# Patient Record
Sex: MALE | Race: WHITE | NOT HISPANIC OR LATINO | Employment: FULL TIME | URBAN - METROPOLITAN AREA
[De-identification: names, ages, dates, MRNs, and addresses within clinical notes are randomized per-mention and may not be internally consistent; named-entity substitution may affect disease eponyms.]

---

## 2022-01-01 ENCOUNTER — APPOINTMENT (OUTPATIENT)
Dept: CARDIOLOGY | Facility: CLINIC | Age: 62
DRG: 834 | End: 2022-01-01
Attending: PHYSICIAN ASSISTANT
Payer: COMMERCIAL

## 2022-01-01 ENCOUNTER — APPOINTMENT (OUTPATIENT)
Dept: GENERAL RADIOLOGY | Facility: CLINIC | Age: 62
DRG: 834 | End: 2022-01-01
Attending: PHYSICIAN ASSISTANT
Payer: COMMERCIAL

## 2022-01-01 ENCOUNTER — LAB (OUTPATIENT)
Dept: LAB | Facility: CLINIC | Age: 62
DRG: 834 | End: 2022-01-01
Attending: INTERNAL MEDICINE
Payer: COMMERCIAL

## 2022-01-01 ENCOUNTER — DOCUMENTATION ONLY (OUTPATIENT)
Dept: MEDSURG UNIT | Facility: CLINIC | Age: 62
End: 2022-01-01

## 2022-01-01 ENCOUNTER — APPOINTMENT (OUTPATIENT)
Dept: GENERAL RADIOLOGY | Facility: CLINIC | Age: 62
DRG: 834 | End: 2022-01-01
Attending: INTERNAL MEDICINE
Payer: COMMERCIAL

## 2022-01-01 ENCOUNTER — APPOINTMENT (OUTPATIENT)
Dept: MRI IMAGING | Facility: CLINIC | Age: 62
DRG: 834 | End: 2022-01-01
Attending: PHYSICIAN ASSISTANT
Payer: COMMERCIAL

## 2022-01-01 ENCOUNTER — TELEPHONE (OUTPATIENT)
Dept: TRANSPLANT | Facility: CLINIC | Age: 62
End: 2022-01-01

## 2022-01-01 ENCOUNTER — HOSPITAL ENCOUNTER (INPATIENT)
Facility: CLINIC | Age: 62
LOS: 20 days | Discharge: HOME | DRG: 834 | End: 2022-12-29
Attending: INTERNAL MEDICINE | Admitting: PEDIATRICS
Payer: COMMERCIAL

## 2022-01-01 ENCOUNTER — TELEPHONE (OUTPATIENT)
Dept: ONCOLOGY | Facility: CLINIC | Age: 62
End: 2022-01-01

## 2022-01-01 ENCOUNTER — PATIENT OUTREACH (OUTPATIENT)
Dept: ONCOLOGY | Facility: CLINIC | Age: 62
End: 2022-01-01

## 2022-01-01 ENCOUNTER — APPOINTMENT (OUTPATIENT)
Dept: CT IMAGING | Facility: CLINIC | Age: 62
DRG: 834 | End: 2022-01-01
Attending: PHYSICIAN ASSISTANT
Payer: COMMERCIAL

## 2022-01-01 VITALS
SYSTOLIC BLOOD PRESSURE: 103 MMHG | OXYGEN SATURATION: 97 % | TEMPERATURE: 98.8 F | RESPIRATION RATE: 18 BRPM | HEIGHT: 73 IN | HEART RATE: 83 BPM | BODY MASS INDEX: 41.75 KG/M2 | WEIGHT: 315 LBS | DIASTOLIC BLOOD PRESSURE: 61 MMHG

## 2022-01-01 DIAGNOSIS — C95.90 LEUKEMIA NOT HAVING ACHIEVED REMISSION, UNSPECIFIED LEUKEMIA TYPE (H): ICD-10-CM

## 2022-01-01 DIAGNOSIS — C91.90 LYMPHOID LEUKEMIA NOT HAVING ACHIEVED REMISSION, UNSPECIFIED LYMPHOID LEUKEMIA TYPE (H): ICD-10-CM

## 2022-01-01 DIAGNOSIS — C92.50: ICD-10-CM

## 2022-01-01 DIAGNOSIS — C92.00 ACUTE MYELOID LEUKEMIA NOT HAVING ACHIEVED REMISSION (H): ICD-10-CM

## 2022-01-01 DIAGNOSIS — C95.00 ACUTE LEUKEMIA (H): Primary | ICD-10-CM

## 2022-01-01 DIAGNOSIS — C92.00 ACUTE MYELOID LEUKEMIA NOT HAVING ACHIEVED REMISSION (H): Primary | ICD-10-CM

## 2022-01-01 LAB
A*: NORMAL
A*LOCUS SEROLOGIC EQUIVALENT: 2
A*LOCUS: NORMAL
A*SEROLOGIC EQUIVALENT: 3
ABO/RH(D): NORMAL
ABTEST METHOD: NORMAL
ADDITIONAL COMMENTS: NORMAL
ALBUMIN SERPL BCG-MCNC: 3.2 G/DL (ref 3.5–5.2)
ALBUMIN SERPL BCG-MCNC: 3.3 G/DL (ref 3.5–5.2)
ALBUMIN SERPL BCG-MCNC: 3.3 G/DL (ref 3.5–5.2)
ALBUMIN SERPL BCG-MCNC: 3.4 G/DL (ref 3.5–5.2)
ALBUMIN SERPL BCG-MCNC: 3.4 G/DL (ref 3.5–5.2)
ALBUMIN SERPL BCG-MCNC: 3.5 G/DL (ref 3.5–5.2)
ALBUMIN SERPL BCG-MCNC: 3.6 G/DL (ref 3.5–5.2)
ALBUMIN SERPL BCG-MCNC: 3.7 G/DL (ref 3.5–5.2)
ALBUMIN SERPL BCG-MCNC: 3.8 G/DL (ref 3.5–5.2)
ALBUMIN SERPL BCG-MCNC: 3.9 G/DL (ref 3.5–5.2)
ALBUMIN SERPL BCG-MCNC: 3.9 G/DL (ref 3.5–5.2)
ALP SERPL-CCNC: 100 U/L (ref 40–129)
ALP SERPL-CCNC: 62 U/L (ref 40–129)
ALP SERPL-CCNC: 64 U/L (ref 40–129)
ALP SERPL-CCNC: 67 U/L (ref 40–129)
ALP SERPL-CCNC: 68 U/L (ref 40–129)
ALP SERPL-CCNC: 69 U/L (ref 40–129)
ALP SERPL-CCNC: 71 U/L (ref 40–129)
ALP SERPL-CCNC: 74 U/L (ref 40–129)
ALP SERPL-CCNC: 75 U/L (ref 40–129)
ALP SERPL-CCNC: 76 U/L (ref 40–129)
ALP SERPL-CCNC: 77 U/L (ref 40–129)
ALP SERPL-CCNC: 78 U/L (ref 40–129)
ALP SERPL-CCNC: 82 U/L (ref 40–129)
ALP SERPL-CCNC: 83 U/L (ref 40–129)
ALP SERPL-CCNC: 86 U/L (ref 40–129)
ALP SERPL-CCNC: 87 U/L (ref 40–129)
ALP SERPL-CCNC: 88 U/L (ref 40–129)
ALP SERPL-CCNC: 90 U/L (ref 40–129)
ALP SERPL-CCNC: 96 U/L (ref 40–129)
ALP SERPL-CCNC: 96 U/L (ref 40–129)
ALT SERPL W P-5'-P-CCNC: 13 U/L (ref 10–50)
ALT SERPL W P-5'-P-CCNC: 15 U/L (ref 10–50)
ALT SERPL W P-5'-P-CCNC: 15 U/L (ref 10–50)
ALT SERPL W P-5'-P-CCNC: 16 U/L (ref 10–50)
ALT SERPL W P-5'-P-CCNC: 16 U/L (ref 10–50)
ALT SERPL W P-5'-P-CCNC: 17 U/L (ref 10–50)
ALT SERPL W P-5'-P-CCNC: 17 U/L (ref 10–50)
ALT SERPL W P-5'-P-CCNC: 19 U/L (ref 10–50)
ALT SERPL W P-5'-P-CCNC: 20 U/L (ref 10–50)
ALT SERPL W P-5'-P-CCNC: 21 U/L (ref 10–50)
ALT SERPL W P-5'-P-CCNC: 22 U/L (ref 10–50)
ALT SERPL W P-5'-P-CCNC: 24 U/L (ref 10–50)
ALT SERPL W P-5'-P-CCNC: 26 U/L (ref 10–50)
ALT SERPL W P-5'-P-CCNC: 30 U/L (ref 10–50)
ALT SERPL W P-5'-P-CCNC: 37 U/L (ref 10–50)
ALT SERPL W P-5'-P-CCNC: 39 U/L (ref 10–50)
ALT SERPL W P-5'-P-CCNC: 39 U/L (ref 10–50)
ALT SERPL W P-5'-P-CCNC: 5 U/L (ref 10–50)
ALT SERPL W P-5'-P-CCNC: <5 U/L (ref 10–50)
ALT SERPL W P-5'-P-CCNC: <5 U/L (ref 10–50)
ANION GAP SERPL CALCULATED.3IONS-SCNC: 10 MMOL/L (ref 7–15)
ANION GAP SERPL CALCULATED.3IONS-SCNC: 11 MMOL/L (ref 7–15)
ANION GAP SERPL CALCULATED.3IONS-SCNC: 12 MMOL/L (ref 7–15)
ANION GAP SERPL CALCULATED.3IONS-SCNC: 13 MMOL/L (ref 7–15)
ANION GAP SERPL CALCULATED.3IONS-SCNC: 14 MMOL/L (ref 7–15)
ANION GAP SERPL CALCULATED.3IONS-SCNC: 15 MMOL/L (ref 7–15)
ANION GAP SERPL CALCULATED.3IONS-SCNC: 16 MMOL/L (ref 7–15)
ANION GAP SERPL CALCULATED.3IONS-SCNC: 16 MMOL/L (ref 7–15)
ANTIBODY SCREEN: NEGATIVE
APPEARANCE CSF: CLEAR
APTT PPP: 25 SECONDS (ref 22–38)
APTT PPP: 26 SECONDS (ref 22–38)
APTT PPP: 26 SECONDS (ref 22–38)
APTT PPP: 27 SECONDS (ref 22–38)
APTT PPP: 27 SECONDS (ref 22–38)
APTT PPP: 28 SECONDS (ref 22–38)
APTT PPP: 30 SECONDS (ref 22–38)
APTT PPP: 30 SECONDS (ref 22–38)
APTT PPP: 31 SECONDS (ref 22–38)
APTT PPP: 32 SECONDS (ref 22–38)
APTT PPP: 33 SECONDS (ref 22–38)
APTT PPP: 34 SECONDS (ref 22–38)
APTT PPP: 37 SECONDS (ref 22–38)
AST SERPL W P-5'-P-CCNC: 21 U/L (ref 10–50)
AST SERPL W P-5'-P-CCNC: 23 U/L (ref 10–50)
AST SERPL W P-5'-P-CCNC: 23 U/L (ref 10–50)
AST SERPL W P-5'-P-CCNC: 25 U/L (ref 10–50)
AST SERPL W P-5'-P-CCNC: 27 U/L (ref 10–50)
AST SERPL W P-5'-P-CCNC: 29 U/L (ref 10–50)
AST SERPL W P-5'-P-CCNC: 30 U/L (ref 10–50)
AST SERPL W P-5'-P-CCNC: 34 U/L (ref 10–50)
AST SERPL W P-5'-P-CCNC: 39 U/L (ref 10–50)
AST SERPL W P-5'-P-CCNC: 45 U/L (ref 10–50)
AST SERPL W P-5'-P-CCNC: 49 U/L (ref 10–50)
AST SERPL W P-5'-P-CCNC: 52 U/L (ref 10–50)
AST SERPL W P-5'-P-CCNC: 53 U/L (ref 10–50)
AST SERPL W P-5'-P-CCNC: 55 U/L (ref 10–50)
AST SERPL W P-5'-P-CCNC: 59 U/L (ref 10–50)
AST SERPL W P-5'-P-CCNC: 64 U/L (ref 10–50)
AST SERPL W P-5'-P-CCNC: 64 U/L (ref 10–50)
AST SERPL W P-5'-P-CCNC: 65 U/L (ref 10–50)
AST SERPL W P-5'-P-CCNC: 68 U/L (ref 10–50)
AST SERPL W P-5'-P-CCNC: 72 U/L (ref 10–50)
ATRIAL RATE - MUSE: 60 BPM
ATRIAL RATE - MUSE: 75 BPM
ATRIAL RATE - MUSE: 77 BPM
ATRIAL RATE - MUSE: 81 BPM
B*: NORMAL
B*LOCUS SEROLOGIC EQUIVALENT: 35
B*LOCUS: NORMAL
B*SEROLOGIC EQUIVALENT: 50
BACTERIA BLD CULT: NO GROWTH
BACTERIA BLD CULT: NO GROWTH
BASE EXCESS BLDV CALC-SCNC: 1.2 MMOL/L (ref -7.7–1.9)
BASOPHILS # BLD MANUAL: 0 10E3/UL (ref 0–0.2)
BASOPHILS # BLD MANUAL: 0.2 10E3/UL (ref 0–0.2)
BASOPHILS # BLD MANUAL: 0.2 10E3/UL (ref 0–0.2)
BASOPHILS NFR BLD MANUAL: 0 %
BASOPHILS NFR BLD MANUAL: 1 %
BILIRUB SERPL-MCNC: 0.5 MG/DL
BILIRUB SERPL-MCNC: 0.6 MG/DL
BILIRUB SERPL-MCNC: 0.7 MG/DL
BUN SERPL-MCNC: 25.1 MG/DL (ref 8–23)
BUN SERPL-MCNC: 25.7 MG/DL (ref 8–23)
BUN SERPL-MCNC: 26.2 MG/DL (ref 8–23)
BUN SERPL-MCNC: 26.4 MG/DL (ref 8–23)
BUN SERPL-MCNC: 26.7 MG/DL (ref 8–23)
BUN SERPL-MCNC: 26.9 MG/DL (ref 8–23)
BUN SERPL-MCNC: 27.1 MG/DL (ref 8–23)
BUN SERPL-MCNC: 27.2 MG/DL (ref 8–23)
BUN SERPL-MCNC: 27.2 MG/DL (ref 8–23)
BUN SERPL-MCNC: 28.1 MG/DL (ref 8–23)
BUN SERPL-MCNC: 28.2 MG/DL (ref 8–23)
BUN SERPL-MCNC: 29 MG/DL (ref 8–23)
BUN SERPL-MCNC: 29.3 MG/DL (ref 8–23)
BUN SERPL-MCNC: 29.6 MG/DL (ref 8–23)
BUN SERPL-MCNC: 29.6 MG/DL (ref 8–23)
BUN SERPL-MCNC: 30 MG/DL (ref 8–23)
BUN SERPL-MCNC: 30.6 MG/DL (ref 8–23)
BUN SERPL-MCNC: 31.1 MG/DL (ref 8–23)
BUN SERPL-MCNC: 32 MG/DL (ref 8–23)
BUN SERPL-MCNC: 32.9 MG/DL (ref 8–23)
BUN SERPL-MCNC: 33.2 MG/DL (ref 8–23)
BUN SERPL-MCNC: 33.3 MG/DL (ref 8–23)
BUN SERPL-MCNC: 33.4 MG/DL (ref 8–23)
BUN SERPL-MCNC: 34.1 MG/DL (ref 8–23)
BUN SERPL-MCNC: 34.9 MG/DL (ref 8–23)
BUN SERPL-MCNC: 35.4 MG/DL (ref 8–23)
BUN SERPL-MCNC: 36.5 MG/DL (ref 8–23)
BUN SERPL-MCNC: 38.7 MG/DL (ref 8–23)
BUN SERPL-MCNC: 39 MG/DL (ref 8–23)
BW-1: NORMAL
C*: NORMAL
C*LOCUS SEROLOGIC EQUIVALENT: 4
C*LOCUS: NORMAL
C*SEROLOGIC EQUIVALENT: 12
CALCIUM SERPL-MCNC: 10.1 MG/DL (ref 8.8–10.2)
CALCIUM SERPL-MCNC: 10.1 MG/DL (ref 8.8–10.2)
CALCIUM SERPL-MCNC: 10.2 MG/DL (ref 8.8–10.2)
CALCIUM SERPL-MCNC: 8.9 MG/DL (ref 8.8–10.2)
CALCIUM SERPL-MCNC: 9 MG/DL (ref 8.8–10.2)
CALCIUM SERPL-MCNC: 9 MG/DL (ref 8.8–10.2)
CALCIUM SERPL-MCNC: 9.1 MG/DL (ref 8.8–10.2)
CALCIUM SERPL-MCNC: 9.2 MG/DL (ref 8.8–10.2)
CALCIUM SERPL-MCNC: 9.3 MG/DL (ref 8.8–10.2)
CALCIUM SERPL-MCNC: 9.4 MG/DL (ref 8.8–10.2)
CALCIUM SERPL-MCNC: 9.4 MG/DL (ref 8.8–10.2)
CALCIUM SERPL-MCNC: 9.5 MG/DL (ref 8.8–10.2)
CALCIUM SERPL-MCNC: 9.6 MG/DL (ref 8.8–10.2)
CALCIUM SERPL-MCNC: 9.6 MG/DL (ref 8.8–10.2)
CALCIUM SERPL-MCNC: 9.8 MG/DL (ref 8.8–10.2)
CALCIUM SERPL-MCNC: 9.9 MG/DL (ref 8.8–10.2)
CHLORIDE SERPL-SCNC: 100 MMOL/L (ref 98–107)
CHLORIDE SERPL-SCNC: 100 MMOL/L (ref 98–107)
CHLORIDE SERPL-SCNC: 102 MMOL/L (ref 98–107)
CHLORIDE SERPL-SCNC: 103 MMOL/L (ref 98–107)
CHLORIDE SERPL-SCNC: 90 MMOL/L (ref 98–107)
CHLORIDE SERPL-SCNC: 92 MMOL/L (ref 98–107)
CHLORIDE SERPL-SCNC: 93 MMOL/L (ref 98–107)
CHLORIDE SERPL-SCNC: 94 MMOL/L (ref 98–107)
CHLORIDE SERPL-SCNC: 95 MMOL/L (ref 98–107)
CHLORIDE SERPL-SCNC: 96 MMOL/L (ref 98–107)
CHLORIDE SERPL-SCNC: 97 MMOL/L (ref 98–107)
CHLORIDE SERPL-SCNC: 97 MMOL/L (ref 98–107)
CHLORIDE SERPL-SCNC: 98 MMOL/L (ref 98–107)
CMV IGG SERPL IA-ACNC: <0.2 U/ML
CMV IGG SERPL IA-ACNC: NORMAL
CMV IGM SERPL IA-ACNC: <8 AU/ML
CMV IGM SERPL IA-ACNC: NEGATIVE
COLOR CSF: COLORLESS
CORTIS SERPL-MCNC: 0.7 UG/DL
CREAT SERPL-MCNC: 0.8 MG/DL (ref 0.67–1.17)
CREAT SERPL-MCNC: 0.88 MG/DL (ref 0.67–1.17)
CREAT SERPL-MCNC: 0.9 MG/DL (ref 0.67–1.17)
CREAT SERPL-MCNC: 0.9 MG/DL (ref 0.67–1.17)
CREAT SERPL-MCNC: 0.93 MG/DL (ref 0.67–1.17)
CREAT SERPL-MCNC: 0.95 MG/DL (ref 0.67–1.17)
CREAT SERPL-MCNC: 0.96 MG/DL (ref 0.67–1.17)
CREAT SERPL-MCNC: 0.97 MG/DL (ref 0.67–1.17)
CREAT SERPL-MCNC: 0.98 MG/DL (ref 0.67–1.17)
CREAT SERPL-MCNC: 1 MG/DL (ref 0.67–1.17)
CREAT SERPL-MCNC: 1 MG/DL (ref 0.67–1.17)
CREAT SERPL-MCNC: 1.01 MG/DL (ref 0.67–1.17)
CREAT SERPL-MCNC: 1.01 MG/DL (ref 0.67–1.17)
CREAT SERPL-MCNC: 1.02 MG/DL (ref 0.67–1.17)
CREAT SERPL-MCNC: 1.04 MG/DL (ref 0.67–1.17)
CREAT SERPL-MCNC: 1.04 MG/DL (ref 0.67–1.17)
CREAT SERPL-MCNC: 1.08 MG/DL (ref 0.67–1.17)
CREAT SERPL-MCNC: 1.11 MG/DL (ref 0.67–1.17)
CREAT SERPL-MCNC: 1.15 MG/DL (ref 0.67–1.17)
CREAT SERPL-MCNC: 1.16 MG/DL (ref 0.67–1.17)
CREAT SERPL-MCNC: 1.16 MG/DL (ref 0.67–1.17)
CREAT SERPL-MCNC: 1.22 MG/DL (ref 0.67–1.17)
CREAT SERPL-MCNC: 1.27 MG/DL (ref 0.67–1.17)
CREAT SERPL-MCNC: 1.53 MG/DL (ref 0.67–1.17)
CRP SERPL-MCNC: 18.4 MG/L
CULTURE HARVEST COMPLETE DATE: NORMAL
DACRYOCYTES BLD QL SMEAR: SLIGHT
DAT POLY: NEGATIVE
DEPRECATED HCO3 PLAS-SCNC: 20 MMOL/L (ref 22–29)
DEPRECATED HCO3 PLAS-SCNC: 21 MMOL/L (ref 22–29)
DEPRECATED HCO3 PLAS-SCNC: 22 MMOL/L (ref 22–29)
DEPRECATED HCO3 PLAS-SCNC: 22 MMOL/L (ref 22–29)
DEPRECATED HCO3 PLAS-SCNC: 23 MMOL/L (ref 22–29)
DEPRECATED HCO3 PLAS-SCNC: 24 MMOL/L (ref 22–29)
DEPRECATED HCO3 PLAS-SCNC: 25 MMOL/L (ref 22–29)
DEPRECATED HCO3 PLAS-SCNC: 26 MMOL/L (ref 22–29)
DIASTOLIC BLOOD PRESSURE - MUSE: NORMAL MMHG
DPA1*: NORMAL
DPB1*: NORMAL
DPB1*LOCUS NMDP: NORMAL
DPB1*LOCUS: NORMAL
DPB1*NMDP: NORMAL
DQA1*: NORMAL
DQA1*LOCUS: NORMAL
DQB1*: NORMAL
DQB1*LOCUS SEROLOGIC EQUIVALENT: 2
DQB1*LOCUS: NORMAL
DQB1*SEROLOGIC EQUIVALENT: 5
DRB1*: NORMAL
DRB1*LOCUS SEROLOGIC EQUIVALENT: 7
DRB1*LOCUS: NORMAL
DRB1*SEROLOGIC EQUIVALENT: 14
DRB3*LOCUS SEROLOGIC EQUIVALENT: 52
DRB3*LOCUS: NORMAL
DRB4*: NORMAL
DRB4*SEROLOGIC EQUIVALENT: 53
DRSSO TEST METHOD: NORMAL
EBV VCA IGG SER IA-ACNC: 71.2 U/ML
EBV VCA IGG SER IA-ACNC: POSITIVE
EBV VCA IGM SER IA-ACNC: <10 U/ML
EBV VCA IGM SER IA-ACNC: NORMAL
EOSINOPHIL # BLD MANUAL: 0 10E3/UL (ref 0–0.7)
EOSINOPHIL NFR BLD MANUAL: 0 %
ERYTHROCYTE [DISTWIDTH] IN BLOOD BY AUTOMATED COUNT: 18.6 % (ref 10–15)
ERYTHROCYTE [DISTWIDTH] IN BLOOD BY AUTOMATED COUNT: 18.7 % (ref 10–15)
ERYTHROCYTE [DISTWIDTH] IN BLOOD BY AUTOMATED COUNT: 18.8 % (ref 10–15)
ERYTHROCYTE [DISTWIDTH] IN BLOOD BY AUTOMATED COUNT: 18.9 % (ref 10–15)
ERYTHROCYTE [DISTWIDTH] IN BLOOD BY AUTOMATED COUNT: 18.9 % (ref 10–15)
ERYTHROCYTE [DISTWIDTH] IN BLOOD BY AUTOMATED COUNT: 19 % (ref 10–15)
ERYTHROCYTE [DISTWIDTH] IN BLOOD BY AUTOMATED COUNT: 19.1 % (ref 10–15)
ERYTHROCYTE [DISTWIDTH] IN BLOOD BY AUTOMATED COUNT: 19.2 % (ref 10–15)
ERYTHROCYTE [DISTWIDTH] IN BLOOD BY AUTOMATED COUNT: 19.3 % (ref 10–15)
ERYTHROCYTE [DISTWIDTH] IN BLOOD BY AUTOMATED COUNT: 19.4 % (ref 10–15)
ERYTHROCYTE [DISTWIDTH] IN BLOOD BY AUTOMATED COUNT: 19.4 % (ref 10–15)
ERYTHROCYTE [DISTWIDTH] IN BLOOD BY AUTOMATED COUNT: 19.5 % (ref 10–15)
FIBRINOGEN PPP-MCNC: 243 MG/DL (ref 170–490)
FIBRINOGEN PPP-MCNC: 246 MG/DL (ref 170–490)
FIBRINOGEN PPP-MCNC: 248 MG/DL (ref 170–490)
FIBRINOGEN PPP-MCNC: 253 MG/DL (ref 170–490)
FIBRINOGEN PPP-MCNC: 256 MG/DL (ref 170–490)
FIBRINOGEN PPP-MCNC: 267 MG/DL (ref 170–490)
FIBRINOGEN PPP-MCNC: 272 MG/DL (ref 170–490)
FIBRINOGEN PPP-MCNC: 282 MG/DL (ref 170–490)
FIBRINOGEN PPP-MCNC: 282 MG/DL (ref 170–490)
FIBRINOGEN PPP-MCNC: 294 MG/DL (ref 170–490)
FIBRINOGEN PPP-MCNC: 315 MG/DL (ref 170–490)
FIBRINOGEN PPP-MCNC: 318 MG/DL (ref 170–490)
FIBRINOGEN PPP-MCNC: 323 MG/DL (ref 170–490)
FIBRINOGEN PPP-MCNC: 336 MG/DL (ref 170–490)
FIBRINOGEN PPP-MCNC: 336 MG/DL (ref 170–490)
FIBRINOGEN PPP-MCNC: 359 MG/DL (ref 170–490)
FIBRINOGEN PPP-MCNC: 395 MG/DL (ref 170–490)
FIBRINOGEN PPP-MCNC: 460 MG/DL (ref 170–490)
FIBRINOGEN PPP-MCNC: 474 MG/DL (ref 170–490)
FIBRINOGEN PPP-MCNC: 490 MG/DL (ref 170–490)
FIBRINOGEN PPP-MCNC: 498 MG/DL (ref 170–490)
GFR SERPL CREATININE-BSD FRML MDRD: 52 ML/MIN/1.73M2
GFR SERPL CREATININE-BSD FRML MDRD: 65 ML/MIN/1.73M2
GFR SERPL CREATININE-BSD FRML MDRD: 68 ML/MIN/1.73M2
GFR SERPL CREATININE-BSD FRML MDRD: 72 ML/MIN/1.73M2
GFR SERPL CREATININE-BSD FRML MDRD: 72 ML/MIN/1.73M2
GFR SERPL CREATININE-BSD FRML MDRD: 73 ML/MIN/1.73M2
GFR SERPL CREATININE-BSD FRML MDRD: 76 ML/MIN/1.73M2
GFR SERPL CREATININE-BSD FRML MDRD: 79 ML/MIN/1.73M2
GFR SERPL CREATININE-BSD FRML MDRD: 82 ML/MIN/1.73M2
GFR SERPL CREATININE-BSD FRML MDRD: 82 ML/MIN/1.73M2
GFR SERPL CREATININE-BSD FRML MDRD: 84 ML/MIN/1.73M2
GFR SERPL CREATININE-BSD FRML MDRD: 85 ML/MIN/1.73M2
GFR SERPL CREATININE-BSD FRML MDRD: 85 ML/MIN/1.73M2
GFR SERPL CREATININE-BSD FRML MDRD: 86 ML/MIN/1.73M2
GFR SERPL CREATININE-BSD FRML MDRD: 86 ML/MIN/1.73M2
GFR SERPL CREATININE-BSD FRML MDRD: 88 ML/MIN/1.73M2
GFR SERPL CREATININE-BSD FRML MDRD: 89 ML/MIN/1.73M2
GFR SERPL CREATININE-BSD FRML MDRD: 90 ML/MIN/1.73M2
GFR SERPL CREATININE-BSD FRML MDRD: >90 ML/MIN/1.73M2
GGT SERPL-CCNC: 77 U/L (ref 8–61)
GLUCOSE BLDC GLUCOMTR-MCNC: 103 MG/DL (ref 70–99)
GLUCOSE BLDC GLUCOMTR-MCNC: 104 MG/DL (ref 70–99)
GLUCOSE BLDC GLUCOMTR-MCNC: 105 MG/DL (ref 70–99)
GLUCOSE BLDC GLUCOMTR-MCNC: 106 MG/DL (ref 70–99)
GLUCOSE BLDC GLUCOMTR-MCNC: 107 MG/DL (ref 70–99)
GLUCOSE BLDC GLUCOMTR-MCNC: 108 MG/DL (ref 70–99)
GLUCOSE BLDC GLUCOMTR-MCNC: 109 MG/DL (ref 70–99)
GLUCOSE BLDC GLUCOMTR-MCNC: 115 MG/DL (ref 70–99)
GLUCOSE BLDC GLUCOMTR-MCNC: 115 MG/DL (ref 70–99)
GLUCOSE BLDC GLUCOMTR-MCNC: 116 MG/DL (ref 70–99)
GLUCOSE BLDC GLUCOMTR-MCNC: 117 MG/DL (ref 70–99)
GLUCOSE BLDC GLUCOMTR-MCNC: 121 MG/DL (ref 70–99)
GLUCOSE BLDC GLUCOMTR-MCNC: 122 MG/DL (ref 70–99)
GLUCOSE BLDC GLUCOMTR-MCNC: 124 MG/DL (ref 70–99)
GLUCOSE BLDC GLUCOMTR-MCNC: 128 MG/DL (ref 70–99)
GLUCOSE BLDC GLUCOMTR-MCNC: 131 MG/DL (ref 70–99)
GLUCOSE BLDC GLUCOMTR-MCNC: 133 MG/DL (ref 70–99)
GLUCOSE BLDC GLUCOMTR-MCNC: 137 MG/DL (ref 70–99)
GLUCOSE BLDC GLUCOMTR-MCNC: 139 MG/DL (ref 70–99)
GLUCOSE BLDC GLUCOMTR-MCNC: 140 MG/DL (ref 70–99)
GLUCOSE BLDC GLUCOMTR-MCNC: 140 MG/DL (ref 70–99)
GLUCOSE BLDC GLUCOMTR-MCNC: 141 MG/DL (ref 70–99)
GLUCOSE BLDC GLUCOMTR-MCNC: 145 MG/DL (ref 70–99)
GLUCOSE BLDC GLUCOMTR-MCNC: 146 MG/DL (ref 70–99)
GLUCOSE BLDC GLUCOMTR-MCNC: 148 MG/DL (ref 70–99)
GLUCOSE BLDC GLUCOMTR-MCNC: 152 MG/DL (ref 70–99)
GLUCOSE BLDC GLUCOMTR-MCNC: 153 MG/DL (ref 70–99)
GLUCOSE BLDC GLUCOMTR-MCNC: 154 MG/DL (ref 70–99)
GLUCOSE BLDC GLUCOMTR-MCNC: 159 MG/DL (ref 70–99)
GLUCOSE BLDC GLUCOMTR-MCNC: 159 MG/DL (ref 70–99)
GLUCOSE BLDC GLUCOMTR-MCNC: 164 MG/DL (ref 70–99)
GLUCOSE BLDC GLUCOMTR-MCNC: 166 MG/DL (ref 70–99)
GLUCOSE BLDC GLUCOMTR-MCNC: 167 MG/DL (ref 70–99)
GLUCOSE BLDC GLUCOMTR-MCNC: 167 MG/DL (ref 70–99)
GLUCOSE BLDC GLUCOMTR-MCNC: 168 MG/DL (ref 70–99)
GLUCOSE BLDC GLUCOMTR-MCNC: 168 MG/DL (ref 70–99)
GLUCOSE BLDC GLUCOMTR-MCNC: 169 MG/DL (ref 70–99)
GLUCOSE BLDC GLUCOMTR-MCNC: 169 MG/DL (ref 70–99)
GLUCOSE BLDC GLUCOMTR-MCNC: 172 MG/DL (ref 70–99)
GLUCOSE BLDC GLUCOMTR-MCNC: 173 MG/DL (ref 70–99)
GLUCOSE BLDC GLUCOMTR-MCNC: 173 MG/DL (ref 70–99)
GLUCOSE BLDC GLUCOMTR-MCNC: 174 MG/DL (ref 70–99)
GLUCOSE BLDC GLUCOMTR-MCNC: 174 MG/DL (ref 70–99)
GLUCOSE BLDC GLUCOMTR-MCNC: 175 MG/DL (ref 70–99)
GLUCOSE BLDC GLUCOMTR-MCNC: 176 MG/DL (ref 70–99)
GLUCOSE BLDC GLUCOMTR-MCNC: 180 MG/DL (ref 70–99)
GLUCOSE BLDC GLUCOMTR-MCNC: 181 MG/DL (ref 70–99)
GLUCOSE BLDC GLUCOMTR-MCNC: 181 MG/DL (ref 70–99)
GLUCOSE BLDC GLUCOMTR-MCNC: 186 MG/DL (ref 70–99)
GLUCOSE BLDC GLUCOMTR-MCNC: 187 MG/DL (ref 70–99)
GLUCOSE BLDC GLUCOMTR-MCNC: 188 MG/DL (ref 70–99)
GLUCOSE BLDC GLUCOMTR-MCNC: 189 MG/DL (ref 70–99)
GLUCOSE BLDC GLUCOMTR-MCNC: 190 MG/DL (ref 70–99)
GLUCOSE BLDC GLUCOMTR-MCNC: 193 MG/DL (ref 70–99)
GLUCOSE BLDC GLUCOMTR-MCNC: 193 MG/DL (ref 70–99)
GLUCOSE BLDC GLUCOMTR-MCNC: 194 MG/DL (ref 70–99)
GLUCOSE BLDC GLUCOMTR-MCNC: 195 MG/DL (ref 70–99)
GLUCOSE BLDC GLUCOMTR-MCNC: 196 MG/DL (ref 70–99)
GLUCOSE BLDC GLUCOMTR-MCNC: 201 MG/DL (ref 70–99)
GLUCOSE BLDC GLUCOMTR-MCNC: 203 MG/DL (ref 70–99)
GLUCOSE BLDC GLUCOMTR-MCNC: 207 MG/DL (ref 70–99)
GLUCOSE BLDC GLUCOMTR-MCNC: 210 MG/DL (ref 70–99)
GLUCOSE BLDC GLUCOMTR-MCNC: 214 MG/DL (ref 70–99)
GLUCOSE BLDC GLUCOMTR-MCNC: 214 MG/DL (ref 70–99)
GLUCOSE BLDC GLUCOMTR-MCNC: 217 MG/DL (ref 70–99)
GLUCOSE BLDC GLUCOMTR-MCNC: 217 MG/DL (ref 70–99)
GLUCOSE BLDC GLUCOMTR-MCNC: 219 MG/DL (ref 70–99)
GLUCOSE BLDC GLUCOMTR-MCNC: 222 MG/DL (ref 70–99)
GLUCOSE BLDC GLUCOMTR-MCNC: 224 MG/DL (ref 70–99)
GLUCOSE BLDC GLUCOMTR-MCNC: 225 MG/DL (ref 70–99)
GLUCOSE BLDC GLUCOMTR-MCNC: 227 MG/DL (ref 70–99)
GLUCOSE BLDC GLUCOMTR-MCNC: 234 MG/DL (ref 70–99)
GLUCOSE BLDC GLUCOMTR-MCNC: 236 MG/DL (ref 70–99)
GLUCOSE BLDC GLUCOMTR-MCNC: 237 MG/DL (ref 70–99)
GLUCOSE BLDC GLUCOMTR-MCNC: 241 MG/DL (ref 70–99)
GLUCOSE BLDC GLUCOMTR-MCNC: 265 MG/DL (ref 70–99)
GLUCOSE BLDC GLUCOMTR-MCNC: 268 MG/DL (ref 70–99)
GLUCOSE BLDC GLUCOMTR-MCNC: 278 MG/DL (ref 70–99)
GLUCOSE BLDC GLUCOMTR-MCNC: 82 MG/DL (ref 70–99)
GLUCOSE BLDC GLUCOMTR-MCNC: 94 MG/DL (ref 70–99)
GLUCOSE BLDC GLUCOMTR-MCNC: 95 MG/DL (ref 70–99)
GLUCOSE BLDC GLUCOMTR-MCNC: 96 MG/DL (ref 70–99)
GLUCOSE BLDC GLUCOMTR-MCNC: 99 MG/DL (ref 70–99)
GLUCOSE CSF-MCNC: 85 MG/DL (ref 40–70)
GLUCOSE SERPL-MCNC: 104 MG/DL (ref 70–99)
GLUCOSE SERPL-MCNC: 107 MG/DL (ref 70–99)
GLUCOSE SERPL-MCNC: 108 MG/DL (ref 70–99)
GLUCOSE SERPL-MCNC: 110 MG/DL (ref 70–99)
GLUCOSE SERPL-MCNC: 114 MG/DL (ref 70–99)
GLUCOSE SERPL-MCNC: 115 MG/DL (ref 70–99)
GLUCOSE SERPL-MCNC: 116 MG/DL (ref 70–99)
GLUCOSE SERPL-MCNC: 117 MG/DL (ref 70–99)
GLUCOSE SERPL-MCNC: 120 MG/DL (ref 70–99)
GLUCOSE SERPL-MCNC: 121 MG/DL (ref 70–99)
GLUCOSE SERPL-MCNC: 122 MG/DL (ref 70–99)
GLUCOSE SERPL-MCNC: 123 MG/DL (ref 70–99)
GLUCOSE SERPL-MCNC: 134 MG/DL (ref 70–99)
GLUCOSE SERPL-MCNC: 138 MG/DL (ref 70–99)
GLUCOSE SERPL-MCNC: 141 MG/DL (ref 70–99)
GLUCOSE SERPL-MCNC: 142 MG/DL (ref 70–99)
GLUCOSE SERPL-MCNC: 146 MG/DL (ref 70–99)
GLUCOSE SERPL-MCNC: 162 MG/DL (ref 70–99)
GLUCOSE SERPL-MCNC: 166 MG/DL (ref 70–99)
GLUCOSE SERPL-MCNC: 169 MG/DL (ref 70–99)
GLUCOSE SERPL-MCNC: 191 MG/DL (ref 70–99)
GLUCOSE SERPL-MCNC: 203 MG/DL (ref 70–99)
GLUCOSE SERPL-MCNC: 214 MG/DL (ref 70–99)
GLUCOSE SERPL-MCNC: 234 MG/DL (ref 70–99)
GLUCOSE SERPL-MCNC: 252 MG/DL (ref 70–99)
GLUCOSE SERPL-MCNC: 276 MG/DL (ref 70–99)
GLUCOSE SERPL-MCNC: 85 MG/DL (ref 70–99)
GROUP A STREP BY PCR: NOT DETECTED
HAPTOGLOB SERPL-MCNC: 145 MG/DL (ref 32–197)
HBV CORE AB SERPL QL IA: NONREACTIVE
HBV SURFACE AB SERPL IA-ACNC: 0.45 M[IU]/ML
HBV SURFACE AB SERPL IA-ACNC: NONREACTIVE M[IU]/ML
HBV SURFACE AG SERPL QL IA: NONREACTIVE
HCO3 BLDV-SCNC: 27 MMOL/L (ref 21–28)
HCT VFR BLD AUTO: 23.3 % (ref 40–53)
HCT VFR BLD AUTO: 24.6 % (ref 40–53)
HCT VFR BLD AUTO: 25.8 % (ref 40–53)
HCT VFR BLD AUTO: 25.9 % (ref 40–53)
HCT VFR BLD AUTO: 26 % (ref 40–53)
HCT VFR BLD AUTO: 26.2 % (ref 40–53)
HCT VFR BLD AUTO: 26.3 % (ref 40–53)
HCT VFR BLD AUTO: 26.3 % (ref 40–53)
HCT VFR BLD AUTO: 26.4 % (ref 40–53)
HCT VFR BLD AUTO: 26.8 % (ref 40–53)
HCT VFR BLD AUTO: 27.1 % (ref 40–53)
HCT VFR BLD AUTO: 27.1 % (ref 40–53)
HCT VFR BLD AUTO: 27.2 % (ref 40–53)
HCT VFR BLD AUTO: 27.3 % (ref 40–53)
HCT VFR BLD AUTO: 27.8 % (ref 40–53)
HCT VFR BLD AUTO: 27.9 % (ref 40–53)
HCT VFR BLD AUTO: 28.5 % (ref 40–53)
HCT VFR BLD AUTO: 28.6 % (ref 40–53)
HCT VFR BLD AUTO: 28.7 % (ref 40–53)
HCT VFR BLD AUTO: 29 % (ref 40–53)
HCT VFR BLD AUTO: 30 % (ref 40–53)
HCV AB SERPL QL IA: NONREACTIVE
HGB BLD-MCNC: 7.4 G/DL (ref 13.3–17.7)
HGB BLD-MCNC: 7.6 G/DL (ref 13.3–17.7)
HGB BLD-MCNC: 7.8 G/DL (ref 13.3–17.7)
HGB BLD-MCNC: 8 G/DL (ref 13.3–17.7)
HGB BLD-MCNC: 8.1 G/DL (ref 13.3–17.7)
HGB BLD-MCNC: 8.1 G/DL (ref 13.3–17.7)
HGB BLD-MCNC: 8.2 G/DL (ref 13.3–17.7)
HGB BLD-MCNC: 8.2 G/DL (ref 13.3–17.7)
HGB BLD-MCNC: 8.3 G/DL (ref 13.3–17.7)
HGB BLD-MCNC: 8.3 G/DL (ref 13.3–17.7)
HGB BLD-MCNC: 8.4 G/DL (ref 13.3–17.7)
HGB BLD-MCNC: 8.5 G/DL (ref 13.3–17.7)
HGB BLD-MCNC: 8.5 G/DL (ref 13.3–17.7)
HGB BLD-MCNC: 8.6 G/DL (ref 13.3–17.7)
HGB BLD-MCNC: 8.7 G/DL (ref 13.3–17.7)
HGB BLD-MCNC: 8.8 G/DL (ref 13.3–17.7)
HGB BLD-MCNC: 8.8 G/DL (ref 13.3–17.7)
HGB BLD-MCNC: 9 G/DL (ref 13.3–17.7)
HGB BLD-MCNC: 9.1 G/DL (ref 13.3–17.7)
HIV 1+2 AB+HIV1 P24 AG SERPL QL IA: NONREACTIVE
HOLD SPECIMEN: NORMAL
HSV1 DNA SPEC QL NAA+PROBE: NEGATIVE
HSV1 DNA SPEC QL NAA+PROBE: NOT DETECTED
HSV2 DNA SPEC QL NAA+PROBE: NEGATIVE
HSV2 DNA SPEC QL NAA+PROBE: NOT DETECTED
INR PPP: 1.05 (ref 0.85–1.15)
INR PPP: 1.06 (ref 0.85–1.15)
INR PPP: 1.08 (ref 0.85–1.15)
INR PPP: 1.08 (ref 0.85–1.15)
INR PPP: 1.11 (ref 0.85–1.15)
INR PPP: 1.12 (ref 0.85–1.15)
INR PPP: 1.12 (ref 0.85–1.15)
INR PPP: 1.14 (ref 0.85–1.15)
INR PPP: 1.14 (ref 0.85–1.15)
INR PPP: 1.15 (ref 0.85–1.15)
INR PPP: 1.16 (ref 0.85–1.15)
INR PPP: 1.17 (ref 0.85–1.15)
INR PPP: 1.17 (ref 0.85–1.15)
INR PPP: 1.2 (ref 0.85–1.15)
INR PPP: 1.21 (ref 0.85–1.15)
INR PPP: 1.22 (ref 0.85–1.15)
INR PPP: 1.61 (ref 0.85–1.15)
INR PPP: 1.62 (ref 0.85–1.15)
INR PPP: 1.73 (ref 0.85–1.15)
INTERPRETATION ECG - MUSE: NORMAL
INTERPRETATION: NORMAL
ISCN: NORMAL
LAB DIRECTOR COMMENTS: NORMAL
LAB DIRECTOR DISCLAIMER: NORMAL
LAB DIRECTOR INTERPRETATION: NORMAL
LAB DIRECTOR METHODOLOGY: NORMAL
LAB DIRECTOR RESULTS: NORMAL
LACTATE SERPL-SCNC: 1.8 MMOL/L (ref 0.7–2)
LACTATE SERPL-SCNC: 2.2 MMOL/L (ref 0.7–2)
LDH SERPL L TO P-CCNC: 1384 U/L (ref 0–250)
LDH SERPL L TO P-CCNC: 1522 U/L (ref 0–250)
LDH SERPL L TO P-CCNC: 1584 U/L (ref 0–250)
LDH SERPL L TO P-CCNC: 1779 U/L (ref 0–250)
LDH SERPL L TO P-CCNC: 1797 U/L (ref 0–250)
LDH SERPL L TO P-CCNC: 1825 U/L (ref 0–250)
LDH SERPL L TO P-CCNC: 1857 U/L (ref 0–250)
LDH SERPL L TO P-CCNC: 1893 U/L (ref 0–250)
LDH SERPL L TO P-CCNC: 1904 U/L (ref 0–250)
LDH SERPL L TO P-CCNC: 1929 U/L (ref 0–250)
LDH SERPL L TO P-CCNC: 1963 U/L (ref 0–250)
LDH SERPL L TO P-CCNC: 1991 U/L (ref 0–250)
LDH SERPL L TO P-CCNC: 2475 U/L (ref 0–250)
LDH SERPL L TO P-CCNC: 409 U/L (ref 0–250)
LDH SERPL L TO P-CCNC: 463 U/L (ref 0–250)
LDH SERPL L TO P-CCNC: 501 U/L (ref 0–250)
LDH SERPL L TO P-CCNC: 549 U/L (ref 0–250)
LDH SERPL L TO P-CCNC: 690 U/L (ref 0–250)
LDH SERPL L TO P-CCNC: 765 U/L (ref 0–250)
LDH SERPL L TO P-CCNC: 974 U/L (ref 0–250)
LIPASE SERPL-CCNC: 36 U/L (ref 13–60)
LIPASE SERPL-CCNC: 37 U/L (ref 13–60)
LIPASE SERPL-CCNC: 70 U/L (ref 13–60)
LVEF ECHO: NORMAL
LYMPHOCYTES # BLD MANUAL: 0.5 10E3/UL (ref 0.8–5.3)
LYMPHOCYTES # BLD MANUAL: 0.6 10E3/UL (ref 0.8–5.3)
LYMPHOCYTES # BLD MANUAL: 0.7 10E3/UL (ref 0.8–5.3)
LYMPHOCYTES # BLD MANUAL: 0.7 10E3/UL (ref 0.8–5.3)
LYMPHOCYTES # BLD MANUAL: 0.8 10E3/UL (ref 0.8–5.3)
LYMPHOCYTES # BLD MANUAL: 0.9 10E3/UL (ref 0.8–5.3)
LYMPHOCYTES # BLD MANUAL: 1 10E3/UL (ref 0.8–5.3)
LYMPHOCYTES # BLD MANUAL: 1.1 10E3/UL (ref 0.8–5.3)
LYMPHOCYTES # BLD MANUAL: 1.3 10E3/UL (ref 0.8–5.3)
LYMPHOCYTES # BLD MANUAL: 1.4 10E3/UL (ref 0.8–5.3)
LYMPHOCYTES # BLD MANUAL: 1.9 10E3/UL (ref 0.8–5.3)
LYMPHOCYTES # BLD MANUAL: 2.1 10E3/UL (ref 0.8–5.3)
LYMPHOCYTES # BLD MANUAL: 2.1 10E3/UL (ref 0.8–5.3)
LYMPHOCYTES # BLD MANUAL: 2.3 10E3/UL (ref 0.8–5.3)
LYMPHOCYTES # BLD MANUAL: 2.5 10E3/UL (ref 0.8–5.3)
LYMPHOCYTES # BLD MANUAL: 2.8 10E3/UL (ref 0.8–5.3)
LYMPHOCYTES # BLD MANUAL: 2.8 10E3/UL (ref 0.8–5.3)
LYMPHOCYTES # BLD MANUAL: 3.6 10E3/UL (ref 0.8–5.3)
LYMPHOCYTES NFR BLD MANUAL: 11 %
LYMPHOCYTES NFR BLD MANUAL: 12 %
LYMPHOCYTES NFR BLD MANUAL: 12 %
LYMPHOCYTES NFR BLD MANUAL: 13 %
LYMPHOCYTES NFR BLD MANUAL: 14 %
LYMPHOCYTES NFR BLD MANUAL: 14 %
LYMPHOCYTES NFR BLD MANUAL: 15 %
LYMPHOCYTES NFR BLD MANUAL: 16 %
LYMPHOCYTES NFR BLD MANUAL: 16 %
LYMPHOCYTES NFR BLD MANUAL: 17 %
LYMPHOCYTES NFR BLD MANUAL: 18 %
LYMPHOCYTES NFR BLD MANUAL: 18 %
LYMPHOCYTES NFR BLD MANUAL: 21 %
LYMPHOCYTES NFR BLD MANUAL: 23 %
LYMPHOCYTES NFR BLD MANUAL: 24 %
LYMPHOCYTES NFR BLD MANUAL: 5 %
LYMPHOCYTES NFR BLD MANUAL: 6 %
LYMPHOCYTES NFR BLD MANUAL: 6 %
LYMPHOCYTES NFR BLD MANUAL: 9 %
LYMPHOCYTES NFR BLD MANUAL: 9 %
MAGNESIUM SERPL-MCNC: 2 MG/DL (ref 1.7–2.3)
MAGNESIUM SERPL-MCNC: 2.1 MG/DL (ref 1.7–2.3)
MAGNESIUM SERPL-MCNC: 2.1 MG/DL (ref 1.7–2.3)
MAGNESIUM SERPL-MCNC: 2.2 MG/DL (ref 1.7–2.3)
MAGNESIUM SERPL-MCNC: 2.3 MG/DL (ref 1.7–2.3)
MAGNESIUM SERPL-MCNC: 2.4 MG/DL (ref 1.7–2.3)
MAGNESIUM SERPL-MCNC: 2.4 MG/DL (ref 1.7–2.3)
MAGNESIUM SERPL-MCNC: 2.5 MG/DL (ref 1.7–2.3)
MAGNESIUM SERPL-MCNC: 2.6 MG/DL (ref 1.7–2.3)
MAGNESIUM SERPL-MCNC: 2.7 MG/DL (ref 1.7–2.3)
MCH RBC QN AUTO: 29.4 PG (ref 26.5–33)
MCH RBC QN AUTO: 29.4 PG (ref 26.5–33)
MCH RBC QN AUTO: 29.5 PG (ref 26.5–33)
MCH RBC QN AUTO: 29.5 PG (ref 26.5–33)
MCH RBC QN AUTO: 29.6 PG (ref 26.5–33)
MCH RBC QN AUTO: 29.8 PG (ref 26.5–33)
MCH RBC QN AUTO: 29.9 PG (ref 26.5–33)
MCH RBC QN AUTO: 30 PG (ref 26.5–33)
MCH RBC QN AUTO: 30 PG (ref 26.5–33)
MCH RBC QN AUTO: 30.1 PG (ref 26.5–33)
MCH RBC QN AUTO: 30.1 PG (ref 26.5–33)
MCH RBC QN AUTO: 30.2 PG (ref 26.5–33)
MCH RBC QN AUTO: 30.3 PG (ref 26.5–33)
MCH RBC QN AUTO: 30.3 PG (ref 26.5–33)
MCH RBC QN AUTO: 30.4 PG (ref 26.5–33)
MCHC RBC AUTO-ENTMCNC: 29.7 G/DL (ref 31.5–36.5)
MCHC RBC AUTO-ENTMCNC: 29.7 G/DL (ref 31.5–36.5)
MCHC RBC AUTO-ENTMCNC: 29.9 G/DL (ref 31.5–36.5)
MCHC RBC AUTO-ENTMCNC: 30.1 G/DL (ref 31.5–36.5)
MCHC RBC AUTO-ENTMCNC: 30.3 G/DL (ref 31.5–36.5)
MCHC RBC AUTO-ENTMCNC: 30.4 G/DL (ref 31.5–36.5)
MCHC RBC AUTO-ENTMCNC: 30.5 G/DL (ref 31.5–36.5)
MCHC RBC AUTO-ENTMCNC: 30.5 G/DL (ref 31.5–36.5)
MCHC RBC AUTO-ENTMCNC: 30.6 G/DL (ref 31.5–36.5)
MCHC RBC AUTO-ENTMCNC: 30.9 G/DL (ref 31.5–36.5)
MCHC RBC AUTO-ENTMCNC: 31 G/DL (ref 31.5–36.5)
MCHC RBC AUTO-ENTMCNC: 31 G/DL (ref 31.5–36.5)
MCHC RBC AUTO-ENTMCNC: 31.3 G/DL (ref 31.5–36.5)
MCHC RBC AUTO-ENTMCNC: 31.3 G/DL (ref 31.5–36.5)
MCHC RBC AUTO-ENTMCNC: 31.6 G/DL (ref 31.5–36.5)
MCHC RBC AUTO-ENTMCNC: 31.8 G/DL (ref 31.5–36.5)
MCHC RBC AUTO-ENTMCNC: 31.9 G/DL (ref 31.5–36.5)
MCV RBC AUTO: 100 FL (ref 78–100)
MCV RBC AUTO: 93 FL (ref 78–100)
MCV RBC AUTO: 95 FL (ref 78–100)
MCV RBC AUTO: 96 FL (ref 78–100)
MCV RBC AUTO: 97 FL (ref 78–100)
MCV RBC AUTO: 98 FL (ref 78–100)
MCV RBC AUTO: 99 FL (ref 78–100)
METAMYELOCYTES # BLD MANUAL: 0 10E3/UL
METAMYELOCYTES # BLD MANUAL: 0.1 10E3/UL
METAMYELOCYTES # BLD MANUAL: 0.2 10E3/UL
METAMYELOCYTES # BLD MANUAL: 0.3 10E3/UL
METAMYELOCYTES # BLD MANUAL: 0.4 10E3/UL
METAMYELOCYTES # BLD MANUAL: 0.4 10E3/UL
METAMYELOCYTES # BLD MANUAL: 0.5 10E3/UL
METAMYELOCYTES # BLD MANUAL: 0.5 10E3/UL
METAMYELOCYTES # BLD MANUAL: 0.6 10E3/UL
METAMYELOCYTES # BLD MANUAL: 0.7 10E3/UL
METAMYELOCYTES # BLD MANUAL: 1.1 10E3/UL
METAMYELOCYTES # BLD MANUAL: 1.2 10E3/UL
METAMYELOCYTES NFR BLD MANUAL: 1 %
METAMYELOCYTES NFR BLD MANUAL: 2 %
METAMYELOCYTES NFR BLD MANUAL: 3 %
METAMYELOCYTES NFR BLD MANUAL: 3 %
METAMYELOCYTES NFR BLD MANUAL: 4 %
METAMYELOCYTES NFR BLD MANUAL: 4 %
METAMYELOCYTES NFR BLD MANUAL: 5 %
METAMYELOCYTES NFR BLD MANUAL: 6 %
METAMYELOCYTES NFR BLD MANUAL: 7 %
METHODS: NORMAL
MONOCYTES # BLD MANUAL: 0 10E3/UL (ref 0–1.3)
MONOCYTES # BLD MANUAL: 0.1 10E3/UL (ref 0–1.3)
MONOCYTES # BLD MANUAL: 0.1 10E3/UL (ref 0–1.3)
MONOCYTES # BLD MANUAL: 0.2 10E3/UL (ref 0–1.3)
MONOCYTES # BLD MANUAL: 0.2 10E3/UL (ref 0–1.3)
MONOCYTES # BLD MANUAL: 0.3 10E3/UL (ref 0–1.3)
MONOCYTES # BLD MANUAL: 0.5 10E3/UL (ref 0–1.3)
MONOCYTES # BLD MANUAL: 0.9 10E3/UL (ref 0–1.3)
MONOCYTES # BLD MANUAL: 0.9 10E3/UL (ref 0–1.3)
MONOCYTES # BLD MANUAL: 1.3 10E3/UL (ref 0–1.3)
MONOCYTES # BLD MANUAL: 1.3 10E3/UL (ref 0–1.3)
MONOCYTES # BLD MANUAL: 1.7 10E3/UL (ref 0–1.3)
MONOCYTES # BLD MANUAL: 1.8 10E3/UL (ref 0–1.3)
MONOCYTES # BLD MANUAL: 1.9 10E3/UL (ref 0–1.3)
MONOCYTES # BLD MANUAL: 1.9 10E3/UL (ref 0–1.3)
MONOCYTES # BLD MANUAL: 2.2 10E3/UL (ref 0–1.3)
MONOCYTES # BLD MANUAL: 2.2 10E3/UL (ref 0–1.3)
MONOCYTES # BLD MANUAL: 2.3 10E3/UL (ref 0–1.3)
MONOCYTES # BLD MANUAL: 2.5 10E3/UL (ref 0–1.3)
MONOCYTES # BLD MANUAL: 2.6 10E3/UL (ref 0–1.3)
MONOCYTES NFR BLD MANUAL: 0 %
MONOCYTES NFR BLD MANUAL: 10 %
MONOCYTES NFR BLD MANUAL: 10 %
MONOCYTES NFR BLD MANUAL: 11 %
MONOCYTES NFR BLD MANUAL: 13 %
MONOCYTES NFR BLD MANUAL: 14 %
MONOCYTES NFR BLD MANUAL: 15 %
MONOCYTES NFR BLD MANUAL: 17 %
MONOCYTES NFR BLD MANUAL: 4 %
MONOCYTES NFR BLD MANUAL: 4 %
MONOCYTES NFR BLD MANUAL: 5 %
MONOCYTES NFR BLD MANUAL: 6 %
MONOCYTES NFR BLD MANUAL: 6 %
MONOCYTES NFR BLD MANUAL: 7 %
MONOCYTES NFR BLD MANUAL: 7 %
MYELOCYTES # BLD MANUAL: 0 10E3/UL
MYELOCYTES # BLD MANUAL: 0.1 10E3/UL
MYELOCYTES # BLD MANUAL: 0.2 10E3/UL
MYELOCYTES # BLD MANUAL: 0.3 10E3/UL
MYELOCYTES # BLD MANUAL: 0.3 10E3/UL
MYELOCYTES # BLD MANUAL: 0.4 10E3/UL
MYELOCYTES # BLD MANUAL: 0.4 10E3/UL
MYELOCYTES # BLD MANUAL: 0.5 10E3/UL
MYELOCYTES # BLD MANUAL: 0.5 10E3/UL
MYELOCYTES # BLD MANUAL: 0.6 10E3/UL
MYELOCYTES # BLD MANUAL: 0.7 10E3/UL
MYELOCYTES # BLD MANUAL: 0.7 10E3/UL
MYELOCYTES # BLD MANUAL: 0.9 10E3/UL
MYELOCYTES NFR BLD MANUAL: 1 %
MYELOCYTES NFR BLD MANUAL: 2 %
MYELOCYTES NFR BLD MANUAL: 3 %
MYELOCYTES NFR BLD MANUAL: 4 %
MYELOCYTES NFR BLD MANUAL: 5 %
MYELOCYTES NFR BLD MANUAL: 5 %
MYELOCYTES NFR BLD MANUAL: 6 %
NEUTROPHILS # BLD MANUAL: 1.5 10E3/UL (ref 1.6–8.3)
NEUTROPHILS # BLD MANUAL: 1.7 10E3/UL (ref 1.6–8.3)
NEUTROPHILS # BLD MANUAL: 10.1 10E3/UL (ref 1.6–8.3)
NEUTROPHILS # BLD MANUAL: 10.6 10E3/UL (ref 1.6–8.3)
NEUTROPHILS # BLD MANUAL: 10.7 10E3/UL (ref 1.6–8.3)
NEUTROPHILS # BLD MANUAL: 11.2 10E3/UL (ref 1.6–8.3)
NEUTROPHILS # BLD MANUAL: 11.4 10E3/UL (ref 1.6–8.3)
NEUTROPHILS # BLD MANUAL: 11.8 10E3/UL (ref 1.6–8.3)
NEUTROPHILS # BLD MANUAL: 12.2 10E3/UL (ref 1.6–8.3)
NEUTROPHILS # BLD MANUAL: 13 10E3/UL (ref 1.6–8.3)
NEUTROPHILS # BLD MANUAL: 2.1 10E3/UL (ref 1.6–8.3)
NEUTROPHILS # BLD MANUAL: 2.4 10E3/UL (ref 1.6–8.3)
NEUTROPHILS # BLD MANUAL: 3.8 10E3/UL (ref 1.6–8.3)
NEUTROPHILS # BLD MANUAL: 5.1 10E3/UL (ref 1.6–8.3)
NEUTROPHILS # BLD MANUAL: 7.1 10E3/UL (ref 1.6–8.3)
NEUTROPHILS # BLD MANUAL: 7.4 10E3/UL (ref 1.6–8.3)
NEUTROPHILS # BLD MANUAL: 8.8 10E3/UL (ref 1.6–8.3)
NEUTROPHILS # BLD MANUAL: 9.5 10E3/UL (ref 1.6–8.3)
NEUTROPHILS # BLD MANUAL: 9.7 10E3/UL (ref 1.6–8.3)
NEUTROPHILS # BLD MANUAL: 9.9 10E3/UL (ref 1.6–8.3)
NEUTROPHILS NFR BLD MANUAL: 57 %
NEUTROPHILS NFR BLD MANUAL: 57 %
NEUTROPHILS NFR BLD MANUAL: 60 %
NEUTROPHILS NFR BLD MANUAL: 60 %
NEUTROPHILS NFR BLD MANUAL: 64 %
NEUTROPHILS NFR BLD MANUAL: 65 %
NEUTROPHILS NFR BLD MANUAL: 66 %
NEUTROPHILS NFR BLD MANUAL: 69 %
NEUTROPHILS NFR BLD MANUAL: 71 %
NEUTROPHILS NFR BLD MANUAL: 73 %
NEUTROPHILS NFR BLD MANUAL: 74 %
NEUTROPHILS NFR BLD MANUAL: 75 %
NEUTROPHILS NFR BLD MANUAL: 75 %
NEUTROPHILS NFR BLD MANUAL: 76 %
NEUTROPHILS NFR BLD MANUAL: 78 %
NEUTROPHILS NFR BLD MANUAL: 79 %
NRBC # BLD AUTO: 0 10E3/UL
NRBC # BLD AUTO: 0.1 10E3/UL
NRBC # BLD AUTO: 0.2 10E3/UL
NRBC # BLD AUTO: 0.5 10E3/UL
NRBC # BLD AUTO: 0.7 10E3/UL
NRBC # BLD AUTO: 0.8 10E3/UL
NRBC # BLD AUTO: 1.6 10E3/UL
NRBC # BLD AUTO: 1.7 10E3/UL
NRBC # BLD AUTO: 2.3 10E3/UL
NRBC # BLD AUTO: 2.4 10E3/UL
NRBC # BLD AUTO: 2.7 10E3/UL
NRBC # BLD AUTO: 2.8 10E3/UL
NRBC # BLD AUTO: 2.8 10E3/UL
NRBC # BLD AUTO: 2.9 10E3/UL
NRBC # BLD AUTO: 3.2 10E3/UL
NRBC # BLD AUTO: 4.1 10E3/UL
NRBC # BLD AUTO: 4.5 10E3/UL
NRBC BLD MANUAL-RTO: 12 %
NRBC BLD MANUAL-RTO: 14 %
NRBC BLD MANUAL-RTO: 14 %
NRBC BLD MANUAL-RTO: 17 %
NRBC BLD MANUAL-RTO: 2 %
NRBC BLD MANUAL-RTO: 21 %
NRBC BLD MANUAL-RTO: 22 %
NRBC BLD MANUAL-RTO: 23 %
NRBC BLD MANUAL-RTO: 25 %
NRBC BLD MANUAL-RTO: 3 %
NRBC BLD MANUAL-RTO: 3 %
NRBC BLD MANUAL-RTO: 4 %
NRBC BLD MANUAL-RTO: 5 %
NRBC BLD MANUAL-RTO: 8 %
NRBC BLD MANUAL-RTO: 9 %
O2/TOTAL GAS SETTING VFR VENT: 2 %
OTHER CELLS # BLD MANUAL: 0.1 10E3/UL
OTHER CELLS # BLD MANUAL: 0.2 10E3/UL
OTHER CELLS # BLD MANUAL: 0.5 10E3/UL
OTHER CELLS # BLD MANUAL: 0.7 10E3/UL
OTHER CELLS # BLD MANUAL: 0.8 10E3/UL
OTHER CELLS # BLD MANUAL: 0.8 10E3/UL
OTHER CELLS # BLD MANUAL: 0.9 10E3/UL
OTHER CELLS # BLD MANUAL: 0.9 10E3/UL
OTHER CELLS # BLD MANUAL: 1.1 10E3/UL
OTHER CELLS # BLD MANUAL: 1.3 10E3/UL
OTHER CELLS # BLD MANUAL: 1.3 10E3/UL
OTHER CELLS # BLD MANUAL: 1.8 10E3/UL
OTHER CELLS NFR BLD MANUAL: 1 %
OTHER CELLS NFR BLD MANUAL: 10 %
OTHER CELLS NFR BLD MANUAL: 3 %
OTHER CELLS NFR BLD MANUAL: 4 %
OTHER CELLS NFR BLD MANUAL: 5 %
OTHER CELLS NFR BLD MANUAL: 6 %
OTHER CELLS NFR BLD MANUAL: 7 %
OTHER CELLS NFR BLD MANUAL: 8 %
OTHER CELLS NFR BLD MANUAL: 9 %
P AXIS - MUSE: 27 DEGREES
P AXIS - MUSE: 33 DEGREES
P AXIS - MUSE: 46 DEGREES
P AXIS - MUSE: 62 DEGREES
PATH REPORT.ADDENDUM SPEC: ABNORMAL
PATH REPORT.ADDENDUM SPEC: ABNORMAL
PATH REPORT.COMMENTS IMP SPEC: ABNORMAL
PATH REPORT.COMMENTS IMP SPEC: NORMAL
PATH REPORT.COMMENTS IMP SPEC: YES
PATH REPORT.FINAL DX SPEC: ABNORMAL
PATH REPORT.FINAL DX SPEC: NORMAL
PATH REPORT.GROSS SPEC: ABNORMAL
PATH REPORT.MICROSCOPIC SPEC OTHER STN: ABNORMAL
PATH REPORT.MICROSCOPIC SPEC OTHER STN: NORMAL
PATH REPORT.RELEVANT HX SPEC: ABNORMAL
PATH REPORT.RELEVANT HX SPEC: NORMAL
PATH REPORT.SITE OF ORIGIN SPEC: ABNORMAL
PATH REV: ABNORMAL
PATH REV: ABNORMAL
PCO2 BLDV: 47 MM HG (ref 40–50)
PH BLDV: 7.37 [PH] (ref 7.32–7.43)
PHOSPHATE SERPL-MCNC: 4.2 MG/DL (ref 2.5–4.5)
PHOSPHATE SERPL-MCNC: 4.2 MG/DL (ref 2.5–4.5)
PHOSPHATE SERPL-MCNC: 4.3 MG/DL (ref 2.5–4.5)
PHOSPHATE SERPL-MCNC: 4.4 MG/DL (ref 2.5–4.5)
PHOSPHATE SERPL-MCNC: 4.4 MG/DL (ref 2.5–4.5)
PHOSPHATE SERPL-MCNC: 4.5 MG/DL (ref 2.5–4.5)
PHOSPHATE SERPL-MCNC: 4.6 MG/DL (ref 2.5–4.5)
PHOSPHATE SERPL-MCNC: 4.7 MG/DL (ref 2.5–4.5)
PHOSPHATE SERPL-MCNC: 4.8 MG/DL (ref 2.5–4.5)
PHOSPHATE SERPL-MCNC: 4.9 MG/DL (ref 2.5–4.5)
PHOSPHATE SERPL-MCNC: 5 MG/DL (ref 2.5–4.5)
PHOSPHATE SERPL-MCNC: 5.1 MG/DL (ref 2.5–4.5)
PHOSPHATE SERPL-MCNC: 5.2 MG/DL (ref 2.5–4.5)
PHOSPHATE SERPL-MCNC: 5.3 MG/DL (ref 2.5–4.5)
PHOSPHATE SERPL-MCNC: 5.3 MG/DL (ref 2.5–4.5)
PHOSPHATE SERPL-MCNC: 5.4 MG/DL (ref 2.5–4.5)
PHOSPHATE SERPL-MCNC: 5.5 MG/DL (ref 2.5–4.5)
PHOSPHATE SERPL-MCNC: 5.6 MG/DL (ref 2.5–4.5)
PHOSPHATE SERPL-MCNC: 5.8 MG/DL (ref 2.5–4.5)
PHOSPHATE SERPL-MCNC: 6 MG/DL (ref 2.5–4.5)
PHOSPHATE SERPL-MCNC: 6.7 MG/DL (ref 2.5–4.5)
PHOSPHATE SERPL-MCNC: 7.1 MG/DL (ref 2.5–4.5)
PLAT MORPH BLD: ABNORMAL
PLATELET # BLD AUTO: 33 10E3/UL (ref 150–450)
PLATELET # BLD AUTO: 40 10E3/UL (ref 150–450)
PLATELET # BLD AUTO: 41 10E3/UL (ref 150–450)
PLATELET # BLD AUTO: 42 10E3/UL (ref 150–450)
PLATELET # BLD AUTO: 42 10E3/UL (ref 150–450)
PLATELET # BLD AUTO: 44 10E3/UL (ref 150–450)
PLATELET # BLD AUTO: 45 10E3/UL (ref 150–450)
PLATELET # BLD AUTO: 46 10E3/UL (ref 150–450)
PLATELET # BLD AUTO: 46 10E3/UL (ref 150–450)
PLATELET # BLD AUTO: 50 10E3/UL (ref 150–450)
PLATELET # BLD AUTO: 50 10E3/UL (ref 150–450)
PLATELET # BLD AUTO: 55 10E3/UL (ref 150–450)
PLATELET # BLD AUTO: 58 10E3/UL (ref 150–450)
PLATELET # BLD AUTO: 62 10E3/UL (ref 150–450)
PLATELET # BLD AUTO: 69 10E3/UL (ref 150–450)
PLATELET # BLD AUTO: 70 10E3/UL (ref 150–450)
PLATELET # BLD AUTO: 83 10E3/UL (ref 150–450)
PLATELET # BLD AUTO: 94 10E3/UL (ref 150–450)
PO2 BLDV: 32 MM HG (ref 25–47)
POLYCHROMASIA BLD QL SMEAR: ABNORMAL
POLYCHROMASIA BLD QL SMEAR: SLIGHT
POTASSIUM SERPL-SCNC: 4.2 MMOL/L (ref 3.4–5.3)
POTASSIUM SERPL-SCNC: 4.3 MMOL/L (ref 3.4–5.3)
POTASSIUM SERPL-SCNC: 4.4 MMOL/L (ref 3.4–5.3)
POTASSIUM SERPL-SCNC: 4.4 MMOL/L (ref 3.4–5.3)
POTASSIUM SERPL-SCNC: 4.5 MMOL/L (ref 3.4–5.3)
POTASSIUM SERPL-SCNC: 4.6 MMOL/L (ref 3.4–5.3)
POTASSIUM SERPL-SCNC: 4.6 MMOL/L (ref 3.4–5.3)
POTASSIUM SERPL-SCNC: 4.7 MMOL/L (ref 3.4–5.3)
POTASSIUM SERPL-SCNC: 4.8 MMOL/L (ref 3.4–5.3)
POTASSIUM SERPL-SCNC: 4.9 MMOL/L (ref 3.4–5.3)
POTASSIUM SERPL-SCNC: 5 MMOL/L (ref 3.4–5.3)
POTASSIUM SERPL-SCNC: 5.2 MMOL/L (ref 3.4–5.3)
POTASSIUM SERPL-SCNC: 5.3 MMOL/L (ref 3.4–5.3)
POTASSIUM SERPL-SCNC: 5.4 MMOL/L (ref 3.4–5.3)
POTASSIUM SERPL-SCNC: 5.8 MMOL/L (ref 3.4–5.3)
POTASSIUM SERPL-SCNC: 6.1 MMOL/L (ref 3.4–5.3)
PR INTERVAL - MUSE: 178 MS
PR INTERVAL - MUSE: 190 MS
PR INTERVAL - MUSE: 192 MS
PR INTERVAL - MUSE: 198 MS
PROCALCITONIN SERPL IA-MCNC: 0.62 NG/ML
PROMYELOCYTES # BLD MANUAL: 0 10E3/UL
PROMYELOCYTES # BLD MANUAL: 0.2 10E3/UL
PROMYELOCYTES # BLD MANUAL: 0.3 10E3/UL
PROMYELOCYTES # BLD MANUAL: 0.3 10E3/UL
PROMYELOCYTES NFR BLD MANUAL: 1 %
PROMYELOCYTES NFR BLD MANUAL: 2 %
PROT CSF-MCNC: 61.4 MG/DL (ref 15–45)
PROT SERPL-MCNC: 6.6 G/DL (ref 6.4–8.3)
PROT SERPL-MCNC: 6.7 G/DL (ref 6.4–8.3)
PROT SERPL-MCNC: 6.8 G/DL (ref 6.4–8.3)
PROT SERPL-MCNC: 6.9 G/DL (ref 6.4–8.3)
PROT SERPL-MCNC: 6.9 G/DL (ref 6.4–8.3)
PROT SERPL-MCNC: 7 G/DL (ref 6.4–8.3)
PROT SERPL-MCNC: 7.1 G/DL (ref 6.4–8.3)
PROT SERPL-MCNC: 7.2 G/DL (ref 6.4–8.3)
PROT SERPL-MCNC: 7.3 G/DL (ref 6.4–8.3)
PROT SERPL-MCNC: 7.3 G/DL (ref 6.4–8.3)
PROT SERPL-MCNC: 7.4 G/DL (ref 6.4–8.3)
PROT SERPL-MCNC: 7.7 G/DL (ref 6.4–8.3)
PROT SERPL-MCNC: 7.8 G/DL (ref 6.4–8.3)
QRS DURATION - MUSE: 80 MS
QRS DURATION - MUSE: 84 MS
QRS DURATION - MUSE: 92 MS
QRS DURATION - MUSE: 94 MS
QT - MUSE: 390 MS
QT - MUSE: 390 MS
QT - MUSE: 398 MS
QT - MUSE: 414 MS
QTC - MUSE: 414 MS
QTC - MUSE: 441 MS
QTC - MUSE: 444 MS
QTC - MUSE: 453 MS
R AXIS - MUSE: -9 DEGREES
R AXIS - MUSE: 14 DEGREES
R AXIS - MUSE: 37 DEGREES
R AXIS - MUSE: 8 DEGREES
RBC # BLD AUTO: 2.5 10E6/UL (ref 4.4–5.9)
RBC # BLD AUTO: 2.57 10E6/UL (ref 4.4–5.9)
RBC # BLD AUTO: 2.64 10E6/UL (ref 4.4–5.9)
RBC # BLD AUTO: 2.66 10E6/UL (ref 4.4–5.9)
RBC # BLD AUTO: 2.67 10E6/UL (ref 4.4–5.9)
RBC # BLD AUTO: 2.67 10E6/UL (ref 4.4–5.9)
RBC # BLD AUTO: 2.68 10E6/UL (ref 4.4–5.9)
RBC # BLD AUTO: 2.72 10E6/UL (ref 4.4–5.9)
RBC # BLD AUTO: 2.72 10E6/UL (ref 4.4–5.9)
RBC # BLD AUTO: 2.74 10E6/UL (ref 4.4–5.9)
RBC # BLD AUTO: 2.79 10E6/UL (ref 4.4–5.9)
RBC # BLD AUTO: 2.88 10E6/UL (ref 4.4–5.9)
RBC # BLD AUTO: 2.89 10E6/UL (ref 4.4–5.9)
RBC # BLD AUTO: 2.91 10E6/UL (ref 4.4–5.9)
RBC # BLD AUTO: 2.96 10E6/UL (ref 4.4–5.9)
RBC # BLD AUTO: 3.07 10E6/UL (ref 4.4–5.9)
RBC # CSF MANUAL: 3 /UL (ref 0–2)
RBC MORPH BLD: ABNORMAL
RETICS # AUTO: 0.13 10E6/UL (ref 0.03–0.1)
RETICS/RBC NFR AUTO: 4.9 % (ref 0.5–2)
SARS-COV-2 RNA RESP QL NAA+PROBE: NEGATIVE
SIGNIFICANT RESULTS: NORMAL
SIGNIFICANT RESULTS: NORMAL
SODIUM SERPL-SCNC: 127 MMOL/L (ref 136–145)
SODIUM SERPL-SCNC: 128 MMOL/L (ref 136–145)
SODIUM SERPL-SCNC: 128 MMOL/L (ref 136–145)
SODIUM SERPL-SCNC: 129 MMOL/L (ref 136–145)
SODIUM SERPL-SCNC: 129 MMOL/L (ref 136–145)
SODIUM SERPL-SCNC: 130 MMOL/L (ref 136–145)
SODIUM SERPL-SCNC: 131 MMOL/L (ref 136–145)
SODIUM SERPL-SCNC: 132 MMOL/L (ref 136–145)
SODIUM SERPL-SCNC: 133 MMOL/L (ref 136–145)
SODIUM SERPL-SCNC: 134 MMOL/L (ref 136–145)
SODIUM SERPL-SCNC: 136 MMOL/L (ref 136–145)
SODIUM SERPL-SCNC: 137 MMOL/L (ref 136–145)
SPECIMEN DESCRIPTION: NORMAL
SPECIMEN EXPIRATION DATE: NORMAL
SYSTOLIC BLOOD PRESSURE - MUSE: NORMAL MMHG
T AXIS - MUSE: 31 DEGREES
T AXIS - MUSE: 36 DEGREES
T AXIS - MUSE: 48 DEGREES
T AXIS - MUSE: 59 DEGREES
TEST DETAILS, MDL: NORMAL
TEST DETAILS, MDL: NORMAL
TROPONIN T SERPL HS-MCNC: 19 NG/L
TROPONIN T SERPL HS-MCNC: 33 NG/L
TROPONIN T SERPL HS-MCNC: 39 NG/L
TUBE # CSF: 4
URATE SERPL-MCNC: 3.5 MG/DL (ref 3.4–7)
URATE SERPL-MCNC: 3.6 MG/DL (ref 3.4–7)
URATE SERPL-MCNC: 4.3 MG/DL (ref 3.4–7)
URATE SERPL-MCNC: 4.5 MG/DL (ref 3.4–7)
URATE SERPL-MCNC: 4.7 MG/DL (ref 3.4–7)
URATE SERPL-MCNC: 5.4 MG/DL (ref 3.4–7)
URATE SERPL-MCNC: 5.6 MG/DL (ref 3.4–7)
URATE SERPL-MCNC: 5.8 MG/DL (ref 3.4–7)
URATE SERPL-MCNC: 6.2 MG/DL (ref 3.4–7)
URATE SERPL-MCNC: 6.7 MG/DL (ref 3.4–7)
URATE SERPL-MCNC: 6.8 MG/DL (ref 3.4–7)
URATE SERPL-MCNC: 6.9 MG/DL (ref 3.4–7)
URATE SERPL-MCNC: 6.9 MG/DL (ref 3.4–7)
URATE SERPL-MCNC: 7 MG/DL (ref 3.4–7)
URATE SERPL-MCNC: 7.3 MG/DL (ref 3.4–7)
URATE SERPL-MCNC: 7.4 MG/DL (ref 3.4–7)
URATE SERPL-MCNC: 7.4 MG/DL (ref 3.4–7)
URATE SERPL-MCNC: 7.7 MG/DL (ref 3.4–7)
URATE SERPL-MCNC: 7.7 MG/DL (ref 3.4–7)
URATE SERPL-MCNC: 7.8 MG/DL (ref 3.4–7)
URATE SERPL-MCNC: 7.8 MG/DL (ref 3.4–7)
URATE SERPL-MCNC: 8.1 MG/DL (ref 3.4–7)
URATE SERPL-MCNC: 8.2 MG/DL (ref 3.4–7)
URATE SERPL-MCNC: 8.3 MG/DL (ref 3.4–7)
URATE SERPL-MCNC: 8.6 MG/DL (ref 3.4–7)
URATE SERPL-MCNC: 9.1 MG/DL (ref 3.4–7)
URATE SERPL-MCNC: 9.2 MG/DL (ref 3.4–7)
URATE SERPL-MCNC: 9.6 MG/DL (ref 3.4–7)
VENTRICULAR RATE- MUSE: 60 BPM
VENTRICULAR RATE- MUSE: 75 BPM
VENTRICULAR RATE- MUSE: 77 BPM
VENTRICULAR RATE- MUSE: 81 BPM
VIT B12 SERPL-MCNC: 408 PG/ML (ref 232–1245)
WBC # BLD AUTO: 12.9 10E3/UL (ref 4–11)
WBC # BLD AUTO: 13.3 10E3/UL (ref 4–11)
WBC # BLD AUTO: 14.3 10E3/UL (ref 4–11)
WBC # BLD AUTO: 14.6 10E3/UL (ref 4–11)
WBC # BLD AUTO: 15.3 10E3/UL (ref 4–11)
WBC # BLD AUTO: 16.4 10E3/UL (ref 4–11)
WBC # BLD AUTO: 16.5 10E3/UL (ref 4–11)
WBC # BLD AUTO: 16.7 10E3/UL (ref 4–11)
WBC # BLD AUTO: 17.3 10E3/UL (ref 4–11)
WBC # BLD AUTO: 17.3 10E3/UL (ref 4–11)
WBC # BLD AUTO: 17.7 10E3/UL (ref 4–11)
WBC # BLD AUTO: 17.8 10E3/UL (ref 4–11)
WBC # BLD AUTO: 18.1 10E3/UL (ref 4–11)
WBC # BLD AUTO: 18.2 10E3/UL (ref 4–11)
WBC # BLD AUTO: 2.1 10E3/UL (ref 4–11)
WBC # BLD AUTO: 2.3 10E3/UL (ref 4–11)
WBC # BLD AUTO: 2.8 10E3/UL (ref 4–11)
WBC # BLD AUTO: 3.2 10E3/UL (ref 4–11)
WBC # BLD AUTO: 5.1 10E3/UL (ref 4–11)
WBC # BLD AUTO: 6.5 10E3/UL (ref 4–11)
WBC # BLD AUTO: 9 10E3/UL (ref 4–11)
WBC # CSF MANUAL: 0 /UL (ref 0–5)

## 2022-01-01 PROCEDURE — 250N000013 HC RX MED GY IP 250 OP 250 PS 637: Performed by: HOSPITALIST

## 2022-01-01 PROCEDURE — 81270 JAK2 GENE: CPT | Performed by: PHYSICIAN ASSISTANT

## 2022-01-01 PROCEDURE — 250N000013 HC RX MED GY IP 250 OP 250 PS 637: Performed by: PEDIATRICS

## 2022-01-01 PROCEDURE — 85610 PROTHROMBIN TIME: CPT | Performed by: PHYSICIAN ASSISTANT

## 2022-01-01 PROCEDURE — 85384 FIBRINOGEN ACTIVITY: CPT | Performed by: PHYSICIAN ASSISTANT

## 2022-01-01 PROCEDURE — 120N000002 HC R&B MED SURG/OB UMMC

## 2022-01-01 PROCEDURE — 250N000011 HC RX IP 250 OP 636: Performed by: PHYSICIAN ASSISTANT

## 2022-01-01 PROCEDURE — 250N000013 HC RX MED GY IP 250 OP 250 PS 637: Performed by: PHYSICIAN ASSISTANT

## 2022-01-01 PROCEDURE — 250N000013 HC RX MED GY IP 250 OP 250 PS 637: Performed by: INTERNAL MEDICINE

## 2022-01-01 PROCEDURE — 84132 ASSAY OF SERUM POTASSIUM: CPT | Performed by: PHYSICIAN ASSISTANT

## 2022-01-01 PROCEDURE — 93010 ELECTROCARDIOGRAM REPORT: CPT | Performed by: INTERNAL MEDICINE

## 2022-01-01 PROCEDURE — 85007 BL SMEAR W/DIFF WBC COUNT: CPT | Performed by: PEDIATRICS

## 2022-01-01 PROCEDURE — 81450 HL NEO GSAP 5-50DNA/DNA&RNA: CPT | Performed by: INTERNAL MEDICINE

## 2022-01-01 PROCEDURE — 250N000011 HC RX IP 250 OP 636: Performed by: HOSPITALIST

## 2022-01-01 PROCEDURE — 83615 LACTATE (LD) (LDH) ENZYME: CPT | Performed by: PHYSICIAN ASSISTANT

## 2022-01-01 PROCEDURE — 84100 ASSAY OF PHOSPHORUS: CPT | Performed by: PHYSICIAN ASSISTANT

## 2022-01-01 PROCEDURE — 258N000003 HC RX IP 258 OP 636: Performed by: PHYSICIAN ASSISTANT

## 2022-01-01 PROCEDURE — 86901 BLOOD TYPING SEROLOGIC RH(D): CPT | Performed by: PEDIATRICS

## 2022-01-01 PROCEDURE — 250N000012 HC RX MED GY IP 250 OP 636 PS 637: Performed by: PEDIATRICS

## 2022-01-01 PROCEDURE — 272N000451 HC KIT SHRLOCK 5FR POWER PICC DOUBLE LUMEN

## 2022-01-01 PROCEDURE — 88184 FLOWCYTOMETRY/ TC 1 MARKER: CPT | Performed by: STUDENT IN AN ORGANIZED HEALTH CARE EDUCATION/TRAINING PROGRAM

## 2022-01-01 PROCEDURE — 36592 COLLECT BLOOD FROM PICC: CPT | Performed by: PHYSICIAN ASSISTANT

## 2022-01-01 PROCEDURE — 250N000011 HC RX IP 250 OP 636: Performed by: INTERNAL MEDICINE

## 2022-01-01 PROCEDURE — 999N000156 HC STATISTIC RCP CONSULT EA 30 MIN

## 2022-01-01 PROCEDURE — 85730 THROMBOPLASTIN TIME PARTIAL: CPT | Performed by: PEDIATRICS

## 2022-01-01 PROCEDURE — 85730 THROMBOPLASTIN TIME PARTIAL: CPT | Performed by: PHYSICIAN ASSISTANT

## 2022-01-01 PROCEDURE — 86850 RBC ANTIBODY SCREEN: CPT | Performed by: PEDIATRICS

## 2022-01-01 PROCEDURE — 99232 SBSQ HOSP IP/OBS MODERATE 35: CPT | Performed by: INTERNAL MEDICINE

## 2022-01-01 PROCEDURE — 88185 FLOWCYTOMETRY/TC ADD-ON: CPT | Performed by: PEDIATRICS

## 2022-01-01 PROCEDURE — 86706 HEP B SURFACE ANTIBODY: CPT | Performed by: INTERNAL MEDICINE

## 2022-01-01 PROCEDURE — 3E04305 INTRODUCTION OF OTHER ANTINEOPLASTIC INTO CENTRAL VEIN, PERCUTANEOUS APPROACH: ICD-10-PCS | Performed by: INTERNAL MEDICINE

## 2022-01-01 PROCEDURE — 258N000003 HC RX IP 258 OP 636: Performed by: INTERNAL MEDICINE

## 2022-01-01 PROCEDURE — 93306 TTE W/DOPPLER COMPLETE: CPT | Mod: 26 | Performed by: STUDENT IN AN ORGANIZED HEALTH CARE EDUCATION/TRAINING PROGRAM

## 2022-01-01 PROCEDURE — G0452 MOLECULAR PATHOLOGY INTERPR: HCPCS | Mod: 26 | Performed by: PATHOLOGY

## 2022-01-01 PROCEDURE — 87529 HSV DNA AMP PROBE: CPT | Performed by: PHYSICIAN ASSISTANT

## 2022-01-01 PROCEDURE — 85027 COMPLETE CBC AUTOMATED: CPT | Performed by: PEDIATRICS

## 2022-01-01 PROCEDURE — 250N000009 HC RX 250: Performed by: PHYSICIAN ASSISTANT

## 2022-01-01 PROCEDURE — 83735 ASSAY OF MAGNESIUM: CPT | Performed by: PHYSICIAN ASSISTANT

## 2022-01-01 PROCEDURE — 81207 BCR/ABL1 GENE MINOR BP: CPT | Performed by: PHYSICIAN ASSISTANT

## 2022-01-01 PROCEDURE — 99233 SBSQ HOSP IP/OBS HIGH 50: CPT | Mod: FS | Performed by: PHYSICIAN ASSISTANT

## 2022-01-01 PROCEDURE — 84550 ASSAY OF BLOOD/URIC ACID: CPT | Performed by: PHYSICIAN ASSISTANT

## 2022-01-01 PROCEDURE — 94640 AIRWAY INHALATION TREATMENT: CPT | Mod: 76

## 2022-01-01 PROCEDURE — 250N000013 HC RX MED GY IP 250 OP 250 PS 637: Performed by: PODIATRIST

## 2022-01-01 PROCEDURE — 99232 SBSQ HOSP IP/OBS MODERATE 35: CPT | Mod: FS | Performed by: PHYSICIAN ASSISTANT

## 2022-01-01 PROCEDURE — 36415 COLL VENOUS BLD VENIPUNCTURE: CPT | Performed by: PHYSICIAN ASSISTANT

## 2022-01-01 PROCEDURE — 86645 CMV ANTIBODY IGM: CPT | Performed by: PHYSICIAN ASSISTANT

## 2022-01-01 PROCEDURE — 88341 IMHCHEM/IMCYTCHM EA ADD ANTB: CPT | Mod: 26 | Performed by: PATHOLOGY

## 2022-01-01 PROCEDURE — 83690 ASSAY OF LIPASE: CPT | Performed by: PHYSICIAN ASSISTANT

## 2022-01-01 PROCEDURE — 88368 INSITU HYBRIDIZATION MANUAL: CPT | Mod: 26 | Performed by: MEDICAL GENETICS

## 2022-01-01 PROCEDURE — 999N000208 ECHOCARDIOGRAM COMPLETE

## 2022-01-01 PROCEDURE — 999N000147 HC STATISTIC PT IP EVAL DEFER: Performed by: PHYSICAL THERAPIST

## 2022-01-01 PROCEDURE — 87651 STREP A DNA AMP PROBE: CPT | Performed by: PHYSICIAN ASSISTANT

## 2022-01-01 PROCEDURE — 88185 FLOWCYTOMETRY/TC ADD-ON: CPT | Performed by: PHYSICIAN ASSISTANT

## 2022-01-01 PROCEDURE — 999N000128 HC STATISTIC PERIPHERAL IV START W/O US GUIDANCE

## 2022-01-01 PROCEDURE — 80053 COMPREHEN METABOLIC PANEL: CPT | Performed by: PHYSICIAN ASSISTANT

## 2022-01-01 PROCEDURE — 84100 ASSAY OF PHOSPHORUS: CPT | Performed by: PEDIATRICS

## 2022-01-01 PROCEDURE — 250N000013 HC RX MED GY IP 250 OP 250 PS 637: Performed by: STUDENT IN AN ORGANIZED HEALTH CARE EDUCATION/TRAINING PROGRAM

## 2022-01-01 PROCEDURE — A9585 GADOBUTROL INJECTION: HCPCS | Performed by: INTERNAL MEDICINE

## 2022-01-01 PROCEDURE — 258N000003 HC RX IP 258 OP 636: Performed by: HOSPITALIST

## 2022-01-01 PROCEDURE — 999N000044 HC STATISTIC CVC DRESSING CHANGE

## 2022-01-01 PROCEDURE — 87389 HIV-1 AG W/HIV-1&-2 AB AG IA: CPT | Performed by: INTERNAL MEDICINE

## 2022-01-01 PROCEDURE — 83690 ASSAY OF LIPASE: CPT | Performed by: INTERNAL MEDICINE

## 2022-01-01 PROCEDURE — 99239 HOSP IP/OBS DSCHRG MGMT >30: CPT | Mod: FS | Performed by: PHYSICIAN ASSISTANT

## 2022-01-01 PROCEDURE — 250N000011 HC RX IP 250 OP 636: Performed by: PEDIATRICS

## 2022-01-01 PROCEDURE — 81206 BCR/ABL1 GENE MAJOR BP: CPT | Performed by: PHYSICIAN ASSISTANT

## 2022-01-01 PROCEDURE — 82945 GLUCOSE OTHER FLUID: CPT | Performed by: PHYSICIAN ASSISTANT

## 2022-01-01 PROCEDURE — 36592 COLLECT BLOOD FROM PICC: CPT | Performed by: INTERNAL MEDICINE

## 2022-01-01 PROCEDURE — 86665 EPSTEIN-BARR CAPSID VCA: CPT | Performed by: PHYSICIAN ASSISTANT

## 2022-01-01 PROCEDURE — 93005 ELECTROCARDIOGRAM TRACING: CPT

## 2022-01-01 PROCEDURE — 88319 ENZYME HISTOCHEMISTRY: CPT | Mod: 26 | Performed by: PATHOLOGY

## 2022-01-01 PROCEDURE — 82310 ASSAY OF CALCIUM: CPT | Performed by: PHYSICIAN ASSISTANT

## 2022-01-01 PROCEDURE — 80048 BASIC METABOLIC PNL TOTAL CA: CPT | Performed by: PEDIATRICS

## 2022-01-01 PROCEDURE — 999N000157 HC STATISTIC RCP TIME EA 10 MIN

## 2022-01-01 PROCEDURE — 85018 HEMOGLOBIN: CPT | Performed by: PEDIATRICS

## 2022-01-01 PROCEDURE — 74174 CTA ABD&PLVS W/CONTRAST: CPT | Mod: 26 | Performed by: RADIOLOGY

## 2022-01-01 PROCEDURE — 250N000009 HC RX 250

## 2022-01-01 PROCEDURE — 87340 HEPATITIS B SURFACE AG IA: CPT | Performed by: INTERNAL MEDICINE

## 2022-01-01 PROCEDURE — 94640 AIRWAY INHALATION TREATMENT: CPT

## 2022-01-01 PROCEDURE — 99233 SBSQ HOSP IP/OBS HIGH 50: CPT | Performed by: INTERNAL MEDICINE

## 2022-01-01 PROCEDURE — 71275 CT ANGIOGRAPHY CHEST: CPT | Mod: 26 | Performed by: RADIOLOGY

## 2022-01-01 PROCEDURE — 89050 BODY FLUID CELL COUNT: CPT | Performed by: PHYSICIAN ASSISTANT

## 2022-01-01 PROCEDURE — 84550 ASSAY OF BLOOD/URIC ACID: CPT | Performed by: INTERNAL MEDICINE

## 2022-01-01 PROCEDURE — 36569 INSJ PICC 5 YR+ W/O IMAGING: CPT

## 2022-01-01 PROCEDURE — 36592 COLLECT BLOOD FROM PICC: CPT | Performed by: STUDENT IN AN ORGANIZED HEALTH CARE EDUCATION/TRAINING PROGRAM

## 2022-01-01 PROCEDURE — 88369 M/PHMTRC ALYSISHQUANT/SEMIQ: CPT | Mod: 26 | Performed by: MEDICAL GENETICS

## 2022-01-01 PROCEDURE — 87040 BLOOD CULTURE FOR BACTERIA: CPT | Performed by: PHYSICIAN ASSISTANT

## 2022-01-01 PROCEDURE — 99357 PR PROLONGED SERV,INPATIENT,EA ADDL 30 MIN: CPT | Mod: FS | Performed by: INTERNAL MEDICINE

## 2022-01-01 PROCEDURE — 84484 ASSAY OF TROPONIN QUANT: CPT | Performed by: PHYSICIAN ASSISTANT

## 2022-01-01 PROCEDURE — 81450 HL NEO GSAP 5-50DNA/DNA&RNA: CPT | Performed by: PHYSICIAN ASSISTANT

## 2022-01-01 PROCEDURE — 258N000003 HC RX IP 258 OP 636: Performed by: STUDENT IN AN ORGANIZED HEALTH CARE EDUCATION/TRAINING PROGRAM

## 2022-01-01 PROCEDURE — 99356 PR PROLONGED SERV,INPATIENT,1ST HR: CPT | Mod: FS | Performed by: PHYSICIAN ASSISTANT

## 2022-01-01 PROCEDURE — 85384 FIBRINOGEN ACTIVITY: CPT | Performed by: PEDIATRICS

## 2022-01-01 PROCEDURE — 88275 CYTOGENETICS 100-300: CPT | Performed by: PHYSICIAN ASSISTANT

## 2022-01-01 PROCEDURE — 36415 COLL VENOUS BLD VENIPUNCTURE: CPT | Performed by: INTERNAL MEDICINE

## 2022-01-01 PROCEDURE — 36415 COLL VENOUS BLD VENIPUNCTURE: CPT | Performed by: PEDIATRICS

## 2022-01-01 PROCEDURE — 72157 MRI CHEST SPINE W/O & W/DYE: CPT | Mod: 26 | Performed by: STUDENT IN AN ORGANIZED HEALTH CARE EDUCATION/TRAINING PROGRAM

## 2022-01-01 PROCEDURE — 84132 ASSAY OF SERUM POTASSIUM: CPT | Performed by: STUDENT IN AN ORGANIZED HEALTH CARE EDUCATION/TRAINING PROGRAM

## 2022-01-01 PROCEDURE — 83010 ASSAY OF HAPTOGLOBIN QUANT: CPT | Performed by: PHYSICIAN ASSISTANT

## 2022-01-01 PROCEDURE — 81378 HLA I & II TYPING HR: CPT | Performed by: PHYSICIAN ASSISTANT

## 2022-01-01 PROCEDURE — 82803 BLOOD GASES ANY COMBINATION: CPT | Performed by: PHYSICIAN ASSISTANT

## 2022-01-01 PROCEDURE — 255N000002 HC RX 255 OP 636: Performed by: INTERNAL MEDICINE

## 2022-01-01 PROCEDURE — 81277 CYTOGENOMIC NEO MICRORA ALYS: CPT | Performed by: INTERNAL MEDICINE

## 2022-01-01 PROCEDURE — 99233 SBSQ HOSP IP/OBS HIGH 50: CPT | Mod: GC | Performed by: INTERNAL MEDICINE

## 2022-01-01 PROCEDURE — 71045 X-RAY EXAM CHEST 1 VIEW: CPT | Mod: 26 | Performed by: RADIOLOGY

## 2022-01-01 PROCEDURE — 86704 HEP B CORE ANTIBODY TOTAL: CPT | Performed by: INTERNAL MEDICINE

## 2022-01-01 PROCEDURE — 83605 ASSAY OF LACTIC ACID: CPT | Performed by: PHYSICIAN ASSISTANT

## 2022-01-01 PROCEDURE — 86880 COOMBS TEST DIRECT: CPT | Performed by: INTERNAL MEDICINE

## 2022-01-01 PROCEDURE — 85060 BLOOD SMEAR INTERPRETATION: CPT | Performed by: PATHOLOGY

## 2022-01-01 PROCEDURE — 84157 ASSAY OF PROTEIN OTHER: CPT | Performed by: PHYSICIAN ASSISTANT

## 2022-01-01 PROCEDURE — 88291 CYTO/MOLECULAR REPORT: CPT | Performed by: MEDICAL GENETICS

## 2022-01-01 PROCEDURE — 85610 PROTHROMBIN TIME: CPT | Performed by: PEDIATRICS

## 2022-01-01 PROCEDURE — 86140 C-REACTIVE PROTEIN: CPT | Performed by: PHYSICIAN ASSISTANT

## 2022-01-01 PROCEDURE — 84145 PROCALCITONIN (PCT): CPT | Performed by: PHYSICIAN ASSISTANT

## 2022-01-01 PROCEDURE — 88184 FLOWCYTOMETRY/ TC 1 MARKER: CPT | Performed by: PATHOLOGY

## 2022-01-01 PROCEDURE — 99356 PR PROLONGED SERV,INPATIENT,1ST HR: CPT | Mod: FS | Performed by: INTERNAL MEDICINE

## 2022-01-01 PROCEDURE — 999N000122 MR MHEALTH OVERREAD

## 2022-01-01 PROCEDURE — 86803 HEPATITIS C AB TEST: CPT | Performed by: INTERNAL MEDICINE

## 2022-01-01 PROCEDURE — 81219 CALR GENE COM VARIANTS: CPT | Performed by: PHYSICIAN ASSISTANT

## 2022-01-01 PROCEDURE — 86644 CMV ANTIBODY: CPT | Performed by: PHYSICIAN ASSISTANT

## 2022-01-01 PROCEDURE — 80053 COMPREHEN METABOLIC PANEL: CPT | Performed by: PEDIATRICS

## 2022-01-01 PROCEDURE — 99233 SBSQ HOSP IP/OBS HIGH 50: CPT | Mod: FS | Performed by: INTERNAL MEDICINE

## 2022-01-01 PROCEDURE — 88184 FLOWCYTOMETRY/ TC 1 MARKER: CPT | Performed by: PEDIATRICS

## 2022-01-01 PROCEDURE — 88319 ENZYME HISTOCHEMISTRY: CPT | Mod: TC | Performed by: INTERNAL MEDICINE

## 2022-01-01 PROCEDURE — 999N000122 CT MHEALTH OVERREAD

## 2022-01-01 PROCEDURE — 88264 CHROMOSOME ANALYSIS 20-25: CPT | Performed by: INTERNAL MEDICINE

## 2022-01-01 PROCEDURE — 999N000007 HC SITE CHECK

## 2022-01-01 PROCEDURE — 36592 COLLECT BLOOD FROM PICC: CPT | Performed by: PEDIATRICS

## 2022-01-01 PROCEDURE — 99222 1ST HOSP IP/OBS MODERATE 55: CPT | Mod: AI | Performed by: PEDIATRICS

## 2022-01-01 PROCEDURE — 258N000003 HC RX IP 258 OP 636: Performed by: PEDIATRICS

## 2022-01-01 PROCEDURE — 84484 ASSAY OF TROPONIN QUANT: CPT | Performed by: INTERNAL MEDICINE

## 2022-01-01 PROCEDURE — 88342 IMHCHEM/IMCYTCHM 1ST ANTB: CPT | Mod: 26 | Performed by: PATHOLOGY

## 2022-01-01 PROCEDURE — 82374 ASSAY BLOOD CARBON DIOXIDE: CPT | Performed by: PHYSICIAN ASSISTANT

## 2022-01-01 PROCEDURE — 88321 CONSLTJ&REPRT SLD PREP ELSWR: CPT | Performed by: PATHOLOGY

## 2022-01-01 PROCEDURE — 272N000473 HC KIT, VPS RHYTHM STYLET

## 2022-01-01 PROCEDURE — 82977 ASSAY OF GGT: CPT | Performed by: INTERNAL MEDICINE

## 2022-01-01 PROCEDURE — 74174 CTA ABD&PLVS W/CONTRAST: CPT

## 2022-01-01 PROCEDURE — 72157 MRI CHEST SPINE W/O & W/DYE: CPT

## 2022-01-01 PROCEDURE — 84550 ASSAY OF BLOOD/URIC ACID: CPT | Performed by: PEDIATRICS

## 2022-01-01 PROCEDURE — U0003 INFECTIOUS AGENT DETECTION BY NUCLEIC ACID (DNA OR RNA); SEVERE ACUTE RESPIRATORY SYNDROME CORONAVIRUS 2 (SARS-COV-2) (CORONAVIRUS DISEASE [COVID-19]), AMPLIFIED PROBE TECHNIQUE, MAKING USE OF HIGH THROUGHPUT TECHNOLOGIES AS DESCRIBED BY CMS-2020-01-R: HCPCS | Performed by: PEDIATRICS

## 2022-01-01 PROCEDURE — 83615 LACTATE (LD) (LDH) ENZYME: CPT | Performed by: PEDIATRICS

## 2022-01-01 PROCEDURE — 255N000002 HC RX 255 OP 636: Performed by: STUDENT IN AN ORGANIZED HEALTH CARE EDUCATION/TRAINING PROGRAM

## 2022-01-01 PROCEDURE — 83735 ASSAY OF MAGNESIUM: CPT | Performed by: PEDIATRICS

## 2022-01-01 PROCEDURE — G0452 MOLECULAR PATHOLOGY INTERPR: HCPCS | Mod: 26 | Performed by: STUDENT IN AN ORGANIZED HEALTH CARE EDUCATION/TRAINING PROGRAM

## 2022-01-01 PROCEDURE — 83690 ASSAY OF LIPASE: CPT | Performed by: HOSPITALIST

## 2022-01-01 PROCEDURE — 71045 X-RAY EXAM CHEST 1 VIEW: CPT

## 2022-01-01 PROCEDURE — 85045 AUTOMATED RETICULOCYTE COUNT: CPT | Performed by: PEDIATRICS

## 2022-01-01 PROCEDURE — 88188 FLOWCYTOMETRY/READ 9-15: CPT | Mod: GC | Performed by: STUDENT IN AN ORGANIZED HEALTH CARE EDUCATION/TRAINING PROGRAM

## 2022-01-01 PROCEDURE — 99223 1ST HOSP IP/OBS HIGH 75: CPT | Performed by: INTERNAL MEDICINE

## 2022-01-01 PROCEDURE — 88189 FLOWCYTOMETRY/READ 16 & >: CPT | Mod: GC | Performed by: PATHOLOGY

## 2022-01-01 PROCEDURE — 11719 TRIM NAIL(S) ANY NUMBER: CPT | Mod: Q8 | Performed by: PODIATRIST

## 2022-01-01 PROCEDURE — 85014 HEMATOCRIT: CPT | Performed by: PEDIATRICS

## 2022-01-01 PROCEDURE — 71275 CT ANGIOGRAPHY CHEST: CPT

## 2022-01-01 PROCEDURE — 85049 AUTOMATED PLATELET COUNT: CPT | Performed by: INTERNAL MEDICINE

## 2022-01-01 PROCEDURE — 85018 HEMOGLOBIN: CPT | Performed by: PHYSICIAN ASSISTANT

## 2022-01-01 PROCEDURE — 62270 DX LMBR SPI PNXR: CPT | Performed by: STUDENT IN AN ORGANIZED HEALTH CARE EDUCATION/TRAINING PROGRAM

## 2022-01-01 PROCEDURE — 009U3ZX DRAINAGE OF SPINAL CANAL, PERCUTANEOUS APPROACH, DIAGNOSTIC: ICD-10-PCS | Performed by: STUDENT IN AN ORGANIZED HEALTH CARE EDUCATION/TRAINING PROGRAM

## 2022-01-01 PROCEDURE — 999N000127 HC STATISTIC PERIPHERAL IV START W US GUIDANCE

## 2022-01-01 PROCEDURE — 82533 TOTAL CORTISOL: CPT | Performed by: PHYSICIAN ASSISTANT

## 2022-01-01 PROCEDURE — 250N000009 HC RX 250: Performed by: INTERNAL MEDICINE

## 2022-01-01 PROCEDURE — 88184 FLOWCYTOMETRY/ TC 1 MARKER: CPT | Performed by: PHYSICIAN ASSISTANT

## 2022-01-01 PROCEDURE — 93010 ELECTROCARDIOGRAM REPORT: CPT | Mod: 59 | Performed by: INTERNAL MEDICINE

## 2022-01-01 PROCEDURE — 82607 VITAMIN B-12: CPT | Performed by: INTERNAL MEDICINE

## 2022-01-01 RX ORDER — OXYCODONE HYDROCHLORIDE 5 MG/1
5-10 TABLET ORAL EVERY 4 HOURS PRN
Status: DISCONTINUED | OUTPATIENT
Start: 2022-01-01 | End: 2022-01-01

## 2022-01-01 RX ORDER — HEPARIN SODIUM,PORCINE 10 UNIT/ML
5-20 VIAL (ML) INTRAVENOUS EVERY 24 HOURS
Status: DISCONTINUED | OUTPATIENT
Start: 2022-01-01 | End: 2022-01-01 | Stop reason: HOSPADM

## 2022-01-01 RX ORDER — HEPARIN SODIUM,PORCINE 10 UNIT/ML
5 VIAL (ML) INTRAVENOUS
Status: CANCELLED | OUTPATIENT
Start: 2022-01-01

## 2022-01-01 RX ORDER — POLYETHYLENE GLYCOL 3350 17 G/17G
17 POWDER, FOR SOLUTION ORAL DAILY
Status: DISCONTINUED | OUTPATIENT
Start: 2022-01-01 | End: 2022-01-01 | Stop reason: HOSPADM

## 2022-01-01 RX ORDER — EPINEPHRINE 1 MG/ML
0.3 INJECTION, SOLUTION, CONCENTRATE INTRAVENOUS EVERY 5 MIN PRN
Status: CANCELLED | OUTPATIENT
Start: 2022-01-01

## 2022-01-01 RX ORDER — ENOXAPARIN SODIUM 100 MG/ML
0.5 INJECTION SUBCUTANEOUS EVERY 12 HOURS
Qty: 40 ML | Refills: 0 | Status: SHIPPED | OUTPATIENT
Start: 2022-01-01 | End: 2022-01-01

## 2022-01-01 RX ORDER — CALCIUM CARBONATE 500 MG/1
500 TABLET, CHEWABLE ORAL 2 TIMES DAILY PRN
Status: DISCONTINUED | OUTPATIENT
Start: 2022-01-01 | End: 2022-01-01 | Stop reason: HOSPADM

## 2022-01-01 RX ORDER — ALBUTEROL SULFATE 0.83 MG/ML
2.5 SOLUTION RESPIRATORY (INHALATION)
Status: CANCELLED | OUTPATIENT
Start: 2022-01-01

## 2022-01-01 RX ORDER — LORAZEPAM 2 MG/ML
0.5 INJECTION INTRAMUSCULAR EVERY 4 HOURS PRN
Status: CANCELLED | OUTPATIENT
Start: 2022-01-01

## 2022-01-01 RX ORDER — ALBUTEROL SULFATE 0.83 MG/ML
2.5 SOLUTION RESPIRATORY (INHALATION)
Status: DISCONTINUED | OUTPATIENT
Start: 2022-01-01 | End: 2022-01-01 | Stop reason: HOSPADM

## 2022-01-01 RX ORDER — LEVOFLOXACIN 250 MG/1
250 TABLET, FILM COATED ORAL DAILY
Status: DISCONTINUED | OUTPATIENT
Start: 2022-01-01 | End: 2022-01-01 | Stop reason: HOSPADM

## 2022-01-01 RX ORDER — LIDOCAINE 4 G/G
1-2 PATCH TOPICAL
Status: DISCONTINUED | OUTPATIENT
Start: 2022-01-01 | End: 2022-01-01

## 2022-01-01 RX ORDER — LIDOCAINE 40 MG/G
CREAM TOPICAL
Status: DISCONTINUED | OUTPATIENT
Start: 2022-01-01 | End: 2022-01-01 | Stop reason: HOSPADM

## 2022-01-01 RX ORDER — HEPARIN SODIUM (PORCINE) LOCK FLUSH IV SOLN 100 UNIT/ML 100 UNIT/ML
5 SOLUTION INTRAVENOUS
Status: CANCELLED | OUTPATIENT
Start: 2022-01-01

## 2022-01-01 RX ORDER — MEPERIDINE HYDROCHLORIDE 25 MG/ML
25 INJECTION INTRAMUSCULAR; INTRAVENOUS; SUBCUTANEOUS EVERY 30 MIN PRN
Status: CANCELLED | OUTPATIENT
Start: 2022-01-01

## 2022-01-01 RX ORDER — AMOXICILLIN 250 MG
2 CAPSULE ORAL 2 TIMES DAILY
Status: DISCONTINUED | OUTPATIENT
Start: 2022-01-01 | End: 2022-01-01 | Stop reason: HOSPADM

## 2022-01-01 RX ORDER — DEXAMETHASONE 4 MG/1
8 TABLET ORAL ONCE
Status: COMPLETED | OUTPATIENT
Start: 2022-01-01 | End: 2022-01-01

## 2022-01-01 RX ORDER — AMOXICILLIN 250 MG
1 CAPSULE ORAL 2 TIMES DAILY
Status: DISCONTINUED | OUTPATIENT
Start: 2022-01-01 | End: 2022-01-01 | Stop reason: HOSPADM

## 2022-01-01 RX ORDER — CALCIUM ACETATE 667 MG/1
2001 CAPSULE ORAL
Status: DISCONTINUED | OUTPATIENT
Start: 2022-01-01 | End: 2022-01-01 | Stop reason: HOSPADM

## 2022-01-01 RX ORDER — LIDOCAINE 40 MG/G
CREAM TOPICAL
Status: ACTIVE | OUTPATIENT
Start: 2022-01-01 | End: 2022-01-01

## 2022-01-01 RX ORDER — LORAZEPAM 2 MG/ML
.5-1 INJECTION INTRAMUSCULAR EVERY 6 HOURS PRN
Status: DISCONTINUED | OUTPATIENT
Start: 2022-01-01 | End: 2022-01-01 | Stop reason: HOSPADM

## 2022-01-01 RX ORDER — ALBUTEROL SULFATE 90 UG/1
1-2 AEROSOL, METERED RESPIRATORY (INHALATION)
Status: DISCONTINUED | OUTPATIENT
Start: 2022-01-01 | End: 2022-01-01 | Stop reason: HOSPADM

## 2022-01-01 RX ORDER — BUMETANIDE 1 MG/1
1 TABLET ORAL DAILY
Status: DISCONTINUED | OUTPATIENT
Start: 2022-01-01 | End: 2022-01-01 | Stop reason: HOSPADM

## 2022-01-01 RX ORDER — SODIUM CHLORIDE 9 MG/ML
INJECTION, SOLUTION INTRAVENOUS CONTINUOUS
Status: DISCONTINUED | OUTPATIENT
Start: 2022-01-01 | End: 2022-01-01

## 2022-01-01 RX ORDER — DIPHENHYDRAMINE HYDROCHLORIDE 50 MG/ML
50 INJECTION INTRAMUSCULAR; INTRAVENOUS
Status: DISCONTINUED | OUTPATIENT
Start: 2022-01-01 | End: 2022-01-01 | Stop reason: HOSPADM

## 2022-01-01 RX ORDER — VORICONAZOLE 200 MG/1
200 TABLET, FILM COATED ORAL 2 TIMES DAILY
Qty: 60 TABLET | Refills: 0 | Status: SHIPPED | OUTPATIENT
Start: 2022-01-01 | End: 2022-01-01

## 2022-01-01 RX ORDER — HYDROXYUREA 500 MG/1
1000 CAPSULE ORAL DAILY
Status: DISCONTINUED | OUTPATIENT
Start: 2022-01-01 | End: 2022-01-01

## 2022-01-01 RX ORDER — ENOXAPARIN SODIUM 100 MG/ML
0.5 INJECTION SUBCUTANEOUS EVERY 12 HOURS
Status: DISCONTINUED | OUTPATIENT
Start: 2022-01-01 | End: 2022-01-01 | Stop reason: HOSPADM

## 2022-01-01 RX ORDER — BUDESONIDE AND FORMOTEROL FUMARATE DIHYDRATE 160; 4.5 UG/1; UG/1
2 AEROSOL RESPIRATORY (INHALATION) 2 TIMES DAILY
COMMUNITY

## 2022-01-01 RX ORDER — CYCLOBENZAPRINE HCL 5 MG
10 TABLET ORAL 3 TIMES DAILY PRN
Status: DISCONTINUED | OUTPATIENT
Start: 2022-01-01 | End: 2022-01-01 | Stop reason: HOSPADM

## 2022-01-01 RX ORDER — ALLOPURINOL 300 MG/1
300 TABLET ORAL 2 TIMES DAILY
Qty: 60 TABLET | Refills: 0 | Status: SHIPPED | OUTPATIENT
Start: 2022-01-01

## 2022-01-01 RX ORDER — SODIUM CHLORIDE, SODIUM LACTATE, POTASSIUM CHLORIDE, CALCIUM CHLORIDE 600; 310; 30; 20 MG/100ML; MG/100ML; MG/100ML; MG/100ML
INJECTION, SOLUTION INTRAVENOUS CONTINUOUS
Status: DISCONTINUED | OUTPATIENT
Start: 2022-01-01 | End: 2022-01-01

## 2022-01-01 RX ORDER — METHYLPREDNISOLONE SODIUM SUCCINATE 125 MG/2ML
125 INJECTION, POWDER, LYOPHILIZED, FOR SOLUTION INTRAMUSCULAR; INTRAVENOUS
Status: CANCELLED
Start: 2022-01-01

## 2022-01-01 RX ORDER — SODIUM CHLORIDE 9 MG/ML
INJECTION, SOLUTION INTRAVENOUS CONTINUOUS
Status: ACTIVE | OUTPATIENT
Start: 2022-01-01 | End: 2022-01-01

## 2022-01-01 RX ORDER — ACETAMINOPHEN 325 MG/1
650 TABLET ORAL EVERY 4 HOURS PRN
Status: DISCONTINUED | OUTPATIENT
Start: 2022-01-01 | End: 2022-01-01 | Stop reason: HOSPADM

## 2022-01-01 RX ORDER — LIDOCAINE 4 G/G
1 PATCH TOPICAL
Status: DISCONTINUED | OUTPATIENT
Start: 2022-01-01 | End: 2022-01-01

## 2022-01-01 RX ORDER — POLYETHYLENE GLYCOL 3350 17 G/17G
17 POWDER, FOR SOLUTION ORAL DAILY PRN
Status: DISCONTINUED | OUTPATIENT
Start: 2022-01-01 | End: 2022-01-01

## 2022-01-01 RX ORDER — CALCIUM CARBONATE 500 MG/1
500 TABLET, CHEWABLE ORAL ONCE
Status: COMPLETED | OUTPATIENT
Start: 2022-01-01 | End: 2022-01-01

## 2022-01-01 RX ORDER — LISINOPRIL 20 MG/1
20 TABLET ORAL DAILY
Status: DISCONTINUED | OUTPATIENT
Start: 2022-01-01 | End: 2022-01-01

## 2022-01-01 RX ORDER — DEXAMETHASONE 4 MG/1
8 TABLET ORAL DAILY
Status: DISCONTINUED | OUTPATIENT
Start: 2022-01-01 | End: 2022-01-01

## 2022-01-01 RX ORDER — LISINOPRIL 5 MG/1
5 TABLET ORAL DAILY
Status: DISCONTINUED | OUTPATIENT
Start: 2022-01-01 | End: 2022-01-01

## 2022-01-01 RX ORDER — OXYCODONE HYDROCHLORIDE 5 MG/1
5 TABLET ORAL EVERY 6 HOURS PRN
Status: DISCONTINUED | OUTPATIENT
Start: 2022-01-01 | End: 2022-01-01

## 2022-01-01 RX ORDER — DEXTROSE MONOHYDRATE 25 G/50ML
25-50 INJECTION, SOLUTION INTRAVENOUS
Status: DISCONTINUED | OUTPATIENT
Start: 2022-01-01 | End: 2022-01-01 | Stop reason: HOSPADM

## 2022-01-01 RX ORDER — OXYCODONE HYDROCHLORIDE 5 MG/1
5 CAPSULE ORAL EVERY 6 HOURS PRN
Status: ON HOLD | COMMUNITY
End: 2022-01-01

## 2022-01-01 RX ORDER — OXYMETAZOLINE HYDROCHLORIDE 0.05 G/100ML
2 SPRAY NASAL 2 TIMES DAILY
Status: DISCONTINUED | OUTPATIENT
Start: 2022-01-01 | End: 2022-01-01

## 2022-01-01 RX ORDER — DEXAMETHASONE 2 MG/1
2 TABLET ORAL DAILY
Status: DISCONTINUED | OUTPATIENT
Start: 2022-01-01 | End: 2022-01-01

## 2022-01-01 RX ORDER — PANTOPRAZOLE SODIUM 40 MG/1
40 TABLET, DELAYED RELEASE ORAL
Qty: 30 TABLET | Refills: 0 | Status: SHIPPED | OUTPATIENT
Start: 2022-01-01

## 2022-01-01 RX ORDER — HYDROMORPHONE HYDROCHLORIDE 1 MG/ML
0.5 INJECTION, SOLUTION INTRAMUSCULAR; INTRAVENOUS; SUBCUTANEOUS
Status: DISCONTINUED | OUTPATIENT
Start: 2022-01-01 | End: 2022-01-01

## 2022-01-01 RX ORDER — CYCLOBENZAPRINE HCL 5 MG
10 TABLET ORAL EVERY 8 HOURS PRN
Status: DISCONTINUED | OUTPATIENT
Start: 2022-01-01 | End: 2022-01-01

## 2022-01-01 RX ORDER — ACYCLOVIR 200 MG/1
400 CAPSULE ORAL 2 TIMES DAILY
Status: DISCONTINUED | OUTPATIENT
Start: 2022-01-01 | End: 2022-01-01

## 2022-01-01 RX ORDER — DEXAMETHASONE 4 MG/1
4 TABLET ORAL ONCE
Status: COMPLETED | OUTPATIENT
Start: 2022-01-01 | End: 2022-01-01

## 2022-01-01 RX ORDER — METHOCARBAMOL 500 MG/1
500 TABLET, FILM COATED ORAL 4 TIMES DAILY
Status: DISCONTINUED | OUTPATIENT
Start: 2022-01-01 | End: 2022-01-01 | Stop reason: HOSPADM

## 2022-01-01 RX ORDER — ALBUTEROL SULFATE 90 UG/1
1-2 AEROSOL, METERED RESPIRATORY (INHALATION)
Status: CANCELLED
Start: 2022-01-01

## 2022-01-01 RX ORDER — LISINOPRIL 5 MG/1
5 TABLET ORAL DAILY
Status: ON HOLD | COMMUNITY
End: 2022-01-01

## 2022-01-01 RX ORDER — ONDANSETRON 8 MG/1
8 TABLET, FILM COATED ORAL EVERY 8 HOURS PRN
Status: DISCONTINUED | OUTPATIENT
Start: 2022-01-01 | End: 2022-01-01 | Stop reason: HOSPADM

## 2022-01-01 RX ORDER — CALCIUM ACETATE 667 MG/1
1334 CAPSULE ORAL
Status: DISCONTINUED | OUTPATIENT
Start: 2022-01-01 | End: 2022-01-01

## 2022-01-01 RX ORDER — ALBUTEROL SULFATE 5 MG/ML
2.5 SOLUTION RESPIRATORY (INHALATION)
Status: DISCONTINUED | OUTPATIENT
Start: 2022-01-01 | End: 2022-01-01 | Stop reason: HOSPADM

## 2022-01-01 RX ORDER — POLYETHYLENE GLYCOL 3350 17 G/17G
17 POWDER, FOR SOLUTION ORAL 2 TIMES DAILY PRN
Status: DISCONTINUED | OUTPATIENT
Start: 2022-01-01 | End: 2022-01-01

## 2022-01-01 RX ORDER — HYDROMORPHONE HYDROCHLORIDE 1 MG/ML
0.5 INJECTION, SOLUTION INTRAMUSCULAR; INTRAVENOUS; SUBCUTANEOUS ONCE
Status: COMPLETED | OUTPATIENT
Start: 2022-01-01 | End: 2022-01-01

## 2022-01-01 RX ORDER — BUMETANIDE 1 MG/1
1 TABLET ORAL DAILY
COMMUNITY

## 2022-01-01 RX ORDER — ATORVASTATIN CALCIUM 20 MG/1
20 TABLET, FILM COATED ORAL EVERY EVENING
Status: DISCONTINUED | OUTPATIENT
Start: 2022-01-01 | End: 2022-01-01

## 2022-01-01 RX ORDER — ENOXAPARIN SODIUM 100 MG/ML
0.5 INJECTION SUBCUTANEOUS EVERY 12 HOURS
Qty: 40 ML | Refills: 0 | Status: SHIPPED | OUTPATIENT
Start: 2022-01-01

## 2022-01-01 RX ORDER — AMMONIUM LACTATE 12 G/100G
CREAM TOPICAL DAILY
Status: DISCONTINUED | OUTPATIENT
Start: 2022-01-01 | End: 2022-01-01 | Stop reason: HOSPADM

## 2022-01-01 RX ORDER — DEXAMETHASONE 4 MG/1
4 TABLET ORAL DAILY
Status: DISCONTINUED | OUTPATIENT
Start: 2022-01-01 | End: 2022-01-01

## 2022-01-01 RX ORDER — METHYLPREDNISOLONE SODIUM SUCCINATE 125 MG/2ML
125 INJECTION, POWDER, LYOPHILIZED, FOR SOLUTION INTRAMUSCULAR; INTRAVENOUS
Status: DISCONTINUED | OUTPATIENT
Start: 2022-01-01 | End: 2022-01-01 | Stop reason: HOSPADM

## 2022-01-01 RX ORDER — DEXAMETHASONE 4 MG/1
4 TABLET ORAL
Status: ON HOLD | COMMUNITY
End: 2022-01-01

## 2022-01-01 RX ORDER — FLUTICASONE FUROATE AND VILANTEROL 200; 25 UG/1; UG/1
1 POWDER RESPIRATORY (INHALATION) DAILY
Status: DISCONTINUED | OUTPATIENT
Start: 2022-01-01 | End: 2022-01-01 | Stop reason: HOSPADM

## 2022-01-01 RX ORDER — ATORVASTATIN CALCIUM 20 MG/1
20 TABLET, FILM COATED ORAL DAILY
Status: ON HOLD | COMMUNITY
End: 2023-01-01

## 2022-01-01 RX ORDER — SODIUM CHLORIDE 9 MG/ML
INJECTION, SOLUTION INTRAVENOUS CONTINUOUS
Status: DISCONTINUED | OUTPATIENT
Start: 2022-01-01 | End: 2022-01-01 | Stop reason: CLARIF

## 2022-01-01 RX ORDER — LORAZEPAM 0.5 MG/1
.5-1 TABLET ORAL EVERY 6 HOURS PRN
Status: DISCONTINUED | OUTPATIENT
Start: 2022-01-01 | End: 2022-01-01 | Stop reason: HOSPADM

## 2022-01-01 RX ORDER — DIPHENHYDRAMINE HYDROCHLORIDE 50 MG/ML
50 INJECTION INTRAMUSCULAR; INTRAVENOUS
Status: CANCELLED
Start: 2022-01-01

## 2022-01-01 RX ORDER — PANTOPRAZOLE SODIUM 40 MG/1
40 TABLET, DELAYED RELEASE ORAL
Status: DISCONTINUED | OUTPATIENT
Start: 2022-01-01 | End: 2022-01-01 | Stop reason: HOSPADM

## 2022-01-01 RX ORDER — AZITHROMYCIN 250 MG/1
500 TABLET, FILM COATED ORAL DAILY
Status: COMPLETED | OUTPATIENT
Start: 2022-01-01 | End: 2022-01-01

## 2022-01-01 RX ORDER — NICOTINE POLACRILEX 4 MG
15-30 LOZENGE BUCCAL
Status: DISCONTINUED | OUTPATIENT
Start: 2022-01-01 | End: 2022-01-01 | Stop reason: HOSPADM

## 2022-01-01 RX ORDER — ALLOPURINOL 100 MG/1
200 TABLET ORAL DAILY
Status: ON HOLD | COMMUNITY
End: 2022-01-01

## 2022-01-01 RX ORDER — PROCHLORPERAZINE MALEATE 10 MG
10 TABLET ORAL EVERY 6 HOURS PRN
Status: DISCONTINUED | OUTPATIENT
Start: 2022-01-01 | End: 2022-01-01

## 2022-01-01 RX ORDER — DEXTROSE MONOHYDRATE 25 G/50ML
25-50 INJECTION, SOLUTION INTRAVENOUS
Status: DISCONTINUED | OUTPATIENT
Start: 2022-01-01 | End: 2022-01-01

## 2022-01-01 RX ORDER — LEVOFLOXACIN 250 MG/1
250 TABLET, FILM COATED ORAL DAILY
Qty: 30 TABLET | Refills: 0 | Status: SHIPPED | OUTPATIENT
Start: 2022-01-01

## 2022-01-01 RX ORDER — EPINEPHRINE 1 MG/ML
0.3 INJECTION, SOLUTION, CONCENTRATE INTRAVENOUS EVERY 5 MIN PRN
Status: DISCONTINUED | OUTPATIENT
Start: 2022-01-01 | End: 2022-01-01 | Stop reason: HOSPADM

## 2022-01-01 RX ORDER — BUMETANIDE 1 MG/1
1 TABLET ORAL ONCE
Status: COMPLETED | OUTPATIENT
Start: 2022-01-01 | End: 2022-01-01

## 2022-01-01 RX ORDER — POLYETHYLENE GLYCOL 3350 17 G/17G
17 POWDER, FOR SOLUTION ORAL DAILY PRN
Status: DISCONTINUED | OUTPATIENT
Start: 2022-01-01 | End: 2022-01-01 | Stop reason: HOSPADM

## 2022-01-01 RX ORDER — NALOXONE HYDROCHLORIDE 0.4 MG/ML
0.2 INJECTION, SOLUTION INTRAMUSCULAR; INTRAVENOUS; SUBCUTANEOUS
Status: DISCONTINUED | OUTPATIENT
Start: 2022-01-01 | End: 2022-01-01 | Stop reason: HOSPADM

## 2022-01-01 RX ORDER — OXYCODONE HYDROCHLORIDE 10 MG/1
10 TABLET ORAL
Status: DISCONTINUED | OUTPATIENT
Start: 2022-01-01 | End: 2022-01-01

## 2022-01-01 RX ORDER — GADOBUTROL 604.72 MG/ML
0.1 INJECTION INTRAVENOUS ONCE
Status: COMPLETED | OUTPATIENT
Start: 2022-01-01 | End: 2022-01-01

## 2022-01-01 RX ORDER — POSACONAZOLE 100 MG/1
300 TABLET, DELAYED RELEASE ORAL EVERY MORNING
Qty: 90 TABLET | Refills: 0 | Status: SHIPPED | OUTPATIENT
Start: 2022-01-01 | End: 2022-01-01

## 2022-01-01 RX ORDER — NALOXONE HYDROCHLORIDE 0.4 MG/ML
0.4 INJECTION, SOLUTION INTRAMUSCULAR; INTRAVENOUS; SUBCUTANEOUS
Status: DISCONTINUED | OUTPATIENT
Start: 2022-01-01 | End: 2022-01-01 | Stop reason: HOSPADM

## 2022-01-01 RX ORDER — ALBUTEROL SULFATE 0.83 MG/ML
SOLUTION RESPIRATORY (INHALATION)
Status: COMPLETED
Start: 2022-01-01 | End: 2022-01-01

## 2022-01-01 RX ORDER — HYDROMORPHONE HYDROCHLORIDE 1 MG/ML
0.5 INJECTION, SOLUTION INTRAMUSCULAR; INTRAVENOUS; SUBCUTANEOUS EVERY 4 HOURS PRN
Status: DISCONTINUED | OUTPATIENT
Start: 2022-01-01 | End: 2022-01-01

## 2022-01-01 RX ORDER — SEMAGLUTIDE 1.34 MG/ML
2 INJECTION, SOLUTION SUBCUTANEOUS
COMMUNITY

## 2022-01-01 RX ORDER — NICOTINE POLACRILEX 4 MG
15-30 LOZENGE BUCCAL
Status: DISCONTINUED | OUTPATIENT
Start: 2022-01-01 | End: 2022-01-01

## 2022-01-01 RX ORDER — OXYCODONE HYDROCHLORIDE 5 MG/1
5 TABLET ORAL
Status: DISCONTINUED | OUTPATIENT
Start: 2022-01-01 | End: 2022-01-01

## 2022-01-01 RX ORDER — ONDANSETRON 8 MG/1
8 TABLET, ORALLY DISINTEGRATING ORAL EVERY 8 HOURS PRN
Status: DISCONTINUED | OUTPATIENT
Start: 2022-01-01 | End: 2022-01-01 | Stop reason: HOSPADM

## 2022-01-01 RX ORDER — OXYCODONE HYDROCHLORIDE 10 MG/1
10-20 TABLET ORAL EVERY 4 HOURS PRN
Status: DISCONTINUED | OUTPATIENT
Start: 2022-01-01 | End: 2022-01-01

## 2022-01-01 RX ORDER — ACYCLOVIR 400 MG/1
400 TABLET ORAL 2 TIMES DAILY
Qty: 60 TABLET | Refills: 0 | Status: SHIPPED | OUTPATIENT
Start: 2022-01-01

## 2022-01-01 RX ORDER — POSACONAZOLE 100 MG/1
300 TABLET, DELAYED RELEASE ORAL EVERY MORNING
Status: DISCONTINUED | OUTPATIENT
Start: 2022-01-01 | End: 2022-01-01 | Stop reason: HOSPADM

## 2022-01-01 RX ORDER — MEPERIDINE HYDROCHLORIDE 25 MG/ML
25 INJECTION INTRAMUSCULAR; INTRAVENOUS; SUBCUTANEOUS EVERY 30 MIN PRN
Status: DISCONTINUED | OUTPATIENT
Start: 2022-01-01 | End: 2022-01-01 | Stop reason: HOSPADM

## 2022-01-01 RX ORDER — ENOXAPARIN SODIUM 100 MG/ML
40 INJECTION SUBCUTANEOUS ONCE
Status: COMPLETED | OUTPATIENT
Start: 2022-01-01 | End: 2022-01-01

## 2022-01-01 RX ORDER — HEPARIN SODIUM,PORCINE 10 UNIT/ML
5-20 VIAL (ML) INTRAVENOUS
Status: DISCONTINUED | OUTPATIENT
Start: 2022-01-01 | End: 2022-01-01 | Stop reason: HOSPADM

## 2022-01-01 RX ORDER — POSACONAZOLE 100 MG/1
300 TABLET, DELAYED RELEASE ORAL EVERY MORNING
Qty: 90 TABLET | Refills: 0 | Status: SHIPPED | OUTPATIENT
Start: 2022-01-01

## 2022-01-01 RX ORDER — OXYCODONE HYDROCHLORIDE 5 MG/1
5 TABLET ORAL EVERY 4 HOURS PRN
Qty: 20 TABLET | Refills: 0 | Status: SHIPPED | OUTPATIENT
Start: 2022-01-01 | End: 2023-01-01

## 2022-01-01 RX ORDER — ALLOPURINOL 300 MG/1
300 TABLET ORAL DAILY
Status: DISCONTINUED | OUTPATIENT
Start: 2022-01-01 | End: 2022-01-01

## 2022-01-01 RX ORDER — IOPAMIDOL 755 MG/ML
120 INJECTION, SOLUTION INTRAVASCULAR ONCE
Status: COMPLETED | OUTPATIENT
Start: 2022-01-01 | End: 2022-01-01

## 2022-01-01 RX ORDER — POLYETHYLENE GLYCOL 3350 17 G/17G
17 POWDER, FOR SOLUTION ORAL DAILY PRN
Qty: 225 G | Refills: 0 | Status: SHIPPED | OUTPATIENT
Start: 2022-01-01

## 2022-01-01 RX ORDER — METHOCARBAMOL 500 MG/1
500 TABLET, FILM COATED ORAL 4 TIMES DAILY PRN
Qty: 60 TABLET | Refills: 0 | Status: SHIPPED | OUTPATIENT
Start: 2022-01-01 | End: 2023-01-01

## 2022-01-01 RX ORDER — CALCIUM ACETATE 667 MG/1
2001 CAPSULE ORAL
Qty: 30 CAPSULE | Refills: 0 | Status: SHIPPED | OUTPATIENT
Start: 2022-01-01

## 2022-01-01 RX ORDER — IPRATROPIUM BROMIDE AND ALBUTEROL SULFATE 2.5; .5 MG/3ML; MG/3ML
3 SOLUTION RESPIRATORY (INHALATION) 2 TIMES DAILY PRN
Status: DISCONTINUED | OUTPATIENT
Start: 2022-01-01 | End: 2022-01-01 | Stop reason: HOSPADM

## 2022-01-01 RX ORDER — OXYCODONE HYDROCHLORIDE 5 MG/1
5-10 TABLET ORAL EVERY 6 HOURS PRN
Status: DISCONTINUED | OUTPATIENT
Start: 2022-01-01 | End: 2022-01-01

## 2022-01-01 RX ORDER — AMMONIUM LACTATE 12 G/100G
CREAM TOPICAL DAILY
Qty: 385 G | Refills: 0 | Status: SHIPPED | OUTPATIENT
Start: 2022-01-01

## 2022-01-01 RX ORDER — OXYCODONE HYDROCHLORIDE 5 MG/1
5 TABLET ORAL EVERY 4 HOURS PRN
Status: DISCONTINUED | OUTPATIENT
Start: 2022-01-01 | End: 2022-01-01 | Stop reason: HOSPADM

## 2022-01-01 RX ORDER — METOPROLOL SUCCINATE 25 MG/1
12.5 TABLET, EXTENDED RELEASE ORAL DAILY
Status: ON HOLD | COMMUNITY
End: 2022-01-01

## 2022-01-01 RX ORDER — ONDANSETRON 2 MG/ML
8 INJECTION INTRAMUSCULAR; INTRAVENOUS EVERY 8 HOURS PRN
Status: DISCONTINUED | OUTPATIENT
Start: 2022-01-01 | End: 2022-01-01 | Stop reason: HOSPADM

## 2022-01-01 RX ORDER — AMOXICILLIN 250 MG
1 CAPSULE ORAL 2 TIMES DAILY PRN
Qty: 30 TABLET | Refills: 0 | Status: SHIPPED | OUTPATIENT
Start: 2022-01-01

## 2022-01-01 RX ORDER — PROCHLORPERAZINE MALEATE 5 MG
5-10 TABLET ORAL EVERY 6 HOURS PRN
Status: DISCONTINUED | OUTPATIENT
Start: 2022-01-01 | End: 2022-01-01 | Stop reason: HOSPADM

## 2022-01-01 RX ORDER — ACETAMINOPHEN 500 MG
500-1000 TABLET ORAL EVERY 6 HOURS PRN
COMMUNITY

## 2022-01-01 RX ORDER — IPRATROPIUM BROMIDE AND ALBUTEROL SULFATE 2.5; .5 MG/3ML; MG/3ML
3 SOLUTION RESPIRATORY (INHALATION) 2 TIMES DAILY
Status: DISCONTINUED | OUTPATIENT
Start: 2022-01-01 | End: 2022-01-01

## 2022-01-01 RX ORDER — ALLOPURINOL 300 MG/1
300 TABLET ORAL 2 TIMES DAILY
Status: DISCONTINUED | OUTPATIENT
Start: 2022-01-01 | End: 2022-01-01 | Stop reason: HOSPADM

## 2022-01-01 RX ORDER — PROCHLORPERAZINE MALEATE 5 MG
5 TABLET ORAL EVERY 6 HOURS PRN
Status: DISCONTINUED | OUTPATIENT
Start: 2022-01-01 | End: 2022-01-01

## 2022-01-01 RX ORDER — ACYCLOVIR 400 MG/1
400 TABLET ORAL 2 TIMES DAILY
Status: DISCONTINUED | OUTPATIENT
Start: 2022-01-01 | End: 2022-01-01 | Stop reason: HOSPADM

## 2022-01-01 RX ADMIN — OXYCODONE HYDROCHLORIDE 7.5 MG: 5 TABLET ORAL at 16:12

## 2022-01-01 RX ADMIN — CALCIUM ACETATE 1334 MG: 667 CAPSULE ORAL at 12:08

## 2022-01-01 RX ADMIN — DICLOFENAC SODIUM 2 G: 10 GEL TOPICAL at 11:45

## 2022-01-01 RX ADMIN — PANTOPRAZOLE SODIUM 40 MG: 40 TABLET, DELAYED RELEASE ORAL at 08:26

## 2022-01-01 RX ADMIN — Medication: at 20:25

## 2022-01-01 RX ADMIN — CALCIUM ACETATE 1334 MG: 667 CAPSULE ORAL at 17:56

## 2022-01-01 RX ADMIN — HYDROMORPHONE HYDROCHLORIDE 0.5 MG: 1 INJECTION, SOLUTION INTRAMUSCULAR; INTRAVENOUS; SUBCUTANEOUS at 12:19

## 2022-01-01 RX ADMIN — LIDOCAINE PATCH 4% 2 PATCH: 40 PATCH TOPICAL at 19:58

## 2022-01-01 RX ADMIN — OXYCODONE HYDROCHLORIDE 7.5 MG: 5 TABLET ORAL at 02:14

## 2022-01-01 RX ADMIN — IPRATROPIUM BROMIDE AND ALBUTEROL SULFATE 3 ML: 2.5; .5 SOLUTION RESPIRATORY (INHALATION) at 20:01

## 2022-01-01 RX ADMIN — DEXAMETHASONE 2 MG: 2 TABLET ORAL at 09:06

## 2022-01-01 RX ADMIN — OXYCODONE HYDROCHLORIDE 10 MG: 5 TABLET ORAL at 21:39

## 2022-01-01 RX ADMIN — Medication: at 21:12

## 2022-01-01 RX ADMIN — CALCIUM ACETATE 2001 MG: 667 CAPSULE ORAL at 12:59

## 2022-01-01 RX ADMIN — METHOCARBAMOL 500 MG: 500 TABLET ORAL at 11:53

## 2022-01-01 RX ADMIN — MAGNESIUM HYDROXIDE 30 ML: 400 SUSPENSION ORAL at 13:31

## 2022-01-01 RX ADMIN — INSULIN ASPART 2 UNITS: 100 INJECTION, SOLUTION INTRAVENOUS; SUBCUTANEOUS at 13:48

## 2022-01-01 RX ADMIN — BUMETANIDE 1 MG: 1 TABLET ORAL at 07:53

## 2022-01-01 RX ADMIN — HYDROXYUREA 1000 MG: 500 CAPSULE ORAL at 09:02

## 2022-01-01 RX ADMIN — CALCIUM ACETATE 1334 MG: 667 CAPSULE ORAL at 13:35

## 2022-01-01 RX ADMIN — INSULIN ASPART 1 UNITS: 100 INJECTION, SOLUTION INTRAVENOUS; SUBCUTANEOUS at 13:55

## 2022-01-01 RX ADMIN — SODIUM CHLORIDE, PRESERVATIVE FREE 5 ML: 5 INJECTION INTRAVENOUS at 05:36

## 2022-01-01 RX ADMIN — DEXAMETHASONE 6 MG: 2 TABLET ORAL at 12:04

## 2022-01-01 RX ADMIN — ACYCLOVIR 400 MG: 400 TABLET ORAL at 21:13

## 2022-01-01 RX ADMIN — PANTOPRAZOLE SODIUM 40 MG: 40 TABLET, DELAYED RELEASE ORAL at 08:48

## 2022-01-01 RX ADMIN — POSACONAZOLE 300 MG: 100 TABLET, DELAYED RELEASE ORAL at 07:59

## 2022-01-01 RX ADMIN — AZITHROMYCIN MONOHYDRATE 500 MG: 250 TABLET ORAL at 14:04

## 2022-01-01 RX ADMIN — HYDROMORPHONE HYDROCHLORIDE 0.5 MG: 1 INJECTION, SOLUTION INTRAMUSCULAR; INTRAVENOUS; SUBCUTANEOUS at 15:07

## 2022-01-01 RX ADMIN — LEVOFLOXACIN 250 MG: 250 TABLET, FILM COATED ORAL at 11:53

## 2022-01-01 RX ADMIN — Medication: at 08:03

## 2022-01-01 RX ADMIN — ACYCLOVIR 400 MG: 400 TABLET ORAL at 08:36

## 2022-01-01 RX ADMIN — BUMETANIDE 1 MG: 1 TABLET ORAL at 08:00

## 2022-01-01 RX ADMIN — ALLOPURINOL 300 MG: 300 TABLET ORAL at 12:56

## 2022-01-01 RX ADMIN — AZITHROMYCIN MONOHYDRATE 500 MG: 250 TABLET ORAL at 08:39

## 2022-01-01 RX ADMIN — DICLOFENAC SODIUM 2 G: 10 GEL TOPICAL at 09:36

## 2022-01-01 RX ADMIN — HYDROXYUREA 1000 MG: 500 CAPSULE ORAL at 09:08

## 2022-01-01 RX ADMIN — INSULIN ASPART 2 UNITS: 100 INJECTION, SOLUTION INTRAVENOUS; SUBCUTANEOUS at 18:51

## 2022-01-01 RX ADMIN — MAGNESIUM HYDROXIDE 30 ML: 400 SUSPENSION ORAL at 16:19

## 2022-01-01 RX ADMIN — METHOCARBAMOL 500 MG: 500 TABLET ORAL at 11:42

## 2022-01-01 RX ADMIN — ALLOPURINOL 300 MG: 300 TABLET ORAL at 07:59

## 2022-01-01 RX ADMIN — ALLOPURINOL 300 MG: 300 TABLET ORAL at 09:04

## 2022-01-01 RX ADMIN — SODIUM CHLORIDE, POTASSIUM CHLORIDE, SODIUM LACTATE AND CALCIUM CHLORIDE 1000 ML: 600; 310; 30; 20 INJECTION, SOLUTION INTRAVENOUS at 10:02

## 2022-01-01 RX ADMIN — CALCIUM ACETATE 1334 MG: 667 CAPSULE ORAL at 17:14

## 2022-01-01 RX ADMIN — IPRATROPIUM BROMIDE AND ALBUTEROL SULFATE 3 ML: 2.5; .5 SOLUTION RESPIRATORY (INHALATION) at 08:43

## 2022-01-01 RX ADMIN — ENOXAPARIN SODIUM 90 MG: 100 INJECTION SUBCUTANEOUS at 20:09

## 2022-01-01 RX ADMIN — POLYETHYLENE GLYCOL 3350 17 G: 17 POWDER, FOR SOLUTION ORAL at 13:04

## 2022-01-01 RX ADMIN — CALCIUM CARBONATE (ANTACID) CHEW TAB 500 MG 500 MG: 500 CHEW TAB at 20:22

## 2022-01-01 RX ADMIN — VENETOCLAX 200 MG: 100 TABLET, FILM COATED ORAL at 17:15

## 2022-01-01 RX ADMIN — RIVAROXABAN 20 MG: 20 TABLET, FILM COATED ORAL at 16:55

## 2022-01-01 RX ADMIN — METHOCARBAMOL 500 MG: 500 TABLET ORAL at 09:35

## 2022-01-01 RX ADMIN — CALCIUM ACETATE 2001 MG: 667 CAPSULE ORAL at 08:19

## 2022-01-01 RX ADMIN — SENNOSIDES AND DOCUSATE SODIUM 2 TABLET: 8.6; 5 TABLET ORAL at 08:55

## 2022-01-01 RX ADMIN — IPRATROPIUM BROMIDE AND ALBUTEROL SULFATE 3 ML: 2.5; .5 SOLUTION RESPIRATORY (INHALATION) at 11:04

## 2022-01-01 RX ADMIN — IPRATROPIUM BROMIDE AND ALBUTEROL SULFATE 3 ML: 2.5; .5 SOLUTION RESPIRATORY (INHALATION) at 15:56

## 2022-01-01 RX ADMIN — ACETAMINOPHEN 650 MG: 325 TABLET, FILM COATED ORAL at 17:13

## 2022-01-01 RX ADMIN — METHOCARBAMOL 500 MG: 500 TABLET ORAL at 12:18

## 2022-01-01 RX ADMIN — DICLOFENAC SODIUM 2 G: 10 GEL TOPICAL at 21:51

## 2022-01-01 RX ADMIN — DICLOFENAC SODIUM 2 G: 10 GEL TOPICAL at 11:51

## 2022-01-01 RX ADMIN — ATORVASTATIN CALCIUM 20 MG: 20 TABLET, FILM COATED ORAL at 23:58

## 2022-01-01 RX ADMIN — SENNOSIDES AND DOCUSATE SODIUM 2 TABLET: 8.6; 5 TABLET ORAL at 08:05

## 2022-01-01 RX ADMIN — METHOCARBAMOL 500 MG: 500 TABLET ORAL at 12:55

## 2022-01-01 RX ADMIN — OXYCODONE HYDROCHLORIDE 10 MG: 5 TABLET ORAL at 19:03

## 2022-01-01 RX ADMIN — VENETOCLAX 100 MG: 100 TABLET, FILM COATED ORAL at 17:49

## 2022-01-01 RX ADMIN — BUMETANIDE 1 MG: 1 TABLET ORAL at 08:27

## 2022-01-01 RX ADMIN — OXYCODONE HYDROCHLORIDE 10 MG: 5 TABLET ORAL at 04:37

## 2022-01-01 RX ADMIN — METHOCARBAMOL 500 MG: 500 TABLET ORAL at 06:33

## 2022-01-01 RX ADMIN — DICLOFENAC SODIUM 2 G: 10 GEL TOPICAL at 11:55

## 2022-01-01 RX ADMIN — ALLOPURINOL 300 MG: 300 TABLET ORAL at 23:58

## 2022-01-01 RX ADMIN — FLUTICASONE FUROATE AND VILANTEROL TRIFENATATE 1 PUFF: 200; 25 POWDER RESPIRATORY (INHALATION) at 07:59

## 2022-01-01 RX ADMIN — CALCIUM ACETATE 1334 MG: 667 CAPSULE ORAL at 18:35

## 2022-01-01 RX ADMIN — DICLOFENAC SODIUM 2 G: 10 GEL TOPICAL at 19:10

## 2022-01-01 RX ADMIN — OXYCODONE HYDROCHLORIDE 10 MG: 10 TABLET ORAL at 08:48

## 2022-01-01 RX ADMIN — INSULIN ASPART 2 UNITS: 100 INJECTION, SOLUTION INTRAVENOUS; SUBCUTANEOUS at 17:56

## 2022-01-01 RX ADMIN — INSULIN ASPART 2 UNITS: 100 INJECTION, SOLUTION INTRAVENOUS; SUBCUTANEOUS at 18:37

## 2022-01-01 RX ADMIN — METHOCARBAMOL 500 MG: 500 TABLET ORAL at 19:08

## 2022-01-01 RX ADMIN — OXYCODONE HYDROCHLORIDE 10 MG: 10 TABLET ORAL at 22:35

## 2022-01-01 RX ADMIN — DICLOFENAC SODIUM 2 G: 10 GEL TOPICAL at 06:45

## 2022-01-01 RX ADMIN — METHOCARBAMOL 500 MG: 500 TABLET ORAL at 20:09

## 2022-01-01 RX ADMIN — ACYCLOVIR 400 MG: 400 TABLET ORAL at 07:59

## 2022-01-01 RX ADMIN — DECITABINE 60 MG: 50 INJECTION, POWDER, LYOPHILIZED, FOR SOLUTION INTRAVENOUS at 13:12

## 2022-01-01 RX ADMIN — PANTOPRAZOLE SODIUM 40 MG: 40 TABLET, DELAYED RELEASE ORAL at 08:55

## 2022-01-01 RX ADMIN — CALCIUM ACETATE 2001 MG: 667 CAPSULE ORAL at 18:46

## 2022-01-01 RX ADMIN — Medication: at 10:45

## 2022-01-01 RX ADMIN — OXYCODONE HYDROCHLORIDE 20 MG: 10 TABLET ORAL at 08:56

## 2022-01-01 RX ADMIN — DICLOFENAC SODIUM 2 G: 10 GEL TOPICAL at 09:10

## 2022-01-01 RX ADMIN — MAGNESIUM HYDROXIDE 30 ML: 400 SUSPENSION ORAL at 13:11

## 2022-01-01 RX ADMIN — IPRATROPIUM BROMIDE AND ALBUTEROL SULFATE 3 ML: 2.5; .5 SOLUTION RESPIRATORY (INHALATION) at 21:05

## 2022-01-01 RX ADMIN — BUMETANIDE 1 MG: 1 TABLET ORAL at 09:08

## 2022-01-01 RX ADMIN — IPRATROPIUM BROMIDE AND ALBUTEROL SULFATE 3 ML: 2.5; .5 SOLUTION RESPIRATORY (INHALATION) at 07:59

## 2022-01-01 RX ADMIN — DICLOFENAC SODIUM 2 G: 10 GEL TOPICAL at 05:50

## 2022-01-01 RX ADMIN — DICLOFENAC SODIUM 2 G: 10 GEL TOPICAL at 16:13

## 2022-01-01 RX ADMIN — ALLOPURINOL 300 MG: 300 TABLET ORAL at 09:35

## 2022-01-01 RX ADMIN — Medication: at 21:41

## 2022-01-01 RX ADMIN — DICLOFENAC SODIUM 2 G: 10 GEL TOPICAL at 21:48

## 2022-01-01 RX ADMIN — LEVOFLOXACIN 250 MG: 250 TABLET, FILM COATED ORAL at 12:56

## 2022-01-01 RX ADMIN — SODIUM CHLORIDE 500 ML: 9 INJECTION, SOLUTION INTRAVENOUS at 21:12

## 2022-01-01 RX ADMIN — CALCIUM ACETATE 1334 MG: 667 CAPSULE ORAL at 07:59

## 2022-01-01 RX ADMIN — OXYCODONE HYDROCHLORIDE 10 MG: 5 TABLET ORAL at 01:12

## 2022-01-01 RX ADMIN — METHOCARBAMOL 500 MG: 500 TABLET ORAL at 06:11

## 2022-01-01 RX ADMIN — POLYETHYLENE GLYCOL 3350 17 G: 17 POWDER, FOR SOLUTION ORAL at 08:20

## 2022-01-01 RX ADMIN — SODIUM CHLORIDE, PRESERVATIVE FREE 5 ML: 5 INJECTION INTRAVENOUS at 22:35

## 2022-01-01 RX ADMIN — OXYCODONE HYDROCHLORIDE 10 MG: 5 TABLET ORAL at 12:41

## 2022-01-01 RX ADMIN — IOPAMIDOL 120 ML: 755 INJECTION, SOLUTION INTRAVENOUS at 10:53

## 2022-01-01 RX ADMIN — CALCIUM ACETATE 1334 MG: 667 CAPSULE ORAL at 17:22

## 2022-01-01 RX ADMIN — SENNOSIDES AND DOCUSATE SODIUM 2 TABLET: 8.6; 5 TABLET ORAL at 09:04

## 2022-01-01 RX ADMIN — IPRATROPIUM BROMIDE AND ALBUTEROL SULFATE 3 ML: 2.5; .5 SOLUTION RESPIRATORY (INHALATION) at 20:10

## 2022-01-01 RX ADMIN — METHOCARBAMOL 500 MG: 500 TABLET ORAL at 21:47

## 2022-01-01 RX ADMIN — CEFEPIME HYDROCHLORIDE 2 G: 2 INJECTION, POWDER, FOR SOLUTION INTRAVENOUS at 22:10

## 2022-01-01 RX ADMIN — ACYCLOVIR 400 MG: 400 TABLET ORAL at 07:53

## 2022-01-01 RX ADMIN — SODIUM CHLORIDE, PRESERVATIVE FREE: 5 INJECTION INTRAVENOUS at 23:55

## 2022-01-01 RX ADMIN — ENOXAPARIN SODIUM 90 MG: 100 INJECTION SUBCUTANEOUS at 20:24

## 2022-01-01 RX ADMIN — METHOCARBAMOL 500 MG: 500 TABLET ORAL at 21:39

## 2022-01-01 RX ADMIN — ALLOPURINOL 300 MG: 300 TABLET ORAL at 20:24

## 2022-01-01 RX ADMIN — OXYMETAZOLINE HYDROCHLORIDE 2 SPRAY: 0.05 SPRAY NASAL at 20:46

## 2022-01-01 RX ADMIN — FLUTICASONE FUROATE AND VILANTEROL TRIFENATATE 1 PUFF: 200; 25 POWDER RESPIRATORY (INHALATION) at 08:31

## 2022-01-01 RX ADMIN — ALLOPURINOL 300 MG: 300 TABLET ORAL at 08:19

## 2022-01-01 RX ADMIN — INSULIN ASPART 1 UNITS: 100 INJECTION, SOLUTION INTRAVENOUS; SUBCUTANEOUS at 22:35

## 2022-01-01 RX ADMIN — IPRATROPIUM BROMIDE AND ALBUTEROL SULFATE 3 ML: 2.5; .5 SOLUTION RESPIRATORY (INHALATION) at 20:20

## 2022-01-01 RX ADMIN — INSULIN ASPART 1 UNITS: 100 INJECTION, SOLUTION INTRAVENOUS; SUBCUTANEOUS at 19:03

## 2022-01-01 RX ADMIN — CALCIUM ACETATE 2001 MG: 667 CAPSULE ORAL at 18:23

## 2022-01-01 RX ADMIN — METHOCARBAMOL 500 MG: 500 TABLET ORAL at 17:03

## 2022-01-01 RX ADMIN — ALLOPURINOL 300 MG: 300 TABLET ORAL at 08:27

## 2022-01-01 RX ADMIN — DECITABINE 60 MG: 50 INJECTION, POWDER, LYOPHILIZED, FOR SOLUTION INTRAVENOUS at 17:48

## 2022-01-01 RX ADMIN — OXYCODONE HYDROCHLORIDE 10 MG: 10 TABLET ORAL at 21:13

## 2022-01-01 RX ADMIN — SENNOSIDES AND DOCUSATE SODIUM 2 TABLET: 8.6; 5 TABLET ORAL at 20:06

## 2022-01-01 RX ADMIN — CALCIUM ACETATE 1334 MG: 667 CAPSULE ORAL at 12:18

## 2022-01-01 RX ADMIN — SODIUM CHLORIDE, PRESERVATIVE FREE 5 ML: 5 INJECTION INTRAVENOUS at 07:32

## 2022-01-01 RX ADMIN — LEVOFLOXACIN 250 MG: 250 TABLET, FILM COATED ORAL at 07:53

## 2022-01-01 RX ADMIN — IPRATROPIUM BROMIDE AND ALBUTEROL SULFATE 3 ML: 2.5; .5 SOLUTION RESPIRATORY (INHALATION) at 20:46

## 2022-01-01 RX ADMIN — PANTOPRAZOLE SODIUM 40 MG: 40 TABLET, DELAYED RELEASE ORAL at 06:36

## 2022-01-01 RX ADMIN — CALCIUM ACETATE 1334 MG: 667 CAPSULE ORAL at 17:33

## 2022-01-01 RX ADMIN — IPRATROPIUM BROMIDE AND ALBUTEROL SULFATE 3 ML: 2.5; .5 SOLUTION RESPIRATORY (INHALATION) at 08:08

## 2022-01-01 RX ADMIN — FLUTICASONE FUROATE AND VILANTEROL TRIFENATATE 1 PUFF: 200; 25 POWDER RESPIRATORY (INHALATION) at 08:35

## 2022-01-01 RX ADMIN — Medication 12.5 MG: at 08:17

## 2022-01-01 RX ADMIN — METHOCARBAMOL 500 MG: 500 TABLET ORAL at 16:41

## 2022-01-01 RX ADMIN — SENNOSIDES AND DOCUSATE SODIUM 2 TABLET: 8.6; 5 TABLET ORAL at 20:08

## 2022-01-01 RX ADMIN — SENNOSIDES AND DOCUSATE SODIUM 2 TABLET: 8.6; 5 TABLET ORAL at 20:09

## 2022-01-01 RX ADMIN — OXYCODONE HYDROCHLORIDE 10 MG: 10 TABLET ORAL at 08:07

## 2022-01-01 RX ADMIN — ALLOPURINOL 300 MG: 300 TABLET ORAL at 08:04

## 2022-01-01 RX ADMIN — ACYCLOVIR 400 MG: 400 TABLET ORAL at 21:48

## 2022-01-01 RX ADMIN — POLYETHYLENE GLYCOL 3350 17 G: 17 POWDER, FOR SOLUTION ORAL at 08:41

## 2022-01-01 RX ADMIN — DECITABINE 60 MG: 50 INJECTION, POWDER, LYOPHILIZED, FOR SOLUTION INTRAVENOUS at 16:00

## 2022-01-01 RX ADMIN — POLYETHYLENE GLYCOL 3350 17 G: 17 POWDER, FOR SOLUTION ORAL at 07:53

## 2022-01-01 RX ADMIN — METHOCARBAMOL 500 MG: 500 TABLET ORAL at 22:18

## 2022-01-01 RX ADMIN — PANTOPRAZOLE SODIUM 40 MG: 40 TABLET, DELAYED RELEASE ORAL at 09:54

## 2022-01-01 RX ADMIN — ACYCLOVIR 400 MG: 400 TABLET ORAL at 20:44

## 2022-01-01 RX ADMIN — OXYCODONE HYDROCHLORIDE 10 MG: 5 TABLET ORAL at 09:57

## 2022-01-01 RX ADMIN — FLUTICASONE FUROATE AND VILANTEROL TRIFENATATE 1 PUFF: 200; 25 POWDER RESPIRATORY (INHALATION) at 09:06

## 2022-01-01 RX ADMIN — HYDROMORPHONE HYDROCHLORIDE 0.5 MG: 1 INJECTION, SOLUTION INTRAMUSCULAR; INTRAVENOUS; SUBCUTANEOUS at 07:02

## 2022-01-01 RX ADMIN — DICLOFENAC SODIUM 2 G: 10 GEL TOPICAL at 16:45

## 2022-01-01 RX ADMIN — LISINOPRIL 5 MG: 5 TABLET ORAL at 09:08

## 2022-01-01 RX ADMIN — FLUTICASONE FUROATE AND VILANTEROL TRIFENATATE 1 PUFF: 200; 25 POWDER RESPIRATORY (INHALATION) at 11:59

## 2022-01-01 RX ADMIN — OXYCODONE HYDROCHLORIDE 10 MG: 5 TABLET ORAL at 06:01

## 2022-01-01 RX ADMIN — OXYCODONE HYDROCHLORIDE 10 MG: 5 TABLET ORAL at 18:50

## 2022-01-01 RX ADMIN — ENOXAPARIN SODIUM 90 MG: 100 INJECTION SUBCUTANEOUS at 10:10

## 2022-01-01 RX ADMIN — METHOCARBAMOL 500 MG: 500 TABLET ORAL at 15:50

## 2022-01-01 RX ADMIN — FLUTICASONE FUROATE AND VILANTEROL TRIFENATATE 1 PUFF: 200; 25 POWDER RESPIRATORY (INHALATION) at 08:52

## 2022-01-01 RX ADMIN — DICLOFENAC SODIUM 2 G: 10 GEL TOPICAL at 22:07

## 2022-01-01 RX ADMIN — METHOCARBAMOL 500 MG: 500 TABLET ORAL at 22:35

## 2022-01-01 RX ADMIN — Medication 2 G: at 13:08

## 2022-01-01 RX ADMIN — DICLOFENAC SODIUM 2 G: 10 GEL TOPICAL at 12:01

## 2022-01-01 RX ADMIN — PANTOPRAZOLE SODIUM 40 MG: 40 TABLET, DELAYED RELEASE ORAL at 08:19

## 2022-01-01 RX ADMIN — OXYCODONE HYDROCHLORIDE 10 MG: 5 TABLET ORAL at 21:44

## 2022-01-01 RX ADMIN — CALCIUM ACETATE 1334 MG: 667 CAPSULE ORAL at 08:03

## 2022-01-01 RX ADMIN — Medication: at 08:30

## 2022-01-01 RX ADMIN — OXYCODONE HYDROCHLORIDE 10 MG: 5 TABLET ORAL at 02:26

## 2022-01-01 RX ADMIN — OXYCODONE HYDROCHLORIDE 5 MG: 5 TABLET ORAL at 17:22

## 2022-01-01 RX ADMIN — METHOCARBAMOL 500 MG: 500 TABLET ORAL at 06:01

## 2022-01-01 RX ADMIN — CYCLOBENZAPRINE 10 MG: 5 TABLET, FILM COATED ORAL at 11:03

## 2022-01-01 RX ADMIN — DICLOFENAC SODIUM 2 G: 10 GEL TOPICAL at 06:09

## 2022-01-01 RX ADMIN — CALCIUM ACETATE 1334 MG: 667 CAPSULE ORAL at 08:02

## 2022-01-01 RX ADMIN — POLYETHYLENE GLYCOL 3350 17 G: 17 POWDER, FOR SOLUTION ORAL at 08:01

## 2022-01-01 RX ADMIN — VENETOCLAX 100 MG: 100 TABLET, FILM COATED ORAL at 18:23

## 2022-01-01 RX ADMIN — ALLOPURINOL 300 MG: 300 TABLET ORAL at 08:51

## 2022-01-01 RX ADMIN — SODIUM CHLORIDE, PRESERVATIVE FREE 5 ML: 5 INJECTION INTRAVENOUS at 08:58

## 2022-01-01 RX ADMIN — OXYCODONE HYDROCHLORIDE 10 MG: 5 TABLET ORAL at 17:59

## 2022-01-01 RX ADMIN — METHOCARBAMOL 500 MG: 500 TABLET ORAL at 12:57

## 2022-01-01 RX ADMIN — PANTOPRAZOLE SODIUM 40 MG: 40 TABLET, DELAYED RELEASE ORAL at 08:40

## 2022-01-01 RX ADMIN — OXYCODONE HYDROCHLORIDE 10 MG: 10 TABLET ORAL at 02:21

## 2022-01-01 RX ADMIN — SENNOSIDES AND DOCUSATE SODIUM 2 TABLET: 8.6; 5 TABLET ORAL at 19:08

## 2022-01-01 RX ADMIN — CALCIUM ACETATE 1334 MG: 667 CAPSULE ORAL at 11:42

## 2022-01-01 RX ADMIN — MAGNESIUM HYDROXIDE 30 ML: 400 SUSPENSION ORAL at 13:00

## 2022-01-01 RX ADMIN — LISINOPRIL 5 MG: 5 TABLET ORAL at 08:17

## 2022-01-01 RX ADMIN — CEFEPIME HYDROCHLORIDE 2 G: 2 INJECTION, POWDER, FOR SOLUTION INTRAVENOUS at 14:05

## 2022-01-01 RX ADMIN — BUMETANIDE 1 MG: 1 TABLET ORAL at 09:07

## 2022-01-01 RX ADMIN — METHOCARBAMOL 500 MG: 500 TABLET ORAL at 19:34

## 2022-01-01 RX ADMIN — FLUTICASONE FUROATE AND VILANTEROL TRIFENATATE 1 PUFF: 200; 25 POWDER RESPIRATORY (INHALATION) at 08:20

## 2022-01-01 RX ADMIN — SODIUM CHLORIDE 500 ML: 9 INJECTION, SOLUTION INTRAVENOUS at 08:33

## 2022-01-01 RX ADMIN — METHOCARBAMOL 500 MG: 500 TABLET ORAL at 12:24

## 2022-01-01 RX ADMIN — POLYETHYLENE GLYCOL 3350 17 G: 17 POWDER, FOR SOLUTION ORAL at 20:37

## 2022-01-01 RX ADMIN — SENNOSIDES AND DOCUSATE SODIUM 2 TABLET: 8.6; 5 TABLET ORAL at 20:41

## 2022-01-01 RX ADMIN — SENNOSIDES AND DOCUSATE SODIUM 2 TABLET: 8.6; 5 TABLET ORAL at 08:42

## 2022-01-01 RX ADMIN — METHOCARBAMOL 500 MG: 500 TABLET ORAL at 17:02

## 2022-01-01 RX ADMIN — INSULIN ASPART 2 UNITS: 100 INJECTION, SOLUTION INTRAVENOUS; SUBCUTANEOUS at 12:55

## 2022-01-01 RX ADMIN — OXYCODONE HYDROCHLORIDE 10 MG: 5 TABLET ORAL at 08:40

## 2022-01-01 RX ADMIN — METHOCARBAMOL 500 MG: 500 TABLET ORAL at 21:48

## 2022-01-01 RX ADMIN — VENETOCLAX 100 MG: 100 TABLET, FILM COATED ORAL at 20:41

## 2022-01-01 RX ADMIN — PANTOPRAZOLE SODIUM 40 MG: 40 TABLET, DELAYED RELEASE ORAL at 08:15

## 2022-01-01 RX ADMIN — FLUTICASONE FUROATE AND VILANTEROL TRIFENATATE 1 PUFF: 200; 25 POWDER RESPIRATORY (INHALATION) at 08:11

## 2022-01-01 RX ADMIN — CALCIUM ACETATE 1334 MG: 667 CAPSULE ORAL at 18:51

## 2022-01-01 RX ADMIN — CEFEPIME HYDROCHLORIDE 2 G: 2 INJECTION, POWDER, FOR SOLUTION INTRAVENOUS at 22:28

## 2022-01-01 RX ADMIN — ACYCLOVIR 400 MG: 400 TABLET ORAL at 20:24

## 2022-01-01 RX ADMIN — METHOCARBAMOL 500 MG: 500 TABLET ORAL at 12:20

## 2022-01-01 RX ADMIN — METHOCARBAMOL 500 MG: 500 TABLET ORAL at 16:19

## 2022-01-01 RX ADMIN — IPRATROPIUM BROMIDE AND ALBUTEROL SULFATE 3 ML: 2.5; .5 SOLUTION RESPIRATORY (INHALATION) at 07:46

## 2022-01-01 RX ADMIN — DICLOFENAC SODIUM 2 G: 10 GEL TOPICAL at 12:58

## 2022-01-01 RX ADMIN — DICLOFENAC SODIUM 2 G: 10 GEL TOPICAL at 12:18

## 2022-01-01 RX ADMIN — OXYCODONE HYDROCHLORIDE 10 MG: 5 TABLET ORAL at 13:35

## 2022-01-01 RX ADMIN — SENNOSIDES AND DOCUSATE SODIUM 2 TABLET: 8.6; 5 TABLET ORAL at 08:20

## 2022-01-01 RX ADMIN — IPRATROPIUM BROMIDE AND ALBUTEROL SULFATE 3 ML: 2.5; .5 SOLUTION RESPIRATORY (INHALATION) at 21:32

## 2022-01-01 RX ADMIN — Medication 10 ML: at 06:34

## 2022-01-01 RX ADMIN — PANTOPRAZOLE SODIUM 40 MG: 40 TABLET, DELAYED RELEASE ORAL at 07:53

## 2022-01-01 RX ADMIN — METHOCARBAMOL 500 MG: 500 TABLET ORAL at 16:45

## 2022-01-01 RX ADMIN — POLYETHYLENE GLYCOL 3350 17 G: 17 POWDER, FOR SOLUTION ORAL at 14:48

## 2022-01-01 RX ADMIN — OXYCODONE HYDROCHLORIDE 10 MG: 5 TABLET ORAL at 21:30

## 2022-01-01 RX ADMIN — MICAFUNGIN 50 MG: 10 INJECTION, POWDER, LYOPHILIZED, FOR SOLUTION INTRAVENOUS at 19:18

## 2022-01-01 RX ADMIN — Medication: at 08:37

## 2022-01-01 RX ADMIN — HYDROMORPHONE HYDROCHLORIDE 0.5 MG: 1 INJECTION, SOLUTION INTRAMUSCULAR; INTRAVENOUS; SUBCUTANEOUS at 06:05

## 2022-01-01 RX ADMIN — PANTOPRAZOLE SODIUM 40 MG: 40 TABLET, DELAYED RELEASE ORAL at 06:45

## 2022-01-01 RX ADMIN — ALLOPURINOL 300 MG: 300 TABLET ORAL at 08:48

## 2022-01-01 RX ADMIN — IPRATROPIUM BROMIDE AND ALBUTEROL SULFATE 3 ML: 2.5; .5 SOLUTION RESPIRATORY (INHALATION) at 10:04

## 2022-01-01 RX ADMIN — PANTOPRAZOLE SODIUM 40 MG: 40 TABLET, DELAYED RELEASE ORAL at 08:36

## 2022-01-01 RX ADMIN — BUMETANIDE 1 MG: 1 TABLET ORAL at 08:39

## 2022-01-01 RX ADMIN — METHOCARBAMOL 500 MG: 500 TABLET ORAL at 09:06

## 2022-01-01 RX ADMIN — Medication: at 09:10

## 2022-01-01 RX ADMIN — SODIUM CHLORIDE: 9 INJECTION, SOLUTION INTRAVENOUS at 08:29

## 2022-01-01 RX ADMIN — ACETAMINOPHEN 650 MG: 325 TABLET, FILM COATED ORAL at 02:46

## 2022-01-01 RX ADMIN — ENOXAPARIN SODIUM 90 MG: 100 INJECTION SUBCUTANEOUS at 08:23

## 2022-01-01 RX ADMIN — INSULIN ASPART 1 UNITS: 100 INJECTION, SOLUTION INTRAVENOUS; SUBCUTANEOUS at 22:19

## 2022-01-01 RX ADMIN — OXYMETAZOLINE HYDROCHLORIDE 2 SPRAY: 0.05 SPRAY NASAL at 21:24

## 2022-01-01 RX ADMIN — PANTOPRAZOLE SODIUM 40 MG: 40 TABLET, DELAYED RELEASE ORAL at 07:59

## 2022-01-01 RX ADMIN — ALLOPURINOL 300 MG: 300 TABLET ORAL at 20:43

## 2022-01-01 RX ADMIN — VENETOCLAX 400 MG: 100 TABLET, FILM COATED ORAL at 18:20

## 2022-01-01 RX ADMIN — POLYETHYLENE GLYCOL 3350 17 G: 17 POWDER, FOR SOLUTION ORAL at 08:28

## 2022-01-01 RX ADMIN — DEXAMETHASONE 4 MG: 4 TABLET ORAL at 08:01

## 2022-01-01 RX ADMIN — IPRATROPIUM BROMIDE AND ALBUTEROL SULFATE 3 ML: 2.5; .5 SOLUTION RESPIRATORY (INHALATION) at 08:17

## 2022-01-01 RX ADMIN — OXYCODONE HYDROCHLORIDE 7.5 MG: 5 TABLET ORAL at 22:17

## 2022-01-01 RX ADMIN — OXYCODONE HYDROCHLORIDE 10 MG: 5 TABLET ORAL at 04:05

## 2022-01-01 RX ADMIN — MAGNESIUM HYDROXIDE 30 ML: 400 SUSPENSION ORAL at 09:17

## 2022-01-01 RX ADMIN — FLUTICASONE FUROATE AND VILANTEROL TRIFENATATE 1 PUFF: 200; 25 POWDER RESPIRATORY (INHALATION) at 08:25

## 2022-01-01 RX ADMIN — POLYETHYLENE GLYCOL 3350 17 G: 17 POWDER, FOR SOLUTION ORAL at 20:32

## 2022-01-01 RX ADMIN — HYDROMORPHONE HYDROCHLORIDE 0.5 MG: 1 INJECTION, SOLUTION INTRAMUSCULAR; INTRAVENOUS; SUBCUTANEOUS at 16:58

## 2022-01-01 RX ADMIN — METHOCARBAMOL 500 MG: 500 TABLET ORAL at 06:53

## 2022-01-01 RX ADMIN — CALCIUM ACETATE 1334 MG: 667 CAPSULE ORAL at 17:49

## 2022-01-01 RX ADMIN — OXYCODONE HYDROCHLORIDE 5 MG: 5 TABLET ORAL at 11:40

## 2022-01-01 RX ADMIN — OXYCODONE HYDROCHLORIDE 10 MG: 5 TABLET ORAL at 17:21

## 2022-01-01 RX ADMIN — SENNOSIDES AND DOCUSATE SODIUM 2 TABLET: 8.6; 5 TABLET ORAL at 09:07

## 2022-01-01 RX ADMIN — METHOCARBAMOL 500 MG: 500 TABLET ORAL at 13:11

## 2022-01-01 RX ADMIN — SODIUM CHLORIDE, PRESERVATIVE FREE 10 ML/HR: 5 INJECTION INTRAVENOUS at 00:59

## 2022-01-01 RX ADMIN — OXYCODONE HYDROCHLORIDE 10 MG: 5 TABLET ORAL at 16:44

## 2022-01-01 RX ADMIN — INSULIN ASPART 1 UNITS: 100 INJECTION, SOLUTION INTRAVENOUS; SUBCUTANEOUS at 08:31

## 2022-01-01 RX ADMIN — POSACONAZOLE 300 MG: 100 TABLET, DELAYED RELEASE ORAL at 08:18

## 2022-01-01 RX ADMIN — SODIUM CHLORIDE, PRESERVATIVE FREE 5 ML: 5 INJECTION INTRAVENOUS at 16:05

## 2022-01-01 RX ADMIN — ACYCLOVIR 400 MG: 400 TABLET ORAL at 12:57

## 2022-01-01 RX ADMIN — DICLOFENAC SODIUM 2 G: 10 GEL TOPICAL at 18:50

## 2022-01-01 RX ADMIN — OXYCODONE HYDROCHLORIDE 10 MG: 5 TABLET ORAL at 16:34

## 2022-01-01 RX ADMIN — BUMETANIDE 1 MG: 1 TABLET ORAL at 08:48

## 2022-01-01 RX ADMIN — DEXAMETHASONE 4 MG: 4 TABLET ORAL at 09:17

## 2022-01-01 RX ADMIN — LEVOFLOXACIN 250 MG: 250 TABLET, FILM COATED ORAL at 21:47

## 2022-01-01 RX ADMIN — POSACONAZOLE 300 MG: 100 TABLET, DELAYED RELEASE ORAL at 08:27

## 2022-01-01 RX ADMIN — SODIUM CHLORIDE, PRESERVATIVE FREE 5 ML: 5 INJECTION INTRAVENOUS at 12:53

## 2022-01-01 RX ADMIN — CALCIUM ACETATE 2001 MG: 667 CAPSULE ORAL at 08:51

## 2022-01-01 RX ADMIN — IPRATROPIUM BROMIDE AND ALBUTEROL SULFATE 3 ML: 2.5; .5 SOLUTION RESPIRATORY (INHALATION) at 19:42

## 2022-01-01 RX ADMIN — OXYCODONE HYDROCHLORIDE 10 MG: 5 TABLET ORAL at 16:31

## 2022-01-01 RX ADMIN — LEVOFLOXACIN 250 MG: 250 TABLET, FILM COATED ORAL at 08:36

## 2022-01-01 RX ADMIN — Medication 12.5 MG: at 09:04

## 2022-01-01 RX ADMIN — ALLOPURINOL 300 MG: 300 TABLET ORAL at 09:08

## 2022-01-01 RX ADMIN — SODIUM CHLORIDE, PRESERVATIVE FREE 5 ML: 5 INJECTION INTRAVENOUS at 04:59

## 2022-01-01 RX ADMIN — IPRATROPIUM BROMIDE AND ALBUTEROL SULFATE 3 ML: 2.5; .5 SOLUTION RESPIRATORY (INHALATION) at 19:53

## 2022-01-01 RX ADMIN — INSULIN ASPART 3 UNITS: 100 INJECTION, SOLUTION INTRAVENOUS; SUBCUTANEOUS at 17:56

## 2022-01-01 RX ADMIN — ACYCLOVIR 400 MG: 400 TABLET ORAL at 20:42

## 2022-01-01 RX ADMIN — CALCIUM ACETATE 1334 MG: 667 CAPSULE ORAL at 08:15

## 2022-01-01 RX ADMIN — LEVOFLOXACIN 250 MG: 250 TABLET, FILM COATED ORAL at 08:48

## 2022-01-01 RX ADMIN — SODIUM CHLORIDE, PRESERVATIVE FREE 5 ML: 5 INJECTION INTRAVENOUS at 16:04

## 2022-01-01 RX ADMIN — SODIUM CHLORIDE, PRESERVATIVE FREE 5 ML: 5 INJECTION INTRAVENOUS at 15:58

## 2022-01-01 RX ADMIN — ALLOPURINOL 300 MG: 300 TABLET ORAL at 07:53

## 2022-01-01 RX ADMIN — Medication: at 13:01

## 2022-01-01 RX ADMIN — DEXAMETHASONE 8 MG: 4 TABLET ORAL at 08:36

## 2022-01-01 RX ADMIN — LEVOFLOXACIN 250 MG: 250 TABLET, FILM COATED ORAL at 21:31

## 2022-01-01 RX ADMIN — ACETAMINOPHEN 650 MG: 325 TABLET, FILM COATED ORAL at 15:58

## 2022-01-01 RX ADMIN — SODIUM CHLORIDE 1000 ML: 9 INJECTION, SOLUTION INTRAVENOUS at 18:14

## 2022-01-01 RX ADMIN — Medication: at 20:39

## 2022-01-01 RX ADMIN — SENNOSIDES AND DOCUSATE SODIUM 2 TABLET: 8.6; 5 TABLET ORAL at 08:28

## 2022-01-01 RX ADMIN — FLUTICASONE FUROATE AND VILANTEROL TRIFENATATE 1 PUFF: 200; 25 POWDER RESPIRATORY (INHALATION) at 08:48

## 2022-01-01 RX ADMIN — DICLOFENAC SODIUM 2 G: 10 GEL TOPICAL at 21:41

## 2022-01-01 RX ADMIN — SENNOSIDES AND DOCUSATE SODIUM 2 TABLET: 8.6; 5 TABLET ORAL at 08:29

## 2022-01-01 RX ADMIN — HYDROXYUREA 1000 MG: 500 CAPSULE ORAL at 08:49

## 2022-01-01 RX ADMIN — CALCIUM ACETATE 1334 MG: 667 CAPSULE ORAL at 11:25

## 2022-01-01 RX ADMIN — Medication: at 20:10

## 2022-01-01 RX ADMIN — SODIUM CHLORIDE: 9 INJECTION, SOLUTION INTRAVENOUS at 00:00

## 2022-01-01 RX ADMIN — OXYCODONE HYDROCHLORIDE 10 MG: 5 TABLET ORAL at 02:22

## 2022-01-01 RX ADMIN — SENNOSIDES AND DOCUSATE SODIUM 2 TABLET: 8.6; 5 TABLET ORAL at 20:10

## 2022-01-01 RX ADMIN — ACYCLOVIR 400 MG: 400 TABLET ORAL at 19:58

## 2022-01-01 RX ADMIN — BUMETANIDE 1 MG: 1 TABLET ORAL at 08:04

## 2022-01-01 RX ADMIN — ACYCLOVIR 400 MG: 400 TABLET ORAL at 08:13

## 2022-01-01 RX ADMIN — IPRATROPIUM BROMIDE AND ALBUTEROL SULFATE 3 ML: 2.5; .5 SOLUTION RESPIRATORY (INHALATION) at 09:54

## 2022-01-01 RX ADMIN — OXYCODONE HYDROCHLORIDE 10 MG: 5 TABLET ORAL at 03:08

## 2022-01-01 RX ADMIN — AZITHROMYCIN MONOHYDRATE 500 MG: 250 TABLET ORAL at 08:55

## 2022-01-01 RX ADMIN — Medication 12.5 MG: at 08:10

## 2022-01-01 RX ADMIN — SODIUM CHLORIDE 6 MG: 9 INJECTION, SOLUTION INTRAVENOUS at 21:33

## 2022-01-01 RX ADMIN — INSULIN ASPART 1 UNITS: 100 INJECTION, SOLUTION INTRAVENOUS; SUBCUTANEOUS at 18:14

## 2022-01-01 RX ADMIN — VENETOCLAX 100 MG: 100 TABLET, FILM COATED ORAL at 18:47

## 2022-01-01 RX ADMIN — SODIUM CHLORIDE, PRESERVATIVE FREE 5 ML: 5 INJECTION INTRAVENOUS at 06:21

## 2022-01-01 RX ADMIN — Medication 5 ML: at 19:08

## 2022-01-01 RX ADMIN — POLYETHYLENE GLYCOL 3350 17 G: 17 POWDER, FOR SOLUTION ORAL at 08:07

## 2022-01-01 RX ADMIN — METHOCARBAMOL 500 MG: 500 TABLET ORAL at 13:03

## 2022-01-01 RX ADMIN — SENNOSIDES AND DOCUSATE SODIUM 2 TABLET: 8.6; 5 TABLET ORAL at 08:40

## 2022-01-01 RX ADMIN — ACETAMINOPHEN 650 MG: 325 TABLET, FILM COATED ORAL at 08:16

## 2022-01-01 RX ADMIN — POLYETHYLENE GLYCOL 3350 17 G: 17 POWDER, FOR SOLUTION ORAL at 08:04

## 2022-01-01 RX ADMIN — SODIUM CHLORIDE, PRESERVATIVE FREE 5 ML: 5 INJECTION INTRAVENOUS at 14:11

## 2022-01-01 RX ADMIN — INSULIN ASPART 1 UNITS: 100 INJECTION, SOLUTION INTRAVENOUS; SUBCUTANEOUS at 21:53

## 2022-01-01 RX ADMIN — OXYCODONE HYDROCHLORIDE 10 MG: 5 TABLET ORAL at 06:43

## 2022-01-01 RX ADMIN — POLYETHYLENE GLYCOL 3350 17 G: 17 POWDER, FOR SOLUTION ORAL at 07:59

## 2022-01-01 RX ADMIN — HYDROMORPHONE HYDROCHLORIDE 0.5 MG: 1 INJECTION, SOLUTION INTRAMUSCULAR; INTRAVENOUS; SUBCUTANEOUS at 21:31

## 2022-01-01 RX ADMIN — SODIUM CHLORIDE, PRESERVATIVE FREE 5 ML: 5 INJECTION INTRAVENOUS at 06:07

## 2022-01-01 RX ADMIN — OXYCODONE HYDROCHLORIDE 10 MG: 5 TABLET ORAL at 14:04

## 2022-01-01 RX ADMIN — FLUTICASONE FUROATE AND VILANTEROL TRIFENATATE 1 PUFF: 200; 25 POWDER RESPIRATORY (INHALATION) at 08:39

## 2022-01-01 RX ADMIN — METHOCARBAMOL 500 MG: 500 TABLET ORAL at 21:14

## 2022-01-01 RX ADMIN — DECITABINE 60 MG: 50 INJECTION, POWDER, LYOPHILIZED, FOR SOLUTION INTRAVENOUS at 17:55

## 2022-01-01 RX ADMIN — DECITABINE 60 MG: 50 INJECTION, POWDER, LYOPHILIZED, FOR SOLUTION INTRAVENOUS at 11:58

## 2022-01-01 RX ADMIN — OXYCODONE HYDROCHLORIDE 10 MG: 5 TABLET ORAL at 13:47

## 2022-01-01 RX ADMIN — DICLOFENAC SODIUM 2 G: 10 GEL TOPICAL at 08:41

## 2022-01-01 RX ADMIN — DICLOFENAC SODIUM 2 G: 10 GEL TOPICAL at 06:20

## 2022-01-01 RX ADMIN — METHOCARBAMOL 500 MG: 500 TABLET ORAL at 17:13

## 2022-01-01 RX ADMIN — POSACONAZOLE 300 MG: 100 TABLET, DELAYED RELEASE ORAL at 08:05

## 2022-01-01 RX ADMIN — ACYCLOVIR 400 MG: 200 CAPSULE ORAL at 20:41

## 2022-01-01 RX ADMIN — ENOXAPARIN SODIUM 90 MG: 100 INJECTION SUBCUTANEOUS at 10:06

## 2022-01-01 RX ADMIN — ACETAMINOPHEN 650 MG: 325 TABLET, FILM COATED ORAL at 16:45

## 2022-01-01 RX ADMIN — LISINOPRIL 20 MG: 20 TABLET ORAL at 08:11

## 2022-01-01 RX ADMIN — OXYCODONE HYDROCHLORIDE 5 MG: 5 TABLET ORAL at 19:20

## 2022-01-01 RX ADMIN — SENNOSIDES AND DOCUSATE SODIUM 2 TABLET: 8.6; 5 TABLET ORAL at 21:14

## 2022-01-01 RX ADMIN — HYDROMORPHONE HYDROCHLORIDE 0.5 MG: 1 INJECTION, SOLUTION INTRAMUSCULAR; INTRAVENOUS; SUBCUTANEOUS at 10:21

## 2022-01-01 RX ADMIN — DEXAMETHASONE 8 MG: 4 TABLET ORAL at 09:48

## 2022-01-01 RX ADMIN — POLYETHYLENE GLYCOL 3350 17 G: 17 POWDER, FOR SOLUTION ORAL at 08:33

## 2022-01-01 RX ADMIN — DICLOFENAC SODIUM 2 G: 10 GEL TOPICAL at 16:11

## 2022-01-01 RX ADMIN — FLUTICASONE FUROATE AND VILANTEROL TRIFENATATE 1 PUFF: 200; 25 POWDER RESPIRATORY (INHALATION) at 08:18

## 2022-01-01 RX ADMIN — Medication: at 20:44

## 2022-01-01 RX ADMIN — METHOCARBAMOL 500 MG: 500 TABLET ORAL at 16:12

## 2022-01-01 RX ADMIN — HYDROXYUREA 1000 MG: 500 CAPSULE ORAL at 09:36

## 2022-01-01 RX ADMIN — HYDROXYUREA 1000 MG: 500 CAPSULE ORAL at 08:37

## 2022-01-01 RX ADMIN — METHOCARBAMOL 500 MG: 500 TABLET ORAL at 16:55

## 2022-01-01 RX ADMIN — OXYCODONE HYDROCHLORIDE 10 MG: 5 TABLET ORAL at 23:21

## 2022-01-01 RX ADMIN — ALLOPURINOL 300 MG: 300 TABLET ORAL at 09:07

## 2022-01-01 RX ADMIN — LEVOFLOXACIN 250 MG: 250 TABLET, FILM COATED ORAL at 08:15

## 2022-01-01 RX ADMIN — ACYCLOVIR 400 MG: 400 TABLET ORAL at 08:54

## 2022-01-01 RX ADMIN — LIDOCAINE PATCH 4% 2 PATCH: 40 PATCH TOPICAL at 20:41

## 2022-01-01 RX ADMIN — OXYCODONE HYDROCHLORIDE 10 MG: 5 TABLET ORAL at 22:27

## 2022-01-01 RX ADMIN — DICLOFENAC SODIUM 2 G: 10 GEL TOPICAL at 13:35

## 2022-01-01 RX ADMIN — CALCIUM ACETATE 1334 MG: 667 CAPSULE ORAL at 08:36

## 2022-01-01 RX ADMIN — FLUTICASONE FUROATE AND VILANTEROL TRIFENATATE 1 PUFF: 200; 25 POWDER RESPIRATORY (INHALATION) at 09:08

## 2022-01-01 RX ADMIN — Medication 5 ML: at 06:12

## 2022-01-01 RX ADMIN — METHOCARBAMOL 500 MG: 500 TABLET ORAL at 21:44

## 2022-01-01 RX ADMIN — SENNOSIDES AND DOCUSATE SODIUM 1 TABLET: 8.6; 5 TABLET ORAL at 20:42

## 2022-01-01 RX ADMIN — METHOCARBAMOL 500 MG: 500 TABLET ORAL at 14:37

## 2022-01-01 RX ADMIN — METHOCARBAMOL 500 MG: 500 TABLET ORAL at 12:08

## 2022-01-01 RX ADMIN — DEXAMETHASONE 8 MG: 4 TABLET ORAL at 12:54

## 2022-01-01 RX ADMIN — ALBUTEROL SULFATE 2.5 MG: 2.5 SOLUTION RESPIRATORY (INHALATION) at 10:28

## 2022-01-01 RX ADMIN — LEVOFLOXACIN 250 MG: 250 TABLET, FILM COATED ORAL at 20:43

## 2022-01-01 RX ADMIN — OXYCODONE HYDROCHLORIDE 7.5 MG: 5 TABLET ORAL at 16:18

## 2022-01-01 RX ADMIN — PANTOPRAZOLE SODIUM 40 MG: 40 TABLET, DELAYED RELEASE ORAL at 08:04

## 2022-01-01 RX ADMIN — MAGNESIUM HYDROXIDE 30 ML: 400 SUSPENSION ORAL at 13:37

## 2022-01-01 RX ADMIN — CALCIUM ACETATE 1334 MG: 667 CAPSULE ORAL at 18:20

## 2022-01-01 RX ADMIN — IPRATROPIUM BROMIDE AND ALBUTEROL SULFATE 3 ML: 2.5; .5 SOLUTION RESPIRATORY (INHALATION) at 20:37

## 2022-01-01 RX ADMIN — HYDROXYUREA 1000 MG: 500 CAPSULE ORAL at 08:20

## 2022-01-01 RX ADMIN — LEVOFLOXACIN 250 MG: 250 TABLET, FILM COATED ORAL at 08:01

## 2022-01-01 RX ADMIN — BUMETANIDE 1 MG: 1 TABLET ORAL at 09:04

## 2022-01-01 RX ADMIN — OXYCODONE HYDROCHLORIDE 10 MG: 5 TABLET ORAL at 13:03

## 2022-01-01 RX ADMIN — Medication 5 ML: at 11:35

## 2022-01-01 RX ADMIN — METHOCARBAMOL 500 MG: 500 TABLET ORAL at 06:38

## 2022-01-01 RX ADMIN — ENOXAPARIN SODIUM 90 MG: 100 INJECTION SUBCUTANEOUS at 21:41

## 2022-01-01 RX ADMIN — INSULIN ASPART 2 UNITS: 100 INJECTION, SOLUTION INTRAVENOUS; SUBCUTANEOUS at 22:44

## 2022-01-01 RX ADMIN — METHOCARBAMOL 500 MG: 500 TABLET ORAL at 06:37

## 2022-01-01 RX ADMIN — ENOXAPARIN SODIUM 90 MG: 100 INJECTION SUBCUTANEOUS at 22:17

## 2022-01-01 RX ADMIN — CALCIUM ACETATE 1334 MG: 667 CAPSULE ORAL at 12:25

## 2022-01-01 RX ADMIN — LISINOPRIL 5 MG: 5 TABLET ORAL at 08:39

## 2022-01-01 RX ADMIN — IPRATROPIUM BROMIDE AND ALBUTEROL SULFATE 3 ML: 2.5; .5 SOLUTION RESPIRATORY (INHALATION) at 08:12

## 2022-01-01 RX ADMIN — POLYETHYLENE GLYCOL 3350 17 G: 17 POWDER, FOR SOLUTION ORAL at 19:58

## 2022-01-01 RX ADMIN — DICLOFENAC SODIUM 2 G: 10 GEL TOPICAL at 16:00

## 2022-01-01 RX ADMIN — SODIUM CHLORIDE, PRESERVATIVE FREE 5 ML: 5 INJECTION INTRAVENOUS at 05:49

## 2022-01-01 RX ADMIN — SODIUM CHLORIDE, PRESERVATIVE FREE 5 ML: 5 INJECTION INTRAVENOUS at 06:14

## 2022-01-01 RX ADMIN — DEXAMETHASONE 10 MG: 2 TABLET ORAL at 12:56

## 2022-01-01 RX ADMIN — DICLOFENAC SODIUM 2 G: 10 GEL TOPICAL at 13:01

## 2022-01-01 RX ADMIN — Medication: at 10:46

## 2022-01-01 RX ADMIN — BUMETANIDE 1 MG: 1 TABLET ORAL at 08:36

## 2022-01-01 RX ADMIN — BUMETANIDE 1 MG: 1 TABLET ORAL at 08:51

## 2022-01-01 RX ADMIN — OXYMETAZOLINE HYDROCHLORIDE 2 SPRAY: 0.05 SPRAY NASAL at 08:47

## 2022-01-01 RX ADMIN — OXYCODONE HYDROCHLORIDE 20 MG: 10 TABLET ORAL at 12:56

## 2022-01-01 RX ADMIN — BUMETANIDE 1 MG: 1 TABLET ORAL at 12:39

## 2022-01-01 RX ADMIN — LIDOCAINE PATCH 4% 1 PATCH: 40 PATCH TOPICAL at 09:57

## 2022-01-01 RX ADMIN — METHOCARBAMOL 500 MG: 500 TABLET ORAL at 17:22

## 2022-01-01 RX ADMIN — SODIUM CHLORIDE, PRESERVATIVE FREE 5 ML: 5 INJECTION INTRAVENOUS at 15:28

## 2022-01-01 RX ADMIN — BUMETANIDE 1 MG: 1 TABLET ORAL at 08:17

## 2022-01-01 RX ADMIN — OXYCODONE HYDROCHLORIDE 10 MG: 5 TABLET ORAL at 23:26

## 2022-01-01 RX ADMIN — Medication: at 16:18

## 2022-01-01 RX ADMIN — METHOCARBAMOL 500 MG: 500 TABLET ORAL at 20:10

## 2022-01-01 RX ADMIN — ALLOPURINOL 300 MG: 300 TABLET ORAL at 08:36

## 2022-01-01 RX ADMIN — BUMETANIDE 1 MG: 1 TABLET ORAL at 12:54

## 2022-01-01 RX ADMIN — RIVAROXABAN 20 MG: 20 TABLET, FILM COATED ORAL at 17:58

## 2022-01-01 RX ADMIN — METHOCARBAMOL 500 MG: 500 TABLET ORAL at 09:08

## 2022-01-01 RX ADMIN — CEFEPIME HYDROCHLORIDE 2 G: 2 INJECTION, POWDER, FOR SOLUTION INTRAVENOUS at 06:38

## 2022-01-01 RX ADMIN — ACYCLOVIR 400 MG: 200 CAPSULE ORAL at 08:39

## 2022-01-01 RX ADMIN — METHOCARBAMOL 500 MG: 500 TABLET ORAL at 16:00

## 2022-01-01 RX ADMIN — CYCLOBENZAPRINE HYDROCHLORIDE 10 MG: 5 TABLET, FILM COATED ORAL at 10:55

## 2022-01-01 RX ADMIN — METHOCARBAMOL 500 MG: 500 TABLET ORAL at 11:50

## 2022-01-01 RX ADMIN — LEVOFLOXACIN 250 MG: 250 TABLET, FILM COATED ORAL at 07:59

## 2022-01-01 RX ADMIN — METHOCARBAMOL 500 MG: 500 TABLET ORAL at 19:03

## 2022-01-01 RX ADMIN — SODIUM CHLORIDE: 9 INJECTION, SOLUTION INTRAVENOUS at 00:52

## 2022-01-01 RX ADMIN — INSULIN ASPART 1 UNITS: 100 INJECTION, SOLUTION INTRAVENOUS; SUBCUTANEOUS at 18:19

## 2022-01-01 RX ADMIN — BUMETANIDE 1 MG: 1 TABLET ORAL at 07:59

## 2022-01-01 RX ADMIN — OXYCODONE HYDROCHLORIDE 10 MG: 5 TABLET ORAL at 22:28

## 2022-01-01 RX ADMIN — HYDROXYUREA 1000 MG: 500 CAPSULE ORAL at 10:55

## 2022-01-01 RX ADMIN — ENOXAPARIN SODIUM 90 MG: 100 INJECTION SUBCUTANEOUS at 22:35

## 2022-01-01 RX ADMIN — POLYETHYLENE GLYCOL 3350 17 G: 17 POWDER, FOR SOLUTION ORAL at 16:39

## 2022-01-01 RX ADMIN — HYDROMORPHONE HYDROCHLORIDE 0.5 MG: 1 INJECTION, SOLUTION INTRAMUSCULAR; INTRAVENOUS; SUBCUTANEOUS at 02:50

## 2022-01-01 RX ADMIN — IPRATROPIUM BROMIDE AND ALBUTEROL SULFATE 3 ML: 2.5; .5 SOLUTION RESPIRATORY (INHALATION) at 21:09

## 2022-01-01 RX ADMIN — RIVAROXABAN 20 MG: 20 TABLET, FILM COATED ORAL at 16:45

## 2022-01-01 RX ADMIN — METHOCARBAMOL 500 MG: 500 TABLET ORAL at 17:56

## 2022-01-01 RX ADMIN — METHOCARBAMOL 500 MG: 500 TABLET ORAL at 05:16

## 2022-01-01 RX ADMIN — ACYCLOVIR 400 MG: 400 TABLET ORAL at 08:01

## 2022-01-01 RX ADMIN — INSULIN ASPART 1 UNITS: 100 INJECTION, SOLUTION INTRAVENOUS; SUBCUTANEOUS at 22:04

## 2022-01-01 RX ADMIN — DEXAMETHASONE 4 MG: 4 TABLET ORAL at 07:59

## 2022-01-01 RX ADMIN — BUMETANIDE 1 MG: 1 TABLET ORAL at 09:34

## 2022-01-01 RX ADMIN — POSACONAZOLE 300 MG: 100 TABLET, DELAYED RELEASE ORAL at 07:53

## 2022-01-01 RX ADMIN — ACYCLOVIR 400 MG: 400 TABLET ORAL at 20:06

## 2022-01-01 RX ADMIN — ACYCLOVIR 400 MG: 400 TABLET ORAL at 08:27

## 2022-01-01 RX ADMIN — ENOXAPARIN SODIUM 90 MG: 100 INJECTION SUBCUTANEOUS at 09:52

## 2022-01-01 RX ADMIN — POSACONAZOLE 300 MG: 100 TABLET, DELAYED RELEASE ORAL at 08:51

## 2022-01-01 RX ADMIN — BUMETANIDE 1 MG: 1 TABLET ORAL at 08:40

## 2022-01-01 RX ADMIN — ACYCLOVIR 400 MG: 400 TABLET ORAL at 08:19

## 2022-01-01 RX ADMIN — METHOCARBAMOL 500 MG: 500 TABLET ORAL at 15:59

## 2022-01-01 RX ADMIN — POLYETHYLENE GLYCOL 3350 17 G: 17 POWDER, FOR SOLUTION ORAL at 09:00

## 2022-01-01 RX ADMIN — SENNOSIDES AND DOCUSATE SODIUM 2 TABLET: 8.6; 5 TABLET ORAL at 07:53

## 2022-01-01 RX ADMIN — DICLOFENAC SODIUM 2 G: 10 GEL TOPICAL at 22:42

## 2022-01-01 RX ADMIN — BUMETANIDE 1 MG: 1 TABLET ORAL at 08:19

## 2022-01-01 RX ADMIN — CALCIUM ACETATE 1334 MG: 667 CAPSULE ORAL at 07:53

## 2022-01-01 RX ADMIN — DICLOFENAC SODIUM 2 G: 10 GEL TOPICAL at 12:56

## 2022-01-01 RX ADMIN — INSULIN ASPART 1 UNITS: 100 INJECTION, SOLUTION INTRAVENOUS; SUBCUTANEOUS at 13:35

## 2022-01-01 RX ADMIN — METHOCARBAMOL 500 MG: 500 TABLET ORAL at 05:59

## 2022-01-01 RX ADMIN — DICLOFENAC SODIUM 2 G: 10 GEL TOPICAL at 20:09

## 2022-01-01 RX ADMIN — OXYCODONE HYDROCHLORIDE 5 MG: 5 TABLET ORAL at 12:52

## 2022-01-01 RX ADMIN — CALCIUM CARBONATE (ANTACID) CHEW TAB 500 MG 500 MG: 500 CHEW TAB at 19:28

## 2022-01-01 RX ADMIN — OXYCODONE HYDROCHLORIDE 7.5 MG: 5 TABLET ORAL at 16:06

## 2022-01-01 RX ADMIN — METHOCARBAMOL 500 MG: 500 TABLET ORAL at 06:45

## 2022-01-01 RX ADMIN — VENETOCLAX 400 MG: 100 TABLET, FILM COATED ORAL at 17:22

## 2022-01-01 RX ADMIN — BUMETANIDE 1 MG: 1 TABLET ORAL at 08:55

## 2022-01-01 RX ADMIN — ENOXAPARIN SODIUM 90 MG: 100 INJECTION SUBCUTANEOUS at 08:45

## 2022-01-01 RX ADMIN — METHOCARBAMOL 500 MG: 500 TABLET ORAL at 22:50

## 2022-01-01 RX ADMIN — ENOXAPARIN SODIUM 90 MG: 100 INJECTION SUBCUTANEOUS at 20:46

## 2022-01-01 RX ADMIN — INSULIN ASPART 1 UNITS: 100 INJECTION, SOLUTION INTRAVENOUS; SUBCUTANEOUS at 22:31

## 2022-01-01 RX ADMIN — METHOCARBAMOL 500 MG: 500 TABLET ORAL at 13:35

## 2022-01-01 RX ADMIN — OXYCODONE HYDROCHLORIDE 5 MG: 5 TABLET ORAL at 16:25

## 2022-01-01 RX ADMIN — SODIUM CHLORIDE, PRESERVATIVE FREE: 5 INJECTION INTRAVENOUS at 16:26

## 2022-01-01 RX ADMIN — SENNOSIDES AND DOCUSATE SODIUM 2 TABLET: 8.6; 5 TABLET ORAL at 19:58

## 2022-01-01 RX ADMIN — METHOCARBAMOL 500 MG: 500 TABLET ORAL at 08:40

## 2022-01-01 RX ADMIN — METHOCARBAMOL 500 MG: 500 TABLET ORAL at 21:41

## 2022-01-01 RX ADMIN — CALCIUM ACETATE 2001 MG: 667 CAPSULE ORAL at 18:19

## 2022-01-01 RX ADMIN — SENNOSIDES AND DOCUSATE SODIUM 1 TABLET: 8.6; 5 TABLET ORAL at 08:10

## 2022-01-01 RX ADMIN — SENNOSIDES AND DOCUSATE SODIUM 2 TABLET: 8.6; 5 TABLET ORAL at 08:01

## 2022-01-01 RX ADMIN — METHOCARBAMOL 500 MG: 500 TABLET ORAL at 21:20

## 2022-01-01 RX ADMIN — HYDROMORPHONE HYDROCHLORIDE 0.5 MG: 1 INJECTION, SOLUTION INTRAMUSCULAR; INTRAVENOUS; SUBCUTANEOUS at 02:15

## 2022-01-01 RX ADMIN — HYDROMORPHONE HYDROCHLORIDE 0.5 MG: 1 INJECTION, SOLUTION INTRAMUSCULAR; INTRAVENOUS; SUBCUTANEOUS at 04:55

## 2022-01-01 RX ADMIN — SENNOSIDES AND DOCUSATE SODIUM 1 TABLET: 8.6; 5 TABLET ORAL at 21:12

## 2022-01-01 RX ADMIN — METHOCARBAMOL 500 MG: 500 TABLET ORAL at 12:59

## 2022-01-01 RX ADMIN — CEFEPIME HYDROCHLORIDE 2 G: 2 INJECTION, POWDER, FOR SOLUTION INTRAVENOUS at 13:02

## 2022-01-01 RX ADMIN — PANTOPRAZOLE SODIUM 40 MG: 40 TABLET, DELAYED RELEASE ORAL at 08:51

## 2022-01-01 RX ADMIN — SENNOSIDES AND DOCUSATE SODIUM 2 TABLET: 8.6; 5 TABLET ORAL at 09:35

## 2022-01-01 RX ADMIN — DICLOFENAC SODIUM 2 G: 10 GEL TOPICAL at 22:50

## 2022-01-01 RX ADMIN — METHOCARBAMOL 500 MG: 500 TABLET ORAL at 16:13

## 2022-01-01 RX ADMIN — CALCIUM ACETATE 1334 MG: 667 CAPSULE ORAL at 11:51

## 2022-01-01 RX ADMIN — SODIUM CHLORIDE, PRESERVATIVE FREE 5 ML: 5 INJECTION INTRAVENOUS at 10:24

## 2022-01-01 RX ADMIN — OXYCODONE HYDROCHLORIDE 10 MG: 5 TABLET ORAL at 09:54

## 2022-01-01 RX ADMIN — INSULIN ASPART 2 UNITS: 100 INJECTION, SOLUTION INTRAVENOUS; SUBCUTANEOUS at 12:56

## 2022-01-01 RX ADMIN — FLUTICASONE FUROATE AND VILANTEROL TRIFENATATE 1 PUFF: 200; 25 POWDER RESPIRATORY (INHALATION) at 08:47

## 2022-01-01 RX ADMIN — IPRATROPIUM BROMIDE AND ALBUTEROL SULFATE 3 ML: 2.5; .5 SOLUTION RESPIRATORY (INHALATION) at 12:33

## 2022-01-01 RX ADMIN — CALCIUM ACETATE 2001 MG: 667 CAPSULE ORAL at 13:11

## 2022-01-01 RX ADMIN — SENNOSIDES AND DOCUSATE SODIUM 2 TABLET: 8.6; 5 TABLET ORAL at 08:17

## 2022-01-01 RX ADMIN — INSULIN ASPART 1 UNITS: 100 INJECTION, SOLUTION INTRAVENOUS; SUBCUTANEOUS at 17:17

## 2022-01-01 RX ADMIN — Medication 5 ML: at 06:11

## 2022-01-01 RX ADMIN — LIDOCAINE PATCH 4% 2 PATCH: 40 PATCH TOPICAL at 19:07

## 2022-01-01 RX ADMIN — GADOBUTROL 10 ML: 604.72 INJECTION INTRAVENOUS at 18:22

## 2022-01-01 RX ADMIN — OXYCODONE HYDROCHLORIDE 10 MG: 5 TABLET ORAL at 12:04

## 2022-01-01 RX ADMIN — OXYCODONE HYDROCHLORIDE 10 MG: 5 TABLET ORAL at 05:17

## 2022-01-01 RX ADMIN — INSULIN ASPART 2 UNITS: 100 INJECTION, SOLUTION INTRAVENOUS; SUBCUTANEOUS at 22:42

## 2022-01-01 RX ADMIN — IPRATROPIUM BROMIDE AND ALBUTEROL SULFATE 3 ML: 2.5; .5 SOLUTION RESPIRATORY (INHALATION) at 20:50

## 2022-01-01 RX ADMIN — LISINOPRIL 5 MG: 5 TABLET ORAL at 09:36

## 2022-01-01 RX ADMIN — SODIUM CHLORIDE, PRESERVATIVE FREE 5 ML: 5 INJECTION INTRAVENOUS at 17:43

## 2022-01-01 RX ADMIN — DICLOFENAC SODIUM 2 G: 10 GEL TOPICAL at 17:02

## 2022-01-01 RX ADMIN — SODIUM CHLORIDE, PRESERVATIVE FREE 5 ML: 5 INJECTION INTRAVENOUS at 21:54

## 2022-01-01 RX ADMIN — SENNOSIDES AND DOCUSATE SODIUM 2 TABLET: 8.6; 5 TABLET ORAL at 08:52

## 2022-01-01 RX ADMIN — DICLOFENAC SODIUM 2 G: 10 GEL TOPICAL at 22:15

## 2022-01-01 RX ADMIN — ACYCLOVIR 400 MG: 400 TABLET ORAL at 08:04

## 2022-01-01 RX ADMIN — METHOCARBAMOL 500 MG: 500 TABLET ORAL at 06:20

## 2022-01-01 RX ADMIN — OXYCODONE HYDROCHLORIDE 7.5 MG: 5 TABLET ORAL at 21:51

## 2022-01-01 RX ADMIN — SODIUM CHLORIDE, PRESERVATIVE FREE 10 ML: 5 INJECTION INTRAVENOUS at 06:15

## 2022-01-01 RX ADMIN — DEXAMETHASONE 10 MG: 2 TABLET ORAL at 11:23

## 2022-01-01 RX ADMIN — ALLOPURINOL 300 MG: 300 TABLET ORAL at 08:10

## 2022-01-01 RX ADMIN — OXYCODONE HYDROCHLORIDE 7.5 MG: 5 TABLET ORAL at 02:27

## 2022-01-01 RX ADMIN — METHOCARBAMOL 500 MG: 500 TABLET ORAL at 06:04

## 2022-01-01 RX ADMIN — DICLOFENAC SODIUM 2 G: 10 GEL TOPICAL at 06:06

## 2022-01-01 RX ADMIN — SODIUM CHLORIDE: 9 INJECTION, SOLUTION INTRAVENOUS at 08:07

## 2022-01-01 RX ADMIN — IPRATROPIUM BROMIDE AND ALBUTEROL SULFATE 3 ML: 2.5; .5 SOLUTION RESPIRATORY (INHALATION) at 21:11

## 2022-01-01 RX ADMIN — SENNOSIDES AND DOCUSATE SODIUM 2 TABLET: 8.6; 5 TABLET ORAL at 19:20

## 2022-01-01 RX ADMIN — INSULIN ASPART 2 UNITS: 100 INJECTION, SOLUTION INTRAVENOUS; SUBCUTANEOUS at 11:40

## 2022-01-01 RX ADMIN — METHOCARBAMOL 500 MG: 500 TABLET ORAL at 05:50

## 2022-01-01 RX ADMIN — DICLOFENAC SODIUM 2 G: 10 GEL TOPICAL at 08:45

## 2022-01-01 RX ADMIN — CEFEPIME HYDROCHLORIDE 2 G: 2 INJECTION, POWDER, FOR SOLUTION INTRAVENOUS at 06:12

## 2022-01-01 RX ADMIN — FLUTICASONE FUROATE AND VILANTEROL TRIFENATATE 1 PUFF: 200; 25 POWDER RESPIRATORY (INHALATION) at 08:09

## 2022-01-01 RX ADMIN — METHOCARBAMOL 500 MG: 500 TABLET ORAL at 16:11

## 2022-01-01 RX ADMIN — METHOCARBAMOL 500 MG: 500 TABLET ORAL at 22:09

## 2022-01-01 RX ADMIN — MICAFUNGIN 50 MG: 10 INJECTION, POWDER, LYOPHILIZED, FOR SOLUTION INTRAVENOUS at 19:59

## 2022-01-01 RX ADMIN — METHOCARBAMOL 500 MG: 500 TABLET ORAL at 22:27

## 2022-01-01 RX ADMIN — POLYETHYLENE GLYCOL 3350 17 G: 17 POWDER, FOR SOLUTION ORAL at 18:51

## 2022-01-01 RX ADMIN — Medication 12.5 MG: at 09:08

## 2022-01-01 RX ADMIN — Medication: at 08:45

## 2022-01-01 RX ADMIN — OXYCODONE HYDROCHLORIDE 10 MG: 5 TABLET ORAL at 06:37

## 2022-01-01 RX ADMIN — METHOCARBAMOL 500 MG: 500 TABLET ORAL at 08:16

## 2022-01-01 RX ADMIN — ALLOPURINOL 300 MG: 300 TABLET ORAL at 08:13

## 2022-01-01 RX ADMIN — DICLOFENAC SODIUM 2 G: 10 GEL TOPICAL at 08:06

## 2022-01-01 RX ADMIN — ACETAMINOPHEN 650 MG: 325 TABLET, FILM COATED ORAL at 16:26

## 2022-01-01 RX ADMIN — SENNOSIDES AND DOCUSATE SODIUM 2 TABLET: 8.6; 5 TABLET ORAL at 20:38

## 2022-01-01 RX ADMIN — IPRATROPIUM BROMIDE AND ALBUTEROL SULFATE 3 ML: 2.5; .5 SOLUTION RESPIRATORY (INHALATION) at 08:22

## 2022-01-01 RX ADMIN — INSULIN ASPART 1 UNITS: 100 INJECTION, SOLUTION INTRAVENOUS; SUBCUTANEOUS at 18:47

## 2022-01-01 RX ADMIN — LISINOPRIL 5 MG: 5 TABLET ORAL at 09:04

## 2022-01-01 RX ADMIN — ACYCLOVIR 400 MG: 200 CAPSULE ORAL at 19:08

## 2022-01-01 RX ADMIN — SENNOSIDES AND DOCUSATE SODIUM 2 TABLET: 8.6; 5 TABLET ORAL at 07:59

## 2022-01-01 RX ADMIN — POLYETHYLENE GLYCOL 3350 17 G: 17 POWDER, FOR SOLUTION ORAL at 08:26

## 2022-01-01 RX ADMIN — HYDROXYUREA 1000 MG: 500 CAPSULE ORAL at 09:11

## 2022-01-01 RX ADMIN — Medication 12.5 MG: at 09:07

## 2022-01-01 RX ADMIN — ENOXAPARIN SODIUM 90 MG: 100 INJECTION SUBCUTANEOUS at 08:14

## 2022-01-01 RX ADMIN — DICLOFENAC SODIUM 2 G: 10 GEL TOPICAL at 17:13

## 2022-01-01 RX ADMIN — INSULIN ASPART 2 UNITS: 100 INJECTION, SOLUTION INTRAVENOUS; SUBCUTANEOUS at 12:09

## 2022-01-01 RX ADMIN — SODIUM CHLORIDE, POTASSIUM CHLORIDE, SODIUM LACTATE AND CALCIUM CHLORIDE: 600; 310; 30; 20 INJECTION, SOLUTION INTRAVENOUS at 14:01

## 2022-01-01 RX ADMIN — BUMETANIDE 1 MG: 1 TABLET ORAL at 08:12

## 2022-01-01 RX ADMIN — DICLOFENAC SODIUM 2 G: 10 GEL TOPICAL at 16:39

## 2022-01-01 RX ADMIN — SENNOSIDES AND DOCUSATE SODIUM 2 TABLET: 8.6; 5 TABLET ORAL at 20:43

## 2022-01-01 RX ADMIN — DEXAMETHASONE 10 MG: 2 TABLET ORAL at 10:55

## 2022-01-01 RX ADMIN — ACYCLOVIR 400 MG: 400 TABLET ORAL at 19:20

## 2022-01-01 RX ADMIN — SENNOSIDES AND DOCUSATE SODIUM 2 TABLET: 8.6; 5 TABLET ORAL at 19:34

## 2022-01-01 RX ADMIN — IPRATROPIUM BROMIDE AND ALBUTEROL SULFATE 3 ML: 2.5; .5 SOLUTION RESPIRATORY (INHALATION) at 20:53

## 2022-01-01 RX ADMIN — POLYETHYLENE GLYCOL 3350 17 G: 17 POWDER, FOR SOLUTION ORAL at 13:31

## 2022-01-01 RX ADMIN — ALLOPURINOL 300 MG: 300 TABLET ORAL at 08:01

## 2022-01-01 RX ADMIN — POLYETHYLENE GLYCOL 3350 17 G: 17 POWDER, FOR SOLUTION ORAL at 09:42

## 2022-01-01 RX ADMIN — DEXAMETHASONE 10 MG: 2 TABLET ORAL at 14:03

## 2022-01-01 RX ADMIN — MICAFUNGIN 50 MG: 10 INJECTION, POWDER, LYOPHILIZED, FOR SOLUTION INTRAVENOUS at 20:02

## 2022-01-01 RX ADMIN — DEXAMETHASONE 2 MG: 2 TABLET ORAL at 08:19

## 2022-01-01 RX ADMIN — FLUTICASONE FUROATE AND VILANTEROL TRIFENATATE 1 PUFF: 200; 25 POWDER RESPIRATORY (INHALATION) at 08:02

## 2022-01-01 RX ADMIN — HYDROMORPHONE HYDROCHLORIDE 0.5 MG: 1 INJECTION, SOLUTION INTRAMUSCULAR; INTRAVENOUS; SUBCUTANEOUS at 07:32

## 2022-01-01 RX ADMIN — MICAFUNGIN 50 MG: 10 INJECTION, POWDER, LYOPHILIZED, FOR SOLUTION INTRAVENOUS at 20:10

## 2022-01-01 RX ADMIN — INSULIN ASPART 1 UNITS: 100 INJECTION, SOLUTION INTRAVENOUS; SUBCUTANEOUS at 22:29

## 2022-01-01 RX ADMIN — ALLOPURINOL 300 MG: 300 TABLET ORAL at 08:41

## 2022-01-01 RX ADMIN — DECITABINE 60 MG: 50 INJECTION, POWDER, LYOPHILIZED, FOR SOLUTION INTRAVENOUS at 10:49

## 2022-01-01 RX ADMIN — HYDROMORPHONE HYDROCHLORIDE 0.5 MG: 1 INJECTION, SOLUTION INTRAMUSCULAR; INTRAVENOUS; SUBCUTANEOUS at 06:51

## 2022-01-01 RX ADMIN — METHOCARBAMOL 500 MG: 500 TABLET ORAL at 09:04

## 2022-01-01 RX ADMIN — INSULIN ASPART 1 UNITS: 100 INJECTION, SOLUTION INTRAVENOUS; SUBCUTANEOUS at 19:29

## 2022-01-01 RX ADMIN — Medication 12.5 MG: at 08:40

## 2022-01-01 RX ADMIN — DICLOFENAC SODIUM 2 G: 10 GEL TOPICAL at 20:10

## 2022-01-01 RX ADMIN — INSULIN ASPART 1 UNITS: 100 INJECTION, SOLUTION INTRAVENOUS; SUBCUTANEOUS at 21:33

## 2022-01-01 RX ADMIN — VENETOCLAX 100 MG: 100 TABLET, FILM COATED ORAL at 18:52

## 2022-01-01 RX ADMIN — MICAFUNGIN 50 MG: 10 INJECTION, POWDER, LYOPHILIZED, FOR SOLUTION INTRAVENOUS at 21:12

## 2022-01-01 RX ADMIN — SODIUM CHLORIDE 6 MG: 9 INJECTION, SOLUTION INTRAVENOUS at 11:33

## 2022-01-01 RX ADMIN — DECITABINE 60 MG: 50 INJECTION, POWDER, LYOPHILIZED, FOR SOLUTION INTRAVENOUS at 20:16

## 2022-01-01 RX ADMIN — ALLOPURINOL 300 MG: 300 TABLET ORAL at 20:09

## 2022-01-01 RX ADMIN — ACYCLOVIR 400 MG: 400 TABLET ORAL at 08:51

## 2022-01-01 RX ADMIN — METHOCARBAMOL 500 MG: 500 TABLET ORAL at 13:47

## 2022-01-01 RX ADMIN — METHOCARBAMOL 500 MG: 500 TABLET ORAL at 22:04

## 2022-01-01 RX ADMIN — ENOXAPARIN SODIUM 90 MG: 100 INJECTION SUBCUTANEOUS at 11:26

## 2022-01-01 RX ADMIN — DEXAMETHASONE 8 MG: 4 TABLET ORAL at 08:14

## 2022-01-01 RX ADMIN — POLYETHYLENE GLYCOL 3350 17 G: 17 POWDER, FOR SOLUTION ORAL at 09:08

## 2022-01-01 RX ADMIN — METHOCARBAMOL 500 MG: 500 TABLET ORAL at 17:21

## 2022-01-01 RX ADMIN — INSULIN ASPART 1 UNITS: 100 INJECTION, SOLUTION INTRAVENOUS; SUBCUTANEOUS at 09:13

## 2022-01-01 RX ADMIN — ALLOPURINOL 300 MG: 300 TABLET ORAL at 08:40

## 2022-01-01 RX ADMIN — FLUTICASONE FUROATE AND VILANTEROL TRIFENATATE 1 PUFF: 200; 25 POWDER RESPIRATORY (INHALATION) at 09:35

## 2022-01-01 RX ADMIN — LIDOCAINE PATCH 4% 2 PATCH: 40 PATCH TOPICAL at 21:53

## 2022-01-01 RX ADMIN — OXYCODONE HYDROCHLORIDE 10 MG: 5 TABLET ORAL at 22:20

## 2022-01-01 RX ADMIN — CALCIUM ACETATE 1334 MG: 667 CAPSULE ORAL at 08:48

## 2022-01-01 RX ADMIN — DEXAMETHASONE 4 MG: 4 TABLET ORAL at 08:48

## 2022-01-01 RX ADMIN — ALLOPURINOL 300 MG: 300 TABLET ORAL at 08:16

## 2022-01-01 RX ADMIN — HYDROMORPHONE HYDROCHLORIDE 1 MG: 1 INJECTION, SOLUTION INTRAMUSCULAR; INTRAVENOUS; SUBCUTANEOUS at 11:52

## 2022-01-01 RX ADMIN — INSULIN ASPART 1 UNITS: 100 INJECTION, SOLUTION INTRAVENOUS; SUBCUTANEOUS at 18:23

## 2022-01-01 RX ADMIN — CALCIUM ACETATE 1334 MG: 667 CAPSULE ORAL at 12:54

## 2022-01-01 RX ADMIN — ACYCLOVIR 400 MG: 400 TABLET ORAL at 20:09

## 2022-01-01 RX ADMIN — MICAFUNGIN 50 MG: 10 INJECTION, POWDER, LYOPHILIZED, FOR SOLUTION INTRAVENOUS at 21:14

## 2022-01-01 RX ADMIN — VENETOCLAX 400 MG: 100 TABLET, FILM COATED ORAL at 17:55

## 2022-01-01 RX ADMIN — HYDROMORPHONE HYDROCHLORIDE 0.5 MG: 1 INJECTION, SOLUTION INTRAMUSCULAR; INTRAVENOUS; SUBCUTANEOUS at 06:42

## 2022-01-01 RX ADMIN — SODIUM CHLORIDE: 9 INJECTION, SOLUTION INTRAVENOUS at 21:55

## 2022-01-01 RX ADMIN — METHOCARBAMOL 500 MG: 500 TABLET ORAL at 22:06

## 2022-01-01 RX ADMIN — OXYCODONE HYDROCHLORIDE 10 MG: 5 TABLET ORAL at 06:10

## 2022-01-01 RX ADMIN — ACYCLOVIR 400 MG: 400 TABLET ORAL at 08:48

## 2022-01-01 RX ADMIN — HYDROXYUREA 1000 MG: 500 CAPSULE ORAL at 08:41

## 2022-01-01 RX ADMIN — METHOCARBAMOL 500 MG: 500 TABLET ORAL at 11:26

## 2022-01-01 RX ADMIN — LISINOPRIL 5 MG: 5 TABLET ORAL at 09:07

## 2022-01-01 RX ADMIN — DICLOFENAC SODIUM 2 G: 10 GEL TOPICAL at 14:37

## 2022-01-01 RX ADMIN — ALLOPURINOL 300 MG: 300 TABLET ORAL at 08:55

## 2022-01-01 RX ADMIN — HYDROMORPHONE HYDROCHLORIDE 0.5 MG: 1 INJECTION, SOLUTION INTRAMUSCULAR; INTRAVENOUS; SUBCUTANEOUS at 08:10

## 2022-01-01 RX ADMIN — CALCIUM ACETATE 2001 MG: 667 CAPSULE ORAL at 08:27

## 2022-01-01 RX ADMIN — HUMAN ALBUMIN MICROSPHERES AND PERFLUTREN 5 ML: 10; .22 INJECTION, SOLUTION INTRAVENOUS at 09:59

## 2022-01-01 RX ADMIN — ENOXAPARIN SODIUM 40 MG: 40 INJECTION SUBCUTANEOUS at 10:55

## 2022-01-01 RX ADMIN — INSULIN ASPART 2 UNITS: 100 INJECTION, SOLUTION INTRAVENOUS; SUBCUTANEOUS at 12:18

## 2022-01-01 RX ADMIN — METHOCARBAMOL 500 MG: 500 TABLET ORAL at 16:21

## 2022-01-01 RX ADMIN — IPRATROPIUM BROMIDE AND ALBUTEROL SULFATE 3 ML: 2.5; .5 SOLUTION RESPIRATORY (INHALATION) at 22:23

## 2022-01-01 RX ADMIN — SODIUM CHLORIDE, POTASSIUM CHLORIDE, SODIUM LACTATE AND CALCIUM CHLORIDE: 600; 310; 30; 20 INJECTION, SOLUTION INTRAVENOUS at 00:29

## 2022-01-01 RX ADMIN — DEXAMETHASONE 2 MG: 2 TABLET ORAL at 08:04

## 2022-01-01 RX ADMIN — SENNOSIDES AND DOCUSATE SODIUM 2 TABLET: 8.6; 5 TABLET ORAL at 09:08

## 2022-01-01 RX ADMIN — CYCLOBENZAPRINE 10 MG: 5 TABLET, FILM COATED ORAL at 08:55

## 2022-01-01 RX ADMIN — SENNOSIDES AND DOCUSATE SODIUM 2 TABLET: 8.6; 5 TABLET ORAL at 20:24

## 2022-01-01 RX ADMIN — BUMETANIDE 1 MG: 1 TABLET ORAL at 08:10

## 2022-01-01 RX ADMIN — POLYETHYLENE GLYCOL 3350 17 G: 17 POWDER, FOR SOLUTION ORAL at 08:51

## 2022-01-01 RX ADMIN — Medication 12.5 MG: at 09:34

## 2022-01-01 RX ADMIN — DECITABINE 60 MG: 50 INJECTION, POWDER, LYOPHILIZED, FOR SOLUTION INTRAVENOUS at 14:08

## 2022-01-01 RX ADMIN — DECITABINE 60 MG: 50 INJECTION, POWDER, LYOPHILIZED, FOR SOLUTION INTRAVENOUS at 18:40

## 2022-01-01 RX ADMIN — DECITABINE 60 MG: 50 INJECTION, POWDER, LYOPHILIZED, FOR SOLUTION INTRAVENOUS at 14:59

## 2022-01-01 RX ADMIN — SODIUM CHLORIDE, PRESERVATIVE FREE 5 ML: 5 INJECTION INTRAVENOUS at 16:11

## 2022-01-01 RX ADMIN — DICLOFENAC SODIUM 2 G: 10 GEL TOPICAL at 12:12

## 2022-01-01 RX ADMIN — DICLOFENAC SODIUM 2 G: 10 GEL TOPICAL at 16:21

## 2022-01-01 RX ADMIN — DEXAMETHASONE 8 MG: 4 TABLET ORAL at 11:53

## 2022-01-01 RX ADMIN — ACYCLOVIR 400 MG: 400 TABLET ORAL at 21:12

## 2022-01-01 RX ADMIN — MICAFUNGIN 50 MG: 10 INJECTION, POWDER, LYOPHILIZED, FOR SOLUTION INTRAVENOUS at 20:06

## 2022-01-01 RX ADMIN — DICLOFENAC SODIUM 2 G: 10 GEL TOPICAL at 16:41

## 2022-01-01 RX ADMIN — SENNOSIDES AND DOCUSATE SODIUM 2 TABLET: 8.6; 5 TABLET ORAL at 19:03

## 2022-01-01 RX ADMIN — FLUTICASONE FUROATE AND VILANTEROL TRIFENATATE 1 PUFF: 200; 25 POWDER RESPIRATORY (INHALATION) at 09:11

## 2022-01-01 RX ADMIN — OXYCODONE HYDROCHLORIDE 10 MG: 10 TABLET ORAL at 02:20

## 2022-01-01 RX ADMIN — IPRATROPIUM BROMIDE AND ALBUTEROL SULFATE 3 ML: 2.5; .5 SOLUTION RESPIRATORY (INHALATION) at 09:40

## 2022-01-01 RX ADMIN — MICAFUNGIN 50 MG: 10 INJECTION, POWDER, LYOPHILIZED, FOR SOLUTION INTRAVENOUS at 20:46

## 2022-01-01 RX ADMIN — METHOCARBAMOL 500 MG: 500 TABLET ORAL at 12:56

## 2022-01-01 RX ADMIN — Medication 5 ML: at 10:44

## 2022-01-01 RX ADMIN — OXYCODONE HYDROCHLORIDE 10 MG: 5 TABLET ORAL at 02:33

## 2022-01-01 ASSESSMENT — ACTIVITIES OF DAILY LIVING (ADL)
ADLS_ACUITY_SCORE: 18
ADLS_ACUITY_SCORE: 18
ADLS_ACUITY_SCORE: 20
ADLS_ACUITY_SCORE: 18
ADLS_ACUITY_SCORE: 20
ADLS_ACUITY_SCORE: 18
ADLS_ACUITY_SCORE: 20
ADLS_ACUITY_SCORE: 18
ADLS_ACUITY_SCORE: 20
ADLS_ACUITY_SCORE: 18
DEPENDENT_IADLS:: INDEPENDENT
ADLS_ACUITY_SCORE: 20
ADLS_ACUITY_SCORE: 18
ADLS_ACUITY_SCORE: 20
ADLS_ACUITY_SCORE: 18
ADLS_ACUITY_SCORE: 20
ADLS_ACUITY_SCORE: 18
ADLS_ACUITY_SCORE: 20
ADLS_ACUITY_SCORE: 18
ADLS_ACUITY_SCORE: 20
ADLS_ACUITY_SCORE: 18
ADLS_ACUITY_SCORE: 20
ADLS_ACUITY_SCORE: 18
ADLS_ACUITY_SCORE: 20
ADLS_ACUITY_SCORE: 18
ADLS_ACUITY_SCORE: 18
ADLS_ACUITY_SCORE: 20
ADLS_ACUITY_SCORE: 20
ADLS_ACUITY_SCORE: 18
ADLS_ACUITY_SCORE: 20
ADLS_ACUITY_SCORE: 20
ADLS_ACUITY_SCORE: 18
ADLS_ACUITY_SCORE: 20
ADLS_ACUITY_SCORE: 18
ADLS_ACUITY_SCORE: 18
ADLS_ACUITY_SCORE: 20
ADLS_ACUITY_SCORE: 18
ADLS_ACUITY_SCORE: 20
ADLS_ACUITY_SCORE: 21
ADLS_ACUITY_SCORE: 20
ADLS_ACUITY_SCORE: 20
ADLS_ACUITY_SCORE: 18
ADLS_ACUITY_SCORE: 18
ADLS_ACUITY_SCORE: 20
ADLS_ACUITY_SCORE: 18
ADLS_ACUITY_SCORE: 18
ADLS_ACUITY_SCORE: 20
ADLS_ACUITY_SCORE: 18
ADLS_ACUITY_SCORE: 20
ADLS_ACUITY_SCORE: 20
ADLS_ACUITY_SCORE: 18
ADLS_ACUITY_SCORE: 20
ADLS_ACUITY_SCORE: 18
ADLS_ACUITY_SCORE: 20
ADLS_ACUITY_SCORE: 18
ADLS_ACUITY_SCORE: 20
ADLS_ACUITY_SCORE: 18
ADLS_ACUITY_SCORE: 20
ADLS_ACUITY_SCORE: 20
ADLS_ACUITY_SCORE: 18
ADLS_ACUITY_SCORE: 20
ADLS_ACUITY_SCORE: 18
ADLS_ACUITY_SCORE: 20
ADLS_ACUITY_SCORE: 18
ADLS_ACUITY_SCORE: 20
ADLS_ACUITY_SCORE: 18
ADLS_ACUITY_SCORE: 20
ADLS_ACUITY_SCORE: 18
ADLS_ACUITY_SCORE: 20
ADLS_ACUITY_SCORE: 18
ADLS_ACUITY_SCORE: 20
ADLS_ACUITY_SCORE: 18
ADLS_ACUITY_SCORE: 20
ADLS_ACUITY_SCORE: 18
ADLS_ACUITY_SCORE: 20
ADLS_ACUITY_SCORE: 20
ADLS_ACUITY_SCORE: 18
ADLS_ACUITY_SCORE: 20
ADLS_ACUITY_SCORE: 18
ADLS_ACUITY_SCORE: 20
ADLS_ACUITY_SCORE: 18
ADLS_ACUITY_SCORE: 20
ADLS_ACUITY_SCORE: 18
ADLS_ACUITY_SCORE: 20
ADLS_ACUITY_SCORE: 18
ADLS_ACUITY_SCORE: 20
ADLS_ACUITY_SCORE: 18
ADLS_ACUITY_SCORE: 18
ADLS_ACUITY_SCORE: 20
ADLS_ACUITY_SCORE: 20
ADLS_ACUITY_SCORE: 18
ADLS_ACUITY_SCORE: 20
ADLS_ACUITY_SCORE: 20
ADLS_ACUITY_SCORE: 18
ADLS_ACUITY_SCORE: 18
ADLS_ACUITY_SCORE: 20
ADLS_ACUITY_SCORE: 18
ADLS_ACUITY_SCORE: 20
ADLS_ACUITY_SCORE: 18
ADLS_ACUITY_SCORE: 20
ADLS_ACUITY_SCORE: 18
ADLS_ACUITY_SCORE: 18
ADLS_ACUITY_SCORE: 20
ADLS_ACUITY_SCORE: 18
ADLS_ACUITY_SCORE: 20
ADLS_ACUITY_SCORE: 18
ADLS_ACUITY_SCORE: 20
ADLS_ACUITY_SCORE: 20
ADLS_ACUITY_SCORE: 18
ADLS_ACUITY_SCORE: 18
ADLS_ACUITY_SCORE: 20
ADLS_ACUITY_SCORE: 20
ADLS_ACUITY_SCORE: 18
ADLS_ACUITY_SCORE: 20
ADLS_ACUITY_SCORE: 18
ADLS_ACUITY_SCORE: 20
ADLS_ACUITY_SCORE: 18
ADLS_ACUITY_SCORE: 20
ADLS_ACUITY_SCORE: 20
ADLS_ACUITY_SCORE: 18
ADLS_ACUITY_SCORE: 20
ADLS_ACUITY_SCORE: 20
ADLS_ACUITY_SCORE: 18
ADLS_ACUITY_SCORE: 20
ADLS_ACUITY_SCORE: 18
ADLS_ACUITY_SCORE: 20
ADLS_ACUITY_SCORE: 18
ADLS_ACUITY_SCORE: 20
ADLS_ACUITY_SCORE: 18
ADLS_ACUITY_SCORE: 20
ADLS_ACUITY_SCORE: 18
ADLS_ACUITY_SCORE: 20
ADLS_ACUITY_SCORE: 18
ADLS_ACUITY_SCORE: 20

## 2022-12-09 PROBLEM — C95.90 LEUKEMIA (H): Status: ACTIVE | Noted: 2022-01-01

## 2022-12-10 NOTE — PROVIDER NOTIFICATION
[phone] paged:Pt having terrible break through back pain wrapping around to abdomen, preventing from taking deep breaths. 5mg oxycodone and 650mg Tylenol given. Could we get 1x dose of 0.5mg diludid incase ineffective? Thanks    Can we try some Voltaren gel?

## 2022-12-10 NOTE — PLAN OF CARE
Nursing Focus: Admission    D: Patient admitted/transferred from Home via friend for further evaluation and treatment.      I: Upon arrival to the unit patient was oriented to room, unit, and call light. Patient s height, weight, and vital signs were obtained. Allergies reviewed and allergy band applied. MD notified of patient s arrival on the unit. Adult AVS completed. Head to toe assessment completed. Education assessment completed. Care plan initiated.     A: Vital signs stable upon admission. Patient rates pain at 0/10. Two RN skin assessment completed Yes. Second RN was Lakshmi GEORGE. Significant Skin Findings include severe edematous BLE, peeling and dusky color. [facility] Nurse Consult Ordered No. Bed Algorithm can be found in PCS flow sheets (Support Surface Algorithm) and on IP [facility] NURSE RESOURCE TAB, was this used during this assessment? No. Was a pulsate mattress ordered No.     P: Continue to monitor patient and intervene as needed. Continue with plan of care. Notify MD with any concerns or changes in patient status.

## 2022-12-10 NOTE — PROGRESS NOTES
[facility]    Hematology / Oncology Progress Note    Date of Admission: 12/9/2022  Hospital Day #: 1   Date of Service (when I saw the patient): 12/10/2022     Assessment & Plan   Angel Cardenas is a 62 year old male with past medical history of PE (previously on Xarelto), T2DM, COPD, HTN, and gout. Currently being admitted for concern of acute leukemia.     HEME   # Concern for acute leukemia    # Leukocytosis with peripheral blasts  Patient was undergoing work up with his PCP for fatigue, subjective fevers, lack of appetite, weight loss, intermittent nose bleed over the last month and rib pain. He was found to have WBC 8.5 Hgb 8.8 and plts 82 with 2% promyelocytes. OP peripheral smear with bicytopenia and 6% circulating blasts, no adrianne rods noted. Flow cytometry of peripheral blood 12/9 with 1% CD34 blasts, reviewed at [facility] with noted 6% blasts. He was referred to [facility] for further work-up and initiation of treatment. On admission WBC 18.2 Hgb 8.3 and Plt 94. He was seen by oncall fellow and attending, please see full note 12/9. Peripheral smear was reviewed and there was a very low suspicion for APL based on review so no treatment was initiated. D  - OP BMBx 12/9/22 - in process. Called over the weekend to have slides and material sent for our hematopathology review. Request was submitted but will not be initiated until Monday. Will have weekday FILIPPO follow up - [facility] [phone].    - Of note, BMBx procedure was technically very difficult and OSH recommended IR consult for future biopsies.  - Peripheral smear 12/9 - in process   - JOHNNY, Vitamin B12, Hapto - pending   - Peripheral studies in process; flow, BCR ABL Major/Minor, MPN NGS panel, FISH    - Virologies: HSV 1/2, CMV IgG, EBV, HIV, Hep B Surface Antigen, Hep B Surface Antibody, Hep B Core, Hepatitis C - in process   - HLA Typing - sent on admission   - EKG 12/9 with NSR QTc 441. Echo 12/10 -  read pending   - Hold off on any line placement for now    # Anemia   # Thrombocytopenia   - Transfuse to keep Hgb >7 and Plt > 10K (may consider increasing if recurrent epistaxis)     # Risk TLS/DIC   # Hyperuricemia   # H/o gout   On admission Uric acid 9.1, LDH 2,475 - though all other TLS labs wnl including Cr 0.88. Of note patient does have a known h/o gout.   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 Amari Cortes (4/15/22). Patient has history of multiple DVTs in the 1990s without known cause, unprovoked PE at age 43, DVT with PE in context of hip replacement. He most recently was diagnosed with bilateral PE with right heart strain on 1/19/22 after presenting to the ED with SOB.   - Previously on Xarelto, which has been held since 12/5 for BMBx. Continue to hold anticoagulation for now; could consider Lovenox if Plt remain stable.     ID  # Prophylaxis  Patient is not currently neutropenic.   - Consider starting ppx Acyclovir in the comings days  - Holding antibiotic/antifungal for now; would start if ANC <1000    ENT   # Sore throat   Mild L tonsil swelling on exam. No purulence or discrete lesions. No stridor or difficulty breathing.   - Strep negative, HSV swab - pending   - Cepacol lozenges prn     # Intermittent Epistaxis   Patient has been having intermittent epistaxis over the last month, but increase in frequency to daily over the last week PTA.   - If recurrent episodes may consider increasing platelet threshold, adding Afrin     NEURO   # Headaches   Patient has been experiencing nasal congestion, left sided headaches and eye pressure. Brain MRI 12/5 with diffuse linear and slightly nodular dural thickening and enhancement. Diffuse marrow signal abnormality within calvarium and central skull base. Differential  includes lymphoproliferative abnormalities and metastatic disease.  - Images pushed into PACs and [facility] over read ordered   - Will need to have LP in Xray, will wait to schedule on Monday 12/12 as unable to over the weekend.   - Tylenol available PRN     ENDO  # T2DM   Follow with [facility], last seen 11/15/22.   - PTA Ozempic held on admission   - Medium intensity sliding scale insulin   - Hypoglycemia protocol in place     CARDS   # HTN   - PTA Metoprolol ER 12.5 mg daily   - PTA Lisinopril 5 mg daily     # HLD   - PTA Atorvastatin held on admission for potential treatment     # Chronic Venous Insufficiency, CEAP 5   # Bilateral LE Edema   - Echo ordered as above  - PTA Bumex 1 mg daily, may consider additional doses based on symptoms    - Lymphedema consulted     PULM  # COPD   - PTA Breo Ellipta     # OSAP  - continue CPAP     MISC   # Chronic Back Pain - PTA Oxycodone 5-10 mg q6h PRN     FEN:  -Encourage PO fluids; bolus cautiously with volume overload   -PRN lyte replacement  -RDAT    Prophy/Misc:  -VTE: None, d/t bleeding risk   -GI/PUD: None indicated   -Bowels: Senna and Miralax PRN     Consults: lymphedema   Code status: FULL  Disposition: Anticipate at least 2-3 day stay for further work-up and management of cytopenias, possibly prolonged 4-6 week stay if confirmed acute leukemia.     Social:   - Lives at home alone near [place], [place]      Patient and plan of care was discussed with attending physician Dr. Campbell.     >90 minutes spent on the date of the encounter. Over 50% of time was spent counseling the patient and/or coordinating care.     Shannon Irving PA-C  Hematology/Oncology  Ph: [phone], Pager: [phone]    Clinically Significant Risk Factors Present on Admission               # Coagulation Defect: INR = 1.17 (Ref range: 0.85 - 1.15) and/or PTT = 28 Seconds (Ref range: 22 - 38 Seconds), will monitor for bleeding  # Thrombocytopenia: Lowest platelets = 83 in last 2 days, will  "monitor for bleeding        # Severe Obesity: Estimated body mass index is 53.86 kg/m  as calculated from the following:    Height as of this encounter: 1.854 m (6' 1\").    Weight as of this encounter: 185.2 kg (408 lb 3.2 oz).           Interval History   Overnight patient was admitted to the floor. He has remained afebrile and VSS. No acute events. This morning he is anxious to figure out what's going on but understanding that it will take time to collect all of our information and data. We discussed plan to continued work-up today. No blood products needed. He endorses fair PO intake. He confirms history above that has noticed increased fatigue, DEAN, fevers/chills, night sweats and weight loss over the past few weeks. Additionally endorses mild sore throat. Denies other specific complaints. Lives at home alone. All questions answered.     Physical Exam   Temp: 97.6  F (36.4  C) Temp src: Oral BP: 125/59 Pulse: 77   Resp: 18 SpO2: 90 % O2 Device: None (Room air)    Vitals:    12/09/22 1855   Weight: (!) 185.2 kg (408 lb 3.2 oz)     Vital Signs with Ranges  Temp:  [97  F (36.1  C)-98.5  F (36.9  C)] 97.6  F (36.4  C)  Pulse:  [70-83] 77  Resp:  [18-20] 18  BP: ()/(53-69) 125/59  SpO2:  [90 %-94 %] 90 %  I/O last 3 completed shifts:  In: 1835 [P.O.:960; I.V.:875]  Out: -     Constitutional: Pleasant male seen resting in bed. Alert and interactive.   HEENT: Mild enlargement of L tonsil >R, no significant erythema or purulence. No stridor or difficulty breathing. Airway intact. Petechiae to upper palate. Otherwise NCAT. PERRL, EOMI, anicteric sclera. Oral mucosa pink and moist with no lesions or thrush.  Hematologic / Lymphatic: Neck supple.   Respiratory: Mildly labored breathing and audible wheeze noted. Lung sounds diminished at bases. No crackles.   Cardiovascular: Regular rate and rhythm. No murmur or rub.   GI: Normoactive bowel sounds. Abdomen soft, distended d/t body habitus. Mild tenderness with " palpation over liver and spleen.   Skin: Chronic venous stasis changes to BLE from ankle to mid-calf.   Musculoskeletal: 2-3+ b/l LE edema. Peripheral pulses present. Good strength and ROM in bed.   Neurologic: A&O x 3, CNs 2-12 grossly intact, speech normal, gait normal, sensation to light touch grossly WNL. Grossly non-focal.  Neuropsychiatric: Mentation and affect appear normal/appropriate.    Medications   Current Facility-Administered Medications   Medication     acetaminophen (TYLENOL) tablet 650 mg     allopurinol (ZYLOPRIM) tablet 300 mg     bumetanide (BUMEX) tablet 1 mg     glucose gel 15-30 g    Or     dextrose 50 % injection 25-50 mL    Or     glucagon injection 1 mg     fluticasone-vilanterol (BREO ELLIPTA) 200-25 MCG/ACT inhaler 1 puff     insulin aspart (NovoLOG) injection (RAPID ACTING)     insulin aspart (NovoLOG) injection (RAPID ACTING)     lidocaine (LMX4) cream     lidocaine 1 % 0.1-1 mL     [Held by provider] lisinopril (ZESTRIL) tablet 20 mg     metoprolol succinate ER (TOPROL-XL) 24 hr half-tab 12.5 mg     naloxone (NARCAN) injection 0.2 mg    Or     naloxone (NARCAN) injection 0.4 mg    Or     naloxone (NARCAN) injection 0.2 mg    Or     naloxone (NARCAN) injection 0.4 mg     No rectal suppositories if WBC less than 1000/microliters or platelets less than 50,000/ L     ondansetron (ZOFRAN) injection 8 mg    Or     ondansetron (ZOFRAN ODT) ODT tab 8 mg    Or     ondansetron (ZOFRAN) tablet 8 mg     oxyCODONE (ROXICODONE) tablet 5-10 mg     polyethylene glycol (MIRALAX) Packet 17 g     prochlorperazine (COMPAZINE) tablet 5 mg    Or     prochlorperazine (COMPAZINE) injection 5 mg     senna-docusate (SENOKOT-S/PERICOLACE) 8.6-50 MG per tablet 1 tablet    Or     senna-docusate (SENOKOT-S/PERICOLACE) 8.6-50 MG per tablet 2 tablet     sodium chloride (PF) 0.9% PF flush 3 mL     sodium chloride (PF) 0.9% PF flush 3 mL       Data   CBC  Recent Labs   Lab 12/10/22  0623 12/09/22 2013   WBC 14.6*  18.2*   RBC 2.64* 2.74*   HGB 7.8* 8.3*   HCT 26.3* 26.0*    95   MCH 29.5 30.3   MCHC 29.7* 31.9   RDW 19.0* 19.0*   PLT 83* 94*     CMP  Recent Labs   Lab 12/10/22  0821 12/10/22  0623 12/10/22  0211 12/09/22 2013   NA  --  136  --  137   POTASSIUM  --  4.5  --  4.4   CHLORIDE  --  103  --  102   CO2  --  22  --  21*   ANIONGAP  --  11  --  14   GLC 95 110* 133* 134*   BUN  --  25.1*  --  27.2*   CR  --  0.88  --  0.93   GFRESTIMATED  --  >90  --  >90   ZEHRA  --  9.3  --  9.4   MAG  --   --   --  2.3   PHOS  --   --   --  4.2   PROTTOTAL  --   --   --  7.7   ALBUMIN  --   --   --  3.5   BILITOTAL  --   --   --  0.6   ALKPHOS  --   --   --  96   AST  --   --   --  72*   ALT  --   --   --  5*     INR  Recent Labs   Lab 12/10/22  1024 12/09/22 2013   INR 1.17* 1.20*       No results found for this or any previous visit.

## 2022-12-10 NOTE — H&P
[facility]    History and Physical - Hospitalist Service       Date of Admission:  12/9/2022    Assessment & Plan      Angel Cardenas is a 62 year old male with a history of prior PE (previously on Xarelto), DM2, COPD, HTN, and gout admitted on 12/9/2022 for suspected leukemia. He is currently hemodynamically stable and is awaiting further workup. His labs are concerning for TLS, so started IVF and allopurinol per heme/onc fellow recommendation.     # Suspected leukemia  # TLS  # Thrombocytopenia  # Coagulation defect  History of recent subjective fevers, fatigue, weight loss, and headaches, now with bone marrow biopsy today indicative of acute leukemia. Admitted for expedited workup.   - CBC w/ diff, CMP, LDH, uric acid, mag, phos, INR, PTT, fibrinogen, peripheral blood smear, and peripheral flow cytometry sent  - NS at 125 ml/hr  - Allopurinol 300 mg daily    # Headaches  History of headaches behind the L eye with intermittent epistaxis from the L nare for the past month. He has not had further epistaxis since discontinuing Xarelto. Headaches behind the L eye could represent cluster headaches. Recent MR brain showed dural thickening and enhancement with possible etiologies including lymphoproliferative abnormalities and metastatic disease.   - Acetaminophen 650 mg q4h prn     # HTN: Continue home lisinopril 20 mg daily and metoprolol 12.5 mg daily.    # COPD: Continue home symbicort.    # Peripheral edema: Continue home bumex 1 mg daily.    # HLD: Continue home atorvastatin 20 mg at bedtime.     # Gout: Holding home dose and treating for TLS as above.     # History of pulmonary embolism: Previously on Xarelto. Held starting 12/5 in anticipation of bone marrow biopsy this morning.   - Continue to hold    # DM2:   - Holding home semaglutide, can restart after pharmacy med rec  - sliding scale insulin while inpatient    # FEN  - Diabetic diet       Diet: Combination Diet  "Moderate Consistent Carb (60 g CHO per Meal) Diet  DVT Prophylaxis: Pneumatic Compression Devices (unclear if further procedures required)  Black Catheter: Not present  Central Lines: None  Cardiac Monitoring: None  Code Status: Full Code    Clinically Significant Risk Factors Present on Admission               # Coagulation Defect: INR = 1.20 (Ref range: 0.85 - 1.15) and/or PTT = 28 Seconds (Ref range: 22 - 38 Seconds), will monitor for bleeding  # Thrombocytopenia: Lowest platelets = 94 in last 2 days, will monitor for bleeding        # Severe Obesity: Estimated body mass index is 53.86 kg/m  as calculated from the following:    Height as of this encounter: 1.854 m (6' 1\").    Weight as of this encounter: 185.2 kg (408 lb 3.2 oz).           Disposition Plan      Expected Discharge Date: 12/11/2022                The patient's care was discussed with the Patient and Heme/Onc Consultant.    JUNIE DAIGLE MD  Hospitalist Service  [facility]  Securely message with the Vocera Web Console (learn more here)  Text page via [employer] Paging/Directory         ______________________________________________________________________    Chief Complaint   Abnormal labs and bone marrow biopsy    History is obtained from the patient    History of Present Illness   Angel Cardenas is a 62 year old male with a history of prior PE (previously on Xarelto), DM2, COPD, HTN, and gout admitted on 12/9/2022 for suspected leukemia.    For the past month, he has been experiencing fatigue, subjective fevers, decreased appetite, weight loss, and rib pain. He was seen by his PCP in clinic, who started a workup including labs, which were notable for WBC 18.5, Hgb 8.8, platelets 82, and abnormal coag studies. Peripheral blood smear showed 1% blasts expressing CD34.  He also has a history of headaches behind his L eye during this time with intermittent nose bleeds from his L nare. He says that his nose " bleeds have stopped since he discontinued Xarelto on 12/5. He had a brain MR on 12/5/22 that showed dural thickening concerning for lymphoproliferative or metastatic disease.      For the past few weeks, he has also been experiencing bilateral rib pain. He has noticed that his appetite is significantly decreased, and he has lost weight as a result. He says that he is always warm and generally sweats at night but hasn't noticed any changes in this. He has not noticed easy bleeding or bruising. He has baseline lower extremity edema that has been unchanged. He has not had nausea, vomiting, or urinary changes. He has baseline constipation that hasn't changed.     He was direct admitted to [facility] for further management.       Review of Systems    The 10 point Review of Systems is negative other than noted in the HPI or here.     Past Medical History    I have reviewed this patient's medical history and updated it with pertinent information if needed.   Past Medical History:   Diagnosis Date     COPD (chronic obstructive pulmonary disease) (H)      Diabetes mellitus, type 2 (H)      Gout      History of pulmonary embolism      HLD (hyperlipidemia)      HTN (hypertension)        Past Surgical History   I have reviewed this patient's surgical history and updated it with pertinent information if needed.  Past Surgical History:   Procedure Laterality Date     AK REVISE TOTAL HIP REPLACEMENT Bilateral      TOTAL SHOULDER REPLACEMENT Right        Social History   I have reviewed this patient's social history and updated it with pertinent information if needed. Former smoker. Prior to his current illness, drank alcohol regularly.        Family History   I have reviewed this patient's family history and updated it with pertinent information if needed.  Family History   Problem Relation Age of Onset     Neurodegenerative disease Father         Creutzfeldt-Osmin disease     Chronic Obstructive Pulmonary Disease Brother       Heart Disease Brother        Prior to Admission Medications   Prior to Admission Medications   Prescriptions Last Dose Informant Patient Reported? Taking?   Ibuprofen 200 MG capsule   Yes No   Sig: Take 200 mg by mouth every 4 hours as needed.      Facility-Administered Medications: None     Allergies   Allergies   Allergen Reactions     Metformin Unknown       Physical Exam   Vital Signs: Temp: 98.5  F (36.9  C) Temp src: Oral BP: 132/53 Pulse: 70   Resp: 20 SpO2: 93 % O2 Device: None (Room air)    Weight: 408 lbs 3.2 oz    Constitutional: awake, alert, cooperative, no apparent distress, and appears stated age  Eyes: pupils equal, round and reactive to light and extra-ocular muscles intact  ENT: normocepalic, without obvious abnormality, atraumatic, normal nares and throat, MMM  Hematologic / Lymphatic: no cervical lymphadenopathy  Respiratory: No increased work of breathing, good air exchange, clear to auscultation bilaterally, no crackles or wheezing  Cardiovascular: regular rate and rhythm, normal S1 and S2 and soft systolic murmur  GI: normal bowel sounds, soft, non-distended and non-tender  Skin: no bruising or bleeding  Musculoskeletal: bilateral lower extremity nonpitting edema, ROM intact  Neurologic: Awake, alert, oriented to name, place and time.  Cranial nerves II-XII are grossly intact.  Moving all extremities.  No focal deficits.     Data   Data reviewed today: I reviewed all medications, new labs and imaging results over the last 24 hours. I personally reviewed no images or EKG's today.    Recent Labs   Lab 12/09/22 2013   WBC 18.2*   HGB 8.3*   MCV 95   PLT 94*   INR 1.20*      POTASSIUM 4.4   CHLORIDE 102   CO2 21*   BUN 27.2*   CR 0.93   ANIONGAP 14   ZEHRA 9.4   *   ALBUMIN 3.5   PROTTOTAL 7.7   BILITOTAL 0.6   ALKPHOS 96   ALT 5*   AST 72*     LDH 2475  Uric acid 9.1  Retic 4.9%  Absolute retic 0.133    Peripheral blood smear and flow cytometry in process

## 2022-12-10 NOTE — PHARMACY-ADMISSION MEDICATION HISTORY
Admission Medication History Completed by Pharmacy    See [facility] Admission Navigator for allergy information, preferred outpatient pharmacy, prior to admission medications and immunization status.     Medication History Sources:     [facility]    Changes made to PTA medication list (reason):    Added: See updated list below    Deleted: Ibuprofen    Changed: None    Additional Information:    Patient is a poor historian of medications. Medication list was provided by his retail pharmacy. Most recent prescriptions that patient may have no started were dexamethasone 4 mg daily and oxycodone 5 mg as needed for pain.     Prior to Admission medications    Medication Sig Last Dose Taking? Auth Provider Long Term End Date   acetaminophen (TYLENOL) 500 MG tablet Take 500-1,000 mg by mouth every 6 hours as needed for mild pain Past Month Yes Unknown, Entered By History     allopurinol (ZYLOPRIM) 100 MG tablet Take 200 mg by mouth daily Past Month Yes Unknown, Entered By History     atorvastatin (LIPITOR) 20 MG tablet Take 20 mg by mouth daily Past Month Yes Unknown, Entered By History     budesonide-formoterol (SYMBICORT) 160-4.5 MCG/ACT Inhaler Inhale 2 puffs into the lungs 2 times daily Past Month Yes Unknown, Entered By History Yes    bumetanide (BUMEX) 1 MG tablet Take 1 mg by mouth daily  Yes Unknown, Entered By History     dexamethasone (DECADRON) 4 MG tablet Take 4 mg by mouth daily (with breakfast) Unknown Yes Unknown, Entered By History Yes    lisinopril (ZESTRIL) 5 MG tablet Take 5 mg by mouth daily Past Month Yes Unknown, Entered By History     metoprolol succinate ER (TOPROL XL) 25 MG 24 hr tablet Take 12.5 mg by mouth daily Past Month Yes Unknown, Entered By History     oxyCODONE (OXY-IR) 5 MG capsule Take 5 mg by mouth every 6 hours as needed for severe pain (7-10) Unknown Yes Unknown, Entered By History     rivaroxaban ANTICOAGULANT (XARELTO) 20 MG TABS tablet Take 20 mg by mouth daily (with dinner)  Past Month Yes Unknown, Entered By History Yes    semaglutide (OZEMPIC, 1 MG/DOSE,) 2 MG/1.5ML pen Inject 2 mg Subcutaneous every 7 days Past Month Yes Unknown, Entered By History         Date completed: 12/10/22    Medication history completed by: Bismark Flores, PharmD

## 2022-12-10 NOTE — PROVIDER NOTIFICATION
Dr. Coto paged #[phone]    New Heme patient just arrived to [facility] and is in room 7515-2. Thank you! [phone]

## 2022-12-10 NOTE — PROGRESS NOTES
"Cross-cover/Event Note  Internal Medicine     Subjective:  On December 10, 2022 at 5:45 PM I was called by bedside nurse to address Angel Cardenas for low back pain.  Reports patient feels as if muscular.  Has taken oxycodone and acetaminophen without relief.  Pain has been ongoing since prior to admission.  Worse this evening  .  Exam:  Blood pressure 122/75, pulse 92, temperature 99.3  F (37.4  C), temperature source Oral, resp. rate 19, height 1.854 m (6' 1\"), weight (!) 185.2 kg (408 lb 3.2 oz), SpO2 (!) 86 %.      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      Plan:  Give 1 time dose of diluadid for breakthrough pain  Continue oxycodone and acetaminophen  Start voltaren gel.  Reevaluate by primary team in AM.    Lamberto Lynn MD   Internal Medicine Hospitalist Service  [facility]      Addendum (12/10/22, 19:30)  Called to room after new O2 need of 4L by NC.  Now around 95% on 4L.  No shortness of breath or chest pain.  Main complaint is ongoing back pain only partially relieved by the interventions above.  History of sleep apnea.  Not aware of episodes of hypoxia in the past.    Vital signs:  Temp: 99.3  F (37.4  C) Temp src: Oral BP: 122/75 Pulse: 92   Resp: 19 SpO2: 94 % O2 Device: Nasal cannula Oxygen Delivery: 4 LPM Height: 185.4 cm (6' 1\") Weight: (!) 185.2 kg (408 lb 3.2 oz)  Estimated body mass index is 53.86 kg/m  as calculated from the following:    Height as of this encounter: 1.854 m (6' 1\").    Weight as of this encounter: 185.2 kg (408 lb 3.2 oz).       EXAM:  Constitutional: alert and mild distress (respiratory, unable to complete full sentences)  Respiratory: Fair diaphragmatic excursion. Decreased lung sounds at bases (consistent with splenomegaly).  No crackles or wheezes.  Psychiatric: mentation appears normal and affect normal/bright     *CT CHEST ABDOMEN PELVIS w CONTRAST   EXAM: CT CHEST ABDOMEN PELVIS w CONTRAST "   LOCATION: [facility]   DATE/TIME: 12/7/2022 4:34 PM     INDICATION: Chest pain. Brain MRI findings suspicious for lymphoma   and/or intracranial metastatic disease.   COMPARISON: CT chest 1/28/2019. CT abdomen pelvis 12/17/2018   TECHNIQUE: CT scan of the chest, abdomen, and pelvis was performed   following injection of IV contrast. Multiplanar reformats were   obtained. Dose reduction techniques were used.   CONTRAST: 149 mL Omnipaque 350     FINDINGS:   LUNGS AND PLEURA: Mosaic attenuation of the pulmonary parenchyma   which could be due to air-trapping or altered perfusion. There are   also some faint groundglass micronodular opacities bilaterally which   are likely infectious/inflammatory. No consolidative opacities. No   pleural effusion or pneumothorax.     There are a few perifissural nodules, which are probably benign   intrapulmonary lymph nodes with the largest measuring 10 mm along the   left major fissure (3/219). There are also a few scattered more   discrete appearing nodules such as 3 mm left upper lobe (3/150) and 5   mm right upper lobe (3/168). These nodules are not significantly   changed since 1/28/2019 and should be benign.     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  New hypoxia likely multifactorial with decreased lung volumes from splenomegaly, some pulmonary edema from inflammation (although no crackles on exam), and possible hypoxia related to chronic sleep apnea.      Plan:  Start robaxin for muscle pain  Start diluadid 0.5 mg IV PRN for breakthrough pain  Continue oxycodone and acetaminophen  CXR if hypoxia worsens  Reevaluate by primary team in AM.

## 2022-12-10 NOTE — PLAN OF CARE
Goal Outcome Evaluation:  Angel here for new dx of acute Leukemia.  BMBX done at OSH yesterday and still waiting final path.  BMBX site CDI   And drsg changed.  C/O back to shoulder pain which is not new to him.  Given Oxycodone 5mg with no relief.  An extra 5mg given with relief.  Pt with +3 LE edema.  On Bumex with good output and IVF was stopped.  Eating ok.  Bg 95 - 99 so no s/s required.  C/O sore throat - cx sent for strep and HSV.  Echo and EKG done and waiting further lab results from this am. Uric acid from yesterday was 9.1

## 2022-12-10 NOTE — PLAN OF CARE
[id number]:    Pt admitted at shift change from home for suspected new leukemia diagnosis. Hypertensive within parameters, OVSS on RA. Afebrile. Up independently. Denies pain and nausea. Some SOB with activity. Left PIV infusing MIVF 125ml/hr. . Pt will need a lymphedema consult. LBM 12/8. Outpatient BMBx on 12/9. Site CDI. Voices concern about the unknowns and eager to learn and ask primary provider questions. Sleeping between cares.

## 2022-12-11 NOTE — PLAN OF CARE
"Goal Outcome Evaluation:  Angel continues with back pain.  Said the pain is in between his shoulder blades.  Is on multiple pain medications - Robaxin, Flexeril, Oxycodone, Voltane gel Lido patch and IV Dilaudid. Pt also received Decadron. Aqua K pump also in use.  Reports his back pain is better this afternoon.  On 3 L O2 with sats 93%.  LS clear but taking short breaths \"I cant take a deep breath because of the pain.\"  Neb done with some relief. Once back pain was relieved O2 sat on RA was 100% and pt able to take deeper breaths.  CXR from last night showed Pulm Edema. Lovenox X1 with H/O PE.  Lovenox was a one time dose due to possible LP tomorrow.     Waiting for BMBX slides from OSH for final dx.  Hydrea started today.  Urine acid 9.6 and received Rasburicase.  No BM since 12/8 - Senna and Miralax given.  Bariatric chair ordered so pt can be out of bed.  BMBX site CDI.  Hospital CPAP ordered so he can have CPAP with O2 running if O2 is needed.                          "

## 2022-12-11 NOTE — PROGRESS NOTES
[facility]    Hematology / Oncology Progress Note    Date of Admission: 12/9/2022  Hospital Day #: 2   Date of Service (when I saw the patient): 12/11/2022     Assessment & Plan   Angel Cardenas is a 62 year old male with past medical history of PE (previously on Xarelto), T2DM, COPD, HTN, and gout. Currently being admitted for concern of MPN vs acute leukemia.     TODAY:   - WBC remains elevated, start Hydrea 1g daily   - TLS labs trending up slightly - Cr 1.11, Phos 5.3, uric acid 9.6. Rasburicase x1. Hold off on IVF d/t volume overload.   - Repeat TLS/DIC labs in AM  - Follow-up labs sent on peripheral blood - flow, BCR ABL Major/Minor, MPN NGS panel, FISH    - OP BMBx 12/9/22 - in process. Called over the weekend to have slides and material sent for our hematopathology review. Request was submitted but will not be initiated until Monday. Weekday FILIPPO follow up - [facility] [phone].   - Reach out to XR on Monday to request diagnostic LP if patient can clinically tolerate.    - Now requiring 3-4L O2, suspect 2/2 volume overload and pain. Continue PTA Bumex 1g daily. Monitor daily weights, I/Os.  - PRN nebs, CPAP at night ordered  - Increased rib/upper back pain. Suspect is likely related to splenomegaly on top of chronic back pain.   - Robaxin 500 mg QID, Voltaren gel QID, Lido patch. PRN: Tylenol, Oxy 5-10 mg q6hr, IV Dilaudid 0.5 mg q4hr.   - Dexamethasone 10 mg x1 today, reassess daily   - S/p ppx Lovenox 40 mg x1 this morning (not ordered daily d/t possible upcoming LP procedure)  - Lymphedema to see      HEME   # Concern for acute leukemia    # Leukocytosis with peripheral blasts  Patient was undergoing work up with his PCP for fatigue, subjective fevers, lack of appetite, weight loss, intermittent nose bleed over the last month and rib pain. He was found to have WBC 8.5 Hgb 8.8 and plts 82 with 2% promyelocytes. OP peripheral smear with  bicytopenia and 6% circulating blasts, no adrianne rods noted. Flow cytometry of peripheral blood 12/9 with 1% CD34 blasts, reviewed at [facility] with noted 6% blasts. He was referred to [facility] for further work-up and initiation of treatment. On admission WBC 18.2 Hgb 8.3 and Plt 94. He was seen by oncall fellow and attending, please see full note 12/9. Peripheral smear was reviewed and there was a very low suspicion for APL based on review so no treatment was initiated. Possibly a ?transformed MPN.   - OP BMBx 12/9/22 - in process. Called over the weekend to have slides and material sent for our hematopathology review. Request was submitted but will not be initiated until Monday. Weekday FILIPPO follow up - [facility] [phone].    - Of note, BMBx procedure was technically very difficult and OSH recommended IR consult for future biopsies. Per nursing, appears more sacral so unsure of quality.   - Peripheral smear 12/9 - in process   - JOHNNY negative, Vitamin B12 wnl, Hapto - pending   - Peripheral studies in process; flow, BCR ABL Major/Minor, MPN NGS panel, FISH    - Virologies: HSV 1/2 negative. CMV IgG, EBV, HIV, Hep B Surface Antigen, Hep B Surface Antibody, Hep B Core, Hepatitis C - in process   - HLA Typing - sent on admission   - EKG 12/9 with NSR QTc 441. Echo 12/10 with hyperkinetic LVEF at 65-70%.   - Hold off on any line placement for now  - WBC 18K on admission, trending down though remains elevated - start Hydrea 1g daily x12/11.   - s/p Dex 10 mg x1 on 12/11 for pain as below, re-assess daily     # Anemia   # Thrombocytopenia   - Transfuse to keep Hgb >7 and Plt > 10K (may consider increasing if recurrent epistaxis)     # TLS, mild  # Risk of DIC  # Hyperuricemia   # H/o gout   On admission Uric acid 9.1, LDH 2,475 - though all other TLS labs wnl including Cr 0.88. Cr trending up slightly as of 12/11 to 1.11 and uric acid remains elevated at 9.6. Of note patient does have a known h/o gout. INR mildly  elevated (~1.2).   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 Amari Cortes (4/15/22). Patient has history of multiple DVTs in the 1990s without known cause, unprovoked PE at age 43, DVT with PE in context of hip replacement. He most recently was diagnosed with bilateral PE with right heart strain on 1/19/22 after presenting to the ED with SOB.   - Previously on Xarelto, which has been held since 12/5 for BMBx. S/p ppx Lovenox 40 mg x1 on 12/11 AM (not ordered daily d/t possible upcoming LP procedure); would hold if Plt <50K.     ID  # Prophylaxis  Patient is not currently neutropenic.   - Consider starting ppx Acyclovir in the comings days pending dx and counts  - Holding antibiotic/antifungal for now; would start if ANC <1000    ENT   # Sore throat   Mild L tonsil swelling on exam. No purulence or discrete lesions. No stridor or difficulty breathing.   - Strep negative, HSV swab - pending   - Cepacol lozenges prn     # Intermittent Epistaxis   Patient has been having intermittent epistaxis over the last month, but increase in frequency to daily over the last week PTA.   - If recurrent episodes may consider increasing platelet threshold, adding Afrin     NEURO   # Headaches   Patient has been experiencing nasal congestion, left sided headaches and eye pressure. No other neurologic complaints. Brain MRI 12/5 with diffuse linear and slightly nodular dural thickening and enhancement. Diffuse marrow signal abnormality within calvarium and central skull base. Differential includes lymphoproliferative abnormalities and metastatic disease.  - Requested images to be pushed into PACs and [facility] over read ordered   - Will need to have LP in XR; call XR to schedule on 12/12 if patient is able to  clinically tolerate.   - Tylenol available PRN     ENDO  # T2DM   Follow with [facility], last seen 11/15/22.   - PTA Ozempic held on admission   - Medium intensity sliding scale insulin   - Hypoglycemia protocol in place     CARDS   # HTN   - PTA Metoprolol ER 12.5 mg daily   - PTA Lisinopril 5 mg daily     # HLD   - PTA Atorvastatin held on admission for potential treatment     # Chronic Venous Insufficiency, CEAP 5   # Bilateral LE Edema   - Echo ordered as above  - PTA Bumex 1 mg daily, may consider additional doses based on symptoms    - Lymphedema consulted     PULM  # Hypoxia  New O2 requirement 12/12 to 3-4L in the setting of recent continuous IVF, splenomegaly and poor pain control.   - CXR showed cardiomegaly, enlarged pulmonary vasculature and diffuse mild interstitial markings bilaterally likely representing pulmonary edema.   - PTA Bumex above  - Neb treatments q2hr prn   - Consider CT PE protocol if worsening given his history. Hold off for now given lack of tachycardia and less likely in setting of thrombocytopenia.     # COPD   - PTA Breo Ellipta     # OSAP  - Continue CPAP at night    MISC   # Acute on chronic back pain   Patient endorses ongoing back pain prior to hospitalization though over the past week has been endorsing rib pain which radiates into his upper back and between his shoulder blades. No red flag sx. Of note he does have splenomegaly on recent CT imaging which is likely contributing.   - EKG shows NSR  - Scheduled Voltaren gel QID, daily Lido patches, Robaxin QID  - PRN Tylenol, heat packs  - PTA Oxycodone 5-10 mg q6h PRN. Add IV Dilaudid 0.5 mg q4hr PRN.   - Dexamethasone as above  - Lipase ordered - pending     FEN:  -Encourage PO fluids; bolus cautiously with volume overload   -PRN lyte replacement  -RDAT    Prophy/Misc:  -VTE: s/p ppx Lovenox 40 mg x1 on 12/11 (not continued daily d/t possible upcoming LP procedure)  -GI/PUD: None indicated   -Bowels: Senna and  "Miralax PRN     Consults: lymphedema   Code status: FULL  Disposition: Anticipate at least 2-3 day stay for further work-up and management of cytopenias, possibly prolonged 4-6 week stay if confirmed acute leukemia.     Social:   - Lives at home alone near [place], [place]      Patient and plan of care was discussed with attending physician Dr. Campbell.     >65 minutes spent on the date of the encounter. Over 50% of time was spent counseling the patient and/or coordinating care.     Shannon Irving PA-C  Hematology/Oncology  Ph: [phone], Pager: [phone]    Clinically Significant Risk Factors         # Hyponatremia: Lowest Na = 134 mmol/L in last 2 days, will monitor as appropriate      # Hypoalbuminemia: Lowest albumin = 3.2 g/dL at 12/11/2022  7:01 AM, will monitor as appropriate   # Thrombocytopenia: Lowest platelets = 69 in last 2 days, will monitor for bleeding          # Severe Obesity: Estimated body mass index is 52.43 kg/m  as calculated from the following:    Height as of this encounter: 1.854 m (6' 1\").    Weight as of this encounter: 180.3 kg (397 lb 6.4 oz)., PRESENT ON ADMISSION         Interval History   Nursing notes reviewed. Increased back pain overnight. Patient received Voltaren gel and IV Dilaudid with some relief. This morning back pain is worse again - describes it as radiating under his ribs to behind his shoulder blades. Feels muscular in nature. Denies CP. Did have new O2 requirement overnight at 3-4L. Patient endorses not being able to take in deep breath due to pain. Legs remain swollen. Appetite decreased. Hoping to receive more answers over the next 1-2 days. All questions answered.     A comprehensive review of systems was obtained and is negative other than noted here or in the HPI.     Physical Exam   Temp: 97  F (36.1  C) Temp src: Temporal BP: 109/50 Pulse: 84   Resp: 20 SpO2: 94 % O2 Device: Nasal cannula Oxygen Delivery: 3 LPM  Vitals:    12/09/22 1855 12/11/22 1112   Weight: (!) 185.2 " kg (408 lb 3.2 oz) (!) 180.3 kg (397 lb 6.4 oz)     Vital Signs with Ranges  Temp:  [96.9  F (36.1  C)-99.3  F (37.4  C)] 97  F (36.1  C)  Pulse:  [64-92] 84  Resp:  [16-20] 20  BP: (109-140)/(50-75) 109/50  SpO2:  [81 %-95 %] 94 %  I/O last 3 completed shifts:  In: 980 [P.O.:480; I.V.:500]  Out: -     Constitutional: Pleasant male seen resting in bed. Alert and interactive. Appearing uncomfortable today due to rib/back pain.   HEENT: Mild enlargement of L tonsil >R, no significant erythema or purulence. No stridor or difficulty breathing. Airway intact. Petechiae to upper palate. Otherwise NCAT. PERRL, EOMI, anicteric sclera. Oral mucosa pink and moist with no lesions or thrush.  Hematologic / Lymphatic: Neck supple.   Respiratory: Tachypnea and mildly labored breathing and audible wheeze noted. Lung sounds diminished at bases. No crackles.   Cardiovascular: Regular rate and rhythm. No murmur or rub.   GI: Normoactive bowel sounds. Abdomen soft, distended d/t body habitus. Mild tenderness with palpation over liver and spleen.   Skin: Chronic venous stasis changes to BLE from ankle to mid-calf.   Musculoskeletal: 2-3+ b/l LE edema. Peripheral pulses present. Good strength and ROM in bed.   Neurologic: A&O x 3, CNs 2-12 grossly intact, speech normal, gait normal, sensation to light touch grossly WNL. Grossly non-focal.  Neuropsychiatric: Mentation and affect appear normal/appropriate.    Medications   Current Facility-Administered Medications   Medication     acetaminophen (TYLENOL) tablet 650 mg     albuterol (PROVENTIL) neb solution 2.5 mg     allopurinol (ZYLOPRIM) tablet 300 mg     benzocaine-menthol (CHLORASEPTIC MAX) 15-10 MG lozenge 1 lozenge     bumetanide (BUMEX) tablet 1 mg     cyclobenzaprine (FLEXERIL) tablet 10 mg     glucose gel 15-30 g    Or     dextrose 50 % injection 25-50 mL    Or     glucagon injection 1 mg     diclofenac (VOLTAREN) 1 % topical gel 2 g     fluticasone-vilanterol (BREO ELLIPTA)  200-25 MCG/ACT inhaler 1 puff     HYDROmorphone (PF) (DILAUDID) injection 0.5 mg     hydroxyurea (HYDREA) capsule 1,000 mg     insulin aspart (NovoLOG) injection (RAPID ACTING)     insulin aspart (NovoLOG) injection (RAPID ACTING)     Lidocaine (LIDOCARE) 4 % Patch 1 patch     lidocaine (LMX4) cream     lidocaine 1 % 0.1-1 mL     lidocaine patch in PLACE     lisinopril (ZESTRIL) tablet 5 mg     methocarbamol (ROBAXIN) tablet 500 mg     metoprolol succinate ER (TOPROL-XL) 24 hr half-tab 12.5 mg     naloxone (NARCAN) injection 0.2 mg    Or     naloxone (NARCAN) injection 0.4 mg    Or     naloxone (NARCAN) injection 0.2 mg    Or     naloxone (NARCAN) injection 0.4 mg     No rectal suppositories if WBC less than 1000/microliters or platelets less than 50,000/ L     ondansetron (ZOFRAN) injection 8 mg    Or     ondansetron (ZOFRAN ODT) ODT tab 8 mg    Or     ondansetron (ZOFRAN) tablet 8 mg     oxyCODONE (ROXICODONE) tablet 5-10 mg     polyethylene glycol (MIRALAX) Packet 17 g     prochlorperazine (COMPAZINE) tablet 5 mg    Or     prochlorperazine (COMPAZINE) injection 5 mg     senna-docusate (SENOKOT-S/PERICOLACE) 8.6-50 MG per tablet 1 tablet    Or     senna-docusate (SENOKOT-S/PERICOLACE) 8.6-50 MG per tablet 2 tablet     sodium chloride (PF) 0.9% PF flush 3 mL     sodium chloride (PF) 0.9% PF flush 3 mL       Data   CBC  Recent Labs   Lab 12/11/22  0701 12/10/22  0623 12/09/22 2013   WBC 12.9* 14.6* 18.2*   RBC 2.68* 2.64* 2.74*   HGB 8.1* 7.8* 8.3*   HCT 25.9* 26.3* 26.0*   MCV 97 100 95   MCH 30.2 29.5 30.3   MCHC 31.3* 29.7* 31.9   RDW 18.7* 19.0* 19.0*   PLT 69* 83* 94*     CMP  Recent Labs   Lab 12/11/22  0805 12/11/22  0701 12/11/22  0230 12/10/22  2224 12/10/22  0821 12/10/22  0623 12/10/22  0211 12/09/22 2013   NA  --  134*  --   --   --  136  --  137   POTASSIUM  --  4.2  --   --   --  4.5  --  4.4   CHLORIDE  --  100  --   --   --  103  --  102   CO2  --  21*  --   --   --  22  --  21*   ANIONGAP  --  13   --   --   --  11  --  14   GLC 82 85 96 103*   < > 110*   < > 134*   BUN  --  29.6*  --   --   --  25.1*  --  27.2*   CR  --  1.11  --   --   --  0.88  --  0.93   GFRESTIMATED  --  76  --   --   --  >90  --  >90   ZEHRA  --  9.0  --   --   --  9.3  --  9.4   MAG  --  2.2  --   --   --   --   --  2.3   PHOS  --  5.3*  --   --   --   --   --  4.2   PROTTOTAL  --  7.1  --   --   --   --   --  7.7   ALBUMIN  --  3.2*  --   --   --   --   --  3.5   BILITOTAL  --  0.7  --   --   --   --   --  0.6   ALKPHOS  --  83  --   --   --   --   --  96   AST  --  64*  --   --   --   --   --  72*   ALT  --  <5*  --   --   --   --   --  5*    < > = values in this interval not displayed.     INR  Recent Labs   Lab 22  0701 12/10/22  1024 22   INR 1.22* 1.17* 1.20*       Results for orders placed or performed during the hospital encounter of 22   XR Chest Port 1 View    Narrative    EXAM: XR CHEST PORT 1 VIEW  12/10/2022 10:38 PM     HISTORY:  hypoxia       COMPARISON:  None    TECHNIQUE: 2 portable AP semiupright views of the chest    FINDINGS:   The trachea is midline. The cardiomediastinal silhouette is enlarged.  The pulmonary vasculature is engorged. No appreciable pneumothorax or  pleural effusion. Mild diffuse interstitial opacities. No acute  osseous abnormality.      Impression    IMPRESSION: Cardiomegaly with engorged pulmonary vasculature and  diffuse mild interstitial markings bilaterally likely represent  pulmonary edema.    I have personally reviewed the examination and initial interpretation  and I agree with the findings.    MARCI RANGEL MD         SYSTEM ID:  [id number]   Echo Complete     Value    LVEF  65-70%    Narrative    [id number]  [id number]  [id number]  [id number]^JULIANO^CARMELO     [facility]  Echocardiography Laboratory  [address] [place] [id number]     Name: RAD TAYLOR  MRN: [id number]  : 1960  Study Date: 12/10/2022 09:37 AM  Age: 62  yrs  Gender: Male  Patient Location: [id number]  Reason For Study: Chemo  Ordering Physician: CARMELO HENAO  Referring Physician: ADVI DAILY  Performed By: Daphne Mckeon RDCS     BSA: 2.9 m2  Height: 73 in  Weight: 408 lb  BP: 96/58 mmHg  ______________________________________________________________________________  Procedure  Complete Portable Echo Adult. Contrast Optison. Optison ([facility] #6176-7116-44)  given intravenously. Patient was given 5 ml mixture of 3 ml Optison and 6 ml  saline. 4 ml wasted.  ______________________________________________________________________________  Interpretation Summary  Global and regional left ventricular function is hyperkinetic with an EF of  65-70%.  The right ventricle is hyperdynamic. The right ventricle is normal size.  No significant valvular abnormalities.  There is no prior study for direct comparison.  ______________________________________________________________________________  Left Ventricle  Global and regional left ventricular function is hyperkinetic with an EF of  65-70%. Left ventricular size is normal. Mild to moderate concentric wall  thickening consistent with left ventricular hypertrophy is present. Left  ventricular diastolic function is normal.     Right Ventricle  The right ventricle is hyperdynamic. The right ventricle is normal size.     Atria  The left atrium appears normal. Mild to moderate right atrial enlargement is  present.     Mitral Valve  The valve leaflets are not well visualized. Trace mitral insufficiency is  present.     Aortic Valve  The valve leaflets are not well visualized. On Doppler interrogation, there is  no significant stenosis or regurgitation.     Tricuspid Valve  The valve leaflets are not well visualized. Trace tricuspid insufficiency is  present. Pulmonary artery systolic pressure cannot be assessed.     Pulmonic Valve  On Doppler interrogation, there is no significant stenosis or regurgitation.  Trace pulmonic insufficiency  is present.     Vessels  Sinuses of Valsalva 3.7 cm. Ascending aorta 3.6 cm. IVC diameter <2.1 cm  collapsing >50% with sniff suggests a normal RA pressure of 3 mmHg.     Pericardium  No pericardial effusion is present.     Compared to Previous Study  There is no prior study for direct comparison.  ______________________________________________________________________________  MMode/2D Measurements & Calculations     IVSd: 1.7 cm  LVIDd: 5.7 cm  LVIDs: 3.7 cm  LVPWd: 1.4 cm  FS: 34.8 %  LV mass(C)d: 416.7 grams  LV mass(C)dI: 143.0 grams/m2  Ao root diam: 3.7 cm  asc Aorta Diam: 3.6 cm  LVOT diam: 2.5 cm  LVOT area: 4.9 cm2  LA Volume (BP): 90.5 ml  LA Volume Index (BP): 31.1 ml/m2  RWT: 0.49     Doppler Measurements & Calculations  MV E max jean-pierre: 107.8 cm/sec  MV A max jean-pierre: 93.8 cm/sec  MV E/A: 1.1  MV dec time: 0.23 sec  Ao V2 max: 143.0 cm/sec  Ao max P.2 mmHg  E/E' av.0  Lateral E/e': 7.4  Medial E/e': 10.6     ______________________________________________________________________________  Report approved by: Aisha Kaufman 12/10/2022 03:44 PM

## 2022-12-11 NOTE — PLAN OF CARE
Goal Outcome Evaluation:    1A&Ox4. Tmax 99.3. SOB due to pain. O2 dipped to 86% on room air. 0.5mg IV dilaudid, 5 mg and 10mg oxycodone given x1 each this shift with minimal relief. Heat applied and Voltaren gel ordered. Waiting for tube to come up from pharmacy. Denies N/V. Minimal appetite. Up independently. voiding adequately. Continue with POC.

## 2022-12-11 NOTE — PLAN OF CARE
[id number]:      VSS on 4L. Afebrile. Denies nausea and SOB. Pain 8/10. PRN IV dilaudid given x2 and PRN PO oxycodone x1 with some relief. PIV is saline locked. Voiding adequately. No acute events. Sleeping between cares.

## 2022-12-11 NOTE — PLAN OF CARE
DEFER- Lymphedema orders received. Chart reviewed and discussed with care team.?Pt has chronic LE edema, no increase in LE edema compared to baseline. Pt has his home compression garments in hospital, appropriate to continue wearing home compression garments. See patient care order for details on wear schedule/donning technique for velcro gaments. No acute IP lymphedema needs identified. Will complete orders.

## 2022-12-12 NOTE — PLAN OF CARE
Goal Outcome Evaluation:      Plan of Care Reviewed With: patient    Overall Patient Progress: no changeOverall Patient Progress: no change    Outcome Evaluation: encourage oral intake of TID meals. Reviewed nutritional supplements

## 2022-12-12 NOTE — PROGRESS NOTES
[facility]    Hematology / Oncology Progress Note    Patient: Angel Cardenas  MRN: [id number]  Admission Date: 12/9/2022  Date of Service (when I saw the patient): 12/12/2022  Hospital Day # 3     Assessment & Plan   Angel Cardenas is a 62 year old male with past medical history of PE (previously on Xarelto), T2DM, COPD, HTN, and gout. Currently being admitted for concern of MPN vs acute leukemia.      Today:   - Peripheral flow resulted with abnormal monocytic population (25%) and abnormal CD34 positive myeloid blast population (1.1%) worrisome for high-grade myeloid neoplasm.   - BMBx slides being shipped from [facility] today  - Continue Hydrea 1 g daily.  - Diagnostic LP performed today at bedside. Flow pending.   - Improved SOB/pain today. Repeat dexamethasone 10 mg today.   - Uric acid, Cr improved after rasburicase. Monitor TLS labs daily.     HEME   # Concern for acute leukemia vs high-grade MPN  # Leukocytosis with peripheral blasts  Patient was undergoing work up with his PCP for fatigue, subjective fevers, lack of appetite, weight loss, intermittent nose bleed over the last month and rib pain. He was found to have WBC 8.5 Hgb 8.8 and plts 82 with 2% promyelocytes. OP peripheral smear with bicytopenia and 6% circulating blasts, no adrianne rods noted. Flow cytometry of peripheral blood 12/9 with 1% CD34 blasts, reviewed at [facility] with noted 6% blasts. He was referred to [facility] for further work-up and initiation of treatment. On admission WBC 18.2 Hgb 8.3 and Plt 94. He was seen by oncall fellow and attending, please see full note 12/9. Peripheral smear was reviewed and there was a very low suspicion for APL based on review so no treatment was initiated. 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   - OP BMBx 12/9/22 in process. Shipped from [facility] on 12/12.   - Of note, BMBx procedure was technically very difficult and OSH recommended IR consult for future biopsies.  Per nursing, appears more sacral so unsure of quality.   - Peripheral flow 12/9 resulted with abnormal monocytic population (25%) and abnormal CD34 positive myeloid blast population (1.1%) worrisome for high-grade myeloid neoplasm.   - Peripheral studies in process: BCR ABL Major/Minor, MPN NGS panel, FISH    - Virologies: HSV 1/2 negative, CMV IgG-, EBV+, HIV-, HepB/C-   - HLA typing sent on admission   - EKG 12/9 with NSR QTc 441. Echo 12/10 with hyperkinetic LVEF at 65-70%.   - Hold off on any line placement for now  - WBC 18K on admission, trending down though remains elevated. Started Hydrea 1g daily x12/11.   - S/p dex 10 mg x1 on 12/11 and 12/12 for pain as below, re-assess daily      # Anemia   # Thrombocytopenia   - Transfuse to keep Hgb >7 and Plt > 10K (may consider increasing if recurrent epistaxis)      # TLS, mild  # Risk of DIC  # Hyperuricemia   # H/o gout   On admission uric acid 9.1, LDH 2,475, though all other TLS labs WNL including Cr 0.88. Cr trending up slightly as of 12/11 to 1.11 and uric acid remains elevated at 9.6. Of note patient does have a known h/o gout. INR mildly elevated (~1.2).   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 Amari Cortes (4/15/22). Patient has history of multiple DVTs in the 1990s without known cause, unprovoked PE at age 43, DVT with PE in context of hip replacement. He most recently was diagnosed with bilateral PE with right heart strain on 1/19/22 after presenting to the ED with SOB.   - Previously on Xarelto, which has been held since 12/5 for BMBx. S/p ppx Lovenox 40 mg x1 on 12/11 AM. Will clarify tomorrow correct Lovenox dosing with high BM and h/o thromboses.      ID  # Prophylaxis  Patient is not currently  neutropenic.   - Consider starting ppx ACV in the comings days pending dx and counts  - Holding antibiotic/antifungal for now; would start if ANC <1000     ENT   # Sore throat   Mild L tonsil swelling on exam. No purulence or discrete lesions. No stridor or difficulty breathing.   - Strep negative, HSV swab negative  - Cepacol lozenges PRN     # Intermittent epistaxis   Patient has been having intermittent epistaxis over the last month, but increase in frequency to daily over the last week PTA.   - If recurrent episodes may consider increasing platelet threshold, adding Afrin      NEURO   # Headaches   Patient has been experiencing nasal congestion, left sided headaches and eye pressure. No other neurologic complaints. Brain MRI 12/5 with diffuse linear and slightly nodular dural thickening and enhancement. Diffuse marrow signal abnormality within calvarium and central skull base. Differential includes lymphoproliferative abnormalities and metastatic disease.  - Requested images to be pushed into PACs and [facility] over read ordered  - S/p diagnostic LP 12/12; flow pending   - Tylenol available PRN      ENDO  # T2DM   Follow with [facility], last seen 11/15/22.   - PTA Ozempic held on admission   - Medium intensity sliding scale insulin   - Hypoglycemia protocol in place      CARDS   # HTN   - Continue PTA metoprolol ER 12.5 mg daily and lisinopril 5 mg daily      # HLD   - PTA atorvastatin held on admission for potential treatment      # Chronic venous insufficiency, CEAP 5   # Bilateral LE edema   - Echo as above  - Continue PTA Bumex 1 mg daily, may consider additional doses based on symptoms    - Lymphedema consulted      PULM  # Hypoxia, improving  New O2 requirement 12/11 to 3-4L in the setting of recent continuous IVF, splenomegaly and poor pain control.   - CXR showed cardiomegaly, enlarged pulmonary vasculature and diffuse mild interstitial markings bilaterally likely representing pulmonary  "edema.   - PTA Bumex above  - Neb treatments q2hr PRN   - Consider CT PE protocol if worsening given his history. Hold off for now given lack of tachycardia and less likely in setting of thrombocytopenia.      # COPD   - Continue PTA Breo Ellipta      # OSAP  - Continue CPAP at night     MISC   # Acute on chronic back pain   Patient endorses ongoing back pain prior to hospitalization though over the past week has been endorsing rib pain which radiates into his upper back and between his shoulder blades. No red flag sx. Of note he does have splenomegaly on recent CT imaging which is likely contributing. EKG shows NSR. Lipase WNL.  - Scheduled Voltaren gel QID, daily lido patches, Robaxin QID  - PRN Tylenol, heat packs  - PTA oxycodone 5-10 mg q6h PRN and IV dilaudid 0.5 mg q4hr PRN  - Dexamethasone as above    Clinically Significant Risk Factors         # Hyponatremia: Lowest Na = 133 mmol/L in last 2 days, will monitor as appropriate      # Hypoalbuminemia: Lowest albumin = 3.2 g/dL at 12/11/2022  7:01 AM, will monitor as appropriate   # Thrombocytopenia: Lowest platelets = 62 in last 2 days, will monitor for bleeding         # Severe Obesity: Estimated body mass index is 51.74 kg/m  as calculated from the following:    Height as of this encounter: 1.854 m (6' 1\").    Weight as of this encounter: 177.9 kg (392 lb 3.2 oz)., PRESENT ON ADMISSION  # Moderate Malnutrition: based on nutrition assessment, PRESENT ON ADMISSION     FEN  Diet: Regular Diet Adult   IVF: Bolus PRN; cautious with volume overload  Lytes: Replete per protocol    PPX  VTE: Discuss tomorrow  Bowel: Senna/MiraLax PRN   GI/PUD: None indicated    MISC  Code Status: Full Code   Lines/Drains: PIV  Social: Lives at home alone near [place], [place]  Dispo: Anticipate at least 2-3 day stay for further work-up and management of cytopenias, possibly prolonged 4-6 week stay if confirmed acute leukemia.   Follow Up: TBD    Patient was seen and plan of care was " discussed with attending physician Dr. Campbell.    I spent 75 minutes in the care of this patient today, which included time necessary for review of interval events, obtaining history and physical exam, ordering medications/tests/procedures as medically indicated, review of pertinent medical literature, counseling of the patient, coordination of care, and documentation time. Over 50% of time was spent counseling the patient and/or coordinating care.    Mary Jacques PA-C   Hematology/Oncology   Pager: [phone]  Desk phone: [phone]    Interval History   No acute events overnight. Patient is feeling much better today. He states this is his best day yet in the hospital. He is breathing better and has less pain. He is unsure if the nebs or steroids helped. He also received a dose of oxycodone this morning that helped. Throat is a little sore due to dry dair. Denies headache. No soreness at BMBx site. Pt is overall eating well.     Vital Signs with Ranges  Temp:  [97.4  F (36.3  C)-97.9  F (36.6  C)] 97.8  F (36.6  C)  Pulse:  [69-91] 73  Resp:  [20] 20  BP: (111-136)/(54-66) 127/60  SpO2:  [86 %-94 %] 87 %  I/O last 3 completed shifts:  In: 840 [P.O.:840]  Out: 2600 [Urine:2600]    Physical Exam   General: Lying in bed, alert, NAD. Pleasant and conversational.  Skin: Chronic venous stasis changes to BLE from ankle to mid-calf. No concerning lesions, rash, jaundice, cyanosis, erythema, or ecchymoses on exposed surfaces.   HEENT: Mild enlargement of L tonsil >R, no significant erythema or purulence. No stridor or difficulty breathing. Airway intact. Petechiae to upper palate. Otherwise NCAT. EOMI, anicteric sclera. Oral mucosa pink and moist with no lesions or thrush.  Respiratory: Non-labored breathing on room air, good air exchange, lungs clear to auscultation bilaterally.  Cardiovascular: RRR. No murmur or rub.   Gastrointestinal: Normoactive BS. Abdomen soft, ND, NT. No palpable masses.  Neurologic: A&O x 3, speech  normal, no deficits grossly.    Medications     - MEDICATION INSTRUCTIONS -         allopurinol  300 mg Oral Daily     bumetanide  1 mg Oral Daily     diclofenac  2 g Topical 4x Daily     fluticasone-vilanterol  1 puff Inhalation Daily     hydroxyurea  1,000 mg Oral Daily     insulin aspart  1-7 Units Subcutaneous TID AC     insulin aspart  1-5 Units Subcutaneous At Bedtime     lidocaine  1 patch Transdermal Q24h     lidocaine   Transdermal Q8H GREG     lisinopril  5 mg Oral Daily     methocarbamol  500 mg Oral 4x Daily     metoprolol succinate ER  12.5 mg Oral Daily     senna-docusate  1 tablet Oral BID    Or     senna-docusate  2 tablet Oral BID     Data   Results for orders placed or performed during the hospital encounter of 12/09/22 (from the past 24 hour(s))   Glucose by meter   Result Value Ref Range    GLUCOSE BY METER POCT 207 (H) 70 - 99 mg/dL   Glucose by meter   Result Value Ref Range    GLUCOSE BY METER POCT 167 (H) 70 - 99 mg/dL   Glucose by meter   Result Value Ref Range    GLUCOSE BY METER POCT 169 (H) 70 - 99 mg/dL   CBC with platelets differential    Narrative    The following orders were created for panel order CBC with platelets differential.  Procedure                               Abnormality         Status                     ---------                               -----------         ------                     CBC with platelets and d...[[id number]]  Abnormal            Final result               Manual Differential[[id number]]          Abnormal            Final result                 Please view results for these tests on the individual orders.   ABO/Rh type and screen    Narrative    The following orders were created for panel order ABO/Rh type and screen.  Procedure                               Abnormality         Status                     ---------                               -----------         ------                     Adult Type and Screen[[id number]]                            Final  "result                 Please view results for these tests on the individual orders.   Lipase   Result Value Ref Range    Lipase 37 13 - 60 U/L   Comprehensive metabolic panel   Result Value Ref Range    Sodium 133 (L) 136 - 145 mmol/L    Potassium 4.6 3.4 - 5.3 mmol/L    Chloride 100 98 - 107 mmol/L    Carbon Dioxide (CO2) 20 (L) 22 - 29 mmol/L    Anion Gap 13 7 - 15 mmol/L    Urea Nitrogen 28.1 (H) 8.0 - 23.0 mg/dL    Creatinine 0.98 0.67 - 1.17 mg/dL    Calcium 9.3 8.8 - 10.2 mg/dL    Glucose 166 (H) 70 - 99 mg/dL    Alkaline Phosphatase 96 40 - 129 U/L    AST 49 10 - 50 U/L    ALT <5 (L) 10 - 50 U/L    Protein Total 7.4 6.4 - 8.3 g/dL    Albumin 3.3 (L) 3.5 - 5.2 g/dL    Bilirubin Total 0.5 <=1.2 mg/dL    GFR Estimate 88 >60 mL/min/1.73m2   Lactate Dehydrogenase   Result Value Ref Range    Lactate Dehydrogenase 1,584 (H) 0 - 250 U/L   Uric acid   Result Value Ref Range    Uric Acid 6.2 3.4 - 7.0 mg/dL   INR   Result Value Ref Range    INR 1.15 0.85 - 1.15   Partial thromboplastin time   Result Value Ref Range    aPTT 32 22 - 38 Seconds   Fibrinogen activity   Result Value Ref Range    Fibrinogen Activity 498 (H) 170 - 490 mg/dL   Magnesium   Result Value Ref Range    Magnesium 2.5 (H) 1.7 - 2.3 mg/dL   Phosphorus   Result Value Ref Range    Phosphorus 5.2 (H) 2.5 - 4.5 mg/dL   CBC with platelets and differential   Result Value Ref Range    WBC Count 14.3 (H) 4.0 - 11.0 10e3/uL    RBC Count 2.74 (L) 4.40 - 5.90 10e6/uL    Hemoglobin 8.2 (L) 13.3 - 17.7 g/dL    Hematocrit 27.1 (L) 40.0 - 53.0 %    MCV 99 78 - 100 fL    MCH 29.9 26.5 - 33.0 pg    MCHC 30.3 (L) 31.5 - 36.5 g/dL    RDW 18.7 (H) 10.0 - 15.0 %    Platelet Count 62 (L) 150 - 450 10e3/uL    Narrative    Previously reported component [ NRBCs ] is no longer reported.\"  Previously reported component [ NRBCs Absolute ] is no longer reported.\"   Adult Type and Screen   Result Value Ref Range    ABO/RH(D) A POS     Antibody Screen Negative Negative    SPECIMEN " EXPIRATION DATE [id number]    Manual Differential   Result Value Ref Range    % Neutrophils 74 %    % Lymphocytes 5 %    % Monocytes 6 %    % Eosinophils 0 %    % Basophils 0 %    % Metamyelocytes 5 %    % Myelocytes 5 %    % Other Cells 5 %    NRBCs per 100 WBC 12 (H) <=0 %    Absolute Neutrophils 10.6 (H) 1.6 - 8.3 10e3/uL    Absolute Lymphocytes 0.7 (L) 0.8 - 5.3 10e3/uL    Absolute Monocytes 0.9 0.0 - 1.3 10e3/uL    Absolute Eosinophils 0.0 0.0 - 0.7 10e3/uL    Absolute Basophils 0.0 0.0 - 0.2 10e3/uL    Absolute Metamyelocytes 0.7 (H) <=0.0 10e3/uL    Absolute Myelocytes 0.7 (H) <=0.0 10e3/uL    Absolute Other Cells 0.7 (H) <=0.0 10e3/uL    Absolute NRBCs 1.7 (H) <=0.0 10e3/uL    RBC Morphology Confirmed RBC Indices     Platelet Assessment  Automated Count Confirmed. Platelet morphology is normal.     Automated Count Confirmed. Platelet morphology is normal.    Polychromasia Slight (A) None Seen   Glucose by meter   Result Value Ref Range    GLUCOSE BY METER POCT 148 (H) 70 - 99 mg/dL   Glucose by meter   Result Value Ref Range    GLUCOSE BY METER POCT 137 (H) 70 - 99 mg/dL   CSF Cell Count with Differential:    Narrative    The following orders were created for panel order CSF Cell Count with Differential:.  Procedure                               Abnormality         Status                     ---------                               -----------         ------                     Cell Count CSF[[id number]]                                   In process                   Please view results for these tests on the individual orders.   Lumbar puncture    Narrative    Abdiaziz Gordillo DO     12/12/2022  2:37 PM  [facility]    Lumbar puncture    Date/Time: 12/12/2022 2:34 PM  Performed by: Abdiaziz Gordillo DO  Authorized by: Abdiaziz Gordillo DO   Indications: evaluation for infection  Preparation: Patient was prepped and draped in the usual sterile  fashion.      UNIVERSAL PROTOCOL   Site Marked: Yes  Prior Images Obtained and Reviewed:  Yes  Required items: Required blood products, implants, devices and special   equipment available    Patient identity confirmed:  Verbally with patient, arm band and provided   demographic data  NA - No sedation, light sedation, or local anesthesia  Confirmation Checklist:  Patient's identity using two indicators  Time out: Immediately prior to the procedure a time out was called    Universal Protocol: the Joint Commission Universal Protocol was followed    Preparation: Patient was prepped and draped in usual sterile fashion    ESBL (mL):  0     ANESTHESIA    Anesthesia: Local infiltration  Local Anesthetic:  Lidocaine 1% without epinephrine  Anesthetic Total (mL):  2.5      SEDATION    Patient Sedated: No      PROCEDURE DETAILS  Lumbar space: L5-S1.  Patient's position: right lateral decubitus  Needle gauge: 18  Needle type: spinal needle - Quincke tip  Needle length: 5.0 in  Number of attempts: 1  Opening pressure: 23 cm H2O  Fluid appearance: xanthochromic  Tubes of fluid: 4  Total volume: 18 ml  Post-procedure: site cleaned and adhesive bandage applied      PROCEDURE    Patient Tolerance:  Patient tolerated the procedure well with no immediate   complications  Length of time physician/provider present for 1:1 monitoring during   sedation: 0

## 2022-12-12 NOTE — PLAN OF CARE
0413-5915:    VSS on 2L. Afebrile. Denies nausea and SOB. Pain 7/10. PRN IV dilaudid given x1 with some relief and PRN oxycodone given x1. PIV is saline locked. . Voiding adequately. No acute events. Sleeping between cares. Possible LP 12/12.

## 2022-12-12 NOTE — PROGRESS NOTES
"CLINICAL NUTRITION SERVICES - ASSESSMENT NOTE     Nutrition Prescription    RECOMMENDATIONS FOR MDs/PROVIDERS TO ORDER:  None at this time     Malnutrition Status:    Moderate malnutrition in the context of acute illness    Recommendations already ordered by Registered Dietitian (RD):  None at this time    Future/Additional Recommendations:  -- monitor oral intake and the need for nutritional supplements  -- monitor weight trends      REASON FOR ASSESSMENT  Angel Cardenas is a/an 62 year old male assessed by the dietitian for Admission Nutrition Risk Screen for positive    Per chart review patient with a history of prior PE (previously on Xarelto), DM2, COPD, HTN, and gout     NUTRITION HISTORY  Angel reports his appetite has been fair to good. He reports his weight loss is due to being lethargic in the evening and either not eating or only eating a small amount of leftovers. He report the last time he went to the Diabetes clinic his weight was 457 pounds which was about 1 month ago.     CURRENT NUTRITION ORDERS  Diet: Moderate Consistent Carbohydrate (60 gram carbohydrate per meal)  Intake/Tolerance: he reports good intake this AM    LABS  Labs reviewed  (12/12): Na 133 mmol/L (L), BUN 28.1 mg/dL (H), Cr 0.98 mg/dL, phos 5.2 mg/dL (H),  2.5 mg/dL (H)     MEDICATIONS  Medications reviewed  Novolog    ANTHROPOMETRICS  Height: 185.4 cm (6' 1\")  Most Recent Weight: (!) 177.9 kg (392 lb 3.2 oz)    IBW: 83.6 kg  BMI: Obesity Grade III BMI >40  Weight History:   Wt Readings from Last 10 Encounters:   12/12/22 (!) 177.9 kg (392 lb 3.2 oz)   weight loss since admission: 4% in 1 week or 14.3% in 1 month  Dosing Weight: 107 kg (adjBW based on IBW and lowest weight since admission)    ASSESSED NUTRITION NEEDS  Estimated Energy Needs: 9043-3955 kcals/day (20 - 25 kcals/kg)  Justification: Maintenance and Obese  Estimated Protein Needs: 128-161 grams protein/day (1.2 - 1.5 grams of pro/kg)  Justification: Increased " needs  Estimated Fluid Needs: (1 mL/kcal)   Justification: Maintenance    PHYSICAL FINDINGS  See malnutrition section below.     MALNUTRITION  % Intake: < 75% for >/= 1 month (moderate)  % Weight Loss: > 5% in 1 month (severe)  Subcutaneous Fat Loss: None observed  Muscle Loss: None observed  Fluid Accumulation/Edema: Moderate  Malnutrition Diagnosis: Moderate malnutrition in the context of acute illness    NUTRITION DIAGNOSIS  Unintended weight loss related to decreased oral intake due to fatigue as evidenced by recent weight loss     INTERVENTIONS  Implementation  Nutrition Education: Provided education on RD role in nutrition POC, nutritional supplements      Goals  Patient to consume % of nutritionally adequate meal trays TID, or the equivalent with supplements/snacks.     Monitoring/Evaluation  Progress toward goals will be monitored and evaluated per protocol.    Tammy Anders, MS/RD/ANALIA  6A/7D RD pager: [phone]  Weekend/Holiday RD pager: [phone]

## 2022-12-12 NOTE — PLAN OF CARE
Goal Outcome Evaluation:  Afebrile. Denied nausea. Given Oxycodone and Voltaren for back pain with some relief. No stool for several days so he was given Senna and Adarsh lax. He had a diagnostic lumbar puncture. Up independently.

## 2022-12-12 NOTE — PROCEDURES
[facility]    Lumbar puncture    Date/Time: 12/12/2022 2:34 PM  Performed by: Abdiaziz Gordillo DO  Authorized by: Abdiaziz Gordillo DO   Indications: evaluation for infection  Preparation: Patient was prepped and draped in the usual sterile fashion.      UNIVERSAL PROTOCOL   Site Marked: Yes  Prior Images Obtained and Reviewed:  Yes  Required items: Required blood products, implants, devices and special equipment available    Patient identity confirmed:  Verbally with patient, arm band and provided demographic data  NA - No sedation, light sedation, or local anesthesia  Confirmation Checklist:  Patient's identity using two indicators  Time out: Immediately prior to the procedure a time out was called    Universal Protocol: the Joint Commission Universal Protocol was followed    Preparation: Patient was prepped and draped in usual sterile fashion    ESBL (mL):  0     ANESTHESIA    Anesthesia: Local infiltration  Local Anesthetic:  Lidocaine 1% without epinephrine  Anesthetic Total (mL):  2.5      SEDATION    Patient Sedated: No      PROCEDURE DETAILS  Lumbar space: L5-S1.  Patient's position: right lateral decubitus  Needle gauge: 18  Needle type: spinal needle - Quincke tip  Needle length: 5.0 in  Number of attempts: 1  Opening pressure: 23 cm H2O  Fluid appearance: xanthochromic  Tubes of fluid: 4  Total volume: 18 ml  Post-procedure: site cleaned and adhesive bandage applied      PROCEDURE    Patient Tolerance:  Patient tolerated the procedure well with no immediate complications  Length of time physician/provider present for 1:1 monitoring during sedation: 0

## 2022-12-12 NOTE — PLAN OF CARE
Goal Outcome Evaluation:  AVSS. DEAN and slight labored breathing while on bed. Pt is on 3 LPM NC sats 94-95%. CPAP at bed time; RT will set up for pt. Up to bathroom independently, voided adequate UOP. Bs 167 before bed time, no coverage needed.  Pt reports pain rated 7/10 in between his shoulder blades, some relief  with PRN oxy 10 mg tab po, scheduled robaxin ,Voltane gel and l Lido patch. Pt can have dilaudid IV for breakthrough pain.  Possible LP tomorrow per Md note.  Continue with POC

## 2022-12-13 NOTE — PROGRESS NOTES
[facility]    Hematology / Oncology Progress Note    Patient: Angel Cardenas  MRN: [id number]  Admission Date: 12/9/2022  Date of Service (when I saw the patient): 12/13/2022  Hospital Day # 4     Assessment & Plan   Angel Cardenas is a 62 year old male with past medical history of PE (previously on Xarelto), T2DM, COPD, HTN, and gout. Currently being admitted for concern of high-grade MPN vs acute leukemia.      Today:   - BMBx slides arrived from [facility] today. Under review by [facility] heme path.   - Continue Hydrea 1 g daily. Repeat dexamethasone 10 mg today.   - Resume PTA Xarelto  - Duonebs BID for hypoxia which my be baseline from underlying COPD  - Ordered milk of Mg PRN for constipation    HEME   # Concern for acute leukemia vs high-grade MPN  # Leukocytosis with peripheral blasts  Patient was undergoing work up with his PCP for fatigue, subjective fevers, lack of appetite, weight loss, intermittent nose bleed over the last month and rib pain. He was found to have WBC 8.5 Hgb 8.8 and plts 82 with 2% promyelocytes. OP peripheral smear with bicytopenia and 6% circulating blasts, no adrianne rods noted. Flow cytometry of peripheral blood 12/9 with 1% CD34 blasts, reviewed at [facility] with noted 6% blasts. He was referred to [facility] for further work-up and initiation of treatment. On admission WBC 18.2 Hgb 8.3 and Plt 94. He was seen by oncall fellow and attending, please see full note 12/9. Peripheral smear was reviewed and there was a very low suspicion for APL based on review so no treatment was initiated. 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   - OP BMBx 12/9/22 in process. Arrived from [facility] on 12/13. Under review by heme path.   - Of note, BMBx procedure was technically very difficult and OSH recommended IR consult for future biopsies. Per nursing, appears more sacral so unsure of quality.   - Peripheral flow 12/9 resulted with abnormal monocytic population (25%)  and abnormal CD34 positive myeloid blast population (1.1%) worrisome for high-grade myeloid neoplasm.   - Peripheral studies in process: BCR ABL Major/Minor, MPN NGS panel, FISH    - Virologies: HSV 1/2 negative, CMV IgG-, EBV+, HIV-, HepB/C-   - HLA typing sent on admission   - EKG 12/9 with NSR QTc 441. Echo 12/10 with hyperkinetic LVEF at 65-70%.   - Hold off on any line placement for now  - WBC 18K on admission, trending down though remains elevated. Started Hydrea 1g daily x12/11.   - S/p dex 10 mg x1 on 12/11-13 for pain as below, re-assess daily      # Anemia   # Thrombocytopenia   - Transfuse to keep Hgb >7 and Plt > 10K (may consider increasing if recurrent epistaxis)      # TLS, mild  # Risk of DIC  # Hyperuricemia   # H/o gout   On admission uric acid 9.1, LDH 2,475, though all other TLS labs WNL including Cr 0.88. Cr trending up slightly as of 12/11 to 1.11 and uric acid remains elevated at 9.6. Of note patient does have a known h/o gout. INR mildly elevated (~1.2).   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 Amari Cortes (4/15/22). Patient has history of multiple DVTs in the 1990s without known cause, unprovoked PE at age 43, DVT with PE in context of hip replacement. He most recently was diagnosed with bilateral PE with right heart strain on 1/19/22 after presenting to the ED with SOB.   - Resumed PTA Xarelto 12/13, held prior for procedures     ID  # Prophylaxis  Patient is not currently neutropenic.   - Consider starting ppx ACV in the comings days pending dx and counts  - Holding antibiotic/antifungal for now; would start if ANC <1000     ENT   # Sore throat   Mild L tonsil swelling on exam. No purulence or discrete lesions. No stridor or difficulty  breathing.   - Strep negative, HSV swab negative  - Cepacol lozenges PRN     # Intermittent epistaxis   Patient has been having intermittent epistaxis over the last month, but increase in frequency to daily over the last week PTA.   - If recurrent episodes may consider increasing platelet threshold, adding Afrin      NEURO   # Headaches   Patient has been experiencing nasal congestion, left sided headaches and eye pressure. No other neurologic complaints. Brain MRI 12/5 with diffuse linear and slightly nodular dural thickening and enhancement. Diffuse marrow signal abnormality within calvarium and central skull base. Differential includes lymphoproliferative abnormalities and metastatic disease.  - Requested images to be pushed into PACs and [facility] over read ordered  - S/p diagnostic LP 12/12; flow pending   - Tylenol available PRN      ENDO  # T2DM   Follow with [facility], last seen 11/15/22.   - PTA Ozempic held on admission   - Medium intensity sliding scale insulin   - Hypoglycemia protocol in place      CARDS   # HTN   - Continue PTA metoprolol ER 12.5 mg daily and lisinopril 5 mg daily      # HLD   - PTA atorvastatin held on admission for potential treatment      # Chronic venous insufficiency, CEAP 5   # Bilateral LE edema   - Echo as above  - Continue PTA Bumex 1 mg daily, may consider additional doses based on symptoms    - Lymphedema consulted      PULM  # Hypoxia, improving  # COPD   New O2 requirement 12/11 to 3-4L in the setting of recent continuous IVF, splenomegaly and poor pain control. After improvement in volume status, ongoing mild hypoxia to high 80s, may be baseline from COPD.  - CXR showed cardiomegaly, enlarged pulmonary vasculature and diffuse mild interstitial markings bilaterally likely representing pulmonary edema.   - PTA Bumex above  - DuoNebs BID x12/13. Neb treatments q2hr PRN   - Consider CT PE protocol if worsening given his history. Hold off for now given lack  "of tachycardia and less likely in setting of thrombocytopenia.   - Continue PTA Breo Ellipta   - Incentive spirometry     # CATHERINE  - Continue CPAP at night    GI  # Constipation  Has not had BM yet this admission.   - Senna BID PRN and MiraLax BID PRN  - Milk of magnesium daily PRN     MISC   # Acute on chronic back pain, improved  Patient endorses ongoing back pain prior to hospitalization though over the past week has been endorsing rib pain which radiates into his upper back and between his shoulder blades. No red flag sx. Of note he does have splenomegaly on recent CT imaging which is likely contributing. EKG shows NSR. Lipase WNL.  - Scheduled Voltaren gel QID, daily lido patches, Robaxin QID  - PRN Tylenol, heat packs  - PTA oxycodone 5-10 mg q6h PRN and IV dilaudid 0.5 mg q4hr PRN  - Dexamethasone as above    Clinically Significant Risk Factors         # Hyponatremia: Lowest Na = 133 mmol/L in last 2 days, will monitor as appropriate      # Hypoalbuminemia: Lowest albumin = 3.2 g/dL at 12/11/2022  7:01 AM, will monitor as appropriate   # Thrombocytopenia: Lowest platelets = 62 in last 2 days, will monitor for bleeding         # Severe Obesity: Estimated body mass index is 51.48 kg/m  as calculated from the following:    Height as of this encounter: 1.854 m (6' 1\").    Weight as of this encounter: 177 kg (390 lb 3.2 oz)., PRESENT ON ADMISSION  # Moderate Malnutrition: based on nutrition assessment, PRESENT ON ADMISSION     FEN  Diet: Regular Diet Adult   IVF: Bolus PRN; cautious with volume overload  Lytes: Replete per protocol    PPX  VTE: PTA Xarelto  Bowel: Senna/MiraLax/MoM PRN  GI/PUD: None indicated    MISC  Code Status: Full Code   Lines/Drains: PIV  Social: Lives at home alone near [place], [place]  Dispo: Anticipate at least 5-6 day stay for further work-up and management of cytopenias, possibly prolonged 4-6 week stay if confirmed acute leukemia.   Follow Up: TBD    Patient was seen and plan of care was " discussed with attending physician Dr. Campbell.    I spent 60 minutes in the care of this patient today, which included time necessary for review of interval events, obtaining history and physical exam, ordering medications/tests/procedures as medically indicated, review of pertinent medical literature, counseling of the patient, coordination of care, and documentation time. Over 50% of time was spent counseling the patient and/or coordinating care.    Mary Jacques PA-C   Hematology/Oncology   Pager: [phone]  Desk phone: [phone]    Interval History   No acute events overnight. Patient continues to feel better. Denies SOB, but denies walking far so unsure if DEAN. Denies pleuritic pain. Low back pain improved, closer to baseline. Denies sore throat, headache. No pain at LP site. LBM 4 days ago.     Vital Signs with Ranges  Temp:  [97.8  F (36.6  C)-98.6  F (37  C)] 97.8  F (36.6  C)  Pulse:  [65-76] 76  Resp:  [18-24] 22  BP: (113-140)/(59-72) 113/69  SpO2:  [89 %-95 %] 90 %  I/O last 3 completed shifts:  In: 3520 [P.O.:3520]  Out: -     Physical Exam   General: Lying in bed, alert, NAD. Pleasant and conversational.  Skin: Chronic venous stasis changes to BLE from ankle to mid-calf. No concerning lesions, rash, jaundice, cyanosis, erythema, or ecchymoses on exposed surfaces.   HEENT: NCAT. EOMI, anicteric sclera.  Respiratory: Non-labored breathing on room air, good air exchange, lungs clear to auscultation bilaterally.  Cardiovascular: RRR. No murmur or rub.   Gastrointestinal: Normoactive BS. Abdomen soft, ND.   Extremities: Baseline bilateral LE edema   Neurologic: A&O x 3, speech normal, no deficits grossly.    Medications     - MEDICATION INSTRUCTIONS -         allopurinol  300 mg Oral Daily     bumetanide  1 mg Oral Daily     diclofenac  2 g Topical 4x Daily     fluticasone-vilanterol  1 puff Inhalation Daily     hydroxyurea  1,000 mg Oral Daily     insulin aspart  1-7 Units Subcutaneous TID AC     insulin  aspart  1-5 Units Subcutaneous At Bedtime     ipratropium - albuterol 0.5 mg/2.5 mg/3 mL  3 mL Nebulization BID     lidocaine  1 patch Transdermal Q24h     lidocaine   Transdermal Q8H GREG     lisinopril  5 mg Oral Daily     methocarbamol  500 mg Oral 4x Daily     metoprolol succinate ER  12.5 mg Oral Daily     rivaroxaban ANTICOAGULANT  20 mg Oral Daily with supper     senna-docusate  1 tablet Oral BID    Or     senna-docusate  2 tablet Oral BID     Data   Results for orders placed or performed during the hospital encounter of 12/09/22 (from the past 24 hour(s))   Glucose by meter   Result Value Ref Range    GLUCOSE BY METER POCT 167 (H) 70 - 99 mg/dL   Glucose by meter   Result Value Ref Range    GLUCOSE BY METER POCT 166 (H) 70 - 99 mg/dL   Glucose by meter   Result Value Ref Range    GLUCOSE BY METER POCT 169 (H) 70 - 99 mg/dL   Glucose by meter   Result Value Ref Range    GLUCOSE BY METER POCT 128 (H) 70 - 99 mg/dL   CBC with platelets differential    Narrative    The following orders were created for panel order CBC with platelets differential.  Procedure                               Abnormality         Status                     ---------                               -----------         ------                     CBC with platelets and d...[[id number]]  Abnormal            Final result               Manual Differential[[id number]]          Abnormal            Edited Result - FINAL        Please view results for these tests on the individual orders.   Comprehensive metabolic panel   Result Value Ref Range    Sodium 133 (L) 136 - 145 mmol/L    Potassium 4.8 3.4 - 5.3 mmol/L    Chloride 97 (L) 98 - 107 mmol/L    Carbon Dioxide (CO2) 20 (L) 22 - 29 mmol/L    Anion Gap 16 (H) 7 - 15 mmol/L    Urea Nitrogen 25.7 (H) 8.0 - 23.0 mg/dL    Creatinine 0.88 0.67 - 1.17 mg/dL    Calcium 9.4 8.8 - 10.2 mg/dL    Glucose 146 (H) 70 - 99 mg/dL    Alkaline Phosphatase 100 40 - 129 U/L    AST 59 (H) 10 - 50 U/L    ALT 13 10 - 50  U/L    Protein Total 7.8 6.4 - 8.3 g/dL    Albumin 3.6 3.5 - 5.2 g/dL    Bilirubin Total 0.6 <=1.2 mg/dL    GFR Estimate >90 >60 mL/min/1.73m2   Lactate Dehydrogenase   Result Value Ref Range    Lactate Dehydrogenase 1,825 (H) 0 - 250 U/L   Uric acid   Result Value Ref Range    Uric Acid 5.8 3.4 - 7.0 mg/dL   INR   Result Value Ref Range    INR 1.16 (H) 0.85 - 1.15   Partial thromboplastin time   Result Value Ref Range    aPTT 28 22 - 38 Seconds   Fibrinogen activity   Result Value Ref Range    Fibrinogen Activity 395 170 - 490 mg/dL   Magnesium   Result Value Ref Range    Magnesium 2.4 (H) 1.7 - 2.3 mg/dL   Phosphorus   Result Value Ref Range    Phosphorus 4.3 2.5 - 4.5 mg/dL   CBC with platelets and differential   Result Value Ref Range    WBC Count 17.7 (H) 4.0 - 11.0 10e3/uL    RBC Count 3.07 (L) 4.40 - 5.90 10e6/uL    Hemoglobin 9.1 (L) 13.3 - 17.7 g/dL    Hematocrit 30.0 (L) 40.0 - 53.0 %    MCV 98 78 - 100 fL    MCH 29.6 26.5 - 33.0 pg    MCHC 30.3 (L) 31.5 - 36.5 g/dL    RDW 18.9 (H) 10.0 - 15.0 %    Platelet Count 70 (L) 150 - 450 10e3/uL   Manual Differential   Result Value Ref Range    % Neutrophils 69 %    % Lymphocytes 12 %    % Monocytes 5 %    % Eosinophils 0 %    % Basophils 0 %    % Metamyelocytes 6 %    % Myelocytes 3 %    % Other Cells 5 %    NRBCs per 100 WBC 9 (H) <=0 %    Absolute Neutrophils 12.2 (H) 1.6 - 8.3 10e3/uL    Absolute Lymphocytes 2.1 0.8 - 5.3 10e3/uL    Absolute Monocytes 0.9 0.0 - 1.3 10e3/uL    Absolute Eosinophils 0.0 0.0 - 0.7 10e3/uL    Absolute Basophils 0.0 0.0 - 0.2 10e3/uL    Absolute Metamyelocytes 1.1 (H) <=0.0 10e3/uL    Absolute Myelocytes 0.5 (H) <=0.0 10e3/uL    Absolute Other Cells 0.9 (H) <=0.0 10e3/uL    Absolute NRBCs 1.6 (H) <=0.0 10e3/uL    RBC Morphology Confirmed RBC Indices     Platelet Assessment  Automated Count Confirmed. Platelet morphology is normal.     Automated Count Confirmed. Platelet morphology is normal.    Polychromasia Slight (A) None Seen     Teardrop Cells Slight (A) None Seen   Glucose by meter   Result Value Ref Range    GLUCOSE BY METER POCT 103 (H) 70 - 99 mg/dL

## 2022-12-13 NOTE — PLAN OF CARE
[id number]:    VSS on 2L. Denies nausea and vomiting. Pain 7/10. PRN oxycodone given x1 and IV dilaudid x1. PIV saline locked. BG at 0200: 169. CPAP in use overnight. Voiding adequately. No acute events. Sleeping between cares.

## 2022-12-13 NOTE — CONSULTS
Care Management Initial Consult    General Information  Assessment completed with: Patient,    Type of CM/SW Visit: Initial Assessment    Primary Care Provider verified and updated as needed: Yes   Readmission within the last 30 days: no previous admission in last 30 days   Return Category: New Diagnosis  Reason for Consult: discharge planning  Advance Care Planning: Advance Care Planning Reviewed: no concerns identified, questions answered          Communication Assessment  Patient's communication style: spoken language (English or Bilingual)    Hearing Difficulty or Deaf: no   Wear Glasses or Blind: no    Cognitive  Cognitive/Neuro/Behavioral: WDL                      Living Environment:   People in home: alone     Current living Arrangements: house      Able to return to prior arrangements: yes       Family/Social Support:  Care provided by: self  Provides care for: no one  Marital Status: Single  Other (specify) (Niece and Nephew)          Description of Support System: Supportive    Support Assessment: Adequate family and caregiver support    Current Resources:   Patient receiving home care services: No     Community Resources: None  Equipment currently used at home: none  Supplies currently used at home: None    Employment/Financial:  Employment Status: employed full-time        Financial Concerns: No concerns identified           Lifestyle & Psychosocial Needs:  Social Determinants of Health     Tobacco Use: Not on file   Alcohol Use: Not on file   Financial Resource Strain: Not on file   Food Insecurity: Not on file   Transportation Needs: Not on file   Physical Activity: Not on file   Stress: Not on file   Social Connections: Not on file   Intimate Partner Violence: Not on file   Depression: Not on file   Housing Stability: Not on file       Functional Status:  Prior to admission patient needed assistance:   Dependent ADLs:: Independent  Dependent IADLs:: Independent       Mental Health Status:  Mental  Health Status: No Current Concerns       Chemical Dependency Status:                Values/Beliefs:  Spiritual, Cultural Beliefs, Yarsani Practices, Values that affect care:                 Additional Information:  62 year old male with past medical history of PE (previously on Xarelto), T2DM, COPD, HTN, and gout. Currently being admitted for concern of MPN vs acute leukemia    RNCC met with Pt at the bedside to complete CMA d/t elevated risk score and to discuss possible discharge needs.    PCP and insurance verified. Pt works full time in sales for [employer]. He lives alone in a house with no stairs and is able to drive himself to appt. Pt stated he has a niece and nephew in the area and his older sister lives in [place] and has stated she would come home if needed. Pt does not have home care.     RNCC asked if Pt needed help with FMLA paper or STD, declined at this time. Pt stated he doesn't know if he can or can't return to work and wanted to wait to hear what the doctors said in the next few days.     No other discharge needs identified at this time. Care management will continue to follow and support safe discharge planning as needed.       Tammy Del Rosario, RN  RNCC [facility]

## 2022-12-13 NOTE — PROVIDER NOTIFICATION
"Provider Notification    Re: O2 hovering around 89-91%, denies SOB, feels \"normal\". HX of COPD, is it okay for his O2 to sat at this number? Please place order if so.     Action: Notified Dr. Lynn at #[phone]    Response: Keep O2 above 90%    "

## 2022-12-13 NOTE — PLAN OF CARE
"[id number]    /59 (BP Location: Right arm)   Pulse 68   Temp 98.2  F (36.8  C) (Oral)   Resp 20   Ht 1.854 m (6' 1\")   Wt (!) 177.9 kg (392 lb 3.2 oz)   SpO2 91%   BMI 51.74 kg/m      VSS. Afebrile. LS clear, on 1L via NC to maintain sats at >90%. Mild dyspnea on exertion, but patient states he feels \"normal\". Denies SOB at rest and nausea. Chronic upper back pain, managing with PRN Oxy Q6H, given x1, would like to stay on top of pain meds. Using Voltaren gel intermittently, no Lidocaine. Eating well,  & 166. No BM this shift, passing flatus, scheduled Senna given. Left elbow had abrasion from scratching, lotion applied. LP site intact, band-aid on. UpAdLib. Continue with POC.     "

## 2022-12-14 NOTE — PLAN OF CARE
Goal Outcome Evaluation:  Given Oxycodone and Voltaren for mid back pain with some relief. Denied nausea. Waiting for bone marrow biopsy results to determine the plan of care. Denied nausea. Afebrile. Up independently. The Podiatrist cut his toenails today.

## 2022-12-14 NOTE — PLAN OF CARE
[id number]:    VSS on RA. Orders adjusted to keep sats greater than 88% due to hx of COPD. Denies nausea and vomiting. Pain 7/10. PRN tylenol given x1, 10mg PRN oxycodone x1. PIV saline locked. BG at 0200: 152. CPAP in use overnight. Voiding adequately. No acute events. Sleeping between cares.

## 2022-12-14 NOTE — PLAN OF CARE
Goal Outcome Evaluation:  Given Oxycodone for back pain with some relief. Denied nausea. Afebrile. He had a stool. Up independently.

## 2022-12-14 NOTE — PROGRESS NOTES
[facility]    Hematology / Oncology Progress Note    Patient: Angel Cardenas  MRN: [id number]  Admission Date: 12/9/2022  Date of Service (when I saw the patient): 12/14/2022  Hospital Day # 5     Assessment & Plan   Angel Cardenas is a 62 year old male with past medical history of PE (previously on Xarelto), T2DM, COPD, HTN, and gout. Currently being admitted for concern of high-grade MPN vs acute leukemia.      Today:   - BMBx slides under review by [facility] heme path. CSF flow negative.   - Continue Hydrea 1 g daily. Start dexamethasone taper at 6 mg today.   - Pt to ambulate halls today.    HEME   # Concern for acute leukemia vs high-grade MPN  # Leukocytosis with peripheral blasts  Patient was undergoing work up with his PCP for fatigue, subjective fevers, lack of appetite, weight loss, intermittent nose bleed over the last month and rib pain. He was found to have WBC 8.5 Hgb 8.8 and plts 82 with 2% promyelocytes. OP peripheral smear with bicytopenia and 6% circulating blasts, no adrianne rods noted. Flow cytometry of peripheral blood 12/9 with 1% CD34 blasts, reviewed at [facility] with noted 6% blasts. He was referred to [facility] for further work-up and initiation of treatment. On admission WBC 18.2 Hgb 8.3 and Plt 94. He was seen by oncall fellow and attending, please see full note 12/9. Peripheral smear was reviewed and there was a very low suspicion for APL based on review so no treatment was initiated. 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   - OP BMBx 12/9/22 in process. Arrived from [facility] on 12/13. Under review by heme path.   - Of note, BMBx procedure was technically very difficult and OSH recommended IR consult for future biopsies. Per nursing, appears more sacral so unsure of quality.   - Peripheral flow 12/9 resulted with abnormal monocytic population (25%) and abnormal CD34 positive myeloid blast population (1.1%) worrisome for high-grade myeloid neoplasm.   -  Peripheral studies in process: BCR ABL Major/Minor, MPN NGS panel, FISH    - Virologies: HSV 1/2 negative, CMV IgG-, EBV+, HIV-, HepB/C-   - HLA typing sent on admission   - EKG 12/9 with NSR QTc 441. Echo 12/10 with hyperkinetic LVEF at 65-70%.   - Hold off on any line placement for now  - WBC 18K on admission, trending down though remains elevated. Started Hydrea 1g daily x12/11.   - S/p dex 10 mg x1 on 12/11-13, 6 mg on 12/14 for pain as below, re-assess daily      # Anemia   # Thrombocytopenia   - Transfuse to keep Hgb >7 and Plt > 10K (may consider increasing if recurrent epistaxis)      # TLS, mild  # Risk of DIC  # Hyperuricemia   # H/o gout   On admission uric acid 9.1, LDH 2,475, though all other TLS labs WNL including Cr 0.88. Cr trending up slightly as of 12/11 to 1.11 and uric acid remains elevated at 9.6. Of note patient does have a known h/o gout. INR mildly elevated (~1.2).   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 Amari Cortes (4/15/22). Patient has history of multiple DVTs in the 1990s without known cause, unprovoked PE at age 43, DVT with PE in context of hip replacement. He most recently was diagnosed with bilateral PE with right heart strain on 1/19/22 after presenting to the ED with SOB.   - Resumed PTA Xarelto 12/13, held prior for procedures     ID  # Prophylaxis  Patient is not currently neutropenic.   - Consider starting ppx ACV in the comings days pending dx and counts  - Holding antibiotic/antifungal for now; would start if ANC <1000     ENT   # Sore throat, resolved  Mild L tonsil swelling on exam. No purulence or discrete lesions. No stridor or difficulty breathing. Strep negative, HSV swab negative.  - Cepacol lozenges PRN     #  Intermittent epistaxis   Patient has been having intermittent epistaxis over the last month, but increase in frequency to daily over the last week PTA.   - If recurrent episodes may consider increasing platelet threshold, adding Afrin      NEURO   # Headaches   Patient has been experiencing nasal congestion, left sided headaches and eye pressure. No other neurologic complaints. Brain MRI 12/5 with diffuse linear and slightly nodular dural thickening and enhancement. Diffuse marrow signal abnormality within calvarium and central skull base. Differential includes lymphoproliferative abnormalities and metastatic disease.  - Requested images to be pushed into PACs and [facility] over read ordered  - S/p diagnostic LP 12/12; flow negative  - Tylenol available PRN      ENDO  # T2DM   Follow with [facility], last seen 11/15/22.   - PTA Ozempic held on admission   - Medium intensity sliding scale insulin   - Hypoglycemia protocol in place      CARDS   # HTN   - Continue PTA metoprolol ER 12.5 mg daily and lisinopril 5 mg daily      # HLD   - PTA atorvastatin held on admission for potential treatment      # Chronic venous insufficiency, CEAP 5   # Bilateral LE edema   - Echo as above  - Continue PTA Bumex 1 mg daily, may consider additional doses based on symptoms    - Lymphedema consulted      PULM  # Hypoxia, improving  # COPD   New O2 requirement 12/11 to 3-4L in the setting of recent continuous IVF, splenomegaly and poor pain control. After improvement in volume status, ongoing mild hypoxia to high 80s, may be baseline from COPD.  - CXR showed cardiomegaly, enlarged pulmonary vasculature and diffuse mild interstitial markings bilaterally likely representing pulmonary edema.   - PTA Bumex above  - DuoNebs BID x12/13. Neb treatments q2hr PRN   - Consider CT PE protocol if worsening given his history. Hold off for now given lack of tachycardia and less likely in setting of thrombocytopenia.   - Continue PTA  "Breo Ellipta   - Incentive spirometry     # CATHERINE  - Continue CPAP at night  - Hypoxia at night, should follow up outpatient for possible CPAP adjustment    GI  # Constipation  Has not had BM yet this admission.   - Senna BID PRN and MiraLax BID PRN  - Milk of magnesium daily PRN     MISC   # Acute on chronic back pain, improved  Patient endorses ongoing back pain prior to hospitalization though over the past week has been endorsing rib pain which radiates into his upper back and between his shoulder blades. No red flag sx. Of note he does have splenomegaly on recent CT imaging which is likely contributing. EKG shows NSR. Lipase WNL.  - Scheduled Voltaren gel QID, Robaxin QID  - PRN Tylenol, heat packs  - PTA oxycodone 5-10 mg q6h PRN and IV dilaudid 0.5 mg q4hr PRN  - Dexamethasone as above    Clinically Significant Risk Factors         # Hyponatremia: Lowest Na = 132 mmol/L in last 2 days, will monitor as appropriate   # Hypercalcemia: Highest Ca = 10.2 mg/dL in last 2 days, will monitor as appropriate    # Hypoalbuminemia: Lowest albumin = 3.2 g/dL at 12/11/2022  7:01 AM, will monitor as appropriate   # Thrombocytopenia: Lowest platelets = 58 in last 2 days, will monitor for bleeding         # Severe Obesity: Estimated body mass index is 50.89 kg/m  as calculated from the following:    Height as of this encounter: 1.854 m (6' 1\").    Weight as of this encounter: 175 kg (385 lb 11.2 oz).   # Moderate Malnutrition: based on nutrition assessment      FEN  Diet: Regular Diet Adult   IVF: Bolus PRN; cautious with volume overload  Lytes: Replete per protocol    PPX  VTE: PTA Xarelto  Bowel: Senna/MiraLax/MoM PRN  GI/PUD: None indicated    MISC  Code Status: Full Code   Lines/Drains: PIV  Social: Lives at home alone near [place], [place]  Dispo: Anticipate at least 5-6 day stay for further work-up and management of cytopenias, possibly prolonged 4-6 week stay if confirmed acute leukemia.   Follow Up: TBD    Patient was seen and " plan of care was discussed with attending physician Dr. Malloy.    I spent 60 minutes in the care of this patient today, which included time necessary for review of interval events, obtaining history and physical exam, ordering medications/tests/procedures as medically indicated, review of pertinent medical literature, counseling of the patient, coordination of care, and documentation time. Over 50% of time was spent counseling the patient and/or coordinating care.    Mary Jacques PA-C   Hematology/Oncology   Pager: [phone]  Desk phone: [phone]    Interval History   No acute events overnight. Patient continues to feel better. Denies SOB, but denies walking far so unsure if DEAN. Nebs helped loosen up phelgm. Will walk halls today. Denies pleuritic pain. Denies sore throat, headache. Had a BM yesterday.     Vital Signs with Ranges  Temp:  [97.8  F (36.6  C)-98.4  F (36.9  C)] 98.1  F (36.7  C)  Pulse:  [71-77] 72  Resp:  [18-20] 20  BP: (107-145)/(52-69) 107/52  SpO2:  [88 %-94 %] 91 %  No intake/output data recorded.    Physical Exam   General: Lying in bed, alert, NAD. Pleasant and conversational.  Skin: Chronic venous stasis changes to BLE from ankle to mid-calf. No concerning lesions, rash, jaundice, cyanosis, erythema, or ecchymoses on exposed surfaces.   HEENT: NCAT. EOMI, anicteric sclera.  Respiratory: Non-labored breathing on room air, good air exchange, lungs clear to auscultation bilaterally.  Cardiovascular: RRR. No murmur or rub.   Gastrointestinal: Normoactive BS. Abdomen soft, ND.   Extremities: Baseline bilateral LE edema   Neurologic: A&O x 3, speech normal, no deficits grossly.    Medications     - MEDICATION INSTRUCTIONS -         allopurinol  300 mg Oral Daily     ammonium lactate   Topical Daily     bumetanide  1 mg Oral Daily     diclofenac  2 g Topical 4x Daily     fluticasone-vilanterol  1 puff Inhalation Daily     hydroxyurea  1,000 mg Oral Daily     insulin aspart  1-7 Units Subcutaneous TID  AC     insulin aspart  1-5 Units Subcutaneous At Bedtime     ipratropium - albuterol 0.5 mg/2.5 mg/3 mL  3 mL Nebulization BID     lidocaine  1 patch Transdermal Q24h     lidocaine   Transdermal Q8H GREG     lisinopril  5 mg Oral Daily     methocarbamol  500 mg Oral 4x Daily     metoprolol succinate ER  12.5 mg Oral Daily     rivaroxaban ANTICOAGULANT  20 mg Oral Daily with supper     senna-docusate  1 tablet Oral BID    Or     senna-docusate  2 tablet Oral BID     Data   Results for orders placed or performed during the hospital encounter of 12/09/22 (from the past 24 hour(s))   Glucose by meter   Result Value Ref Range    GLUCOSE BY METER POCT 227 (H) 70 - 99 mg/dL   Glucose by meter   Result Value Ref Range    GLUCOSE BY METER POCT 236 (H) 70 - 99 mg/dL   Glucose by meter   Result Value Ref Range    GLUCOSE BY METER POCT 152 (H) 70 - 99 mg/dL   CBC with platelets differential    Narrative    The following orders were created for panel order CBC with platelets differential.  Procedure                               Abnormality         Status                     ---------                               -----------         ------                     CBC with platelets and d...[[id number]]  Abnormal            Final result               Manual Differential[[id number]]          Abnormal            Final result                 Please view results for these tests on the individual orders.   Comprehensive metabolic panel   Result Value Ref Range    Sodium 132 (L) 136 - 145 mmol/L    Potassium 4.8 3.4 - 5.3 mmol/L    Chloride 96 (L) 98 - 107 mmol/L    Carbon Dioxide (CO2) 24 22 - 29 mmol/L    Anion Gap 12 7 - 15 mmol/L    Urea Nitrogen 29.6 (H) 8.0 - 23.0 mg/dL    Creatinine 0.88 0.67 - 1.17 mg/dL    Calcium 10.2 8.8 - 10.2 mg/dL    Glucose 120 (H) 70 - 99 mg/dL    Alkaline Phosphatase 88 40 - 129 U/L    AST 65 (H) 10 - 50 U/L    ALT 17 10 - 50 U/L    Protein Total 7.3 6.4 - 8.3 g/dL    Albumin 3.5 3.5 - 5.2 g/dL    Bilirubin  Total 0.5 <=1.2 mg/dL    GFR Estimate >90 >60 mL/min/1.73m2   Uric acid   Result Value Ref Range    Uric Acid 7.0 3.4 - 7.0 mg/dL   INR   Result Value Ref Range    INR 1.73 (H) 0.85 - 1.15   Partial thromboplastin time   Result Value Ref Range    aPTT 33 22 - 38 Seconds   Fibrinogen activity   Result Value Ref Range    Fibrinogen Activity 323 170 - 490 mg/dL   Magnesium   Result Value Ref Range    Magnesium 2.6 (H) 1.7 - 2.3 mg/dL   Phosphorus   Result Value Ref Range    Phosphorus 4.4 2.5 - 4.5 mg/dL   CBC with platelets and differential   Result Value Ref Range    WBC Count 17.8 (H) 4.0 - 11.0 10e3/uL    RBC Count 2.89 (L) 4.40 - 5.90 10e6/uL    Hemoglobin 8.5 (L) 13.3 - 17.7 g/dL    Hematocrit 27.9 (L) 40.0 - 53.0 %    MCV 97 78 - 100 fL    MCH 29.4 26.5 - 33.0 pg    MCHC 30.5 (L) 31.5 - 36.5 g/dL    RDW 18.7 (H) 10.0 - 15.0 %    Platelet Count 58 (L) 150 - 450 10e3/uL   Manual Differential   Result Value Ref Range    % Neutrophils 64 %    % Lymphocytes 16 %    % Monocytes 10 %    % Eosinophils 0 %    % Basophils 1 %    % Myelocytes 2 %    % Promyelocytes 1 %    % Other Cells 6 %    NRBCs per 100 WBC 23 (H) <=0 %    Absolute Neutrophils 11.4 (H) 1.6 - 8.3 10e3/uL    Absolute Lymphocytes 2.8 0.8 - 5.3 10e3/uL    Absolute Monocytes 1.8 (H) 0.0 - 1.3 10e3/uL    Absolute Eosinophils 0.0 0.0 - 0.7 10e3/uL    Absolute Basophils 0.2 0.0 - 0.2 10e3/uL    Absolute Myelocytes 0.4 (H) <=0.0 10e3/uL    Absolute Promyelocytes 0.2 (H) <=0.0 10e3/uL    Absolute Other Cells 1.1 (H) <=0.0 10e3/uL    Absolute NRBCs 4.1 (H) <=0.0 10e3/uL    RBC Morphology Confirmed RBC Indices     Platelet Assessment  Automated Count Confirmed. Platelet morphology is normal.     Automated Count Confirmed. Platelet morphology is normal.    Polychromasia Slight (A) None Seen   Glucose by meter   Result Value Ref Range    GLUCOSE BY METER POCT 128 (H) 70 - 99 mg/dL   Lactate Dehydrogenase   Result Value Ref Range    Lactate Dehydrogenase 1,857 (H) 0 -  250 U/L   Extra Tube    Narrative    The following orders were created for panel order Extra Tube.  Procedure                               Abnormality         Status                     ---------                               -----------         ------                     Extra Purple Top Tube[[id number]]                            Final result                 Please view results for these tests on the individual orders.   Extra Purple Top Tube   Result Value Ref Range    Hold Specimen [facility]    Glucose by meter   Result Value Ref Range    GLUCOSE BY METER POCT 164 (H) 70 - 99 mg/dL

## 2022-12-14 NOTE — CONSULTS
Assessment: Angel is a 62-year-old type II diabetic with vascular disease and nail dystrophy.  He does see podiatrist in the community where he lives.    Plan:  -Patient seen and evaluated.  -Nails trimmed x10.  -Lotion for legs daily.  -We will sign off for now.  Please reconsult with any questions.    HPI: Angel is a 62-year-old type II diabetic with elongated dystrophic nails.  He relates that he does see a podiatrist by his house.  He goes about once a year.  He does have diabetic shoes.  He relates it is difficult for him to trim his nails.    Past Medical History:   Diagnosis Date     COPD (chronic obstructive pulmonary disease) (H)      Diabetes mellitus, type 2 (H)      Gout      History of pulmonary embolism      HLD (hyperlipidemia)      HTN (hypertension)      Past Surgical History:   Procedure Laterality Date     [place] REVISE TOTAL HIP REPLACEMENT Bilateral      TOTAL SHOULDER REPLACEMENT Right         Allergies   Allergen Reactions     Metformin Unknown     Social History     Socioeconomic History     Marital status: Single     Spouse name: Not on file     Number of children: Not on file     Years of education: Not on file     Highest education level: Not on file   Occupational History     Not on file   Tobacco Use     Smoking status: Not on file     Smokeless tobacco: Not on file   Substance and Sexual Activity     Alcohol use: Not on file     Drug use: Not on file     Sexual activity: Not on file   Other Topics Concern     Not on file   Social History Narrative     Not on file     Social Determinants of Health     Financial Resource Strain: Not on file   Food Insecurity: Not on file   Transportation Needs: Not on file   Physical Activity: Not on file   Stress: Not on file   Social Connections: Not on file   Intimate Partner Violence: Not on file   Housing Stability: Not on file     Objective:  Vital signs:  Temp: 97.8  F (36.6  C) Temp src: Oral BP: 114/59 Pulse: 75   Resp: 20 SpO2: 94 % O2 Device: None  "(Room air) Oxygen Delivery: 2.5 LPM Height: 185.4 cm (6' 1\") Weight: (!) 175 kg (385 lb 11.2 oz)  Estimated body mass index is 50.89 kg/m  as calculated from the following:    Height as of this encounter: 1.854 m (6' 1\").    Weight as of this encounter: 175 kg (385 lb 11.2 oz).      Lab Results   Component Value Date    WBC 17.8 12/14/2022     Lab Results   Component Value Date    RBC 2.89 12/14/2022     Lab Results   Component Value Date    HGB 8.5 12/14/2022     Lab Results   Component Value Date    HCT 27.9 12/14/2022     No components found for: MCT  Lab Results   Component Value Date    MCV 97 12/14/2022     Lab Results   Component Value Date    MCH 29.4 12/14/2022     Lab Results   Component Value Date    MCHC 30.5 12/14/2022     Lab Results   Component Value Date    RDW 18.7 12/14/2022     Lab Results   Component Value Date    PLT 58 12/14/2022     Last Comprehensive Metabolic Panel:  Sodium   Date Value Ref Range Status   12/14/2022 132 (L) 136 - 145 mmol/L Final     Potassium   Date Value Ref Range Status   12/14/2022 4.8 3.4 - 5.3 mmol/L Final     Chloride   Date Value Ref Range Status   12/14/2022 96 (L) 98 - 107 mmol/L Final     Carbon Dioxide (CO2)   Date Value Ref Range Status   12/14/2022 24 22 - 29 mmol/L Final     Anion Gap   Date Value Ref Range Status   12/14/2022 12 7 - 15 mmol/L Final     Glucose   Date Value Ref Range Status   12/14/2022 120 (H) 70 - 99 mg/dL Final     GLUCOSE BY METER POCT   Date Value Ref Range Status   12/14/2022 128 (H) 70 - 99 mg/dL Final     Urea Nitrogen   Date Value Ref Range Status   12/14/2022 29.6 (H) 8.0 - 23.0 mg/dL Final     Creatinine   Date Value Ref Range Status   12/14/2022 0.88 0.67 - 1.17 mg/dL Final     GFR Estimate   Date Value Ref Range Status   12/14/2022 >90 >60 mL/min/1.73m2 Final     Comment:     Effective December 21, 2021 eGFRcr in adults is calculated using the 2021 CKD-EPI creatinine equation which includes age and gender (Regis et al., NEJ, DOI: " 10.1056/PAJSbn0231532)     Calcium   Date Value Ref Range Status   12/14/2022 10.2 8.8 - 10.2 mg/dL Final     Bilirubin Total   Date Value Ref Range Status   12/14/2022 0.5 <=1.2 mg/dL Final     Alkaline Phosphatase   Date Value Ref Range Status   12/14/2022 88 40 - 129 U/L Final     ALT   Date Value Ref Range Status   12/14/2022 17 10 - 50 U/L Final     AST   Date Value Ref Range Status   12/14/2022 65 (H) 10 - 50 U/L Final       PE:  DP pulses are 2/4.  PT pulses are not palpable secondary to edema.  Hemosiderin deposits noted to bilateral legs.  Capillary refill time is instant.  Gross sensation is intact.  Equinus noted bilaterally.  Nails are elongated and dystrophic bilaterally x10.

## 2022-12-15 NOTE — PLAN OF CARE
"Goal Outcome Evaluation:      Plan of Care Reviewed With: patient    Overall Patient Progress: no changeOverall Patient Progress: no change    Time 8633-4153    /52 (BP Location: Right arm)   Pulse 72   Temp 98.1  F (36.7  C) (Oral)   Resp 20   Ht 1.854 m (6' 1\")   Wt (!) 175 kg (385 lb 11.2 oz)   SpO2 91%   BMI 50.89 kg/m      Reason for admission: admitted for concern of high-grade MPN vs acute leukemia.   Activity: Independent  Pain: back/across shoulders, voltaren   Neuro: A&O x 4  Cardiac: htn  Respiratory: dyspnea on exertion   GI/: wnl, last BM 12/14  Diet: regular  Lines: PIV  Labs/imaging: reviewed      New changes this shift: ocycodone x 1 for back pain, order for 1x dose of tums for indigestion, bgm 164, 193     Plan: Waiting for pathology results      Continue to monitor and follow POC   "

## 2022-12-15 NOTE — PROGRESS NOTES
[facility]    Hematology / Oncology Progress Note    Patient: Angel Cardenas  MRN: [id number]  Admission Date: 12/9/2022  Date of Service (when I saw the patient): 12/15/2022  Hospital Day # 6     Assessment & Plan   Angel Cardenas is a 62 year old male with past medical history of PE (previously on Xarelto), T2DM, COPD, HTN, and gout. Currently being admitted for concern of high-grade MPN vs acute leukemia.      Today:   - BMBx slides under review by [facility] heme path. Awaiting results to direct plan. Added on cytogenetics and molecular.   - Continue Hydrea 1 g daily. Dexamethasone taper at 4 mg today.   - Increased uric acid and phosphorus today. Monitor TLS labs BID. Bolus PRN for e/o TLS judiciously given recent history of hypervolemia.     HEME   # Concern for acute leukemia vs high-grade MPN  # Leukocytosis with peripheral blasts  Patient was undergoing work up with his PCP for fatigue, subjective fevers, lack of appetite, weight loss, intermittent nose bleed over the last month and rib pain. He was found to have WBC 8.5 Hgb 8.8 and plts 82 with 2% promyelocytes. OP peripheral smear with bicytopenia and 6% circulating blasts, no adrianne rods noted. Flow cytometry of peripheral blood 12/9 with 1% CD34 blasts, reviewed at [facility] with noted 6% blasts. He was referred to [facility] for further work-up and initiation of treatment. On admission WBC 18.2 Hgb 8.3 and Plt 94. He was seen by oncall fellow and attending, please see full note 12/9. Peripheral smear was reviewed and there was a very low suspicion for APL based on review so no treatment was initiated. 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   - OP BMBx 12/9/22 in process. Arrived from [facility] on 12/13. Under review by heme path.   - Of note, BMBx procedure was technically very difficult and OSH recommended IR consult for future biopsies. Per nursing, appears more sacral so unsure of quality.   - Peripheral flow 12/9  resulted with abnormal monocytic population (25%) and abnormal CD34 positive myeloid blast population (1.1%) worrisome for high-grade myeloid neoplasm.   - Peripheral studies in process: BCR ABL major/minor, MPN NGS panel, FISH    - Virologies: HSV 1/2 negative, CMV IgG-, EBV+, HIV-, HepB/C-   - HLA typing sent on admission   - EKG 12/9 with NSR QTc 441. Echo 12/10 with hyperkinetic LVEF at 65-70%.   - Hold off on any line placement for now  - WBC 18K on admission, trending down though remains elevated. Started Hydrea 1g daily x12/11.   - S/p dex 10 mg x1 on 12/11-13, 6 mg on 12/14, 4 mg on 12/15 for pain as below, re-assess daily      # Anemia   # Thrombocytopenia   - Transfuse to keep Hgb >7 and plt > 10k (may consider increasing if recurrent epistaxis)      # TLS, mild  # Risk of DIC  # Hyperuricemia   # H/o gout   On admission uric acid 9.1, LDH 2,475, though all other TLS labs WNL including Cr 0.88. Cr trending up slightly as of 12/11 to 1.11 and uric acid remains elevated at 9.6. Of note patient does have a known h/o gout. INR mildly elevated (~1.2).   - Stopped IVF d/t volume overload, bolus PRN judiciously    - Allopurinol 300 mg daily   - S/p rasburicase x1 on 12/11  - Will monitor TLS labs BID. Monitor DIC labs daily for now.     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 Amari Cortes (4/15/22). Patient has history of multiple DVTs in the 1990s without known cause, unprovoked PE at age 43, DVT with PE in context of hip replacement. He most recently was diagnosed with bilateral PE with right heart strain on 1/19/22 after presenting to the ED with SOB.   - Resumed PTA Xarelto 12/13     ID  # Prophylaxis  Patient is not currently neutropenic.   - Consider starting ppx ACV in the comings days pending dx and counts  - Holding antibiotic/antifungal for now; would start if ANC <1000     ENT   # Sore throat, resolved  Mild L  tonsil swelling on exam. No purulence or discrete lesions. No stridor or difficulty breathing. Strep negative, HSV swab negative.  - Cepacol lozenges PRN     # Intermittent epistaxis   Patient has been having intermittent epistaxis over the last month, but increase in frequency to daily over the last week PTA.   - If recurrent episodes may consider increasing platelet threshold, adding Afrin      NEURO   # Headaches   Patient has been experiencing nasal congestion, left sided headaches and eye pressure. No other neurologic complaints. Brain MRI 12/5 with diffuse linear and slightly nodular dural thickening and enhancement. Diffuse marrow signal abnormality within calvarium and central skull base. Differential includes lymphoproliferative abnormalities and metastatic disease.  - Requested images to be pushed into PACs and [facility] over read ordered  - S/p diagnostic LP 12/12; flow negative  - Tylenol available PRN      ENDO  # T2DM   Follow with [facility] [place], last seen 11/15/22.   - PTA Ozempic held on admission   - Medium intensity sliding scale insulin   - Hypoglycemia protocol in place      CARDS   # HTN   - Continue PTA metoprolol ER 12.5 mg daily and lisinopril 5 mg daily      # HLD   - PTA atorvastatin held on admission for potential treatment      # Chronic venous insufficiency, CEAP 5   # Bilateral LE edema   - Echo as above  - Continue PTA Bumex 1 mg daily, may consider additional doses based on symptoms    - Lymphedema consulted      PULM  # Hypoxia, improving  # COPD   New O2 requirement 12/11 to 3-4L in the setting of recent continuous IVF, splenomegaly and poor pain control. After improvement in volume status, ongoing mild hypoxia to high 80s, may be baseline from COPD.  - CXR showed cardiomegaly, enlarged pulmonary vasculature and diffuse mild interstitial markings bilaterally likely representing pulmonary edema.   - PTA Bumex above  - DuoNebs BID x12/13. Neb treatments q2hr PRN   -  "Consider CT PE protocol if worsening given his history. Hold off for now given lack of tachycardia and less likely in setting of thrombocytopenia.   - Continue PTA Breo Ellipta   - Incentive spirometry     # CATHERINE  - Continue CPAP at night  - Hypoxia at night, should follow up outpatient for possible CPAP adjustment    GI  # Constipation  Has not had BM yet this admission.   - Senna BID PRN and MiraLax BID PRN  - Milk of magnesium daily PRN     MISC   # Acute on chronic back pain, improved  Patient endorses ongoing back pain prior to hospitalization though over the past week has been endorsing rib pain which radiates into his upper back and between his shoulder blades. No red flag sx. Of note he does have splenomegaly on recent CT imaging which is likely contributing. EKG shows NSR. Lipase WNL.  - Scheduled Voltaren gel QID, Robaxin QID  - PRN Tylenol, heat packs  - PTA oxycodone 5-10 mg q6h PRN and IV dilaudid 0.5 mg q4hr PRN  - Dexamethasone as above    Clinically Significant Risk Factors         # Hyponatremia: Lowest Na = 131 mmol/L in last 2 days, will monitor as appropriate   # Hypercalcemia: Highest Ca = 10.2 mg/dL in last 2 days, will monitor as appropriate    # Hypoalbuminemia: Lowest albumin = 3.2 g/dL at 12/11/2022  7:01 AM, will monitor as appropriate   # Thrombocytopenia: Lowest platelets = 55 in last 2 days, will monitor for bleeding         # Severe Obesity: Estimated body mass index is 50.76 kg/m  as calculated from the following:    Height as of this encounter: 1.854 m (6' 1\").    Weight as of this encounter: 174.5 kg (384 lb 11.2 oz).   # Moderate Malnutrition: based on nutrition assessment      FEN  Diet: Regular Diet Adult   IVF: Bolus PRN; cautious with volume overload  Lytes: Replete per protocol    PPX  VTE: PTA Xarelto  Bowel: Senna/MiraLax/MoM PRN  GI/PUD: None indicated    MISC  Code Status: Full Code   Lines/Drains: PIV  Social: Lives at home alone near [place], [place]  Dispo: Anticipate at least " 5-6 day stay for further work-up and management of cytopenias, possibly prolonged 4-6 week stay if confirmed acute leukemia.   Follow Up: TBD    Patient was seen and plan of care was discussed with attending physician Dr. Malloy.    I spent 60 minutes in the care of this patient today, which included time necessary for review of interval events, obtaining history and physical exam, ordering medications/tests/procedures as medically indicated, review of pertinent medical literature, counseling of the patient, coordination of care, and documentation time. Over 50% of time was spent counseling the patient and/or coordinating care.    Mary Jacques PA-C   Hematology/Oncology   Pager: [phone]  Desk phone: [phone]    Interval History   No acute events overnight. Patient continues to feel well. Improved lower back pain, not sharp. Denies nausea. Having BMs but still getting laxatives to loosen stool. Eating well. Did not ambulate in halls yesterday. Awaiting results.     Vital Signs with Ranges  Temp:  [97.4  F (36.3  C)-98.2  F (36.8  C)] 97.8  F (36.6  C)  Pulse:  [66-75] 75  Resp:  [16-20] 16  BP: (101-139)/(52-64) 101/57  SpO2:  [90 %-95 %] 90 %  I/O last 3 completed shifts:  In: 900 [P.O.:900]  Out: -     Physical Exam   General: Lying in bed, alert, NAD. Pleasant and conversational.  Skin: Chronic venous stasis changes to BLE from ankle to mid-calf. No concerning lesions, rash, jaundice, cyanosis, erythema, or ecchymoses on exposed surfaces.   HEENT: NCAT. EOMI, anicteric sclera.  Respiratory: Non-labored breathing on room air, good air exchange, lungs clear to auscultation bilaterally.  Cardiovascular: RRR. No murmur or rub.   Gastrointestinal: Normoactive BS. Abdomen soft, ND.   Extremities: Baseline bilateral LE edema   Neurologic: A&O x 3, speech normal, no deficits grossly.    Medications     - MEDICATION INSTRUCTIONS -         allopurinol  300 mg Oral Daily     ammonium lactate   Topical Daily     bumetanide  1  mg Oral Daily     diclofenac  2 g Topical 4x Daily     fluticasone-vilanterol  1 puff Inhalation Daily     hydroxyurea  1,000 mg Oral Daily     insulin aspart  1-7 Units Subcutaneous TID AC     insulin aspart  1-5 Units Subcutaneous At Bedtime     ipratropium - albuterol 0.5 mg/2.5 mg/3 mL  3 mL Nebulization BID     lisinopril  5 mg Oral Daily     methocarbamol  500 mg Oral 4x Daily     metoprolol succinate ER  12.5 mg Oral Daily     rivaroxaban ANTICOAGULANT  20 mg Oral Daily with supper     senna-docusate  1 tablet Oral BID    Or     senna-docusate  2 tablet Oral BID     Data   Results for orders placed or performed during the hospital encounter of 12/09/22 (from the past 24 hour(s))   Glucose by meter   Result Value Ref Range    GLUCOSE BY METER POCT 164 (H) 70 - 99 mg/dL   Glucose by meter   Result Value Ref Range    GLUCOSE BY METER POCT 193 (H) 70 - 99 mg/dL   Glucose by meter   Result Value Ref Range    GLUCOSE BY METER POCT 159 (H) 70 - 99 mg/dL   CBC with platelets differential    Narrative    The following orders were created for panel order CBC with platelets differential.  Procedure                               Abnormality         Status                     ---------                               -----------         ------                     CBC with platelets and d...[[id number]]  Abnormal            Final result               Manual Differential[[id number]]          Abnormal            Final result                 Please view results for these tests on the individual orders.   ABO/Rh type and screen    Narrative    The following orders were created for panel order ABO/Rh type and screen.  Procedure                               Abnormality         Status                     ---------                               -----------         ------                     Adult Type and Screen[[id number]]                            Final result                 Please view results for these tests on the individual  orders.   Comprehensive metabolic panel   Result Value Ref Range    Sodium 131 (L) 136 - 145 mmol/L    Potassium 4.7 3.4 - 5.3 mmol/L    Chloride 96 (L) 98 - 107 mmol/L    Carbon Dioxide (CO2) 24 22 - 29 mmol/L    Anion Gap 11 7 - 15 mmol/L    Urea Nitrogen 33.2 (H) 8.0 - 23.0 mg/dL    Creatinine 0.98 0.67 - 1.17 mg/dL    Calcium 9.1 8.8 - 10.2 mg/dL    Glucose 114 (H) 70 - 99 mg/dL    Alkaline Phosphatase 87 40 - 129 U/L    AST 64 (H) 10 - 50 U/L    ALT 26 10 - 50 U/L    Protein Total 7.1 6.4 - 8.3 g/dL    Albumin 3.6 3.5 - 5.2 g/dL    Bilirubin Total 0.6 <=1.2 mg/dL    GFR Estimate 88 >60 mL/min/1.73m2   Lactate Dehydrogenase   Result Value Ref Range    Lactate Dehydrogenase 1,797 (H) 0 - 250 U/L   Uric acid   Result Value Ref Range    Uric Acid 7.7 (H) 3.4 - 7.0 mg/dL   INR   Result Value Ref Range    INR 1.62 (H) 0.85 - 1.15   Partial thromboplastin time   Result Value Ref Range    aPTT 30 22 - 38 Seconds   Fibrinogen activity   Result Value Ref Range    Fibrinogen Activity 294 170 - 490 mg/dL   Magnesium   Result Value Ref Range    Magnesium 2.7 (H) 1.7 - 2.3 mg/dL   Phosphorus   Result Value Ref Range    Phosphorus 4.8 (H) 2.5 - 4.5 mg/dL   CBC with platelets and differential   Result Value Ref Range    WBC Count 18.1 (H) 4.0 - 11.0 10e3/uL    RBC Count 2.96 (L) 4.40 - 5.90 10e6/uL    Hemoglobin 9.0 (L) 13.3 - 17.7 g/dL    Hematocrit 28.5 (L) 40.0 - 53.0 %    MCV 96 78 - 100 fL    MCH 30.4 26.5 - 33.0 pg    MCHC 31.6 31.5 - 36.5 g/dL    RDW 18.7 (H) 10.0 - 15.0 %    Platelet Count 55 (L) 150 - 450 10e3/uL   Adult Type and Screen   Result Value Ref Range    ABO/RH(D) A POS     Antibody Screen Negative Negative    SPECIMEN EXPIRATION DATE [id number]    Manual Differential   Result Value Ref Range    % Neutrophils 65 %    % Lymphocytes 14 %    % Monocytes 7 %    % Eosinophils 0 %    % Basophils 1 %    % Metamyelocytes 2 %    % Myelocytes 1 %    % Other Cells 10 %    NRBCs per 100 WBC 25 (H) <=0 %    Absolute  Neutrophils 11.8 (H) 1.6 - 8.3 10e3/uL    Absolute Lymphocytes 2.5 0.8 - 5.3 10e3/uL    Absolute Monocytes 1.3 0.0 - 1.3 10e3/uL    Absolute Eosinophils 0.0 0.0 - 0.7 10e3/uL    Absolute Basophils 0.2 0.0 - 0.2 10e3/uL    Absolute Metamyelocytes 0.4 (H) <=0.0 10e3/uL    Absolute Myelocytes 0.2 (H) <=0.0 10e3/uL    Absolute Other Cells 1.8 (H) <=0.0 10e3/uL    Absolute NRBCs 4.5 (H) <=0.0 10e3/uL    RBC Morphology Confirmed RBC Indices     Platelet Assessment  Automated Count Confirmed. Platelet morphology is normal.     Automated Count Confirmed. Platelet morphology is normal.    Polychromasia Slight (A) None Seen   Glucose by meter   Result Value Ref Range    GLUCOSE BY METER POCT 108 (H) 70 - 99 mg/dL   Glucose by meter   Result Value Ref Range    GLUCOSE BY METER POCT 122 (H) 70 - 99 mg/dL

## 2022-12-15 NOTE — PLAN OF CARE
Goal Outcome Evaluation:  Given Oxycodone for upper mid back pain with some relief. Denied nausea. Afebrile. Up independently. He was told today that he has acute leukemia and they are waiting for more tests to determine chemotherapy plan.

## 2022-12-15 NOTE — PROVIDER NOTIFICATION
Paged Dr RUBEN Love @ [phone]    Pt was wondering if he could get an order for some tums. Thanks Yasmeen [phone]    Order placed

## 2022-12-15 NOTE — PLAN OF CARE
"Goal Outcome Evaluation:    /64 (BP Location: Right arm)   Pulse 66   Temp 97.7  F (36.5  C) (Oral)   Resp 20   Ht 1.854 m (6' 1\")   Wt (!) 175 kg (385 lb 11.2 oz)   SpO2 90%   BMI 50.89 kg/m      Pt has been vitally stable overnight. Complained of back pain. Oxy given once during shift. Denied N/V and SOB. Patient has been voiding spontaneously and not saving, no BM during shift. BG @0200 was 159. Continue with plan of care.     "

## 2022-12-16 NOTE — PROGRESS NOTES
[facility]    Hematology / Oncology Progress Note    Patient: Angel Cardenas  MRN: [id number]  Admission Date: 12/9/2022  Date of Service (when I saw the patient): 12/16/2022  Hospital Day # 7     Assessment & Plan   Angel Cardenas is a 62 year old male with past medical history of PE (previously on Xarelto), T2DM, COPD, HTN, and gout. Diagnosed with AMML.      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 Considering treatment with 7+3 tonight if pt remains hemodynamically stable. Placing PICC this afternoon.  - Acute symptomatic hypotension this morning 123/61 ? 77/39 in the setting of acutely worsening upper back pain. Rapid response called. EKG NSR, trop 33 - will trend @ 1500. VBG normal. Hgb stable. No e/o bleed, PE, dissection on CTA/CAP w/ contrast. CT did note RLL consolidation suggestion possible pneumonia. Lactic 2.2 - will trend @ 1500. BCx drawn. Initially pressures improved with bolus then became soft again to 92/46 around 1400. Started mIVF at 125 mL/hr. Ordered cefepime and azithromycin empirically for CAP. Ordered CRP and procal. Held lisinopril and metoprolol. Monitor vitals closely.   - Severe upper back pain, between scapula. Oxycodone 5-10 mg q4hr PRN. Increased dilaudid frequency to 0.5 mg q2hr PRN. Escalated steroid dose to dexamethasone 10 mg today. Re-ordered lidocaine patch.  - Worsening TLS labs despite bolus last night. Started continuous IVF as above. Plan to give another dose of rasburicase when hemodynamically stable.    HEME   # AMML   # Leukocytosis with peripheral blasts  Patient was undergoing work up with his PCP for fatigue, subjective fevers, lack of appetite, weight loss, intermittent nose bleed over the last month and rib pain. He was found to have WBC 8.5 Hgb 8.8 and plts 82 with 2% promyelocytes. OP peripheral smear with bicytopenia and 6% circulating blasts, no adrianne rods noted. Flow cytometry of  peripheral blood 12/9 with 1% CD34 blasts, reviewed at [facility] with noted 6% blasts. He was referred to [facility] for further work-up and initiation of treatment. On admission WBC 18.2 Hgb 8.3 and Plt 94. He was seen by oncall fellow and attending, please see full note 12/9. Peripheral smear was reviewed and there was a very low suspicion for APL based on review so no treatment was initiated. 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   - Peripheral flow 12/9 resulted with abnormal monocytic population (25%) and abnormal CD34 positive myeloid blast population (1.1%) worrisome for high-grade myeloid neoplasm. Peripheral BCR/ABL1 negative. Peripheral cytogenetics and molecular pending.   - OP [facility] 12/9/22. Arrived from [facility] on 12/13. 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 Cytogenetics and molecular pending.   - Of note, [facility] procedure was technically very difficult and OSH recommended IR consult for future biopsies. Per nursing, appears more sacral so unsure of quality.   - Virologies: HSV 1/2 negative, CMV IgG-, EBV+, HIV-, HepB/C-   - HLA typing sent on admission   - EKG 12/9 with NSR QTc 441. Echo 12/10 with hyperkinetic LVEF at 65-70%.   - Hold off on any line placement for now  - WBC 18K on admission. S/p Hydrea 1g daily 12/11-present, will stop with initiation of treatment.  - If remains hemodynamically stable, plan to start 7+3 tonight.      # Anemia   # Thrombocytopenia   - Transfuse to keep Hgb >7 and plt > 10k (may consider increasing if recurrent epistaxis)      # TLS  # Risk of DIC  # Hyperuricemia   # H/o gout   On admission uric acid 9.1, LDH 2,475, though all other TLS labs WNL including Cr 0.88. Cr trending up slightly as of 12/11 to 1.11 and uric acid remains elevated at 9.6. Of note patient does have a known h/o gout. INR mildly elevated (~1.2).   - Stopped IVF d/t volume overload. Restarted 12/16 in the setting of hypotension and TLS.   - Allopurinol 300 mg daily   - S/p  rasburicase x1 on 12/11. Plan to give another dose of rasburicase for uptrending uric acid 12/16 when HDS.   - Monitor TLS labs BID. Monitor DIC labs daily for now.     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 Amari Cortes (4/15/22). Patient has history of multiple DVTs in the 1990s without known cause, unprovoked PE at age 43, DVT with PE in context of hip replacement. He most recently was diagnosed with bilateral PE with right heart strain on 1/19/22 after presenting to the ED with SOB.   - Resumed PTA Xarelto 12/13  - Repeat CTA 12/16 w/o evidence of PE     ID  # Undifferentiated shock  # Possible pneumonia   # Hypotension  Acute symptomatic hypotension on 12/16 from 123/61 ? 77/39 in the setting of acutely worsening upper back pain, diaphoresis, dizziness. Rapid response called. EKG NSR, trop 33. VBG normal. Hgb stable. No e/o bleed, PE, dissection on CTA/CAP w/ contrast. CT did note RLL consolidation suggestion possible pneumonia. Lactic 2.2. BCx drawn. Initially pressures improved with bolus then became soft again to 92/46 around 1400. Recent echo with EF 65-70%.  - Trend troponin and lactic acid  - Follow blood cultures. Ordered procal, CRP. AM cortisol ordered.   - LR @ 125 mL/HR  - Cefepime and azithromycin x12/16  - Hold lisinopril and metoprolol  - Monitor vitals closely; transfer to ICU for pressors if pressures unable to be supported with IVF    # Prophylaxis   -  mg BID x12/16  - Start micafungin once line is placed tonight (needs PIV for fluids)  - Treatment dose antibiotics as above. Not previously on antibacterial ppx.     ENT   # Sore throat, resolved  Mild L tonsil swelling on exam. No purulence or discrete lesions. No stridor or difficulty breathing. Strep negative, HSV swab negative.  - Cepacol lozenges PRN     # Intermittent epistaxis   Patient has been having intermittent epistaxis over the last  month, but increase in frequency to daily over the last week PTA.   - If recurrent episodes may consider increasing platelet threshold, adding Afrin      MSK/NEURO  # Acute on chronic back pain, improved  Patient endorses ongoing back pain prior to hospitalization though over the past week has been endorsing rib pain which radiates into his upper back and between his shoulder blades. No red flag sx. Of note he does have splenomegaly on recent CT imaging which is likely contributing. EKG shows NSR. Lipase WNL.  - Scheduled Voltaren gel QID, Robaxin QID  - PRN Tylenol, heat packs  - PTA oxycodone 5-10 mg q6h PRN and IV dilaudid 0.5 mg q4hr PRN  - S/p dex 10 mg x1 on 12/11-13, 6 mg on 12/14, 4 mg on 12/15 for pain. Increased back to 10 mg on 12/16.   - Consider MRI thoracic spine tomorrow.     # Headaches   Patient has been experiencing nasal congestion, left sided headaches and eye pressure. No other neurologic complaints. Brain MRI 12/5 with diffuse linear and slightly nodular dural thickening and enhancement. Diffuse marrow signal abnormality within calvarium and central skull base. Differential includes lymphoproliferative abnormalities and metastatic disease.  - Requested images to be pushed into PACs and [facility] over read ordered  - S/p diagnostic LP 12/12; flow negative  - Tylenol available PRN      PULM  # Hypoxia  # COPD   New O2 requirement 12/11 to 3-4L in the setting of recent continuous IVF, splenomegaly and poor pain control. After improvement in volume status, ongoing mild hypoxia to high 80s, may be baseline from COPD.  - CXR showed cardiomegaly, enlarged pulmonary vasculature and diffuse mild interstitial markings bilaterally likely representing pulmonary edema.   - PTA Bumex above  - DuoNebs BID x12/13. Neb treatments q2hr PRN   - Continue PTA Breo Ellipta   - Incentive spirometry  - CT chest 12/16 showed possible RLL pneumonia. See pneumonia treatment as above.     # CATHERINE  - Continue CPAP at night  -  "Hypoxia at night, should follow up outpatient for possible CPAP adjustment    CARDS  # HTN   - Hold PTA metoprolol ER 12.5 mg daily and lisinopril 5 mg daily in the setting of hypotension     # HLD   - PTA atorvastatin held on admission for potential treatment      # Chronic venous insufficiency, CEAP 5   # Bilateral LE edema   - Echo as above  - Continue PTA Bumex 1 mg daily, may consider additional doses based on symptoms    - Lymphedema consulted     GI  # Constipation, resolved  - Senna BID PRN and MiraLax BID PRN  - Milk of magnesium daily PRN    ENDO  # T2DM   Follow with [facility], last seen 11/15/22.   - PTA Ozempic held on admission   - Medium intensity sliding scale insulin   - Hypoglycemia protocol in place     Clinically Significant Risk Factors         # Hyponatremia: Lowest Na = 131 mmol/L in last 2 days, will monitor as appropriate      # Hypoalbuminemia: Lowest albumin = 3.2 g/dL at 12/11/2022  7:01 AM, will monitor as appropriate   # Thrombocytopenia: Lowest platelets = 45 in last 2 days, will monitor for bleeding         # Severe Obesity: Estimated body mass index is 51.35 kg/m  as calculated from the following:    Height as of this encounter: 1.854 m (6' 1\").    Weight as of this encounter: 176.5 kg (389 lb 3.2 oz).   # Moderate Malnutrition: based on nutrition assessment      FEN  Diet: Regular Diet Adult   IVF: Bolus PRN; cautious with volume overload  Lytes: Replete per protocol    PPX  VTE: PTA Xarelto  Bowel: Senna/MiraLax/MoM PRN  GI/PUD: Protonix    MISC  Code Status: Full Code   Lines/Drains: PIV  Social: Lives at home alone near [place], [place]  Dispo: Anticipate at least 5-6 day stay for further work-up and management of cytopenias, possibly prolonged 4-6 week stay if confirmed acute leukemia.   Follow Up: TBD    Patient was seen and plan of care was discussed with attending physician Dr. Malloy.    I spent 120 minutes in the care of this patient today, which included time " necessary for review of interval events, obtaining history and physical exam, ordering medications/tests/procedures as medically indicated, review of pertinent medical literature, counseling of the patient, coordination of care, and documentation time. Over 50% of time was spent counseling the patient and/or coordinating care.    Mary Jacques PA-C   Hematology/Oncology   Pager: [phone]  Desk phone: [phone]    Interval History   Seen this morning around 0900. Patient was overall doing the same. Stable upper band lower back pain, improved w/oxycodone and dilaudid. Feels that legs are a little more swollen after liter of fluids last night. Having BMs but stool still firm. Asks team to return later this afternoon to discuss diagnosis and plan with sisters.    Later this morning, around 1100, was called to pt's bedside for acute symptomatic hypotension this morning 123/61 ? 77/39 in the setting of acutely worsening upper back pain. Pt reports severe mid-thoracic back pain surrounding ribcage, worse with deep breaths. He reports feeling like there his a knife in the middle of his back. He is in physical discomfort. Per RN, he was diaphoretic and pale when she discovered patient. Work up as above.     Seen later this afternoon, slightly more comfortable with increased pain regimen, but still visibly uncomfortable.     Vital Signs with Ranges  Temp:  [97  F (36.1  C)-98.8  F (37.1  C)] 98.8  F (37.1  C)  Pulse:  [68-87] 83  Resp:  [16-20] 18  BP: ()/(39-68) 92/46  Cuff Mean (mmHg):  [69] 69  SpO2:  [80 %-100 %] 92 %  I/O last 3 completed shifts:  In: 1960 [P.O.:960; I.V.:1000]  Out: -     Physical Exam   General: This morning, sitting up in chair, comfortable, alert. During rapid, pt is supine, diaphoretic, visibly uncomfortable, on 2L O2 for comfort. Skin: Chronic venous stasis changes to BLE from ankle to mid-calf. No concerning lesions, rash, jaundice, cyanosis, erythema, or ecchymoses on exposed surfaces.    HEENT: NCAT. EOMI, anicteric sclera.  Respiratory: Non-labored breathing on room air, good air exchange, lungs clear to auscultation bilaterally.  Cardiovascular: RRR. No murmur or rub.   Gastrointestinal: Normoactive BS. Abdomen soft, ND.   Extremities: Baseline bilateral LE edema slightly increased today.   Neurologic: A&O x 3, speech normal, no deficits grossly.    Medications     lactated ringers 125 mL/hr at 12/16/22 1401     - MEDICATION INSTRUCTIONS -         allopurinol  300 mg Oral Daily     ammonium lactate   Topical Daily     azithromycin  500 mg Oral Daily     bumetanide  1 mg Oral Daily     ceFEPIme  2 g Intravenous Q8H     dexamethasone  10 mg Oral Once     diclofenac  2 g Topical 4x Daily     fluticasone-vilanterol  1 puff Inhalation Daily     hydroxyurea  1,000 mg Oral Daily     insulin aspart  1-7 Units Subcutaneous TID AC     insulin aspart  1-5 Units Subcutaneous At Bedtime     ipratropium - albuterol 0.5 mg/2.5 mg/3 mL  3 mL Nebulization BID     lidocaine  1-2 patch Transdermal Q24H     lidocaine   Transdermal Q8H GREG     [Held by provider] lisinopril  5 mg Oral Daily     methocarbamol  500 mg Oral 4x Daily     [Held by provider] metoprolol succinate ER  12.5 mg Oral Daily     pantoprazole  40 mg Oral QAM AC     [Held by provider] rivaroxaban ANTICOAGULANT  20 mg Oral Daily with supper     senna-docusate  1 tablet Oral BID    Or     senna-docusate  2 tablet Oral BID     Data   Results for orders placed or performed during the hospital encounter of 12/09/22 (from the past 24 hour(s))   Uric acid   Result Value Ref Range    Uric Acid 8.2 (H) 3.4 - 7.0 mg/dL   Phosphorus   Result Value Ref Range    Phosphorus 4.7 (H) 2.5 - 4.5 mg/dL   Basic metabolic panel   Result Value Ref Range    Sodium 131 (L) 136 - 145 mmol/L    Potassium 4.6 3.4 - 5.3 mmol/L    Chloride 93 (L) 98 - 107 mmol/L    Carbon Dioxide (CO2) 22 22 - 29 mmol/L    Anion Gap 16 (H) 7 - 15 mmol/L    Urea Nitrogen 38.7 (H) 8.0 - 23.0  mg/dL    Creatinine 1.22 (H) 0.67 - 1.17 mg/dL    Calcium 9.5 8.8 - 10.2 mg/dL    Glucose 191 (H) 70 - 99 mg/dL    GFR Estimate 68 >60 mL/min/1.73m2   Glucose by meter   Result Value Ref Range    GLUCOSE BY METER POCT 141 (H) 70 - 99 mg/dL   Glucose by meter   Result Value Ref Range    GLUCOSE BY METER POCT 224 (H) 70 - 99 mg/dL   Glucose by meter   Result Value Ref Range    GLUCOSE BY METER POCT 137 (H) 70 - 99 mg/dL   CBC with platelets differential    Narrative    The following orders were created for panel order CBC with platelets differential.  Procedure                               Abnormality         Status                     ---------                               -----------         ------                     CBC with platelets and d...[[id number]]  Abnormal            Final result               Manual Differential[[id number]]          Abnormal            Final result                 Please view results for these tests on the individual orders.   Comprehensive metabolic panel   Result Value Ref Range    Sodium 131 (L) 136 - 145 mmol/L    Potassium 4.7 3.4 - 5.3 mmol/L    Chloride 97 (L) 98 - 107 mmol/L    Carbon Dioxide (CO2) 23 22 - 29 mmol/L    Anion Gap 11 7 - 15 mmol/L    Urea Nitrogen 34.9 (H) 8.0 - 23.0 mg/dL    Creatinine 1.08 0.67 - 1.17 mg/dL    Calcium 9.2 8.8 - 10.2 mg/dL    Glucose 108 (H) 70 - 99 mg/dL    Alkaline Phosphatase 76 40 - 129 U/L    AST 68 (H) 10 - 50 U/L    ALT 24 10 - 50 U/L    Protein Total 6.9 6.4 - 8.3 g/dL    Albumin 3.4 (L) 3.5 - 5.2 g/dL    Bilirubin Total 0.5 <=1.2 mg/dL    GFR Estimate 79 >60 mL/min/1.73m2   Lactate Dehydrogenase   Result Value Ref Range    Lactate Dehydrogenase 1,963 (H) 0 - 250 U/L   INR   Result Value Ref Range    INR 1.61 (H) 0.85 - 1.15   Partial thromboplastin time   Result Value Ref Range    aPTT 27 22 - 38 Seconds   Fibrinogen activity   Result Value Ref Range    Fibrinogen Activity 282 170 - 490 mg/dL   Magnesium   Result Value Ref Range     "Magnesium 2.7 (H) 1.7 - 2.3 mg/dL   Uric acid   Result Value Ref Range    Uric Acid 8.6 (H) 3.4 - 7.0 mg/dL   Phosphorus   Result Value Ref Range    Phosphorus 5.0 (H) 2.5 - 4.5 mg/dL   CBC with platelets and differential   Result Value Ref Range    WBC Count 16.7 (H) 4.0 - 11.0 10e3/uL    RBC Count 2.79 (L) 4.40 - 5.90 10e6/uL    Hemoglobin 8.4 (L) 13.3 - 17.7 g/dL    Hematocrit 27.1 (L) 40.0 - 53.0 %    MCV 97 78 - 100 fL    MCH 30.1 26.5 - 33.0 pg    MCHC 31.0 (L) 31.5 - 36.5 g/dL    RDW 18.8 (H) 10.0 - 15.0 %    Platelet Count 45 (LL) 150 - 450 10e3/uL    Narrative    Previously reported component [ NRBCs ] is no longer reported.\"  Previously reported component [ NRBCs Absolute ] is no longer reported.\"   Manual Differential   Result Value Ref Range    % Neutrophils 57 %    % Lymphocytes 14 %    % Monocytes 13 %    % Eosinophils 0 %    % Basophils 0 %    % Metamyelocytes 7 %    % Myelocytes 1 %    % Other Cells 8 %    NRBCs per 100 WBC 17 (H) <=0 %    Absolute Neutrophils 9.5 (H) 1.6 - 8.3 10e3/uL    Absolute Lymphocytes 2.3 0.8 - 5.3 10e3/uL    Absolute Monocytes 2.2 (H) 0.0 - 1.3 10e3/uL    Absolute Eosinophils 0.0 0.0 - 0.7 10e3/uL    Absolute Basophils 0.0 0.0 - 0.2 10e3/uL    Absolute Metamyelocytes 1.2 (H) <=0.0 10e3/uL    Absolute Myelocytes 0.2 (H) <=0.0 10e3/uL    Absolute Other Cells 1.3 (H) <=0.0 10e3/uL    Absolute NRBCs 2.8 (H) <=0.0 10e3/uL    RBC Morphology Confirmed RBC Indices     Platelet Assessment  Automated Count Confirmed. Platelet morphology is normal.     Automated Count Confirmed. Platelet morphology is normal.    Polychromasia Slight (A) None Seen   Glucose by meter   Result Value Ref Range    GLUCOSE BY METER POCT 109 (H) 70 - 99 mg/dL   EKG 12-lead, complete   Result Value Ref Range    Systolic Blood Pressure  mmHg    Diastolic Blood Pressure  mmHg    Ventricular Rate 81 BPM    Atrial Rate 81 BPM    NM Interval 178 ms    QRS Duration 84 ms     ms    QTc 453 ms    P Axis 46 " degrees    R AXIS 37 degrees    T Axis 59 degrees    Interpretation ECG       Sinus rhythm  Normal ECG  When compared with ECG of 11-DEC-2022 08:48,  No significant change was found     Lactic acid whole blood   Result Value Ref Range    Lactic Acid 2.2 (H) 0.7 - 2.0 mmol/L   Troponin T, High Sensitivity   Result Value Ref Range    Troponin T, High Sensitivity 33 (H) <=22 ng/L   Hemoglobin   Result Value Ref Range    Hemoglobin 8.8 (L) 13.3 - 17.7 g/dL   Blood gas venous   Result Value Ref Range    pH Venous 7.37 7.32 - 7.43    pCO2 Venous 47 40 - 50 mm Hg    pO2 Venous 32 25 - 47 mm Hg    Bicarbonate Venous 27 21 - 28 mmol/L    Base Excess/Deficit (+/-) 1.2 -7.7 - 1.9 mmol/L    FIO2 2    Lipase   Result Value Ref Range    Lipase 36 13 - 60 U/L   Extra Tube    Narrative    The following orders were created for panel order Extra Tube.  Procedure                               Abnormality         Status                     ---------                               -----------         ------                     Extra Red Top Tube[[id number]]                               Final result               Extra Purple Top Tube[[id number]]                                                         Please view results for these tests on the individual orders.   Extra Red Top Tube   Result Value Ref Range    Hold Specimen JIC    Extra Tube    Narrative    The following orders were created for panel order Extra Tube.  Procedure                               Abnormality         Status                     ---------                               -----------         ------                     Extra Blue Top Tube[[id number]]                              Final result                 Please view results for these tests on the individual orders.   Extra Blue Top Tube   Result Value Ref Range    Hold Specimen JIC    Glucose by meter   Result Value Ref Range    GLUCOSE BY METER POCT 104 (H) 70 - 99 mg/dL   CTA CHEST ABDOMEN PELVIS WITH CONTRAST  PANEL    Narrative    Exam: Computed tomographic angiography of the chest, abdomen and  pelvis without and with contrast including 3D reformations dated  12/16/2022 11:49 AM    Clinical information: severe upper back pain, sudden hypotension, new  AML and spenomegaly    Technique: Axial images through the chest and abdomen obtained before  the administration of intravenous contrast media and following the  injection of contrast media  in the arterial phase. Source images  reviewed as well as 3D and multi-planar reconstructions at a 3D  workstation.    Contrast: 120 mL Isovue-370    DLP: 1739 mGy*cm    Comparison: CT 12/7/2022..    Findings:      Aortic diameters:      Sinuses of Valsalva: 3.7 cm    Sinotubular ridge: 3.4 cm    Ascending aorta:  4.5 cm, unchanged    Arch: 2.9 cm    Proximal descending thoracic aorta: 3.0 cm    Mid descending thoracic aorta: 2.5 cm    Descending thoracic aorta at the diaphragm: 2.5 cm     Infrarenal abdominal aorta: 2.4 cm    Bifurcation: 1.8 cm.     Common origin of the brachiocephalic and left common carotid arteries.  No significant abnormality at the origin of the great vessels.     Suboptimal contrast bolus for evaluation of the pulmonary arteries. No  evidence of central pulmonary embolus. No evidence of right heart  strain. The main pulmonary artery measures up to 3.9 cm.    Suboptimal evaluation of the abdominal aorta, iliac arteries, and  mesenteric arteries secondary to patient body habitus. The celiac  axis, SMA and SREEKANTH are grossly patent. The renal arteries are grossly  patent bilaterally. The iliac, common femoral, proximal SFA, and  proximal profunda femoral arteries are grossly patent.      Chest:     Heart size is normal. No pericardial effusion. There are mild coronary  artery calcifications. No substantial change in numerous prominence  mediastinal lymph nodes compared to prior. No evidence of axillary or  hilar lymphadenopathy. The esophagus is unremarkable.     The  central tracheal bronchial tree is clear. There are drain blood  nodules within the right lower lobe with associated consolidation.  Bibasilar subsegmental atelectasis. Scattered peripheral mucous  plugging. Nodule along the left major fissure, favoring a perifissural  lymph node. No suspicious pulmonary nodule. No pleural effusion or  pneumothorax.    Abdomen and pelvis    Splenomegaly measuring up to 18 cm, unchanged from prior. Mild fatty  atrophy of the pancreas. Unchanged bilateral renal cysts. The liver,  gallbladder, adrenal glands, and appendix are within normal limits. No  abnormal abdominal or pelvic lymph nodes by size criteria. No free  fluid or free air.      Small fat-containing umbilical hernia. No acute or suspicious osseous  lesion. Mild degenerative changes of the spine. Partially visualized  bilateral total hip arthroplasties.      Impression    Impression:   1. No evidence of acute aortic syndrome, specifically no evidence of  aortic dissection.  2. Suboptimal bolus timing for pulmonary arterial evaluation. No  central pulmonary embolus or evidence of right heart strain.  3. Tree-in-bud nodularity and associated consolidation within the  right lower lobe, concerning for an infectious or inflammatory  etiology.  4. Unchanged ectasia of the ascending thoracic aorta, measuring up to  4.5 cm.  5. The main pulmonary artery measures up to 3.9 cm, unchanged. This  may be seen with pulmonary arterial hypertension.   6. Unchanged splenomegaly and multiple prominent mediastinal lymph  nodes.                I have personally reviewed the examination and initial interpretation  and I agree with the findings.    FREDERICK CORCORAN MD         SYSTEM ID:  [id number]

## 2022-12-16 NOTE — PLAN OF CARE
Goal Outcome Evaluation:  [id number]    VSS on RA.  Denies nausea & SOB at rest. Endorses DEAN. 6/10 back pain, oxy x1. Reports good UOP. No acute events. AM scheduled voltaren gel administered early per pt request.

## 2022-12-16 NOTE — PLAN OF CARE
Goal Outcome Evaluation:      Plan of Care Reviewed With: patient    Overall Patient Progress: no changeOverall Patient Progress: no change  Pt afebrile with stable vs with O2 sats 90 at baseline on RA in the morning, but pt very agitated with mid back pain between shoulder blades, unable to sit still breathing heavily, reported that he had had this pain early in this admission but not like this for a few days, it felt like there was a knife stabbing in his back. He had received oxycodone 2 hours prior. Dilaudid 0.5mg IV administered with obvious pain relief. Demeanor calmed and pt reported that pain was improved, but not gone. Pain returned in about 2 hours,and he then received oxycodone. Pt stated that the pain was so bad he was sweating. Diaphoresis was observed and then pt reported lightheadedness. Sitting in chair BP 77/39. HR 80's (receiving metoprolol). Pt placed in bed PA called and rapid response called. O2 placed at 2LNC for sats high 80's. SBP recovered to 100's lying down. Pt received LR 1L bolus. Second PIV placed. Troponin 33, Glucose 109, Hgb rechecked (8.8), Lactic acid 2.2, Pt taken to CT. Upon return from CT, Pt reported that pain in back was improving having had the oxycodone. SBP remained 90s. HR remained 70-80's. Pt voided 400 urine. Became diaphoretic again in afternoon and seems to corollate with  when pain medicine wears off. IVF initiated at 125cc/hr, oral and IV antibiotics initiated. And received 1x dose decadron. Plan for PICC to be placed at bedside this afternoon and to begin chemotherapy for AML.

## 2022-12-16 NOTE — PROCEDURES
[facility]    Double Lumen PICC Placement    Date/Time: 12/16/2022 5:11 PM  Performed by: Garfield Parra RN  Authorized by: Mary Jacques PA-C   Indications: vascular access      UNIVERSAL PROTOCOL   Site Marked: Yes  Prior Images Obtained and Reviewed:  Yes  Required items: Required blood products, implants, devices and special equipment available    Patient identity confirmed:  Verbally with patient, arm band, provided demographic data, hospital-assigned identification number and anonymous protocol, patient vented/unresponsive  NA - No sedation, light sedation, or local anesthesia  Confirmation Checklist:  Patient's identity using two indicators, relevant allergies, procedure was appropriate and matched the consent or emergent situation and correct equipment/implants were available  Time out: Immediately prior to the procedure a time out was called (Garfield)    Universal Protocol: the Joint Commission Universal Protocol was followed    Preparation: Patient was prepped and draped in usual sterile fashion       ANESTHESIA    Anesthesia: Local infiltration  Local Anesthetic:  Lidocaine 1% without epinephrine  Anesthetic Total (mL):  5      SEDATION    Patient Sedated: No        Preparation: skin prepped with ChloraPrep  Skin prep agent: skin prep agent completely dried prior to procedure  Sterile barriers: maximum sterile barriers were used: cap, mask, sterile gown, sterile gloves, and large sterile sheet  Hand hygiene: hand hygiene performed prior to central venous catheter insertion  Type of line used: PICC  Catheter type: double lumen  Lumen type: non-valved and power PICC  Lumen Identification: Purple and Red  Catheter size: 5 Fr  Brand: Bard  Lot number: [id number]  Placement method: venipuncture, MST, ultrasound and tip navigation system  Number of attempts: 1  Difficulty threading catheter: no  Successful placement: yes  Orientation: right    Location: brachial  vein (medial)  Tip Location: SVC  Arm circumference: adults 10 cm  Extremity circumference: 43  Visible catheter length: 1  Total catheter length: 48  Dressing and securement: adhesive securement device, alcohol impregnated caps, blood removed, blood cleaned with CHG, chlorhexidine patch applied, fixation device, line secured, securement device, site cleansed, statlock and transparent securement dressing  Post procedure assessment: blood return through all ports, free fluid flow and placement verified by 3CG technology  PROCEDURE Describe Procedure: Right brachial medial vein 0.73cm dm 1cm external.Placement verified by [facility] 3CG.PICC okay to use.

## 2022-12-16 NOTE — CODE/RAPID RESPONSE
Rapid Response Team Note    Assessment   In assessment a rapid response was called on Angel Cardensa due to hypotension and severe upper back pain. This presentation is possibly due to hypovolemia vs acute blood loss vs narcotic use vs other. Less likely 2/2 acute coronary syndrome or PE.    Plan   -  Hbg, lactic acid, troponin stat  -  EKG personally reviewed with NSR with no ST-T changes  -  CTA chest/abdomen/pelvis stat  -  Cont IVFs ordered by primary team prior to arrival  -  The Hematology/Oncology primary team was present at bedside and helping with management of patient  -  Disposition: The patient will remain on the current unit. We will continue to monitor this patient closely.  -  Reassessment and plan follow-up will be performed by the primary team    Sammie Woody PA-C  [facility] RRT [facility] Job Code Contact #[phone]  [facility] Paging/Directory    Hospital Course   Brief Summary of events leading to rapid response:   Angel Cardenas is a 62 year old male with PMH COPD, DM Type 2, Gout, history of PE (recently off anticoagulation) who was admitted 12/9/2022 for suspected leukemia, now with concern for acute leukemia vs high-grade MPN.  Patient reports upper back pain located between his shoulder blades which started a few days ago but overall controlled with oxycodone. He was up in a chair this morning when the pain got significantly worse rated a 9/10 (treated with oxycodone and dilaudid). His bedside RN noted he was diaphoretic, check his blood pressure and low at 77/39 and activated RRT. Patient was brought back to bed and primary team ordered 1L NS bolus.     Patient denies chest pain or SOB and has no other complaints. BP improved to 113/65 with improvement in pain.     Admission Diagnosis:   Leukemia (H) [C95.90]    Physical Exam   Temp: 98.1  F (36.7  C) Temp  Min: 97  F (36.1  C)  Max: 98.4  F (36.9  C)  Resp: 20 Resp  Min: 16  Max: 20  SpO2: 90 % SpO2  Min: 90 %  Max: 100 %  Pulse: 79 Pulse  Min:  68  Max: 87    No data recorded  BP: 113/65 Systolic (24hrs), Av , Min:77 , Max:123   Diastolic (24hrs), Av, Min:39, Max:68     I/Os: I/O last 3 completed shifts:  In: 1960 [P.O.:960; I.V.:1000]  Out: -      Exam:   General: acutely ill appearing  Mental Status: baseline mental status.  Lungs: CTA  Abd: obese, BS present, non-tender  Ext: chronic skin changes    Significant Results and Procedures       CBC:   Recent Labs   Lab Test 22  1044 22  0602 12/15/22  0705 22  0633   WBC  --  16.7* 18.1* 17.8*   HGB 8.8* 8.4* 9.0* 8.5*   HCT  --  27.1* 28.5* 27.9*   PLT  --  45* 55* 58*        Sepsis Evaluation   The patient is not known to have an infection.  NO EVIDENCE OF SEPSIS at this time.  Vital sign, physical exam, and lab findings are due to hypovolemia vs acute blood loss.vs narcotics vs other

## 2022-12-16 NOTE — PLAN OF CARE
[id number]:    VSS on RA. Afebrile. Up independently. Pain 6/10. PRN oxycodone given x1. Denies nausea and SOB. C/O heartburn, PRN tums given x1.  and 224, covered with sliding scale. Uric acid 8.2, 1L fluid bolus given. Had a shower this evening and had a good appetite. Awaiting for chemo treatment plan.

## 2022-12-16 NOTE — PROGRESS NOTES
12/16/22 1100   Call Information   Date of Call 12/16/22   Time of Call 1040   Name of person requesting the team Maxine   Title of person requesting team RN   RRT Arrival time 1045   Time RRT ended 1125   Reason for call   Type of RRT Adult   Primary reason for call Cardiovascular   Cardiovascular SBP less than 90   Was patient transferred from the ED, ICU, or PACU within last 24 hours prior to RRT call? No   SBAR   Situation BP 77/39; diaphoretic, dizzy with severe mid back pain   Background 62 year old male with a history of prior PE (previously on Xarelto), DM2, COPD, HTN, and gout admitted on 12/9/2022 for suspected leukemia. He is currently hemodynamically stable and is awaiting further workup. His labs are concerning for TLS, so started IVF and allopurinol per heme/onc fellow recommendation   Notable History/Conditions Diabetes;COPD;Hypertension   Assessment A/OX4, hypotensive, diaphoretic, sweat soaked pillow, C/O extreme stabbing pain in the back between the shoulder blades, irregular breathing between breath holding and tachypnea.   Interventions Blood glucose;ECG;Fluid bolus;Labs;Portable monitor;Other (describe)  (CT)   Patient Outcome   Patient Outcome Stabilized on unit   RRT Team   Attending/Primary/Covering Physician Devin Malloy   Date Attending Physician notified 12/16/22   Time Attending Physician notified 1045   Physician(s) Sammie Reis RN Lisette Murphy RN, Susan RT Debbie   Post RRT Intervention Assessment   Post RRT Assessment Stable/Improved   Date Follow Up Done 12/16/22   Time Follow Up Done 1409   Comments BP still soft, but stable, starting IVF, steriods, ABX

## 2022-12-17 NOTE — PROVIDER NOTIFICATION
Hospitalist Danelle paged/notified:   pt have some bright red bloody nose which started all of sudden.  can we do some afrin spray??  Thanks  Awaiting for recommendation..   Humidifier placed on O2 and applied nose pressure clip..

## 2022-12-17 NOTE — PLAN OF CARE
Goal Outcome Evaluation:      Plan of Care Reviewed With: patient    Overall Patient Progress: no changeOverall Patient Progress: no change  Pt afebrile with stable vs Able to wean O2 off. Now 90% on RA (which is his baseline) Pain between shoulder blades not a problem today. Receiving volatren gel and robaxin, but did not require oxycodone or IV dilaudid this shift. Received decadron again today.No c/o of lightheadedness. Lisinopril and metoprolol remain on hold. IVF changed to NS at 75cc/hr, remains on oral azithromycin and IV cefepime. No bloody noses this shift. Blood glucose 140 & 214. No BM, received senna and miralax. No chemo orders for today.

## 2022-12-17 NOTE — PROGRESS NOTES
[facility]    Hematology / Oncology Progress Note    Patient: Angel Cardenas  MRN: [id number]  Admission Date: 12/9/2022  Date of Service (when I saw the patient): 12/17/2022  Hospital Day # 8     Assessment & Plan   Angel Cardenas is a 62 year old male with past medical history of PE (previously on Xarelto), T2DM, COPD, HTN, and gout. Diagnosed with AMML.      Today:   - BP stable overnight. Started slower dex taper at 8 mg today.   - Thoracic back pain improved today. Ordered MRI thoracic spine to further evaluate.   - S/p rasburicase last night. Trend TLS labs BID. Decreased mIVF rate to 75 mL/hr given h/o hypervolemia. Lymphedema consulted.   - Plan to await further cytogenetic testing prior starting AML treatment.     HEME   # AMML   # Leukocytosis with peripheral blasts  Patient was undergoing work up with his PCP for fatigue, subjective fevers, lack of appetite, weight loss, intermittent nose bleed over the last month and rib pain. He was found to have WBC 8.5 Hgb 8.8 and plts 82 with 2% promyelocytes. OP peripheral smear with bicytopenia and 6% circulating blasts, no adrianne rods noted. Flow cytometry of peripheral blood 12/9 with 1% CD34 blasts, reviewed at [facility] with noted 6% blasts. He was referred to [facility] for further work-up and initiation of treatment. On admission WBC 18.2 Hgb 8.3 and Plt 94. He was seen by oncall fellow and attending, please see full note 12/9. Peripheral smear was reviewed and there was a very low suspicion for APL based on review so no treatment was initiated. 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   - Peripheral flow 12/9 resulted with abnormal monocytic population (25%) and abnormal CD34 positive myeloid blast population (1.1%) worrisome for high-grade myeloid neoplasm. Peripheral BCR/ABL1 and FLT3 negative. Peripheral cytogenetics and molecular pending.   - OP BMBx 12/9/22. Arrived from [facility] on 12/13. 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 Cytogenetics and molecular pending.   - Of note, BMBx procedure was technically very difficult and OSH recommended IR consult for future biopsies. Per nursing, appears more sacral so unsure of quality.   - Virologies: HSV 1/2 negative, CMV IgG-, EBV+, HIV-, HepB/C-   - HLA typing sent on admission   - EKG 12/9 with NSR QTc 441. Echo 12/10 with hyperkinetic LVEF at 65-70%.   - Hold off on any line placement for now  - WBC 18K on admission. Hydrea 1g daily 12/11-present, will stop with initiation of treatment.  - Given hemodynamic stability, hold treatment of 7+3 until we can obtain FLT3 and NPM1 PCRs.      # Anemia   # Thrombocytopenia   - Transfuse to keep Hgb >7 and plt > 10k (may consider increasing if recurrent epistaxis)      # TLS  # Risk of DIC  # Hyperuricemia   # H/o gout   On admission uric acid 9.1, LDH 2,475, though all other TLS labs WNL including Cr 0.88. Cr trending up slightly as of 12/11 to 1.11 and uric acid remains elevated at 9.6. Of note patient does have a known h/o gout. INR mildly elevated (~1.2).   - On IVF upon admission, stopped d/t volume overload. Restarted 12/16 in the setting of hypotension and TLS. NS @ 75 mL/hr.   - Allopurinol 300 mg daily   - S/p rasburicase x1 on 12/11 and 12/16.   - Monitor TLS labs BID. Monitor DIC labs daily for now.     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 Amari Cortes (4/15/22). Patient has history of multiple DVTs in the 1990s without known cause, unprovoked PE at age 43, DVT with PE in context of hip replacement. He most recently was diagnosed with bilateral PE with right heart strain on 1/19/22 after presenting to the ED with SOB.   - Resumed PTA Xarelto 12/13; held x12/16 with plt dropping  - Repeat CTA 12/16 w/o evidence of PE     ID  # Undifferentiated shock, resolved  # Possible pneumonia   # Hypotension, resolved  # Possible  adrenal insufficiency  Acute symptomatic hypotension on 12/16 from 123/61 ? 77/39 in the setting of acutely worsening upper back pain, diaphoresis, dizziness. Rapid response called. EKG NSR, trop 33 ? 30. VBG normal. Hgb stable. No e/o bleed, PE, dissection on CTA/CAP w/ contrast. CT did note RLL consolidation suggestion possible pneumonia. Lactic 2.2 ? 1.8. BCx NGTD. Initially pressures improved with bolus then became soft again to 92/46 around 1400. Recent echo with EF 65-70%.  - Suspect that hypotension was possibly from adrenal insufficiency with steroid taper as pressure improved after dexamethasone 10 mg was ordered 12/16 afternoon. AM cortisol 12/17 was WNL but likely skewed by recent steroid administration. Less likely septic shock in the absense of fever and tachycardia.   - Cefepime and azithromycin x12/16  - Hold lisinopril and metoprolol    # Prophylaxis   -  mg BID x12/16  - Micafungin 50 mg IV x12/16  - Posa/vori both $0/month copay  - Treatment dose antibiotics as above. Not previously on antibacterial ppx.     ENT   # Sore throat, resolved  Mild L tonsil swelling on exam. No purulence or discrete lesions. No stridor or difficulty breathing. Strep negative, HSV swab negative.  - Cepacol lozenges PRN     # Intermittent epistaxis   Patient has been having intermittent epistaxis over the last month, but increase in frequency to daily over the last week PTA.   - If recurrent episodes may consider increasing platelet threshold, adding Afrin      MSK/NEURO  # Acute on chronic back pain, improved  Patient endorses ongoing back pain prior to hospitalization though over the past week has been endorsing rib pain which radiates into his upper back and between his shoulder blades. No red flag sx. Of note he does have splenomegaly on recent CT imaging which is likely contributing. EKG shows NSR. Lipase WNL.  - Scheduled Voltaren gel QID, Robaxin QID  - PRN Tylenol, heat packs  - PTA oxycodone 5-10 mg q6h  PRN and IV dilaudid 0.5 mg q4hr PRN  - S/p dex 10 mg x1 on 12/11-13, 6 mg on 12/14, 4 mg on 12/15 for pain. Increased back to 10 mg on 12/16, taper to 8 mg on 12/17.   - MRI thoracic spine ordered     # Headaches   Patient has been experiencing nasal congestion, left sided headaches and eye pressure. No other neurologic complaints. Brain MRI 12/5 with diffuse linear and slightly nodular dural thickening and enhancement. Diffuse marrow signal abnormality within calvarium and central skull base. Differential includes lymphoproliferative abnormalities and metastatic disease.  - Requested images to be pushed into PACs and [facility] over read ordered  - S/p diagnostic LP 12/12; flow negative  - Tylenol available PRN      PULM  # Hypoxia  # COPD   New O2 requirement 12/11 to 3-4L in the setting of recent continuous IVF, splenomegaly and poor pain control. After improvement in volume status, ongoing mild hypoxia to high 80s, may be baseline from COPD.  - CXR showed cardiomegaly, enlarged pulmonary vasculature and diffuse mild interstitial markings bilaterally likely representing pulmonary edema.   - PTA Bumex above  - DuoNebs BID x12/13. Neb treatments q2hr PRN   - Continue PTA Breo Ellipta   - Incentive spirometry  - CT chest 12/16 showed possible RLL pneumonia. See pneumonia treatment as above.     # CATHERINE  - Continue CPAP at night  - Hypoxia at night, should follow up outpatient for possible CPAP adjustment    CARDS  # HTN   - Hold PTA metoprolol ER 12.5 mg daily and lisinopril 5 mg daily in the setting of hypotension     # HLD   - PTA atorvastatin held on admission for potential treatment      # Chronic venous insufficiency, CEAP 5   # Bilateral LE edema   - Echo as above  - Continue PTA Bumex 1 mg daily, may consider additional doses based on symptoms    - Lymphedema consulted     GI  # Constipation, resolved  - Senna BID PRN and MiraLax BID PRN  - Milk of magnesium daily PRN    ENDO  # T2DM   Follow [facility], last seen 11/15/22.   - PTA Ozempic held on admission   - Medium intensity sliding scale insulin   - Hypoglycemia protocol in place     Clinically Significant Risk Factors        # Hyperkalemia: Highest K = 5.4 mmol/L in last 2 days, will monitor as appropriate  # Hyponatremia: Lowest Na = 130 mmol/L in last 2 days, will monitor as appropriate      # Hypoalbuminemia: Lowest albumin = 3.2 g/dL at 12/11/2022  7:01 AM, will monitor as appropriate   # Thrombocytopenia: Lowest platelets = 40 in last 2 days, will monitor for bleeding         # Severe Obesity: Estimated body mass index is 51.26 kg/m  as calculated from the following:    Height as of this encounter: 1.854 m (6' 1\").    Weight as of this encounter: 176.2 kg (388 lb 8 oz).   # Moderate Malnutrition: based on nutrition assessment      FEN  Diet: Regular Diet Adult   IVF: Bolus PRN; cautious with volume overload  Lytes: Replete per protocol    PPX  VTE: None given thrombocytopenia  Bowel: Senna/MiraLax/MoM PRN  GI/PUD: Protonix    MISC  Code Status: Full Code   Lines/Drains: PIV  Social: Lives at home alone near [place], [place]  Dispo: Anticipate at least 5-6 day stay for further work-up and management of cytopenias, possibly prolonged 4-6 week stay if confirmed acute leukemia.   Follow Up: TBD    Patient was seen and plan of care was discussed with attending physician Dr. Malloy.    I spent 60 minutes in the care of this patient today, which included time necessary for review of interval events, obtaining history and physical exam, ordering medications/tests/procedures as medically indicated, review of pertinent medical literature, counseling of the patient, coordination of care, and documentation time. Over 50% of time was spent counseling the patient and/or coordinating care.    Mary Jacques PA-C   Hematology/Oncology   Pager: [phone]  Desk phone: [phone]    Interval History   Overnight, vitals were stable, afebrile, normotensive. Had episode of " "epistaxis that resolved with nasal clip after 30 min. Received rasburicase for TLS. Patient is feeling better today. Upper back pain is greatly improved, \"taken care of.\" Had one episode of diaphoresis in chair, feels more comfortable in bed. Feels that volume status is holding steady, but legs maybe look a little more edematous today. Mild erythema around BMBx, not really bothering pt.     Vital Signs with Ranges  Temp:  [97.4  F (36.3  C)-98.6  F (37  C)] 97.9  F (36.6  C)  Pulse:  [64-84] 80  Resp:  [16-20] 18  BP: ()/(48-66) 124/53  SpO2:  [90 %-100 %] 90 %  I/O last 3 completed shifts:  In: 4763.83 [P.O.:1440; I.V.:2323.83; IV Piggyback:1000]  Out: 4670 [Urine:4670]    Physical Exam   General: Lying in bed, comfortable, alert. Large habitus.   Skin: Chronic venous stasis changes to BLE from ankle to mid-calf. No concerning lesions, rash, jaundice, cyanosis, erythema, or ecchymoses on exposed surfaces.   HEENT: NCAT. EOMI, anicteric sclera.  Respiratory: Non-labored breathing on room air, good air exchange, lungs clear to auscultation bilaterally.  Cardiovascular: RRR. No murmur or rub.   Gastrointestinal: Normoactive BS. Abdomen soft, ND.   Extremities: Baseline bilateral LE edema slightly increased today.   Neurologic: A&O x 3, speech normal, no deficits grossly.    Medications     - MEDICATION INSTRUCTIONS -       sodium chloride 75 mL/hr at 12/17/22 0829       acyclovir  400 mg Oral BID     allopurinol  300 mg Oral Daily     ammonium lactate   Topical Daily     azithromycin  500 mg Oral Daily     bumetanide  1 mg Oral Daily     ceFEPIme  2 g Intravenous Q8H     diclofenac  2 g Topical 4x Daily     fluticasone-vilanterol  1 puff Inhalation Daily     heparin lock flush  5-20 mL Intracatheter Q24H     hydroxyurea  1,000 mg Oral Daily     insulin aspart  1-7 Units Subcutaneous TID AC     insulin aspart  1-5 Units Subcutaneous At Bedtime     ipratropium - albuterol 0.5 mg/2.5 mg/3 mL  3 mL Nebulization BID "     lidocaine  1-2 patch Transdermal Q24H     lidocaine   Transdermal Q8H GREG     [Held by provider] lisinopril  5 mg Oral Daily     methocarbamol  500 mg Oral 4x Daily     [Held by provider] metoprolol succinate ER  12.5 mg Oral Daily     micafungin  50 mg Intravenous Q24H     oxymetazoline  2 spray Both Nostrils BID     pantoprazole  40 mg Oral QAM AC     [Held by provider] rivaroxaban ANTICOAGULANT  20 mg Oral Daily with supper     senna-docusate  1 tablet Oral BID    Or     senna-docusate  2 tablet Oral BID     Data   Results for orders placed or performed during the hospital encounter of 12/09/22 (from the past 24 hour(s))   Glucose by meter   Result Value Ref Range    GLUCOSE BY METER POCT 137 (H) 70 - 99 mg/dL   Double Lumen PICC Placement    Narrative    Garfield Parra RN     12/16/2022  5:15 PM  [facility]    Double Lumen PICC Placement    Date/Time: 12/16/2022 5:11 PM  Performed by: Garfield Parra RN  Authorized by: Mary Jacques PA-C   Indications: vascular access      UNIVERSAL PROTOCOL   Site Marked: Yes  Prior Images Obtained and Reviewed:  Yes  Required items: Required blood products, implants, devices and special   equipment available    Patient identity confirmed:  Verbally with patient, arm band, provided   demographic data, hospital-assigned identification number and anonymous   protocol, patient vented/unresponsive  NA - No sedation, light sedation, or local anesthesia  Confirmation Checklist:  Patient's identity using two indicators, relevant   allergies, procedure was appropriate and matched the consent or emergent   situation and correct equipment/implants were available  Time out: Immediately prior to the procedure a time out was called (Garfield)    Universal Protocol: the Joint Commission Universal Protocol was followed    Preparation: Patient was prepped and draped in usual sterile fashion       ANESTHESIA    Anesthesia: Local  infiltration  Local Anesthetic:  Lidocaine 1% without epinephrine  Anesthetic Total (mL):  5      SEDATION    Patient Sedated: No        Preparation: skin prepped with ChloraPrep  Skin prep agent: skin prep agent completely dried prior to procedure  Sterile barriers: maximum sterile barriers were used: cap, mask, sterile   gown, sterile gloves, and large sterile sheet  Hand hygiene: hand hygiene performed prior to central venous catheter   insertion  Type of line used: PICC  Catheter type: double lumen  Lumen type: non-valved and power PICC  Lumen Identification: Purple and Red  Catheter size: 5 Fr  Brand: Bard  Lot number: [id number]  Placement method: venipuncture, MST, ultrasound and tip navigation system  Number of attempts: 1  Difficulty threading catheter: no  Successful placement: yes  Orientation: right    Location: brachial vein (medial)  Tip Location: SVC  Arm circumference: adults 10 cm  Extremity circumference: 43  Visible catheter length: 1  Total catheter length: 48  Dressing and securement: adhesive securement device, alcohol impregnated   caps, blood removed, blood cleaned with CHG, chlorhexidine patch applied,   fixation device, line secured, securement device, site cleansed, statlock   and transparent securement dressing  Post procedure assessment: blood return through all ports, free fluid flow   and placement verified by 3CG technology  PROCEDURE Describe Procedure: Right brachial medial vein 0.73cm dm 1cm   external.[facility].P[facility] okay to use.   Uric acid   Result Value Ref Range    Uric Acid 9.2 (H) 3.4 - 7.0 mg/dL   Phosphorus   Result Value Ref Range    Phosphorus 6.7 (H) 2.5 - 4.5 mg/dL   Basic metabolic panel   Result Value Ref Range    Sodium 130 (L) 136 - 145 mmol/L    Potassium 5.4 (H) 3.4 - 5.3 mmol/L    Chloride 94 (L) 98 - 107 mmol/L    Carbon Dioxide (CO2) 23 22 - 29 mmol/L    Anion Gap 13 7 - 15 mmol/L    Urea Nitrogen 39.0 (H) 8.0 - 23.0 mg/dL    Creatinine  1.53 (H) 0.67 - 1.17 mg/dL    Calcium 9.3 8.8 - 10.2 mg/dL    Glucose 142 (H) 70 - 99 mg/dL    GFR Estimate 52 (L) >60 mL/min/1.73m2   Lactic acid whole blood   Result Value Ref Range    Lactic Acid 1.8 0.7 - 2.0 mmol/L   Troponin T, High Sensitivity   Result Value Ref Range    Troponin T, High Sensitivity 39 (H) <=22 ng/L   Platelet count   Result Value Ref Range    Platelet Count 44 (LL) 150 - 450 10e3/uL   Glucose by meter   Result Value Ref Range    GLUCOSE BY METER POCT 241 (H) 70 - 99 mg/dL   Glucose by meter   Result Value Ref Range    GLUCOSE BY METER POCT 168 (H) 70 - 99 mg/dL   CBC with platelets differential    Narrative    The following orders were created for panel order CBC with platelets differential.  Procedure                               Abnormality         Status                     ---------                               -----------         ------                     CBC with platelets and d...[[id number]]  Abnormal            Final result               Manual Differential[[id number]]          Abnormal            Final result                 Please view results for these tests on the individual orders.   Comprehensive metabolic panel   Result Value Ref Range    Sodium 132 (L) 136 - 145 mmol/L    Potassium 5.2 3.4 - 5.3 mmol/L    Chloride 98 98 - 107 mmol/L    Carbon Dioxide (CO2) 24 22 - 29 mmol/L    Anion Gap 10 7 - 15 mmol/L    Urea Nitrogen 33.4 (H) 8.0 - 23.0 mg/dL    Creatinine 1.16 0.67 - 1.17 mg/dL    Calcium 9.2 8.8 - 10.2 mg/dL    Glucose 138 (H) 70 - 99 mg/dL    Alkaline Phosphatase 86 40 - 129 U/L    AST 55 (H) 10 - 50 U/L    ALT 20 10 - 50 U/L    Protein Total 6.6 6.4 - 8.3 g/dL    Albumin 3.3 (L) 3.5 - 5.2 g/dL    Bilirubin Total 0.5 <=1.2 mg/dL    GFR Estimate 72 >60 mL/min/1.73m2   Lactate Dehydrogenase   Result Value Ref Range    Lactate Dehydrogenase 1,893 (H) 0 - 250 U/L   INR   Result Value Ref Range    INR 1.21 (H) 0.85 - 1.15   Partial thromboplastin time   Result Value Ref  "Range    aPTT 31 22 - 38 Seconds   Fibrinogen activity   Result Value Ref Range    Fibrinogen Activity 336 170 - 490 mg/dL   Magnesium   Result Value Ref Range    Magnesium 2.7 (H) 1.7 - 2.3 mg/dL   Uric acid   Result Value Ref Range    Uric Acid 5.6 3.4 - 7.0 mg/dL   Phosphorus   Result Value Ref Range    Phosphorus 5.5 (H) 2.5 - 4.5 mg/dL   Cortisol   Result Value Ref Range    Cortisol 0.7   ug/dL   Troponin T, High Sensitivity   Result Value Ref Range    Troponin T, High Sensitivity 19 <=22 ng/L   CBC with platelets and differential   Result Value Ref Range    WBC Count 16.4 (H) 4.0 - 11.0 10e3/uL    RBC Count 2.67 (L) 4.40 - 5.90 10e6/uL    Hemoglobin 8.0 (L) 13.3 - 17.7 g/dL    Hematocrit 26.3 (L) 40.0 - 53.0 %    MCV 99 78 - 100 fL    MCH 30.0 26.5 - 33.0 pg    MCHC 30.4 (L) 31.5 - 36.5 g/dL    RDW 18.8 (H) 10.0 - 15.0 %    Platelet Count 40 (LL) 150 - 450 10e3/uL    Narrative    Previously reported component [ NRBCs ] is no longer reported.\"  Previously reported component [ NRBCs Absolute ] is no longer reported.\"   Manual Differential   Result Value Ref Range    % Neutrophils 65 %    % Lymphocytes 13 %    % Monocytes 15 %    % Eosinophils 0 %    % Basophils 0 %    % Metamyelocytes 1 %    % Myelocytes 1 %    % Other Cells 5 %    NRBCs per 100 WBC 14 (H) <=0 %    Absolute Neutrophils 10.7 (H) 1.6 - 8.3 10e3/uL    Absolute Lymphocytes 2.1 0.8 - 5.3 10e3/uL    Absolute Monocytes 2.5 (H) 0.0 - 1.3 10e3/uL    Absolute Eosinophils 0.0 0.0 - 0.7 10e3/uL    Absolute Basophils 0.0 0.0 - 0.2 10e3/uL    Absolute Metamyelocytes 0.2 (H) <=0.0 10e3/uL    Absolute Myelocytes 0.2 (H) <=0.0 10e3/uL    Absolute Other Cells 0.8 (H) <=0.0 10e3/uL    Absolute NRBCs 2.3 (H) <=0.0 10e3/uL    RBC Morphology Confirmed RBC Indices     Platelet Assessment  Automated Count Confirmed. Platelet morphology is normal.     Automated Count Confirmed. Platelet morphology is normal.    Polychromasia Moderate (A) None Seen   Glucose by meter "   Result Value Ref Range    GLUCOSE BY METER POCT 140 (H) 70 - 99 mg/dL   Glucose by meter   Result Value Ref Range    GLUCOSE BY METER POCT 214 (H) 70 - 99 mg/dL

## 2022-12-17 NOTE — PLAN OF CARE
"Goal Outcome Evaluation:    1500 - 2330:     /52 (BP Location: Left arm)   Pulse 70   Temp 98.5  F (36.9  C) (Oral)   Resp 16   Ht 1.854 m (6' 1\")   Wt (!) 176.5 kg (389 lb 3.2 oz)   SpO2 90%   BMI 51.35 kg/m       Repeat LA 1.8  A&O x 4, AVSS on 2 L NC with humidifier sating at 90 - 92%.  Pt c/o bilateral shoulder/back pain 6/10 managed with oxycodone 10 mg x 2, prn tylenol x 1 & prn iv dilaudid available for breakthrough pain.   Lidocaine patch x 2 on shoulder/back.   Pt had an episode of epistaxis (hospitalist notified), applied nose clip/pressure, humidifier added to O2.  Hospitalist added afrin spray BID, platelet recheck is 44*  Gave afrin spray, nose bleed resolved it self before spray.   TLS lab came with elevated uric acid 9.2, gave rasburicase & K+ 5.4 & phos 6.7 cont to monitor potassium, repeat labs tomorrow AM.   , 241, insulin given 1 unit at bedside.   Up sba, voiding spontaneously, last bm 12/15, passing gas.  Continue with poc...                              "

## 2022-12-17 NOTE — PLAN OF CARE
Goal Outcome Evaluation: [id number]      Plan of Care Reviewed With: patient    Overall Patient Progress: no change    Outcome Evaluation:     Respiratory: CPAP with 2L overnight 90-92%, Denies SOB  Cardiac: WDL BP stable overnight with continued IV fluids systolic 110s-130s diastolic 55-to high 60s, Denies Chest pain.  Neuro: A&Ox4. Calls appropriately.   GI/: Voiding spontaneously in urinal, Last BM 12/15  Diet: Regular   Skin: aziza BLE with +2 edema, scattered bruising throughout, wound noted on sacrum dime size pageshama LEAL for WOC consult mepilex applied    Lines/drains: R double lumen PICC LR infusing in purple lumen, red lumen to TKO   Pain: oxyx2 PRN and lidocaine patch's in place   Labs: , k 5.4, uric acid 5.6 , lactic 1.8, platelet count 40, hbg 8.0  Activity: SBA with IV pole  Plan:  monitor BP, plan for chemo to be decided

## 2022-12-17 NOTE — PROGRESS NOTES
"MEDICINE CROSS COVER:  Alerted by bedside RN that patient was having a nose bleed. Patient reports hx of nose bleeds.     Per RN, patient is holding pressure with improvement in bleeding, but requested Afrin order.     BP has been stable this evening, most recent vitals below.     /61 (BP Location: Left arm)   Pulse 64   Temp 98.1  F (36.7  C) (Oral)   Resp 18   Ht 1.854 m (6' 1\")   Wt (!) 176.5 kg (389 lb 3.2 oz)   SpO2 91%   BMI 51.35 kg/m      Afrin order placed as needed. Also put in platelet check.     Of note, TLS labs returned and uric acid is going up (9.2 from 8.6), so rasburicase ordered. Repeat lactate improved to normal range, no further intervention needed. Troponin is essentially stable, will repeat in AM.     Patricia Philippe MD  Internal Medicine/Pediatrics Hospitalist   of Internal Medicine and Pediatrics  [facility]  Pager: [phone]  "

## 2022-12-18 NOTE — PLAN OF CARE
Goal Outcome Evaluation:    00:00-07:00 am   AF  Soft BP but stable, 100's/50's  OVSS   Desat 87% while sleeping.  Maintaining O2 sat at 90-91% on 1L/NC at night when not using home CPAP.  Pain in bilateral shoulder/back manageable with prn Oxycodone 10 mg po given x 2.  Denied nausea, slight DEAN but otherwise no chest pain or SOB.   at 02:00 am  UAL  Voiding spontaneously well, good UOP   LBM 12/15  Currently on bowel regimen.  Continues on IV NS at 75 ml/hr  Encourage activities as tolerated.   Resting/sleeping between cares  No new acute events overnight   Continue w/POC

## 2022-12-18 NOTE — PROGRESS NOTES
"[facility]    Hematology / Oncology Progress Note    Patient: Angel Cardenas  MRN: [id number]  Admission Date: 12/9/2022  Date of Service (when I saw the patient): 12/18/2022  Hospital Day # 9     Assessment & Plan   Angel Cardenas is a 62 year old male with past medical history of PE (previously on Xarelto), T2DM, COPD, HTN, and gout. Diagnosed with AMML.      Today:   - Tentative plan for treatment of new AMML sometime tomorrow pending cytogenetic testing.   - MRI thoracic spine showed \"scattered subtle mottled enhancement of bones most pronounced on L 7th rib, could be 2/2 underlying disease process.\" It is possible that pt has some extramedullary infiltration of cortex causing pain. Regardless, his pain in this area seems to respond well to dexamethasone. Continue dex 8 mg daily until treatments, then taper slowly.   - Good PO intake and improved TLS labs. Discontinued IVF given h/o hypervolemia. Bolus PRN.   - Discontinued IV cefepime as low concern for pneumonia, started in the setting of acute hypotension. Pt remains afebrile and non-toxic. Resume ppx Levaquin.   - Continue bowel regimen for constipation.     HEME   # AMML   # Leukocytosis with peripheral blasts  Patient was undergoing work up with his PCP for fatigue, subjective fevers, lack of appetite, weight loss, intermittent nose bleed over the last month and rib pain. He was found to have WBC 8.5 Hgb 8.8 and plts 82 with 2% promyelocytes. OP peripheral smear with bicytopenia and 6% circulating blasts, no adrianne rods noted. Flow cytometry of peripheral blood 12/9 with 1% CD34 blasts, reviewed at [facility] with noted 6% blasts. He was referred to [facility] for further work-up and initiation of treatment. On admission WBC 18.2 Hgb 8.3 and Plt 94. He was seen by oncall fellow and attending, please see full note 12/9. Peripheral smear was reviewed and there was a very low suspicion for APL based on review so no treatment " was initiated. 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   - Peripheral flow 12/9 resulted with abnormal monocytic population (25%) and abnormal CD34 positive myeloid blast population (1.1%) worrisome for high-grade myeloid neoplasm. Peripheral BCR/ABL1 and FLT3 negative. Peripheral cytogenetics and molecular pending.   - OP BMBx 12/9/22. Arrived from [facility] on 12/13. 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 Cytogenetics and molecular pending.   - Of note, BMBx procedure was technically very difficult and OSH recommended IR consult for future biopsies. Per nursing, appears more sacral so unsure of quality.   - Virologies: HSV 1/2 negative, CMV IgG-, EBV+, HIV-, HepB/C-   - HLA typing sent on admission   - EKG 12/9 with NSR QTc 441. Echo 12/10 with hyperkinetic LVEF at 65-70%.   - Hold off on any line placement for now  - WBC 18K on admission. Hydrea 1g daily 12/11-present, will stop with initiation of treatment.  - Given hemodynamic stability, hold treatment until we can obtain FLT3 and NPM1 PCRs. Leaning toward patricia+HMA at this point.  PA sent for venetoclax, weekday team can follow up with Lashonda Robles on Monday.     # Anemia   # Thrombocytopenia   - Transfuse to keep Hgb >7 and plt > 10k (may consider increasing if recurrent epistaxis)      # TLS  # Risk of DIC  # Hyperuricemia   # Hyperphosphatemia  # H/o gout   On admission uric acid 9.1, LDH 2,475, though all other TLS labs WNL including Cr 0.88. Cr trending up slightly as of 12/11 to 1.11 and uric acid remains elevated at 9.6. Of note patient does have a known h/o gout. INR mildly elevated (~1.2).   - On IVF upon admission, stopped d/t volume overload. Restarted 12/16-12/18 in the setting of hypotension and TLS. Bolus PRN.   - Allopurinol 300 mg daily   - S/p rasburicase x1 on 12/11 and 12/16.   - Consider PhosLo if phos continues to uptrend  - Monitor TLS labs BID. Monitor DIC labs daily for now.     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 Amari Cortes (4/15/22). Patient has history of multiple DVTs in the 1990s without known cause, unprovoked PE at age 43, DVT with PE in context of hip replacement. He most recently was diagnosed with bilateral PE with right heart strain on 1/19/22 after presenting to the ED with SOB.   - Resumed PTA Xarelto 12/13; held x12/16 with plt dropping  - Repeat CTA 12/16 w/o evidence of PE     ID  # Undifferentiated shock, resolved  # Possible pneumonia   # Hypotension, resolved  # Possible adrenal insufficiency  Acute symptomatic hypotension on 12/16 from 123/61 ? 77/39 in the setting of acutely worsening upper back pain, diaphoresis, dizziness. Rapid response called. EKG NSR, trop 33 ? 30. VBG normal. Hgb stable. No e/o bleed, PE, dissection on CTA/CAP w/ contrast. CT did note RLL consolidation suggestion possible pneumonia. Lactic 2.2 ? 1.8. BCx NGTD. Initially pressures improved with bolus then became soft again to 92/46 around 1400. Recent echo with EF 65-70%.  - Suspect that hypotension was possibly from adrenal insufficiency with steroid taper as pressure improved after dexamethasone 10 mg was ordered 12/16 afternoon. AM cortisol 12/17 was WNL but likely skewed by recent steroid administration. Less likely septic shock in the absense of fever and tachycardia.   - Cefepime 12/16-12/18, discontinued as low suspicion for infection as etiology of hypotension. Completed azithromycin 12/16-12/18.  - Hold lisinopril and metoprolol    # Prophylaxis   -  mg BID x12/16  - Micafungin 50 mg IV x12/16  - Posa/vori both $0/month copay  - Treatment dose antibiotics as above. Not previously on antibacterial ppx.     ENT   # Sore throat, resolved  Mild L tonsil swelling on exam. No purulence or discrete lesions. No stridor or difficulty breathing. Strep negative, HSV swab negative.  - Cepacol lozenges PRN     # Intermittent epistaxis  "  Patient has been having intermittent epistaxis over the last month, but increase in frequency to daily over the last week PTA.   - If recurrent episodes may consider increasing platelet threshold, adding Afrin      MSK/NEURO  # Acute on chronic back pain, improved  Patient endorses ongoing back pain prior to hospitalization though over the past week has been endorsing rib pain which radiates into his upper back and between his shoulder blades. No red flag sx. Of note he does have splenomegaly on recent CT imaging which could be contributing. EKG shows NSR. Lipase WNL. Acute worsening on 12/16 in the setting of acute hypotension as above, improved with steroids.   - Scheduled Voltaren gel QID, Robaxin QID  - PRN Tylenol, heat packs  - MRI thoracic spine 12/17 showed \"scattered subtle mottled enhancement of bones most pronounced on L 7th rib, could be 2/2 underlying disease process.\" It is possible that pt has some extramedullary infiltration of cortex causing pain.   - PTA oxycodone 5-10 mg q6h PRN and IV dilaudid 0.5 mg q2hr PRN  - Pain responds well to dexamethasone. S/p dex 10 mg x1 on 12/11-13, 6 mg on 12/14, 4 mg on 12/15 for pain. Increased back to 10 mg on 12/16, taper to 8 mg on 12/17. Continue dex 8 mg daily until treatments, then taper slowly given concern for adrenal insufficiency as above.   - Could consider RadOn consult in the future pending course    # Headaches, improved  Patient has been experiencing nasal congestion, left sided headaches and eye pressure. No other neurologic complaints. Brain MRI 12/5 with diffuse linear and slightly nodular dural thickening and enhancement. Diffuse marrow signal abnormality within calvarium and central skull base. Differential includes lymphoproliferative abnormalities and metastatic disease.  - Requested images to be pushed into PACs and [facility] over read ordered  - S/p diagnostic LP 12/12; flow negative  - Tylenol available PRN      PULM  # Hypoxia, " intermittent  # COPD   New O2 requirement 12/11 to 3-4L in the setting of recent continuous IVF, splenomegaly and poor pain control. After improvement in volume status, ongoing mild hypoxia to high 80s, may be baseline from COPD.  - CXR showed cardiomegaly, enlarged pulmonary vasculature and diffuse mild interstitial markings bilaterally likely representing pulmonary edema.   - PTA Bumex above  - DuoNebs BID x12/13. Neb treatments q2hr PRN   - Continue PTA Breo Ellipta   - Incentive spirometry  - CT chest 12/16 showed possible RLL pneumonia. See pneumonia treatment as above.     # CATHERINE  - Continue CPAP at night  - Hypoxia at night, should follow up outpatient for possible CPAP adjustment    CARDS  # HTN   - Hold PTA metoprolol ER 12.5 mg daily and lisinopril 5 mg daily in the setting of recent hypotension     # HLD   - PTA atorvastatin held on admission for potential treatment      # Chronic venous insufficiency, CEAP 5   # Bilateral LE edema   - Echo as above  - Continue PTA Bumex 1 mg daily, may consider additional doses based on symptoms    - Lymphedema consulted     GI  # Constipation  Pt is having BMs but stools are small and firm.   - Senna BID PRN and MiraLax BID PRN  - Milk of magnesium daily PRN  - Drinking prune juice    ENDO  # T2DM   Follow with [facility], last seen 11/15/22.   - PTA Ozempic held on admission   - Medium intensity sliding scale insulin   - Hypoglycemia protocol in place     Clinically Significant Risk Factors        # Hyperkalemia: Highest K = 5.4 mmol/L in last 2 days, will monitor as appropriate  # Hyponatremia: Lowest Na = 130 mmol/L in last 2 days, will monitor as appropriate      # Hypoalbuminemia: Lowest albumin = 3.2 g/dL at 12/11/2022  7:01 AM, will monitor as appropriate   # Thrombocytopenia: Lowest platelets = 40 in last 2 days, will monitor for bleeding         # Severe Obesity: Estimated body mass index is 51.14 kg/m  as calculated from the following:     "Height as of this encounter: 1.854 m (6' 1\").    Weight as of this encounter: 175.8 kg (387 lb 9.6 oz).   # Moderate Malnutrition: based on nutrition assessment      FEN  Diet: Regular Diet Adult   IVF: Bolus PRN; cautious with volume overload  Lytes: Replete per protocol    PPX  VTE: None given thrombocytopenia  Bowel: Senna/MiraLax/MoM PRN  GI/PUD: Protonix    MISC  Code Status: Full Code   Lines/Drains: PIV  Social: Lives at home alone near [place], [place]  Dispo: Pending work up and management of cytopenias. LOS will be determined by chemo plan.   Follow Up: TBD    Patient was seen and plan of care was discussed with attending physician Dr. Malloy.    I spent 60 minutes in the care of this patient today, which included time necessary for review of interval events, obtaining history and physical exam, ordering medications/tests/procedures as medically indicated, review of pertinent medical literature, counseling of the patient, coordination of care, and documentation time. Over 50% of time was spent counseling the patient and/or coordinating care.    Mary Jacques PA-C   Hematology/Oncology   Pager: [phone]  Desk phone: [phone]    Interval History   No acute events overnight. Patient was weaned to room air. Did not use any dilaudid overnight, only oxycodone. This morning, he reports feeling ok. His back still hurts some from being in bed at lot more recently and having to lay in MRI machine last night. Overall, back pain is much better managed than his \"episode\" two days prior. No recurrent diaphoresis, dizziness. Peripheral edema is only mildly worse than baseline for him and is stable. Denies SOB. Having BMs that are firm and small, RN helping with bowel regimen.     Vital Signs with Ranges  Temp:  [97.2  F (36.2  C)-98.2  F (36.8  C)] 98.2  F (36.8  C)  Pulse:  [65-80] 80  Resp:  [17-20] 20  BP: (101-141)/(47-62) 118/59  SpO2:  [90 %-94 %] 91 %  I/O last 3 completed shifts:  In: 4005 [P.O.:1920; I.V.:2085]  Out: " 3025 [Urine:3025]    Physical Exam   General: Sitting up in chair, comfortable, alert. Large habitus.   Skin: Chronic venous stasis changes to BLE from ankle to mid-calf. No concerning lesions, rash, jaundice, cyanosis, erythema, or ecchymoses on exposed surfaces.   HEENT: NCAT. EOMI, anicteric sclera.  Respiratory: Non-labored breathing on room air, good air exchange, lungs clear to auscultation bilaterally.  Cardiovascular: RRR. No murmur or rub.   Gastrointestinal: Normoactive BS. Abdomen soft, ND.   Extremities: Baseline bilateral LE edema, slightly increased from baseline, stable.   Neurologic: A&O x 3, speech normal, no deficits grossly.    Medications     - MEDICATION INSTRUCTIONS -         acyclovir  400 mg Oral BID     allopurinol  300 mg Oral Daily     ammonium lactate   Topical Daily     bumetanide  1 mg Oral Daily     dexamethasone  8 mg Oral Daily     diclofenac  2 g Topical 4x Daily     fluticasone-vilanterol  1 puff Inhalation Daily     heparin lock flush  5-20 mL Intracatheter Q24H     hydroxyurea  1,000 mg Oral Daily     insulin aspart  1-7 Units Subcutaneous TID AC     insulin aspart  1-5 Units Subcutaneous At Bedtime     ipratropium - albuterol 0.5 mg/2.5 mg/3 mL  3 mL Nebulization BID     levofloxacin  250 mg Oral Daily     lidocaine  1-2 patch Transdermal Q24H     lidocaine   Transdermal Q8H GREG     [Held by provider] lisinopril  5 mg Oral Daily     methocarbamol  500 mg Oral 4x Daily     [Held by provider] metoprolol succinate ER  12.5 mg Oral Daily     micafungin  50 mg Intravenous Q24H     pantoprazole  40 mg Oral QAM AC     [Held by provider] rivaroxaban ANTICOAGULANT  20 mg Oral Daily with supper     senna-docusate  1 tablet Oral BID    Or     senna-docusate  2 tablet Oral BID     Data   Results for orders placed or performed during the hospital encounter of 12/09/22 (from the past 24 hour(s))   Uric acid   Result Value Ref Range    Uric Acid 3.5 3.4 - 7.0 mg/dL   Phosphorus   Result Value  Ref Range    Phosphorus 4.4 2.5 - 4.5 mg/dL   Basic metabolic panel   Result Value Ref Range    Sodium 132 (L) 136 - 145 mmol/L    Potassium 5.3 3.4 - 5.3 mmol/L    Chloride 98 98 - 107 mmol/L    Carbon Dioxide (CO2) 20 (L) 22 - 29 mmol/L    Anion Gap 14 7 - 15 mmol/L    Urea Nitrogen 32.0 (H) 8.0 - 23.0 mg/dL    Creatinine 1.16 0.67 - 1.17 mg/dL    Calcium 8.9 8.8 - 10.2 mg/dL    Glucose 276 (H) 70 - 99 mg/dL    GFR Estimate 72 >60 mL/min/1.73m2   MR Thoracic Spine w/o & w Contrast    Narrative    MR THORACIC SPINE W/O & W CONTRAST 12/17/2022 7:18 PM    History: Pt w/new diagnosis of AML. Unexplained and severe thoracic  spine pain.    Comparison: Chest CT from yesterday    Technique:  Sagittal T1- and T2-weighted images through the thoracic spine and  axial T2-weighted images were obtained without intravenous contrast.  Following intravenous administration of gadolinium, axial and sagittal  T1-weighted images with fat saturation were also obtained.    Contrast: Gadavist 17.6 cc    Findings: Normal alignment. Normal thoracic kyphosis. Markedly  decreased intrinsic T1 signal in the osseous structures compatible  with bone marrow reconversion. Subtle mottled enhancement of the  vertebral structures and thoracic ribs most pronounced at left dorsal  seventh rib (series 101 image 31).    No spinal canal or neural from stenosis. No acute compression  fracture. Normal cord signal. No cord compression. Scattered partially  visualized renal cortical cyst.      Impression    IMPRESSION: Diffuse bone marrow reconversion can be seen in the  setting of hematologic malignancy. Scattered subtle mottled  enhancement of the bones most pronounced in the left seventh rib,  could be secondary to underlying primary disease. No cord compression  or myelopathic cord signal.    WILLIAMS SHELLEY MD         SYSTEM ID:  [id number]   Glucose by meter   Result Value Ref Range    GLUCOSE BY METER POCT 181 (H) 70 - 99 mg/dL   Glucose by meter    Result Value Ref Range    GLUCOSE BY METER POCT 181 (H) 70 - 99 mg/dL   Glucose by meter   Result Value Ref Range    GLUCOSE BY METER POCT 145 (H) 70 - 99 mg/dL   CBC with platelets differential    Narrative    The following orders were created for panel order CBC with platelets differential.  Procedure                               Abnormality         Status                     ---------                               -----------         ------                     CBC with platelets and d...[[id number]]  Abnormal            Final result               Manual Differential[[id number]]          Abnormal            Final result                 Please view results for these tests on the individual orders.   ABO/Rh type and screen    Narrative    The following orders were created for panel order ABO/Rh type and screen.  Procedure                               Abnormality         Status                     ---------                               -----------         ------                     Adult Type and Screen[[id number]]                            Final result                 Please view results for these tests on the individual orders.   Comprehensive metabolic panel   Result Value Ref Range    Sodium 132 (L) 136 - 145 mmol/L    Potassium 4.9 3.4 - 5.3 mmol/L    Chloride 98 98 - 107 mmol/L    Carbon Dioxide (CO2) 24 22 - 29 mmol/L    Anion Gap 10 7 - 15 mmol/L    Urea Nitrogen 26.2 (H) 8.0 - 23.0 mg/dL    Creatinine 0.97 0.67 - 1.17 mg/dL    Calcium 9.0 8.8 - 10.2 mg/dL    Glucose 117 (H) 70 - 99 mg/dL    Alkaline Phosphatase 77 40 - 129 U/L    AST 45 10 - 50 U/L    ALT 17 10 - 50 U/L    Protein Total 6.8 6.4 - 8.3 g/dL    Albumin 3.4 (L) 3.5 - 5.2 g/dL    Bilirubin Total 0.5 <=1.2 mg/dL    GFR Estimate 89 >60 mL/min/1.73m2   Lactate Dehydrogenase   Result Value Ref Range    Lactate Dehydrogenase 1,779 (H) 0 - 250 U/L   INR   Result Value Ref Range    INR 1.11 0.85 - 1.15   Partial thromboplastin time   Result Value Ref  "Range    aPTT 28 22 - 38 Seconds   Fibrinogen activity   Result Value Ref Range    Fibrinogen Activity 318 170 - 490 mg/dL   Magnesium   Result Value Ref Range    Magnesium 2.5 (H) 1.7 - 2.3 mg/dL   Uric acid   Result Value Ref Range    Uric Acid 3.6 3.4 - 7.0 mg/dL   Phosphorus   Result Value Ref Range    Phosphorus 4.6 (H) 2.5 - 4.5 mg/dL   CBC with platelets and differential   Result Value Ref Range    WBC Count 17.3 (H) 4.0 - 11.0 10e3/uL    RBC Count 2.72 (L) 4.40 - 5.90 10e6/uL    Hemoglobin 8.1 (L) 13.3 - 17.7 g/dL    Hematocrit 27.3 (L) 40.0 - 53.0 %     78 - 100 fL    MCH 29.8 26.5 - 33.0 pg    MCHC 29.7 (L) 31.5 - 36.5 g/dL    RDW 19.2 (H) 10.0 - 15.0 %    Platelet Count 42 (LL) 150 - 450 10e3/uL    Narrative    Previously reported component [ NRBCs ] is no longer reported.\"  Previously reported component [ NRBCs Absolute ] is no longer reported.\"   Adult Type and Screen   Result Value Ref Range    ABO/RH(D) A POS     Antibody Screen Negative Negative    SPECIMEN EXPIRATION DATE [id number]    Manual Differential   Result Value Ref Range    % Neutrophils 65 %    % Lymphocytes 21 %    % Monocytes 11 %    % Eosinophils 0 %    % Basophils 0 %    % Myelocytes 3 %    NRBCs per 100 WBC 17 (H) <=0 %    Absolute Neutrophils 11.2 (H) 1.6 - 8.3 10e3/uL    Absolute Lymphocytes 3.6 0.8 - 5.3 10e3/uL    Absolute Monocytes 1.9 (H) 0.0 - 1.3 10e3/uL    Absolute Eosinophils 0.0 0.0 - 0.7 10e3/uL    Absolute Basophils 0.0 0.0 - 0.2 10e3/uL    Absolute Myelocytes 0.5 (H) <=0.0 10e3/uL    Absolute NRBCs 2.9 (H) <=0.0 10e3/uL    RBC Morphology Confirmed RBC Indices     Platelet Assessment  Automated Count Confirmed. Platelet morphology is normal.     Automated Count Confirmed. Platelet morphology is normal.   Glucose by meter   Result Value Ref Range    GLUCOSE BY METER POCT 103 (H) 70 - 99 mg/dL       "

## 2022-12-18 NOTE — PLAN OF CARE
"Goal Outcome Evaluation:    1500 - 2330:   BP (!) 141/62   Pulse 78   Temp 98  F (36.7  C) (Temporal)   Resp 20   Ht 1.854 m (6' 1\")   Wt (!) 176.2 kg (388 lb 8 oz)   SpO2 94%   BMI 51.26 kg/m       Pt had MRI Thoracic spine showed no cord compression.   A&O x 4, AVSS on RA, on cont's pulse ox sating at 90 - 91%(have COPD/MD ok with it).  Bilateral shoulder/back pain managed with oxycodone 10 mg x 2, on scheduled robaxin & lidocaine patch x 2 on shoulder blade.  TLS lab BID.   , insulin given per sliding scale.  MIVF NS 75 ml/hr via PICC.  Lymphedema consulted.  Up sba, voiding spontaneously, last bm 12/15, took scheduled 2 senokot.  Waiting for cytogenetic testing prior AML treatment.  Continue with poc...                                  "

## 2022-12-19 PROBLEM — C92.00 ACUTE MYELOID LEUKEMIA NOT HAVING ACHIEVED REMISSION (H): Status: ACTIVE | Noted: 2022-01-01

## 2022-12-19 NOTE — PLAN OF CARE
"Goal Outcome Evaluation:      Plan of Care Reviewed With: patient    Overall Patient Progress: no changeOverall Patient Progress: no change    Time 9825-8512    /56   Pulse 75   Temp 97.8  F (36.6  C) (Temporal)   Resp 18   Ht 1.854 m (6' 1\")   Wt (!) 175.8 kg (387 lb 9.6 oz)   SpO2 90%   BMI 51.14 kg/m      Reason for admission: Leukemia, chemo pending bx findings  Activity: Independent   Pain: Oxycodone x 2  Neuro: A&O x 4  Cardiac: wnl, holding htn meds @ this x  Respiratory: RA  GI/: Voiding, last BM 12/18 but small, mirilax   Diet: Regular  Lines: PICC  Labs/imaging: Reviewed phos and phos 5.0, Uric acid 4.3, bgm 173, 268      New changes this shift: Started phoslo     Plan: plan on starting chemo tomorrow after results      Continue to monitor and follow POC   "

## 2022-12-19 NOTE — PROGRESS NOTES
Prior Authorization **INITIATED**    Medication: Venclexta 100mg tabs  Insurance Company: [employer] ([facility]) - Phone [phone] Fax [phone]  Pharmacy Filling the Rx: n/a - Patient has not yet been discharged, and there are no outpatient orders for this medication yet  Start Date: 12/19/2022  Reference #: CoverMyMeds Key: [id number] - PA Case ID: [id number]  Comments:  Proactive Prior Authorization      [facility] Discharge Pharmacy Liaison  Pronouns: She/Her/Hers  (covering for Lashonda Robles, U-Discharge/[facility] Discharge Pharmacy Liaison)    [facility]  [address] [place] [address]  [address] [place] [address] [address], [place] [address]   [email or web address]  [email or web address]   Phone: [phone]  Pager: [phone]  Fax: [phone]

## 2022-12-19 NOTE — PROGRESS NOTES
[facility]    Hematology / Oncology Progress Note    Patient: Angel Cardenas  MRN: [id number]  Admission Date: 12/9/2022  Date of Service (when I saw the patient): 12/19/2022  Hospital Day # 10     Assessment & Plan   Angel Cardenas is a 62 year old male with past medical history of PE (previously on Xarelto), T2DM, COPD, HTN, and gout. Diagnosed with AMML.      Today:   - Plan to start Venetocalx + Decitabine this evening, after discussion with Dr. Malloy, patient and sister.   - Continue TLS/DIC monitoring BID   - Continue supportive cares for constipation; PRN senna and Miralax    HEME   # AMML   # Leukocytosis with peripheral blasts  Patient was undergoing work up with his PCP for fatigue, subjective fevers, lack of appetite, weight loss, intermittent nose bleed over the last month and rib pain. He was found to have WBC 8.5 Hgb 8.8 and plts 82 with 2% promyelocytes. OP peripheral smear with bicytopenia and 6% circulating blasts, no adrianne rods noted. Flow cytometry of peripheral blood 12/9 with 1% CD34 blasts, reviewed at [facility] with noted 6% blasts. He was referred to [facility] for further work-up and initiation of treatment. On admission WBC 18.2 Hgb 8.3 and Plt 94. He was seen by oncall fellow and attending, please see full note 12/9. Peripheral smear was reviewed and there was a very low suspicion for APL based on review so no treatment was initiated. 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   - Peripheral flow 12/9 resulted with abnormal monocytic population (25%) and abnormal CD34 positive myeloid blast population (1.1%) worrisome for high-grade myeloid neoplasm. Peripheral BCR/ABL1 and FLT3 negative.FISH with no rearrangement of NUP98 or KMT2A.   - OP BMBx 12/9/22. Arrived from [facility] on 12/13. 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 Cytogenetics and molecular pending.   - Of note, BMBx procedure was technically very difficult and OSH  recommended IR consult for future biopsies. Per nursing, appears more sacral so unsure of quality.   - Virologies: HSV 1/2 negative, CMV IgG-, EBV+, HIV-, HepB/C-   - HLA typing sent on admission   - EKG 12/9 with NSR QTc 441. Echo 12/10 with hyperkinetic LVEF at 65-70%.   - Hold off on any line placement for now  - WBC 18K on admission. Hydrea 1g daily 12/11-present, will stop with initiation of treatment.  - Anticipate initiation of Venetoclax + Decitabine after discussions with Dr. Malloy, patient and his sister.     # Anemia   # Thrombocytopenia   - Transfuse to keep Hgb >7 and plt > 10k (may consider increasing if recurrent epistaxis)      # TLS  # Risk of DIC  # Hyperuricemia   # Hyperphosphatemia  # H/o gout   On admission uric acid 9.1, LDH 2,475, though all other TLS labs WNL including Cr 0.88. Cr trending up slightly as of 12/11 to 1.11 and uric acid remains elevated at 9.6. Of note patient does have a known h/o gout. INR mildly elevated (~1.2).   - On IVF upon admission, stopped d/t volume overload. Restarted 12/16-12/18 in the setting of hypotension and TLS. Bolus PRN.   - Allopurinol 300 mg daily   - S/p rasburicase x1 on 12/11 and 12/16.   - Consider PhosLo if phos continues to uptrend  - Monitor TLS labs BID. Monitor DIC labs daily for now.     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 Amari Cortes (4/15/22). Patient has history of multiple DVTs in the 1990s without known cause, unprovoked PE at age 43, DVT with PE in context of hip replacement. He most recently was diagnosed with bilateral PE with right heart strain on 1/19/22 after presenting to the ED with SOB.   - Resumed PTA Xarelto 12/13; held x12/16 with plt dropping  - Repeat CTA 12/16 w/o evidence of PE     ID  # Undifferentiated shock, resolved  # Possible pneumonia   # Hypotension, resolved  # Possible adrenal insufficiency  Acute symptomatic hypotension  on 12/16 from 123/61 ? 77/39 in the setting of acutely worsening upper back pain, diaphoresis, dizziness. Rapid response called. EKG NSR, trop 33 ? 30. VBG normal. Hgb stable. No e/o bleed, PE, dissection on CTA/CAP w/ contrast. CT did note RLL consolidation suggestion possible pneumonia. Lactic 2.2 ? 1.8. BCx NGTD. Initially pressures improved with bolus then became soft again to 92/46 around 1400. Recent echo with EF 65-70%.  - Suspect that hypotension was possibly from adrenal insufficiency with steroid taper as pressure improved after dexamethasone 10 mg was ordered 12/16 afternoon. AM cortisol 12/17 was WNL but likely skewed by recent steroid administration. Less likely septic shock in the absense of fever and tachycardia.   - Cefepime 12/16-12/18, discontinued as low suspicion for infection as etiology of hypotension. Completed azithromycin 12/16-12/18.  - Hold lisinopril and metoprolol    # Prophylaxis   -  mg BID  - Micafungin 50 mg IV    - Posa/vori both $0/month copay  - Levaquin 250 mg daily     ENT   # Sore throat, resolved  Mild L tonsil swelling on exam. No purulence or discrete lesions. No stridor or difficulty breathing. Strep negative, HSV swab negative.  - Cepacol lozenges PRN     # Intermittent epistaxis   Patient has been having intermittent epistaxis over the last month, but increase in frequency to daily over the last week PTA.   - If recurrent episodes may consider increasing platelet threshold, adding Afrin      MSK/NEURO  # Acute on chronic back pain, improved  Patient endorses ongoing back pain prior to hospitalization though over the past week has been endorsing rib pain which radiates into his upper back and between his shoulder blades. No red flag sx. Of note he does have splenomegaly on recent CT imaging which could be contributing. EKG shows NSR. Lipase WNL. Acute worsening on 12/16 in the setting of acute hypotension as above, improved with steroids.   - Scheduled Voltaren gel  "QID, Robaxin QID  - PRN Tylenol, heat packs  - MRI thoracic spine 12/17 showed \"scattered subtle mottled enhancement of bones most pronounced on L 7th rib, could be 2/2 underlying disease process.\" It is possible that pt has some extramedullary infiltration of cortex causing pain.   - PTA oxycodone 5-10 mg q6h PRN and IV dilaudid 0.5 mg q2hr PRN  - Pain responds well to dexamethasone. S/p dex 10 mg x1 on 12/11-13, 6 mg on 12/14, 4 mg on 12/15 for pain. Increased back to 10 mg on 12/16, taper to 8 mg on 12/17. Continue dex 8 mg daily until treatments, then taper slowly given concern for adrenal insufficiency as above.   - Could consider [facility] consult in the future pending course    # Headaches, improved  Patient has been experiencing nasal congestion, left sided headaches and eye pressure. No other neurologic complaints. Brain MRI 12/5 with diffuse linear and slightly nodular dural thickening and enhancement. Diffuse marrow signal abnormality within calvarium and central skull base. Differential includes lymphoproliferative abnormalities and metastatic disease.  - Requested images to be pushed into PACs and [facility] over read ordered  - S/p diagnostic LP 12/12; flow negative  - Tylenol available PRN      PULM  # Hypoxia, intermittent  # COPD   New O2 requirement 12/11 to 3-4L in the setting of recent continuous IVF, splenomegaly and poor pain control. After improvement in volume status, ongoing mild hypoxia to high 80s, may be baseline from COPD.  - CXR showed cardiomegaly, enlarged pulmonary vasculature and diffuse mild interstitial markings bilaterally likely representing pulmonary edema.   - PTA Bumex above  - DuoNebs BID x12/13. Neb treatments q2hr PRN   - Continue PTA Breo Ellipta   - Incentive spirometry  - CT chest 12/16 showed possible RLL pneumonia. See pneumonia treatment as above.     # CATHERINE  - Continue CPAP at night  - Hypoxia at night, should follow up outpatient for possible CPAP adjustment    CARDS  # " "HTN   - Hold PTA metoprolol ER 12.5 mg daily and lisinopril 5 mg daily in the setting of recent hypotension     # HLD   - PTA atorvastatin held on admission for potential treatment      # Chronic venous insufficiency, CEAP 5   # Bilateral LE edema   - Echo as above  - Continue PTA Bumex 1 mg daily, may consider additional doses based on symptoms    - Lymphedema consulted     GI  # Constipation  Pt is having BMs but stools are small and firm.   - Senna BID PRN and MiraLax BID PRN  - Milk of magnesium daily PRN  - Drinking prune juice    ENDO  # T2DM   Follows with [facility], last seen 11/15/22.   - PTA Ozempic held on admission   - Medium intensity sliding scale insulin   - Hypoglycemia protocol in place     Clinically Significant Risk Factors         # Hyponatremia: Lowest Na = 130 mmol/L in last 2 days, will monitor as appropriate      # Hypoalbuminemia: Lowest albumin = 3.2 g/dL at 12/11/2022  7:01 AM, will monitor as appropriate   # Thrombocytopenia: Lowest platelets = 42 in last 2 days, will monitor for bleeding         # Severe Obesity: Estimated body mass index is 50.25 kg/m  as calculated from the following:    Height as of this encounter: 1.854 m (6' 1\").    Weight as of this encounter: 172.8 kg (380 lb 14.4 oz).   # Moderate Malnutrition: based on nutrition assessment      FEN  Diet: Regular Diet Adult   IVF: Bolus PRN; cautious with volume overload  Lytes: Replete per protocol    PPX  VTE: None given thrombocytopenia  Bowel: Senna/MiraLax/MoM PRN  GI/PUD: Protonix    MISC  Code Status: Full Code   Lines/Drains: PIV  Social: Lives at home alone near [place], [place]  Dispo: Pending work up and management of cytopenias. LOS will be determined by chemo plan.   Follow Up: TBD    Patient was seen and plan of care was discussed with attending physician Dr. Malloy.    I spent 40 minutes in the care of this patient today, which included time necessary for review of interval events, obtaining history " and physical exam, ordering medications/tests/procedures as medically indicated, review of pertinent medical literature, counseling of the patient, coordination of care, and documentation time. Over 50% of time was spent counseling the patient and/or coordinating care.    Katrin Colbert PA-C ([facility])  Hematology/Oncology  Pager #[phone]    Interval History   No acute events overnight. Angel is overall doing well. He was dealing with some constipation the past couple days but was able to have a bowel movement yesterday. He reports it was a bit on the softer side, discussed that he may need to hold miralax if it continues to be soft. Discussed that we will have the cytogenetics back later today and Dr. Malloy will come this afternoon and discuss treatment. He voiced understanding. All questions answered at this time.      Vital Signs with Ranges  Temp:  [96.9  F (36.1  C)-98.5  F (36.9  C)] 97.8  F (36.6  C)  Pulse:  [61-87] 78  Resp:  [18-20] 18  BP: (113-133)/(56-69) 115/60  SpO2:  [90 %-95 %] 92 %  I/O last 3 completed shifts:  In: 20 [I.V.:20]  Out: -     Physical Exam   General: Sitting up on bed, comfortable, alert. Large habitus.   Skin: Chronic venous stasis changes to BLE from ankle to mid-calf. No concerning lesions, rash, jaundice, cyanosis, erythema, or ecchymoses on exposed surfaces.   HEENT: NCAT. EOMI, anicteric sclera.  Respiratory: Non-labored breathing on room air, good air exchange, lungs clear to auscultation bilaterally.  Cardiovascular: RRR. No murmur or rub.   Gastrointestinal: Normoactive BS. Abdomen soft, ND.   Extremities: Baseline bilateral LE edema, slightly increased from baseline, stable.   Neurologic: A&O x 3, speech normal, no deficits grossly.    Medications     - MEDICATION INSTRUCTIONS -         acyclovir  400 mg Oral BID     allopurinol  300 mg Oral Daily     ammonium lactate   Topical Daily     bumetanide  1 mg Oral Daily     calcium acetate (phos binder)  1,334 mg Oral TID w/meals      dexamethasone  8 mg Oral Daily     diclofenac  2 g Topical 4x Daily     fluticasone-vilanterol  1 puff Inhalation Daily     heparin lock flush  5-20 mL Intracatheter Q24H     hydroxyurea  1,000 mg Oral Daily     insulin aspart  1-7 Units Subcutaneous TID AC     insulin aspart  1-5 Units Subcutaneous At Bedtime     ipratropium - albuterol 0.5 mg/2.5 mg/3 mL  3 mL Nebulization BID     levofloxacin  250 mg Oral Daily     lidocaine  1-2 patch Transdermal Q24H     lidocaine   Transdermal Q8H GREG     [Held by provider] lisinopril  5 mg Oral Daily     methocarbamol  500 mg Oral 4x Daily     [Held by provider] metoprolol succinate ER  12.5 mg Oral Daily     micafungin  50 mg Intravenous Q24H     pantoprazole  40 mg Oral QAM AC     [Held by provider] rivaroxaban ANTICOAGULANT  20 mg Oral Daily with supper     senna-docusate  1 tablet Oral BID    Or     senna-docusate  2 tablet Oral BID     Data   Results for orders placed or performed during the hospital encounter of 12/09/22 (from the past 24 hour(s))   Uric acid   Result Value Ref Range    Uric Acid 4.3 3.4 - 7.0 mg/dL   Phosphorus   Result Value Ref Range    Phosphorus 5.0 (H) 2.5 - 4.5 mg/dL   Basic metabolic panel   Result Value Ref Range    Sodium 130 (L) 136 - 145 mmol/L    Potassium 4.9 3.4 - 5.3 mmol/L    Chloride 94 (L) 98 - 107 mmol/L    Carbon Dioxide (CO2) 23 22 - 29 mmol/L    Anion Gap 13 7 - 15 mmol/L    Urea Nitrogen 28.2 (H) 8.0 - 23.0 mg/dL    Creatinine 1.04 0.67 - 1.17 mg/dL    Calcium 9.2 8.8 - 10.2 mg/dL    Glucose 169 (H) 70 - 99 mg/dL    GFR Estimate 82 >60 mL/min/1.73m2   Glucose by meter   Result Value Ref Range    GLUCOSE BY METER POCT 173 (H) 70 - 99 mg/dL   Glucose by meter   Result Value Ref Range    GLUCOSE BY METER POCT 268 (H) 70 - 99 mg/dL   Glucose by meter   Result Value Ref Range    GLUCOSE BY METER POCT 159 (H) 70 - 99 mg/dL   CBC with platelets differential    Narrative    The following orders were created for panel order CBC with  platelets differential.  Procedure                               Abnormality         Status                     ---------                               -----------         ------                     CBC with platelets and d...[[id number]]  Abnormal            Final result               Manual Differential[[id number]]          Abnormal            Final result                 Please view results for these tests on the individual orders.   Comprehensive metabolic panel   Result Value Ref Range    Sodium 130 (L) 136 - 145 mmol/L    Potassium 5.3 3.4 - 5.3 mmol/L    Chloride 95 (L) 98 - 107 mmol/L    Carbon Dioxide (CO2) 25 22 - 29 mmol/L    Anion Gap 10 7 - 15 mmol/L    Urea Nitrogen 26.9 (H) 8.0 - 23.0 mg/dL    Creatinine 0.98 0.67 - 1.17 mg/dL    Calcium 9.1 8.8 - 10.2 mg/dL    Glucose 122 (H) 70 - 99 mg/dL    Alkaline Phosphatase 75 40 - 129 U/L    AST 39 10 - 50 U/L    ALT 15 10 - 50 U/L    Protein Total 6.8 6.4 - 8.3 g/dL    Albumin 3.5 3.5 - 5.2 g/dL    Bilirubin Total 0.5 <=1.2 mg/dL    GFR Estimate 88 >60 mL/min/1.73m2   Lactate Dehydrogenase   Result Value Ref Range    Lactate Dehydrogenase 1,522 (H) 0 - 250 U/L   INR   Result Value Ref Range    INR 1.14 0.85 - 1.15   Partial thromboplastin time   Result Value Ref Range    aPTT 26 22 - 38 Seconds   Fibrinogen activity   Result Value Ref Range    Fibrinogen Activity 336 170 - 490 mg/dL   Magnesium   Result Value Ref Range    Magnesium 2.5 (H) 1.7 - 2.3 mg/dL   Uric acid   Result Value Ref Range    Uric Acid 4.5 3.4 - 7.0 mg/dL   Phosphorus   Result Value Ref Range    Phosphorus 4.9 (H) 2.5 - 4.5 mg/dL   CBC with platelets and differential   Result Value Ref Range    WBC Count 16.5 (H) 4.0 - 11.0 10e3/uL    RBC Count 2.72 (L) 4.40 - 5.90 10e6/uL    Hemoglobin 8.0 (L) 13.3 - 17.7 g/dL    Hematocrit 26.8 (L) 40.0 - 53.0 %    MCV 99 78 - 100 fL    MCH 29.4 26.5 - 33.0 pg    MCHC 29.9 (L) 31.5 - 36.5 g/dL    RDW 19.5 (H) 10.0 - 15.0 %    Platelet Count 42 (LL) 150 -  "450 10e3/uL    Narrative    Previously reported component [ NRBCs ] is no longer reported.\"  Previously reported component [ NRBCs Absolute ] is no longer reported.\"   Manual Differential   Result Value Ref Range    % Neutrophils 60 %    % Lymphocytes 17 %    % Monocytes 14 %    % Eosinophils 0 %    % Basophils 0 %    % Metamyelocytes 3 %    % Myelocytes 1 %    % Other Cells 5 %    NRBCs per 100 WBC 17 (H) <=0 %    Absolute Neutrophils 9.9 (H) 1.6 - 8.3 10e3/uL    Absolute Lymphocytes 2.8 0.8 - 5.3 10e3/uL    Absolute Monocytes 2.3 (H) 0.0 - 1.3 10e3/uL    Absolute Eosinophils 0.0 0.0 - 0.7 10e3/uL    Absolute Basophils 0.0 0.0 - 0.2 10e3/uL    Absolute Metamyelocytes 0.5 (H) <=0.0 10e3/uL    Absolute Myelocytes 0.2 (H) <=0.0 10e3/uL    Absolute Other Cells 0.8 (H) <=0.0 10e3/uL    Absolute NRBCs 2.8 (H) <=0.0 10e3/uL    RBC Morphology Confirmed RBC Indices     Platelet Assessment  Automated Count Confirmed. Platelet morphology is normal.     Automated Count Confirmed. Platelet morphology is normal.    Polychromasia Slight (A) None Seen   Glucose by meter   Result Value Ref Range    GLUCOSE BY METER POCT 175 (H) 70 - 99 mg/dL       "

## 2022-12-19 NOTE — CONSULTS
Consulted by Bismark Flores to run a test claim for Venclexta 100mg tabs.    Patient has pharmacy benefits through [employer] Per insurance, this medication requires prior authorization.    This process has been started--please see separate notes linked from Prior Authorization Encounter for details/updates regarding this PA.      Please feel free to contact me with any other test claims, prior authorizations, or insurance questions regarding outpatient medications.     Thanks!      Genesis Daniel, [facility] Discharge Pharmacy Liaison  Pronouns: She/Her/Hers  (covering for Lashonda Travis, [facility] Discharge Pharmacy Liaison)    [facility]  [address] [place] [address]  [address] [place] [address] [address] [address]   [email or web address]  [email or web address]   Phone: [phone]  Pager: [phone]  Fax: [phone]

## 2022-12-19 NOTE — PROGRESS NOTES
"SPIRITUAL HEALTH SERVICES  [facility] 7D  REFERRAL SOURCE: Length of stay     Pt was pleasant with a positive attitude. Pt shares, \"I have ish, a lot of ish that everything will work out ok. It's how I was raised. I still have a lot of life to live.\"  Pt expressed no spiritual health needs at this time.     PLAN: No further follow-up.     Rev. Lakshmi Lambert MDiv, [facility]  Staff    Pager [phone]  * [facility] remains available 24/7 for emergent requests/referrals, either by having the switchboard page the on-call  or by entering an ASAP/STAT consult in Epic (this will also page the on-call ).*   "

## 2022-12-19 NOTE — PROGRESS NOTES
CLINICAL NUTRITION SERVICES - REASSESSMENT NOTE     Nutrition Prescription    RECOMMENDATIONS FOR MDs/PROVIDERS TO ORDER:  - Total fluids/adjustments per Provider     Malnutrition Status:    - Moderate malnutrition in the context of acute illness      Recommendations already ordered by Registered Dietitian (RD):  - None at this time    Future/Additional Recommendations:  - Encourage small frequent meals every 3-4 hrs (including ONS, such as Glucerna) if poor appetite persists.   - If pt to remain hospitalized > 72 hrs, recommend obtaining calorie counts to help quantify po amounts.  - Monitor wt trends and lytes     EVALUATION OF THE PROGRESS TOWARD GOALS   Diet: Regular as of 12/15 (previously on a Moderate Consistent Carb diet (60 g CHO per meal)    Intake: Pt reports he has still been eating 3 meals per day over the past week, but eating less d/t decreased appetite.       NEW FINDINGS   Weight: 172.8 kg (today), 177.9 kg (12/12); wt loss of 5.1 kg (~ 3% loss) over the past week (despite pt receiving IVF's from 12/16 thru 12/18).     - Per chart review, pt with h/o 14.3% in 1 month    Labs:  NA++ 129 (low)  BUN 32.9 (high)   (high) @ 16:59    Meds:  Bumex daily  Phoslo TID  Decadron daily  Novolog MEDIUM INSULIN RESISTANCE DOSING) TID (before meals and @ bedtime)      ASSESSED NUTRITION NEEDS (new dosing wt of 106 kg, based on lowest wt of 172.8 kg and IBW)   Estimated Energy Needs: 1623-2321 kcals/day (20 - 25 kcals/kg)  Justification: Maintenance and Obese  Estimated Protein Needs: 127-159 grams protein/day (1.2 - 1.5 grams of pro/kg)  Justification: Increased needs  Estimated Fluid Needs: (1 mL/kcal)   Justification: Maintenance     MALNUTRITION  % Intake: h/o < 75% for >/= 1 month (moderate)  % Weight Loss: > 5% in 1 month (severe)  Subcutaneous Fat Loss: None observed  Muscle Loss: None observed  Fluid Accumulation/Edema: Moderate  Malnutrition Diagnosis: Moderate malnutrition in the context of acute  illness    Previous Goals   Patient to consume % of nutritionally adequate meal trays TID, or the equivalent with supplements/snacks.    Evaluation: Met    Previous Nutrition Diagnosis  Unintended weight loss related to decreased oral intake due to fatigue as evidenced by recent weight loss     Evaluation: No change    CURRENT NUTRITION DIAGNOSIS  Unintended weight loss related to question volume depletion d/t diuresis vs inadequate po intakes d/t periods of decreased appetite as evidenced by h/o 14.3% in 1 month, in addition to  3% of the past week and consuming <75% of his meal intakes over the past week.     INTERVENTIONS  Implementation  Collaboration with other providers    Goals  1. Patient to consume % of nutritionally adequate meal trays TID, or the equivalent with supplements/snacks.  2. Wt not to decline < 172 kg    Monitoring/Evaluation  Progress toward goals will be monitored and evaluated per protocol.      Liana Hernandez RD,LD  7D pager [phone]

## 2022-12-19 NOTE — PLAN OF CARE
Goal Outcome Evaluation:    00:00-07:00 am  AF with stable VS  O2 at 90% using CPAP at night.  A&Ox4  Pain between shoulder blades and back well manageable with Oxycodone 10 mg given x 2.   at 02:30 am  No nausea/vomiting  Denied SOB and chest pain.  Up independently  Voiding spontaneously well, good UOP  LBM 12/18  Pt endorses feeling well and no new acute events overnight.  Sleeping well  Possible starting chemo today.  Continue w/POC

## 2022-12-20 NOTE — PLAN OF CARE
Goal Outcome Evaluation:    1624-7745:    VSS, afebrile. To receive Day 1 chemo, Decitabine, this evening. Reports back/shoulder pain, PRN oxycodone 10mg given x1, good relief. BG checks 122, 175, & 278 - covered per sliding scale. No acute events. Continue to monitor & w/ POC.

## 2022-12-20 NOTE — PROGRESS NOTES
[facility]    Hematology / Oncology Progress Note    Patient: Angel Cardenas  MRN: [id number]  Admission Date: 12/9/2022  Date of Service (when I saw the patient): 12/20/2022  Hospital Day # 11     Assessment & Plan   Angel Cardenas is a 62 year old male with past medical history of PE (previously on Xarelto), T2DM, COPD, HTN, and gout. He was admitted for further work up and initiation of treatment. He was diagnosed with AMML and began treatment with Venetoclax + Decitabine (I4O0=8112/19/22).      Today:   - Today is Day 2 Venetoclax + Decitabine; closely monitor for side effects   - Palliative care consult ongoing pain refractory to current interventions   - Continue TLS/DIC monitoring BID   - Continue supportive cares for constipation; PRN senna and Miralax    HEME   # AMML   # Leukocytosis with peripheral blasts  Patient was undergoing work up with his PCP for fatigue, subjective fevers, lack of appetite, weight loss, intermittent nose bleed over the last month and rib pain. He was found to have WBC 8.5 Hgb 8.8 and plts 82 with 2% promyelocytes. OP peripheral smear with bicytopenia and 6% circulating blasts, no adrianne rods noted. Flow cytometry of peripheral blood 12/9 with 1% CD34 blasts, reviewed at [facility] with noted 6% blasts. He was referred to [facility] for further work-up and initiation of treatment. On admission WBC 18.2 Hgb 8.3 and Plt 94. He was seen by oncall fellow and attending, please see full note 12/9. Peripheral smear was reviewed and there was a very low suspicion for APL based on review so no treatment was initiated. 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   - Peripheral flow 12/9 resulted with abnormal monocytic population (25%) and abnormal CD34 positive myeloid blast population (1.1%) worrisome for high-grade myeloid neoplasm. Peripheral BCR/ABL1 and FLT3 negative.FISH with no rearrangement of NUP98 or KMT2A.   - OP BMBx 12/9/22. Arrived from [facility] on  12/13. 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 Cytogenetics and molecular pending.   - Of note, BMBx procedure was technically very difficult and OSH recommended IR consult for future biopsies. Per nursing, appears more sacral so unsure of quality.   - Virologies: HSV 1/2 negative, CMV IgG-, EBV+, HIV-, HepB/C-   - HLA typing sent on admission   - EKG 12/9 with NSR QTc 441. Echo 12/10 with hyperkinetic LVEF at 65-70%.   - Hold off on any line placement for now  - WBC 18K on admission. Hydrea 1g daily 12/11-12/19, stopped with initiation of treatment       Treatment Plan: Venetoclax/Decitabine (O3Y3=3612/19/22)    - Venetoclax 100 mg PO daily - D1     - Venetoclax 200 mg PO daily - D2     - Venetoclax 400 mg PO daily - D3 - 28 - may need to dose adjust depending on antifungal    - Decitabine 20 mg/m2 (60 mg) IV daily - D1-10     # Anemia   # Thrombocytopenia   - Transfuse to keep Hgb >7 and plt > 10k (may consider increasing if recurrent epistaxis)      # TLS  # Risk of DIC  # Hyperuricemia   # Hyperphosphatemia  # H/o gout   On admission uric acid 9.1, LDH 2,475, though all other TLS labs WNL including Cr 0.88. Cr trending up slightly as of 12/11 to 1.11 and uric acid remains elevated at 9.6. Of note patient does have a known h/o gout. INR mildly elevated (~1.2).   - On IVF upon admission, stopped d/t volume overload. Restarted 12/16-12/18 in the setting of hypotension and TLS. Bolus PRN.   - Allopurinol 300 mg daily   - S/p rasburicase x1 on 12/11 and 12/16.   - Consider PhosLo if phos continues to uptrend  - Monitor TLS labs BID. Monitor DIC labs daily for now.     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 Amari Cortes (4/15/22). Patient has history of multiple DVTs in the 1990s without known cause, unprovoked PE at age 43, DVT with PE in context of hip replacement. He most recently was diagnosed with  bilateral PE with right heart strain on 1/19/22 after presenting to the ED with SOB.   - Resumed PTA Xarelto 12/13; held x12/16 with plt dropping  - Repeat CTA 12/16 w/o evidence of PE     ID  # Undifferentiated shock, resolved  # Possible pneumonia   # Hypotension, resolved  # Possible adrenal insufficiency  Acute symptomatic hypotension on 12/16 from 123/61 ? 77/39 in the setting of acutely worsening upper back pain, diaphoresis, dizziness. Rapid response called. EKG NSR, trop 33 ? 30. VBG normal. Hgb stable. No e/o bleed, PE, dissection on CTA/CAP w/ contrast. CT did note RLL consolidation suggestion possible pneumonia. Lactic 2.2 ? 1.8. BCx NGTD. Initially pressures improved with bolus then became soft again to 92/46 around 1400. Recent echo with EF 65-70%.  - Suspect that hypotension was possibly from adrenal insufficiency with steroid taper as pressure improved after dexamethasone 10 mg was ordered 12/16 afternoon. AM cortisol 12/17 was WNL but likely skewed by recent steroid administration. Less likely septic shock in the absense of fever and tachycardia.   - Cefepime 12/16-12/18, discontinued as low suspicion for infection as etiology of hypotension. Completed azithromycin 12/16-12/18.  - Hold lisinopril and metoprolol    # Prophylaxis   -  mg BID  - Micafungin 50 mg IV    - Posa/vori both $0/month copay  - Levaquin 250 mg daily     ENT   # Sore throat, resolved  Mild L tonsil swelling on exam. No purulence or discrete lesions. No stridor or difficulty breathing. Strep negative, HSV swab negative.  - Cepacol lozenges PRN     # Intermittent epistaxis   Patient has been having intermittent epistaxis over the last month, but increase in frequency to daily over the last week PTA.   - If recurrent episodes may consider increasing platelet threshold, adding Afrin      MSK/NEURO  # Acute on chronic back pain  Patient endorses ongoing back pain prior to hospitalization though over the past week has been  "endorsing rib pain which radiates into his upper back and between his shoulder blades. No red flag sx. Of note he does have splenomegaly on recent CT imaging which could be contributing. EKG shows NSR. Lipase WNL. MRI thoracic spine 12/17 showed \"scattered subtle mottled enhancement of bones most pronounced on L 7th rib, could be 2/2 underlying disease process.\" It is possible that pt has some extramedullary infiltration of cortex causing pain.   - Scheduled Voltaren gel QID, Robaxin QID  - PRN Tylenol, heat packs   - PTA oxycodone 10-20 mg q6h PRN and IV dilaudid 1 mg q2hr PRN  - Pain responds well to dexamethasone. S/p dex 10 mg x1 on 12/11-13, 6 mg on 12/14, 4 mg on 12/15 for pain. Increased back to 10 mg on 12/16, taper to 8 mg on 12/17. Continue dex 8 mg daily plan was to start tapering with initiation of treatment but patient is having an acute pain flare; will reassess taper in next couple of days  - Palliative Care consult for further assistance in pain management     # Headaches, improved  Patient has been experiencing nasal congestion, left sided headaches and eye pressure. No other neurologic complaints. Brain MRI 12/5 with diffuse linear and slightly nodular dural thickening and enhancement. Diffuse marrow signal abnormality within calvarium and central skull base. Differential includes lymphoproliferative abnormalities and metastatic disease.  - Requested images to be pushed into PACs and [facility] over read ordered  - S/p diagnostic LP 12/12; flow negative  - Tylenol available PRN      PULM  # Hypoxia, intermittent  # COPD   New O2 requirement 12/11 to 3-4L in the setting of recent continuous IVF, splenomegaly and poor pain control. After improvement in volume status, ongoing mild hypoxia to high 80s, may be baseline from COPD.  - CXR showed cardiomegaly, enlarged pulmonary vasculature and diffuse mild interstitial markings bilaterally likely representing pulmonary edema.   - PTA Bumex above  - DuoNebs " "BID x12/13. Neb treatments q2hr PRN   - Continue PTA Breo Ellipta   - Incentive spirometry  - CT chest 12/16 showed possible RLL pneumonia. See pneumonia treatment as above.     # CATHERINE  - Continue CPAP at night  - Hypoxia at night, should follow up outpatient for possible CPAP adjustment    CARDS  # HTN   - Hold PTA metoprolol ER 12.5 mg daily and lisinopril 5 mg daily in the setting of recent hypotension     # HLD   - PTA atorvastatin held on admission for potential treatment      # Chronic venous insufficiency, CEAP 5   # Bilateral LE edema   - Echo as above  - Continue PTA Bumex 1 mg daily, may consider additional doses based on symptoms    - Lymphedema consulted     GI  # Constipation  Pt is having BMs but stools are small and firm.   - Senna BID PRN and MiraLax BID PRN  - Milk of magnesium daily PRN  - Drinking prune juice    ENDO  # T2DM   Follows with [facility], last seen 11/15/22.   - PTA Ozempic held on admission   - Medium intensity sliding scale insulin   - Hypoglycemia protocol in place     Clinically Significant Risk Factors         # Hyponatremia: Lowest Na = 129 mmol/L in last 2 days, will monitor as appropriate      # Hypoalbuminemia: Lowest albumin = 3.2 g/dL at 12/11/2022  7:01 AM, will monitor as appropriate   # Thrombocytopenia: Lowest platelets = 42 in last 2 days, will monitor for bleeding         # Severe Obesity: Estimated body mass index is 50.03 kg/m  as calculated from the following:    Height as of this encounter: 1.854 m (6' 1\").    Weight as of this encounter: 172 kg (379 lb 3.1 oz).   # Moderate Malnutrition: based on nutrition assessment      FEN  Diet: Regular Diet Adult   IVF: Bolus PRN; cautious with volume overload  Lytes: Replete per protocol    PPX  VTE: None given thrombocytopenia  Bowel: Senna/MiraLax/MoM PRN  GI/PUD: Protonix    MISC  Code Status: Full Code   Lines/Drains: PIV  Social: Lives at home alone near [place], [place]  Dispo: Pending work up and " management of cytopenias. LOS will be determined by chemo plan.   Follow Up: TBD    Patient was seen and plan of care was discussed with attending physician Dr. Malloy.    I spent 70 minutes in the care of this patient today, which included time necessary for review of interval events, obtaining history and physical exam, ordering medications/tests/procedures as medically indicated, review of pertinent medical literature, counseling of the patient, coordination of care, and documentation time. Over 50% of time was spent counseling the patient and/or coordinating care.    Katrin Colbert PA-C ([place])  Hematology/Oncology  Pager #[phone]    Interval History   Nursing notes reviewed.  Angel began having a flare of his mid back pain ~0300 this morning. He received Oxycodone and additional IV pain medication with minimal relief. I went to visit with Angel early in the morning and he was visibly uncomfortable. He reports that he has had minimal relief with the current pain medications. It is his chronic pain but more extreme he states. Discussed adjusting his current regimen and adjusting. He was agreeable. He has no loss of sensation in lower extremities, loss of bowel or bladder function.     Went to re-visit the patient and he is still having intense back pain. Dr. Malloy and Dr. Downs were at bedside. Will further increase IV pain meds and consult palliative care for further assistance.     Vital Signs with Ranges  Temp:  [97.7  F (36.5  C)-98.6  F (37  C)] 98  F (36.7  C)  Pulse:  [70-85] 85  Resp:  [16-20] 20  BP: (112-131)/(58-66) 112/61  SpO2:  [90 %-98 %] 96 %  I/O last 3 completed shifts:  In: 820 [P.O.:800; I.V.:20]  Out: -     Physical Exam   General: Sitting up on bed, comfortable, alert. Large habitus.   Skin: Chronic venous stasis changes to BLE from ankle to mid-calf. No concerning lesions, rash, jaundice, cyanosis, erythema, or ecchymoses on exposed surfaces.   HEENT: NCAT. EOMI, anicteric  sclera.  Respiratory: Non-labored breathing on room air, good air exchange, lungs clear to auscultation bilaterally.  Cardiovascular: RRR. No murmur or rub.   Gastrointestinal: Normoactive BS. Abdomen soft, ND.   Extremities: Baseline bilateral LE edema, slightly increased from baseline, stable.   Neurologic: A&O x 3, speech normal, no deficits grossly.    Medications     - MEDICATION INSTRUCTIONS -         acyclovir  400 mg Oral BID     allopurinol  300 mg Oral Daily     ammonium lactate   Topical Daily     bumetanide  1 mg Oral Daily     calcium acetate (phos binder)  1,334 mg Oral TID w/meals     decitabine  20 mg/m2 (Treatment Plan Recorded) Intravenous Q23H     dexamethasone  8 mg Oral Daily     diclofenac  2 g Topical 4x Daily     fluticasone-vilanterol  1 puff Inhalation Daily     heparin lock flush  5-20 mL Intracatheter Q24H     insulin aspart  1-7 Units Subcutaneous TID AC     insulin aspart  1-5 Units Subcutaneous At Bedtime     ipratropium - albuterol 0.5 mg/2.5 mg/3 mL  3 mL Nebulization BID     levofloxacin  250 mg Oral Daily     lidocaine  1-2 patch Transdermal Q24H     lidocaine   Transdermal Q8H GREG     [Held by provider] lisinopril  5 mg Oral Daily     methocarbamol  500 mg Oral 4x Daily     [Held by provider] metoprolol succinate ER  12.5 mg Oral Daily     micafungin  50 mg Intravenous Q24H     pantoprazole  40 mg Oral QAM AC     [Held by provider] rivaroxaban ANTICOAGULANT  20 mg Oral Daily with supper     senna-docusate  1 tablet Oral BID    Or     senna-docusate  2 tablet Oral BID     venetoclax  200 mg Oral Once     [START ON 12/21/2022] venetoclax  400 mg Oral Daily     Data   Results for orders placed or performed during the hospital encounter of 12/09/22 (from the past 24 hour(s))   Glucose by meter   Result Value Ref Range    GLUCOSE BY METER POCT 175 (H) 70 - 99 mg/dL   Uric acid   Result Value Ref Range    Uric Acid 4.7 3.4 - 7.0 mg/dL   Phosphorus   Result Value Ref Range    Phosphorus  4.2 2.5 - 4.5 mg/dL   Basic metabolic panel   Result Value Ref Range    Sodium 129 (L) 136 - 145 mmol/L    Potassium 5.0 3.4 - 5.3 mmol/L    Chloride 93 (L) 98 - 107 mmol/L    Carbon Dioxide (CO2) 23 22 - 29 mmol/L    Anion Gap 13 7 - 15 mmol/L    Urea Nitrogen 32.9 (H) 8.0 - 23.0 mg/dL    Creatinine 1.00 0.67 - 1.17 mg/dL    Calcium 9.5 8.8 - 10.2 mg/dL    Glucose 252 (H) 70 - 99 mg/dL    GFR Estimate 86 >60 mL/min/1.73m2   Glucose by meter   Result Value Ref Range    GLUCOSE BY METER POCT 278 (H) 70 - 99 mg/dL   Glucose by meter   Result Value Ref Range    GLUCOSE BY METER POCT 186 (H) 70 - 99 mg/dL   Glucose by meter   Result Value Ref Range    GLUCOSE BY METER POCT 190 (H) 70 - 99 mg/dL   CBC with platelets differential    Narrative    The following orders were created for panel order CBC with platelets differential.  Procedure                               Abnormality         Status                     ---------                               -----------         ------                     CBC with platelets and d...[[id number]]  Abnormal            Final result               Manual Differential[[id number]]          Abnormal            Final result                 Please view results for these tests on the individual orders.   Comprehensive metabolic panel   Result Value Ref Range    Sodium 131 (L) 136 - 145 mmol/L    Potassium 5.0 3.4 - 5.3 mmol/L    Chloride 95 (L) 98 - 107 mmol/L    Carbon Dioxide (CO2) 25 22 - 29 mmol/L    Anion Gap 11 7 - 15 mmol/L    Urea Nitrogen 26.7 (H) 8.0 - 23.0 mg/dL    Creatinine 0.90 0.67 - 1.17 mg/dL    Calcium 9.9 8.8 - 10.2 mg/dL    Glucose 116 (H) 70 - 99 mg/dL    Alkaline Phosphatase 78 40 - 129 U/L    AST 52 (H) 10 - 50 U/L    ALT 21 10 - 50 U/L    Protein Total 7.1 6.4 - 8.3 g/dL    Albumin 3.6 3.5 - 5.2 g/dL    Bilirubin Total 0.5 <=1.2 mg/dL    GFR Estimate >90 >60 mL/min/1.73m2   Lactate Dehydrogenase   Result Value Ref Range    Lactate Dehydrogenase 1,904 (H) 0 - 250 U/L  "  INR   Result Value Ref Range    INR 1.17 (H) 0.85 - 1.15   Partial thromboplastin time   Result Value Ref Range    aPTT 27 22 - 38 Seconds   Fibrinogen activity   Result Value Ref Range    Fibrinogen Activity 272 170 - 490 mg/dL   Magnesium   Result Value Ref Range    Magnesium 2.3 1.7 - 2.3 mg/dL   Uric acid   Result Value Ref Range    Uric Acid 5.4 3.4 - 7.0 mg/dL   Phosphorus   Result Value Ref Range    Phosphorus 5.2 (H) 2.5 - 4.5 mg/dL   CBC with platelets and differential   Result Value Ref Range    WBC Count 17.3 (H) 4.0 - 11.0 10e3/uL    RBC Count 2.88 (L) 4.40 - 5.90 10e6/uL    Hemoglobin 8.7 (L) 13.3 - 17.7 g/dL    Hematocrit 28.7 (L) 40.0 - 53.0 %     78 - 100 fL    MCH 30.2 26.5 - 33.0 pg    MCHC 30.3 (L) 31.5 - 36.5 g/dL    RDW 19.5 (H) 10.0 - 15.0 %    Platelet Count 44 (LL) 150 - 450 10e3/uL    Narrative    Previously reported component [ NRBCs ] is no longer reported.\"  Previously reported component [ NRBCs Absolute ] is no longer reported.\"   Manual Differential   Result Value Ref Range    % Neutrophils 75 %    % Lymphocytes 6 %    % Monocytes 13 %    % Eosinophils 0 %    % Basophils 0 %    % Metamyelocytes 2 %    % Myelocytes 4 %    NRBCs per 100 WBC 14 (H) <=0 %    Absolute Neutrophils 13.0 (H) 1.6 - 8.3 10e3/uL    Absolute Lymphocytes 1.0 0.8 - 5.3 10e3/uL    Absolute Monocytes 2.2 (H) 0.0 - 1.3 10e3/uL    Absolute Eosinophils 0.0 0.0 - 0.7 10e3/uL    Absolute Basophils 0.0 0.0 - 0.2 10e3/uL    Absolute Metamyelocytes 0.3 (H) <=0.0 10e3/uL    Absolute Myelocytes 0.7 (H) <=0.0 10e3/uL    Absolute NRBCs 2.4 (H) <=0.0 10e3/uL    RBC Morphology Confirmed RBC Indices     Platelet Assessment  Automated Count Confirmed. Platelet morphology is normal.     Automated Count Confirmed. Platelet morphology is normal.    Polychromasia Moderate (A) None Seen   Glucose by meter   Result Value Ref Range    GLUCOSE BY METER POCT 105 (H) 70 - 99 mg/dL   Glucose by meter   Result Value Ref Range    GLUCOSE " BY METER POCT 115 (H) 70 - 99 mg/dL

## 2022-12-20 NOTE — PROVIDER NOTIFICATION
Provider Abdiaziz Cope MD notified via pager [phone].    Pt got 10 mg oxy, 0.5 mg Dilaudid w/ no relief. Are you able to order additional medication?     Gabi ALEJANDRO [id number]    Plan: 1x additional dose of dilaudid ordered.

## 2022-12-20 NOTE — PLAN OF CARE
"[id number]  Goal Outcome Evaluation:    /58 (BP Location: Left arm, Cuff Size: Adult Large)   Pulse 70   Temp 97.9  F (36.6  C) (Oral)   Resp 20   Ht 1.854 m (6' 1\")   Wt (!) 172.8 kg (380 lb 14.4 oz)   SpO2 90%   BMI 50.25 kg/m      Pt vitals stable. Pain unrelieved w/ scheduled lidocaine patches, Voltaren gel and robaxin. PRN oxy given x2 and IV dilaudid x1 w/ no relief. Ice packs applied, MD notified. Additional 0.5 mg dilaudid ordered and given w/ no relief. MD notified and additional dilaudid ordered. Denies nausea/sob. Pt has significant edema in lower extremities, requesting to see lymphedema tomorrow. Stating he was suppose to see them previously but has not in past week. Pt received first dose of decitabine and oral venetoclax, tolerated well. Hat placed in bathroom, education provided on saving urine. 2000 , no sliding scale given. 0200 .   "

## 2022-12-20 NOTE — PROVIDER NOTIFICATION
Provider Abdiaziz Cope MD notified via pager [phone].     Pt still having unrelieved pain after additional 1x dose Dilaudid.     Gabi ALEJANDRO [phone]    Plan: Additional 1x dose dilaudid ordered.

## 2022-12-20 NOTE — PROGRESS NOTES
Prior Authorization **APPROVED**    Authorization Effective Date: 12/19/2022  Authorization Expiration Date: 12/19/2023  Medication: Venclexta 100mg tabs **APPROVED**  Approved Dose/Quantity: 4 tablets daily  Reference #: CoverMyMeds Key: [id number] - PA Case ID: [id number]   Insurance Company: [employer] - Phone [phone] Fax [phone]  Expected CoPay: $0.00     CoPay Card Available: No    Foundation Assistance Needed: n/a  Which Pharmacy is filling the prescription (Not needed for infusion/clinic administered): n/a - Patient has not yet been discharged, and there are no outpatient orders for this medication yet  Comments:  Proactive Prior Authorization          Genesis Daniel CPhT  [place] [facility]charge Pharmacy Liaison  Pronouns: She/Her/Hers    [facility]  [facility]  [address] [place] [address] [address] [address]   [email or web address]  [email or web address]   Phone: [phone]  Pager: [phone]  Fax: [phone]

## 2022-12-20 NOTE — PROGRESS NOTES
Nursing Focus: Chemotherapy  D: Positive blood return via PICC. Insertion site is clean/dry/intact, dressing intact with no complaints of pain.  Urine output is recorded in intake in Doc Flowsheet.    I: Premedications given per order (see electronic medical administration record). Dose #1 of Decitabine started to infuse over 1hour. Reviewed pt teaching on chemotherapy side effects.  Pt denies need for further teaching. Chemotherapy double checked per protocol by two chemotherapy competent RN's.   A: Tolerating procedure well. Denies nausea and or pain.   P: Continue to monitor urine output and symptoms of nausea. Screen for symptoms of toxicity.

## 2022-12-20 NOTE — PLAN OF CARE
Goal Outcome Evaluation:    [id number]:    VSS, afebrile. Pt dealing w/ uncontrolled lower back pain throughout day shift.    Interventions:  -Flexeril added & given.  -Oxycodone dose increased from 10mg to 20mg, given x2.  -IV dilaudid increased from 0.5mg to 1mg; 0.5mg given x2, 1mg given x1 - some relief from 1mg dose.    Heating pad utilized. Voltaren gel used. Palliative consult placed.    Lymphedema did see pt one 12/11, can use pt's home supply to wrap legs. BG checks 105, 115, & 189, covered per sliding scale.      Nursing Focus: Chemotherapy  D: Positive blood return via PICC. Insertion site is clean/dry/intact, dressing intact with no complaints of pain.  Urine output is recorded in intake in Doc Flowsheet.    I: Premedications given per order (see electronic medical administration record). Dose #2 of Decitabine started to infuse over 1hour. Reviewed pt teaching on chemotherapy side effects.  Pt denies need for further teaching. Chemotherapy double checked per protocol by two chemotherapy competent RN's.   A: Tolerating procedure well. Denies nausea and or pain.   P: Continue to monitor urine output and symptoms of nausea. Screen for symptoms of toxicity.    Continue to monitor & w/ POC.

## 2022-12-21 NOTE — PROVIDER NOTIFICATION
Paged Dr Shalini Finley @ [phone]    Pt concerned about edema in LE, takes lasix at home.   Thanks Yasmeen [phone]

## 2022-12-21 NOTE — CONSULTS
"[facility]  Palliative Care Consultation Note    Patient: Angel Cardenas  Date of Admission:  12/9/2022    Requesting Clinician / Team: Heme Malignancy  Reason for consult: Pain management  Symptom management    Recommendations:  Pain: controlled at present, although patient describes flares of pain over past days that have \"come out of nowhere\" and he is worried he will have another acute pain flare. Pain is constant but varies in severity, is sharp in quality, in back at T5-T7 level and when it increases it extends bilaterally across bilateral scapula and if it becomes very severe it then wraps around to midaxillary line. Right now, pain is controlled and minimal and limited to just around spine and medial aspect of scapula bilaterally. Patient is not at all sedated from current regimen. Notes pain controlled now but was severe and nearly intolerable for portions of yesterday and night before last. Pt does feel like Dilaudid IV helped his pain more than oxycodone dosing even at higher doses of oxycodone. Pain etiology sounds nociceptive with both bony pain and muscle spasm pain along rhomboid muscle group bilaterally.    Recommend continuing Dilaudid PCA 0.5mg IV Q15 minutes PRN for today to assess overall opiate needs and assess pain trajectory. PCA totals from shift ending at 7am today unfortunately do not look to have been entered in [employer] and PCA pump was cleared. Information today will help assess opiate use totals (I do not know how much IV Dilaudid he used since PCA set up or when his last dose was from PCA).    If pain continues with good control through day today and tonight, discussed with patient tentative plan to transition to PO opiate regimen only tomorrow based on total opiate needs and use.    Oxycodone 10-20mg PO Q4hrs PRN ordered, last used at 12:56pm yesterday.    Dilaudid 1mg IV Q2hrs PRN nurse administered dose ordered but not used, recommend " "discontinuing this (ordered for you)    Dexamethasone likely assisting with bony pain, Pt has completed 4 days at dexamethasone 8mg with plans to decrease tomorrow to 4mg, anticipate he will tolerate this ok from a pain perspective as he has had an adequate \"burst\" of steroid for pain and 4mg dosing should still assist with bony pain.    Nausea: none present    Opiate-induced constipation: small bowel movement yesterday, last full bowel movement 12/19    Continue scheduled senna 1-2 tabs PO BID    Recommend scheduling one dose of Miralax and having an additional PRN dose (ordered for you)    Offered option of PRN suppository or enema but Pt declines at this time    These recommendations have been discussed with primary team by note, phone.      Thank you for the opportunity to participate in the care of this patient and family. Our team: will continue to follow.     During regular M-F work hours -- if you are not sure who specifically to contact -- please contact us by sending a text page to our team consult pager at [phone].    After regular work hours and on weekends/holidays, you can call our answering service at [phone]. Also, who's on call for us is available in [employer].     Total time spent was 60 minutes,  >50% of time was spent counseling and/or coordination of care regarding symptom management, support with coping. Recommendations communicated with primary team via note, phone.    Betzy Cifuentes MD / Palliative Medicine / Pager [phone] / [facility] Inpatient Team Consult Pager [phone] (answered 8am-430pm M-F) - ok to text page via [employer] / After-Hours Answering Service [phone] / Palliative Clinic in the [facility] [place] [facility] [phone] (scheduling); [phone] (triage).        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 Palliative consulted to assist with pain.    Today, the patient was seen for:  AML  Chemotherapy initiation  Cancer related pain  Symptom management  Support with coping    Prognosis, Goals, & Planning:      Functional Status just prior to hospitalization: 2 (Ambulatory and capable of all selfcare but unable to carry out any work activities; may need help with IADLs up and about > 50% of waking hours)      Prognosis, Goals, and/or Advance Care Planning were addressed today: Yes        Summary/Comments:       Patient's decision making preferences: independently          Patient has decision-making capacity today for complex decisions: Yes            I have concerns about the patient/family's health literacy today: No           Patient has a completed Health Care Directive: No.       Code status: Full Code    Coping, Meaning, & Spirituality:   Mood, coping, and/or meaning in the context of serious illness were addressed today: Yes  Summary/Comments: Pt notes that it has felt very unsettling to have these sudden severe flares of pain that come without warning, it makes him worry about having more severe pain without warning. He is hopeful he can start feeling better soon, he has not felt well for weeks to months.    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 Palliative consulted to assist with pain.    Pt just had a shower and sitting in a chair, notes that pain  "is controlled at present, although patient describes flares of pain over past days that have \"come out of nowhere\" and he is worried he will have another acute pain flare. Pain is constant but varies in severity, is in his back at T5-T7 level and when it increases it extends bilaterally across bilateral scapula and if it becomes very severe it then wraps around to midaxillary line. Right now, pain is controlled and minimal and limited to just around spine and medial aspect of scapula bilaterally. Patient is not at all sedated from current regimen. Notes pain controlled now but was severe and nearly intolerable for portions of yesterday and night before last. Pt does feel like Dilaudid IV helped his pain more than oxycodone dosing even at higher doses of oxycodone during the severe pain flares. No nausea, no pain anywhere else, small bowel movement yesterday with last full bowel movement 12/19 and does not feel constipated. No difficulty with eating reported. Has LE edema that he notes is near baseline, he normally uses compression stockings at home and has them here.    Key Palliative Symptom Data:  # Pain severity the last 12 hours: moderate  # Dyspnea severity the last 12 hours: none  # Nausea severity the last 12 hours: low  # Anxiety severity the last 12 hours: low    Patient is on opioids: assessed and I made recommendations about bowel care as above.    ROS:  Comprehensive ROS is reviewed and is negative except as here & per HPI.     Past Medical History:  Past Medical History:   Diagnosis Date     COPD (chronic obstructive pulmonary disease) (H)      Diabetes mellitus, type 2 (H)      Gout      History of pulmonary embolism      HLD (hyperlipidemia)      HTN (hypertension)         Past Surgical History:  Past Surgical History:   Procedure Laterality Date     PICC DOUBLE LUMEN PLACEMENT Right 12/16/2022    Right brachial-medial vein 48cm total 1cm external.Placement verified by [facility] 3CG.PICC okay to use. "     MI REVISE TOTAL HIP REPLACEMENT Bilateral      TOTAL SHOULDER REPLACEMENT Right          Family History:  Family History   Problem Relation Age of Onset     Neurodegenerative disease Father         Creutzfeldt-Osmin disease     Chronic Obstructive Pulmonary Disease Brother      Heart Disease Brother          Allergies:  Allergies   Allergen Reactions     Metformin Unknown        Medications:  I have reviewed this patient's medication profile and medications from this hospitalization.     Physical Exam:  Vital Signs: Temp: 97.9  F (36.6  C) Temp src: Oral BP: 115/58 Pulse: 71   Resp: 18 SpO2: 93 % O2 Device: None (Room air)    Weight: 383 lbs 11.2 oz  Gen: NAD, sitting up comfortably in chair, pleasant and cooperative with interview and exam  HEENT: normocephalic, no scleral icterus, no perioral lesions, oral mucosa moist  CV: RRR at time of exam  Pulm: good air movement bilaterally without wheezing  Abd: soft, +bs, nontender to palpation diffusely, nondistended  LE: 1+ edema bilaterally  Skin: chronic venous stasis changes bilaterally in LE, hemosiderin deposition in LE bilaterally  Neuro: AOx3, no tremor  Psych: restricted affect      Data reviewed:  Recent imaging reviewed, my comments on pertinents:   12/17/22 MRI T spine  IMPRESSION (per Radiology): Diffuse bone marrow reconversion can be seen in the  setting of hematologic malignancy. Scattered subtle mottled  enhancement of the bones most pronounced in the left seventh rib, could be secondary to underlying primary disease. No cord compression or myelopathic cord signal.    12/16/22 CTA chest/abd/pelvis  Impression (per Radiology):   1. No evidence of acute aortic syndrome, specifically no evidence of  aortic dissection.  2. Suboptimal bolus timing for pulmonary arterial evaluation. No  central pulmonary embolus or evidence of right heart strain.  3. Tree-in-bud nodularity and associated consolidation within the  right lower lobe, concerning for an  infectious or inflammatory  etiology.  4. Unchanged ectasia of the ascending thoracic aorta, measuring up to  4.5 cm.  5. The main pulmonary artery measures up to 3.9 cm, unchanged. This  may be seen with pulmonary arterial hypertension.   6. Unchanged splenomegaly and multiple prominent mediastinal lymph  nodes.    Recent lab data reviewed, my comments on pertinents:   Today Na 129, K 5.3, creatinine 1.0, WBC 15.3, HGb 8.6, plts 46

## 2022-12-21 NOTE — PROGRESS NOTES
"BP (!) 140/66 (BP Location: Left arm)   Pulse 74   Temp 98.2  F (36.8  C) (Oral)   Resp 18   Ht 1.854 m (6' 1\")   Wt (!) 174 kg (383 lb 11.2 oz)   SpO2 96%   BMI 50.62 kg/m      [id number]    Neuro: A&Ox4.   Cardiac: Afebrile, VSS.   Respiratory: RA   GI/: Voiding spontaneously. 1x BM this shift.Blood sugars 193, ss insulin given.   Diet/appetite: Tolerating regular diet. Denies nausea   Activity: Up with SBA.    Pain: Chronic and shoulder pain. On PCA pump 0.5 mg,  PCA Lockout: 15 Minutes and One Hour Limit: 2 mg.Palliative team reviewed dosing today.To keep PCA pump.On flexeril PRN.    Lines: R PICC 2L IVf's with PCA.  Skin: No new deficits noted. Pt.took a shower. Larissa red BLEs.Lactate cr applied.  Replacement: K+ downward trending 4.9.On Phoslo.  Chemo: Day 3 Venetoclax + Decitabine.[facility] Labs Q24 hrs.   Will continue to monitor and follow plan of care.          "

## 2022-12-21 NOTE — PROVIDER NOTIFICATION
Provider Lamberto Clay MD notified via pager [phone]    FYI pt potassium 5.8 at 2000 blood draw. Do you want to start intervention?     Gabi ALEJANDRO [id number]    Plan: Recheck K at 0100 after bolus has infused more, notify if greater than 5.4 after intervention.

## 2022-12-21 NOTE — PLAN OF CARE
"1900-0700  Goal Outcome Evaluation:    /70 (BP Location: Right arm)   Pulse 61   Temp 97.4  F (36.3  C) (Oral)   Resp 16   Ht 1.854 m (6' 1\")   Wt (!) 172 kg (379 lb 3.1 oz)   SpO2 93%   BMI 50.03 kg/m      Pt vitals stable. Pain managed well per pt via PCA pump. Declined lidocaine patches, wanted to continue w/ heat pack. Denies nausea. Dyspnea w/ exertion. Independent. Saving urine, adequate output overnight. Phos and K high, 1L NS ordered x1 100/hr. Notify provider if K >5.4 after intervention. 2000 , 1 unit sliding scale given. 0200 . Plan for palliative consult today to help manage pain.   "

## 2022-12-21 NOTE — PROGRESS NOTES
Nursing Focus: Chemotherapy  D: Positive blood return via PICC. Insertion site is clean/dry/intact, dressing intact with no complaints of pain.  Urine output is recorded in intake in Doc Flowsheet.    I: Premedications given per order (see electronic medical administration record). Dose #2 of decitabine started to infuse over 1 hour. Reviewed pt teaching on chemotherapy side effects.  Pt denies need for further teaching. Chemotherapy double checked per protocol by two chemotherapy competent RN's.   A: Tolerating procedure well. Denies nausea and or pain.   P: Continue to monitor urine output and symptoms of nausea. Screen for symptoms of toxicity.

## 2022-12-21 NOTE — PROGRESS NOTES
[facility]    Hematology / Oncology Progress Note    Patient: Angel Cardenas  MRN: [id number]  Admission Date: 12/9/2022  Date of Service (when I saw the patient): 12/21/2022  Hospital Day # 12     Assessment & Plan   Angel Cardenas is a 62 year old male with past medical history of PE (previously on Xarelto), T2DM, COPD, HTN, and gout. He was admitted for further work up and initiation of treatment. He was diagnosed with AMML and began treatment with Venetoclax + Decitabine (O3H8=4112/19/22). 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      TODAY:   - Day 3 Venetoclax + Decitabine, Venetoclax is at 400 mg daily dosing  - Monitor TLS labs BID, continue Allopurinol and Phoslo, IVF bolus prn   - K improved this AM to 4.9 with IVF (from 5.8 last night), monitor q4h x2. S/p additional 500 ml bolus this AM.   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   - Continue Dilaudid PCA for now  - Scheduled Voltaren gel, Lido patches, Robaxin   - PRN Tylenol, Oxycodone, Flexeril available   - Continue with best supportive cares      HEME   # AMML   # Leukocytosis with peripheral blasts  Patient was undergoing work up with his PCP for fatigue, subjective fevers, lack of appetite, weight loss, intermittent nose bleed over the last month and rib pain. He was found to have WBC 8.5 Hgb 8.8 and plts 82 with 2% promyelocytes. OP peripheral smear with bicytopenia and 6% circulating blasts, no adrianne rods noted. Flow cytometry of peripheral blood 12/9 with 1% CD34 blasts, reviewed at [facility] with noted 6% blasts. He was referred to [facility] for further work-up and initiation of treatment. On  admission WBC 18.2 Hgb 8.3 and Plt 94. He was seen by oncall fellow and attending, please see full note 12/9. Peripheral smear was reviewed and there was a very low suspicion for APL based on review so no treatment was initiated. 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   - Peripheral flow 12/9 resulted with abnormal monocytic population (25%) and abnormal CD34 positive myeloid blast population (1.1%) worrisome for high-grade myeloid neoplasm. Peripheral BCR/ABL1 and FLT3 negative.FISH with no rearrangement of NUP98 or KMT2A.   - OP BMBx 12/9/22. Arrived from [facility] on 12/13. 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 Cytogenetics and molecular pending.   - Of note, BMBx procedure was technically very difficult and OSH recommended IR consult for future biopsies. Per nursing, appears more sacral so unsure of quality. Will discuss timing of repeat BMBx.   - Virologies: HSV 1/2 negative, CMV IgG-, EBV+, HIV-, HepB/C-   - HLA typing sent on admission   - EKG 12/9 with NSR QTc 441. Echo 12/10 with hyperkinetic LVEF at 65-70%.   - Hold off on any line placement for now  - WBC 18K on admission. Hydrea 1g daily 12/11-12/19, stopped with initiation of treatment       Treatment Plan: Venetoclax/Decitabine (U3L2=4812/19/22)    - Venetoclax 100 mg PO daily - D1     - Venetoclax 200 mg PO daily - D2     - Venetoclax 400 mg PO daily - D3 - 28 - may need to dose adjust depending on antifungal    - Decitabine 20 mg/m2 (60 mg) IV daily - D1-10     # Anemia   # Thrombocytopenia   - Transfuse to keep Hgb >7 and plt > 10k (may consider increasing if recurrent epistaxis)      # TLS  # Hyperkalemia, resolved  # Hyperuricemia   # Hyperphosphatemia  # H/o gout   # Risk of DIC  On admission uric acid 9.1, LDH 2,475, though all other TLS labs WNL including Cr 0.88. Cr trending up slightly as of 12/11 to 1.11 and uric acid remains elevated at 9.6. Of note patient does have a known h/o gout. INR mildly elevated (~1.2). He  is s/p Rasburicase x1 on 12/11 and again on 12/16. IVF now discontinued d/t volume overload and improvement in labs. TLS labs again trending up since starting chemo as of 12/21 -- K elevated to 5.8 (now resolved), uric acid 7.3, Phos 5.6 and Cr 1.27 (baseline ~0.9-1.0). EKG show NSR.   - IVF bolus prn, s/p 1L on 12/20 and 500 ml bolus on 12/21 AM  - Allopurinol 300 mg daily. Consider re-dosing Rasburicase if uric acid is >8.0.   - Phos 1.334 mg TID initiated x12/18, consider increasing if Phos remains elevated  - Monitor TLS labs BID. Monitor DIC labs daily for now.  - K down to 4.9 (from 5.8 last night) with IVF as above. Repeat K q4h x2.      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 Amari Cortes (4/15/22). Patient has history of multiple DVTs in the 1990s without known cause, unprovoked PE at age 43, DVT with PE in context of hip replacement. He most recently was diagnosed with bilateral PE with right heart strain on 1/19/22 after presenting to the ED with SOB.   - Resumed PTA Xarelto 12/13; held x12/16 with plt dropping  - Repeat CTA 12/16 w/o evidence of PE     ID  # Undifferentiated shock, resolved  # Possible pneumonia   # Hypotension, resolved  # Possible adrenal insufficiency  Acute symptomatic hypotension on 12/16 from 123/61 ? 77/39 in the setting of acutely worsening upper back pain, diaphoresis, dizziness. Rapid response called. EKG NSR, trop 33 ? 30. VBG normal. Hgb stable. No e/o bleed, PE, dissection on CTA/CAP w/ contrast. CT did note RLL consolidation suggestion possible pneumonia. Lactic 2.2 ? 1.8. BCx NGTD. Initially pressures improved with bolus then became soft again to 92/46 around 1400. Recent echo with EF 65-70%.  - Suspect that hypotension was possibly from adrenal insufficiency with steroid taper as pressure improved after dexamethasone 10 mg was ordered 12/16 afternoon. AM cortisol 12/17 was WNL but  "likely skewed by recent steroid administration. Less likely septic shock in the absense of fever and tachycardia.   - Cefepime 12/16-12/18, discontinued as low suspicion for infection as etiology of hypotension. Completed azithromycin 12/16-12/18.  - Hold lisinopril and metoprolol    # Prophylaxis   -  mg BID  - Micafungin 50 mg IV for now with concurrent chemo   - Posa/vori both $0/month copay  - Levaquin 250 mg daily     ENT   # Sore throat, resolved  Mild L tonsil swelling on exam. No purulence or discrete lesions. No stridor or difficulty breathing. Strep negative, HSV swab negative.  - Cepacol lozenges PRN     # Intermittent epistaxis   Patient has been having intermittent epistaxis over the last month, but increase in frequency to daily over the last week PTA.   - If recurrent episodes may consider increasing platelet threshold, adding Afrin      MSK/NEURO  # Acute on chronic back pain  Patient endorses ongoing back pain prior to hospitalization though over the past week has been endorsing rib pain which radiates into his upper back and between his shoulder blades. No red flag sx. Of note he does have splenomegaly on recent CT imaging which could be contributing. EKG shows NSR. Lipase WNL. MRI thoracic spine 12/17 showed \"scattered subtle mottled enhancement of bones most pronounced on L 7th rib, could be 2/2 underlying disease process.\" It is possible that pt has some extramedullary infiltration of cortex causing pain.   - Scheduled Voltaren gel QID, Robaxin QID  - PRN Tylenol, heat packs, Flexeril    - PTA oxycodone 10-20 mg q6h PRN and IV dilaudid 1 mg q2hr PRN  - Pain responds well to dexamethasone. S/p dex 10 mg x1 on 12/11-13, 6 mg on 12/14, 4 mg on 12/15 for pain. Increased back to 10 mg on 12/16, taper to 8 mg on 12/17-12/21. Continue to taper with initiation of treatment -- plan for Dex 4 mg daily x12/22 and continue to reassess.   - Palliative Care consulted; initiated Dilaudid PCA x12/20 - " continue for now.     # Headaches, improved  Patient has been experiencing nasal congestion, left sided headaches and eye pressure. No other neurologic complaints. Brain MRI 12/5 with diffuse linear and slightly nodular dural thickening and enhancement. Diffuse marrow signal abnormality within calvarium and central skull base. Differential includes lymphoproliferative abnormalities and metastatic disease.  - Requested images to be pushed into PACs and [facility] over read ordered  - S/p diagnostic LP 12/12; flow negative  - Tylenol available PRN      PULM  # Hypoxia, intermittent  # COPD   New O2 requirement 12/11 to 3-4L in the setting of recent continuous IVF, splenomegaly and poor pain control. After improvement in volume status, ongoing mild hypoxia to high 80s, may be baseline from COPD.  - CXR showed cardiomegaly, enlarged pulmonary vasculature and diffuse mild interstitial markings bilaterally likely representing pulmonary edema.   - PTA Bumex above  - DuoNebs BID x12/13. Neb treatments q2hr PRN   - Continue PTA Breo Ellipta   - Incentive spirometry  - CT chest 12/16 showed possible RLL pneumonia. See pneumonia treatment as above.     # CATHERINE  - Continue CPAP at night  - Hypoxia at night, should follow up outpatient for possible CPAP adjustment    CARDS  # HTN   - Hold PTA metoprolol ER 12.5 mg daily and lisinopril 5 mg daily in the setting of recent hypotension     # HLD   - PTA atorvastatin held on admission for potential treatment      # Chronic venous insufficiency, CEAP 5   # Bilateral LE edema   - Echo as above  - Continue PTA Bumex 1 mg daily, may consider additional doses based on symptoms    - Lymphedema consulted     GI  # Constipation  Pt is having BMs but stools are small and firm.   - Senna BID and MiraLax daily   - Milk of magnesium daily PRN  - Drinking prune juice    ENDO  # T2DM   Follows with [facility], last seen 11/15/22.   - PTA Ozempic held on admission   - Medium intensity  "sliding scale insulin   - Hypoglycemia protocol in place     Clinically Significant Risk Factors        # Hyperkalemia: Highest K = 5.8 mmol/L in last 2 days, will monitor as appropriate  # Hyponatremia: Lowest Na = 129 mmol/L in last 2 days, will monitor as appropriate      # Hypoalbuminemia: Lowest albumin = 3.2 g/dL at 12/11/2022  7:01 AM, will monitor as appropriate   # Thrombocytopenia: Lowest platelets = 44 in last 2 days, will monitor for bleeding         # Severe Obesity: Estimated body mass index is 50.62 kg/m  as calculated from the following:    Height as of this encounter: 1.854 m (6' 1\").    Weight as of this encounter: 174 kg (383 lb 11.2 oz).   # Moderate Malnutrition: based on nutrition assessment      FEN  Diet: Regular Diet Adult   IVF: Bolus PRN; cautious with volume overload  Lytes: Replete per protocol    PPX  VTE: None given thrombocytopenia  Bowel: Senna/MiraLax/MoM PRN  GI/PUD: Protonix    MISC  Code Status: Full Code   Lines/Drains: PIV  Social: Lives at home alone near [place], [place]  Dispo: Started chemo with 10-days of Decitabine + Venetoclax, dispo TBD pending ongoing plan.   Follow Up: TBD; will need to send message to establish with one of our Hematologists once plan is more clear.       Patient and plan of care was discussed with attending physician Dr. Guzman.     >50 minutes spent on the date of the encounter. Over 50% of time was spent counseling the patient and/or coordinating care.     Shannon Irving PA-C  Hematology/Oncology  Ph: [phone], Pager: [phone]    Interval History   Nursing notes reviewed. K elevated with last night's evening labs. He continued on IVF while awaiting lab recheck. This morning K is down to 5.4 and then further to 4.9. EKG ok. He continued with poorly controlled acute on chronic back pain and Dilaudid PCA was initiated. This morning he reports pain is doing better, rates it a 5/10 still to back and wrapping around to ribs. Denies other specific complaints. Legs " remain swollen though no more so than they have. He endorses fair PO intake. Has remained afebrile and VSS. Chemo continues. Denies any abdominal complaints.     A comprehensive review of systems was obtained and is negative other than noted here or in the HPI.     Vital Signs with Ranges  Temp:  [97.4  F (36.3  C)-98.7  F (37.1  C)] 97.9  F (36.6  C)  Pulse:  [60-81] 71  Resp:  [16-20] 18  BP: (100-132)/(47-70) 115/58  SpO2:  [90 %-95 %] 93 %  I/O last 3 completed shifts:  In: 1733 [P.O.:1600; I.V.:11; IV Piggyback:122]  Out: 2775 [Urine:2775]    Physical Exam   General: Sitting upright in bed, comfortable, alert. Large habitus.   Skin: Chronic venous stasis changes to BLE from ankle to mid-calf. No concerning lesions, rash, jaundice, cyanosis, erythema, or ecchymoses on exposed surfaces.   HEENT: NCAT. EOMI, anicteric sclera.  Respiratory: Non-labored breathing on room air, good air exchange, lungs clear to auscultation bilaterally.  Cardiovascular: RRR. No murmur or rub.   Gastrointestinal: Normoactive BS. Abdomen soft, ND.   Extremities: Baseline bilateral LE edema, slightly increased from baseline, stable.   Neurologic: A&O x 3, speech normal, no deficits grossly.    Medications     HYDROmorphone       - MEDICATION INSTRUCTIONS -       sodium chloride 10 mL/hr (12/21/22 0059)       acyclovir  400 mg Oral BID     allopurinol  300 mg Oral Daily     ammonium lactate   Topical Daily     bumetanide  1 mg Oral Daily     calcium acetate (phos binder)  1,334 mg Oral TID w/meals     decitabine  20 mg/m2 (Treatment Plan Recorded) Intravenous Q23H     [START ON 12/22/2022] dexamethasone  4 mg Oral Daily     diclofenac  2 g Topical 4x Daily     fluticasone-vilanterol  1 puff Inhalation Daily     heparin lock flush  5-20 mL Intracatheter Q24H     insulin aspart  1-7 Units Subcutaneous TID AC     insulin aspart  1-5 Units Subcutaneous At Bedtime     ipratropium - albuterol 0.5 mg/2.5 mg/3 mL  3 mL Nebulization BID      levofloxacin  250 mg Oral Daily     lidocaine  1-2 patch Transdermal Q24H     lidocaine   Transdermal Q8H [facility]     [Held by provider] lisinopril  5 mg Oral Daily     methocarbamol  500 mg Oral 4x Daily     [Held by provider] metoprolol succinate ER  12.5 mg Oral Daily     micafungin  50 mg Intravenous Q24H     pantoprazole  40 mg Oral QAM AC     [START ON 12/22/2022] polyethylene glycol  17 g Oral Daily     [Held by provider] rivaroxaban ANTICOAGULANT  20 mg Oral Daily with supper     senna-docusate  1 tablet Oral BID    Or     senna-docusate  2 tablet Oral BID     venetoclax  400 mg Oral Daily     Data   Results for orders placed or performed during the hospital encounter of 12/09/22 (from the past 24 hour(s))   Glucose by meter   Result Value Ref Range    GLUCOSE BY METER POCT 115 (H) 70 - 99 mg/dL   Glucose by meter   Result Value Ref Range    GLUCOSE BY METER POCT 189 (H) 70 - 99 mg/dL   Uric acid   Result Value Ref Range    Uric Acid 7.4 (H) 3.4 - 7.0 mg/dL   Phosphorus   Result Value Ref Range    Phosphorus 7.1 (H) 2.5 - 4.5 mg/dL   Basic metabolic panel   Result Value Ref Range    Sodium 130 (L) 136 - 145 mmol/L    Potassium 5.4 (H) 3.4 - 5.3 mmol/L    Chloride 92 (L) 98 - 107 mmol/L    Carbon Dioxide (CO2) 24 22 - 29 mmol/L    Anion Gap 14 7 - 15 mmol/L    Urea Nitrogen 36.5 (H) 8.0 - 23.0 mg/dL    Creatinine 1.27 (H) 0.67 - 1.17 mg/dL    Calcium 9.9 8.8 - 10.2 mg/dL    Glucose 203 (H) 70 - 99 mg/dL    GFR Estimate 65 >60 mL/min/1.73m2   Glucose by meter   Result Value Ref Range    GLUCOSE BY METER POCT 203 (H) 70 - 99 mg/dL   Potassium   Result Value Ref Range    Potassium 5.8 (H) 3.4 - 5.3 mmol/L   ABO/Rh type and screen    Narrative    The following orders were created for panel order ABO/Rh type and screen.  Procedure                               Abnormality         Status                     ---------                               -----------         ------                     Adult Type and  Screen[[id number]]                            Final result                 Please view results for these tests on the individual orders.   Adult Type and Screen   Result Value Ref Range    ABO/RH(D) A POS     Antibody Screen Negative Negative    SPECIMEN EXPIRATION DATE [id number]    Glucose by meter   Result Value Ref Range    GLUCOSE BY METER POCT 219 (H) 70 - 99 mg/dL   CBC with platelets differential    Narrative    The following orders were created for panel order CBC with platelets differential.  Procedure                               Abnormality         Status                     ---------                               -----------         ------                     CBC with platelets and d...[[id number]]  Abnormal            Final result               Manual Differential[[id number]]          Abnormal            Final result                 Please view results for these tests on the individual orders.   Comprehensive metabolic panel   Result Value Ref Range    Sodium 129 (L) 136 - 145 mmol/L    Potassium 5.4 (H) 3.4 - 5.3 mmol/L    Chloride 94 (L) 98 - 107 mmol/L    Carbon Dioxide (CO2) 23 22 - 29 mmol/L    Anion Gap 12 7 - 15 mmol/L    Urea Nitrogen 35.4 (H) 8.0 - 23.0 mg/dL    Creatinine 1.00 0.67 - 1.17 mg/dL    Calcium 9.6 8.8 - 10.2 mg/dL    Glucose 141 (H) 70 - 99 mg/dL    Alkaline Phosphatase 90 40 - 129 U/L    AST 53 (H) 10 - 50 U/L    ALT 15 10 - 50 U/L    Protein Total 7.2 6.4 - 8.3 g/dL    Albumin 3.7 3.5 - 5.2 g/dL    Bilirubin Total 0.5 <=1.2 mg/dL    GFR Estimate 86 >60 mL/min/1.73m2   Lactate Dehydrogenase   Result Value Ref Range    Lactate Dehydrogenase 1,991 (H) 0 - 250 U/L   INR   Result Value Ref Range    INR 1.12 0.85 - 1.15   Partial thromboplastin time   Result Value Ref Range    aPTT 31 22 - 38 Seconds   Fibrinogen activity   Result Value Ref Range    Fibrinogen Activity 359 170 - 490 mg/dL   Magnesium   Result Value Ref Range    Magnesium 2.4 (H) 1.7 - 2.3 mg/dL   Uric acid   Result  "Value Ref Range    Uric Acid 7.3 (H) 3.4 - 7.0 mg/dL   Phosphorus   Result Value Ref Range    Phosphorus 5.6 (H) 2.5 - 4.5 mg/dL   CBC with platelets and differential   Result Value Ref Range    WBC Count 15.3 (H) 4.0 - 11.0 10e3/uL    RBC Count 2.88 (L) 4.40 - 5.90 10e6/uL    Hemoglobin 8.6 (L) 13.3 - 17.7 g/dL    Hematocrit 28.6 (L) 40.0 - 53.0 %    MCV 99 78 - 100 fL    MCH 29.9 26.5 - 33.0 pg    MCHC 30.1 (L) 31.5 - 36.5 g/dL    RDW 19.3 (H) 10.0 - 15.0 %    Platelet Count 46 (LL) 150 - 450 10e3/uL    Narrative    Previously reported component [ NRBCs ] is no longer reported.\"  Previously reported component [ NRBCs Absolute ] is no longer reported.\"   Manual Differential   Result Value Ref Range    % Neutrophils 66 %    % Lymphocytes 9 %    % Monocytes 17 %    % Eosinophils 0 %    % Basophils 0 %    % Metamyelocytes 4 %    % Myelocytes 2 %    % Promyelocytes 2 %    NRBCs per 100 WBC 5 (H) <=0 %    Absolute Neutrophils 10.1 (H) 1.6 - 8.3 10e3/uL    Absolute Lymphocytes 1.4 0.8 - 5.3 10e3/uL    Absolute Monocytes 2.6 (H) 0.0 - 1.3 10e3/uL    Absolute Eosinophils 0.0 0.0 - 0.7 10e3/uL    Absolute Basophils 0.0 0.0 - 0.2 10e3/uL    Absolute Metamyelocytes 0.6 (H) <=0.0 10e3/uL    Absolute Myelocytes 0.3 (H) <=0.0 10e3/uL    Absolute Promyelocytes 0.3 (H) <=0.0 10e3/uL    Absolute NRBCs 0.8 (H) <=0.0 10e3/uL    RBC Morphology Confirmed RBC Indices     Platelet Assessment  Automated Count Confirmed. Platelet morphology is normal.     Automated Count Confirmed. Platelet morphology is normal.    Polychromasia Slight (A) None Seen   EKG 12-lead, complete   Result Value Ref Range    Systolic Blood Pressure  mmHg    Diastolic Blood Pressure  mmHg    Ventricular Rate 60 BPM    Atrial Rate 60 BPM    ID Interval 190 ms    QRS Duration 80 ms     ms    QTc 414 ms    P Axis 62 degrees    R AXIS 14 degrees    T Axis 31 degrees    Interpretation ECG       Sinus rhythm  Normal ECG  When compared with ECG of 16-DEC-2022 " 10:15,  No significant change was found     Potassium   Result Value Ref Range    Potassium 5.3 3.4 - 5.3 mmol/L   Potassium   Result Value Ref Range    Potassium 4.9 3.4 - 5.3 mmol/L   Glucose by meter   Result Value Ref Range    GLUCOSE BY METER POCT 193 (H) 70 - 99 mg/dL

## 2022-12-22 NOTE — PLAN OF CARE
"Goal Outcome Evaluation:      Plan of Care Reviewed With: patient    Overall Patient Progress: no changeOverall Patient Progress: no change    Time 4997-7812    /58 (BP Location: Left arm, Cuff Size: Adult Large)   Pulse 65   Temp 97.8  F (36.6  C) (Oral)   Resp 18   Ht 1.854 m (6' 1\")   Wt (!) 174 kg (383 lb 11.2 oz)   SpO2 94%   BMI 50.62 kg/m      Reason for admission: AML, Day 3 Venetoclax + Decitabine, F6S7=9012/19/22  Activity: Independent  Pain: pain well controlled w/PCA used one dose  Neuro: A&O x4  Cardiac: htn, edema  Respiratory: COPD,dyslpnea w/exertion  GI/: Voiding adequate, senna and mirilax, last BM 12/20  Diet: Regular  Lines: PICC   Labs/imaging: Reviewed       New changes this shift: C1D3 venetoclax      Plan: Started chemo with 10-days of Decitabine + Venetoclax, dispo TBD pending ongoing plan.      Continue to monitor and follow POC   "

## 2022-12-22 NOTE — PROGRESS NOTES
"/56   Pulse 72   Temp 97.2  F (36.2  C) (Oral)   Resp 18   Ht 1.854 m (6' 1\")   Wt (!) 172.8 kg (381 lb)   SpO2 93%   BMI 50.27 kg/m      3414-1774  Neuro: A&Ox4.   Cardiac: Afebrile, VSS.Edema 2+,bumex given.Lymp wraps on (pts supply).            Respiratory: RA   GI/: Voiding spontaneously. 1x BM this shift. Blood sugars 107 and 194, ss insulin given.Voiding spontaneously in bathroom.  Diet/appetite: Tolerating regular diet. Denies nausea   Activity: Up with SBA.Took shower.    Pain: Chronic back and shoulder pain. On PCA pump 0.5 mg,PCA Lockout: 15 Minutes and One Hour Limit: 2 mg.On flexeril PRN.               Lines: R PICC 2L IVf's with PCA.  Skin: No new deficits noted. Larissa red BLEs Lactate cr applied.  Replacement: K+5.0.  Phos 5.0-On Phoslo.  Chemo: Day 4 Venetoclax + Decitabine.TLS Labs BID.   Will continue to monitor and follow plan of care.          "

## 2022-12-22 NOTE — PLAN OF CARE
"[id number]  Goal Outcome Evaluation:    /60 (BP Location: Left arm)   Pulse 56   Temp 97.5  F (36.4  C) (Temporal)   Resp 18   Ht 1.854 m (6' 1\")   Wt (!) 174 kg (383 lb 11.2 oz)   SpO2 93%   BMI 50.62 kg/m      Vitals stable. Pain managed well w/ PCA pump per pt. Denies nausea/sob. Independent. Adequate urine output overnight, saving in bathroom. Last BM 12/20. Pt notes swelling in feet has gotten worse, states he takes lasix once a day at home and would like that ordered. 0200 . Phos, K trending down. Continue to monitor TLS labs BID.   "

## 2022-12-22 NOTE — PROGRESS NOTES
[facility]    Hematology / Oncology Progress Note    Patient: Angel Cardenas  MRN: [id number]  Admission Date: 12/9/2022  Date of Service (when I saw the patient): 12/22/2022  Hospital Day # 13     Assessment & Plan   Angel Cardenas is a 62 year old male with past medical history of PE (previously on Xarelto), T2DM, COPD, HTN, and gout. He was admitted for further work up and initiation of treatment. He was diagnosed with AMML and began treatment with Venetoclax + Decitabine (J0Z7=2112/19/22). 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      TODAY:   - Day 4 Venetoclax + Decitabine, Venetoclax is at 400 mg daily dosing  - TLS labs improved and stable, monitor BID for now. Continue with Allopurinol, Phoslo, IVF bolus prn.   - Taper Dex to 4 mg daily for now, consider further taper in the coming days.   - Will give additional 1x dose of Bumex 1 mg today in setting of progressive LE edema. Continue PTA Bumex 1 mg daily.   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  - Continue Dilaudid PCA for now  - Scheduled Voltaren gel, Lido patches, Robaxin   - PRN Tylenol, Oxycodone, Flexeril available   - Continue with best supportive cares      HEME   # AMML with SRSF2, TET2 mutations  # Leukocytosis with peripheral blasts, improving  Patient was undergoing work up with his PCP for fatigue, subjective fevers, lack of appetite, weight loss, intermittent nose bleed over the last month and rib pain. He was found to have WBC 8.5 Hgb 8.8 and plts 82 with 2% promyelocytes. OP peripheral smear with bicytopenia and 6% circulating blasts, no adrianne rods noted. Flow cytometry of peripheral blood 12/9 with 1% CD34 blasts, reviewed at [facility]  with noted 6% blasts. He was referred to [facility] for further work-up and initiation of treatment. On admission WBC 18.2 Hgb 8.3 and Plt 94. He was seen by oncall fellow and attending, please see full note 12/9. Peripheral smear was reviewed and there was a very low suspicion for APL based on review so no treatment was initiated. 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   - Peripheral flow 12/9 resulted with abnormal monocytic population (25%) and abnormal CD34 positive myeloid blast population (1.1%) worrisome for high-grade myeloid neoplasm. Peripheral BCR/ABL1 and FLT3 negative. FISH with no rearrangement of NUP98 or KMT2A.   - OP BMBx 12/9/22. Arrived from [facility] on 12/13. Per [facility] heme path review, biopsy shows AMML with 80% blasts.   - NGS shows SRSF2 and TET2 mutations. In the absence of a favorable genetic finding, SRSF2 is associated with adverse risk. TET2 does not have prognostic significance. BCR/ABL undetectable.   - FISH negative for rearrangement.   - Of note, BMBx procedure was technically very difficult and OSH recommended IR consult for future biopsies. Per nursing, appears more sacral so unsure of quality. Will discuss timing of repeat BMBx.   - Virologies: HSV 1/2 negative, CMV IgG-, EBV+, HIV-, HepB/C-   - HLA typing sent on admission   - EKG 12/9 with NSR QTc 441. Echo 12/10 with hyperkinetic LVEF at 65-70%.   - PICC line placed, would remove at discharge  - WBC 18K on admission. Hydrea 1g daily 12/11-12/19, stopped with initiation of treatment -- WBC is down-trending.       Treatment Plan: Venetoclax/Decitabine (Y7B6=1712/19/22)    - Venetoclax 100 mg PO daily - D1; complete     - Venetoclax 200 mg PO daily - D2; complete     - Venetoclax 400 mg PO daily - D3 - 28 - may need to dose adjust depending on antifungal    - Decitabine 20 mg/m2 (60 mg) IV daily - D1-10     # Anemia   # Thrombocytopenia   - Transfuse to keep Hgb >7 and plt > 10k (may consider increasing if recurrent epistaxis)      #  TLS, stable to improved  # Hyperkalemia, resolved  # Hyperuricemia   # Hyperphosphatemia  # H/o gout   # Risk of DIC  On admission uric acid 9.1, LDH 2,475, though all other TLS labs WNL including Cr 0.88. Cr trending up slightly as of 12/11 to 1.11 and uric acid remains elevated at 9.6. Of note patient does have a known h/o gout. INR mildly elevated (~1.2). He is s/p Rasburicase x1 on 12/11 and again on 12/16. IVF now discontinued d/t volume overload and improvement in labs. TLS labs again trending up since starting chemo as of 12/21 -- K elevated to 5.8 (now resolved), uric acid 7.3, Phos 5.6 and Cr 1.27 (baseline ~0.9-1.0). EKG show NSR. Labs improved following additional IVF.   - Allopurinol 300 mg daily. Consider re-dosing Rasburicase if uric acid is >8.0.   - Phos 1.334 mg TID initiated x12/18, consider increasing if Phos remains elevated  - Monitor TLS labs BID; consider de-escalating in the coming days. DIC labs daily for now.     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 Amari Cortes (4/15/22). Patient has history of multiple DVTs in the 1990s without known cause, unprovoked PE at age 43, DVT with PE in context of hip replacement. He most recently was diagnosed with bilateral PE with right heart strain on 1/19/22 after presenting to the ED with SOB.   - Resumed PTA Xarelto 12/13; held x12/16 with plt dropping  - Repeat CTA 12/16 w/o evidence of PE     ID  # Undifferentiated shock, resolved  # Possible pneumonia, s/p empiric abx   # Hypotension, resolved  # Possible adrenal insufficiency  Acute symptomatic hypotension on 12/16 from 123/61 ? 77/39 in the setting of acutely worsening upper back pain, diaphoresis, dizziness. Rapid response called. EKG NSR, trop 33 ? 30. VBG normal. Hgb stable. No e/o bleed, PE, dissection on CTA/CAP w/ contrast. CT did note RLL consolidation suggestion possible pneumonia. Lactic 2.2 ? 1.8.  "BCx NGTD. Initially pressures improved with bolus then became soft again to 92/46 around 1400. Recent echo with EF 65-70%.  - Suspect that hypotension was possibly from adrenal insufficiency with steroid taper as pressure improved after dexamethasone 10 mg was ordered 12/16 afternoon. AM cortisol 12/17 was WNL but likely skewed by recent steroid administration. Less likely septic shock in the absense of fever and tachycardia.   - Cefepime 12/16-12/18, discontinued as low suspicion for infection as etiology of hypotension. Completed azithromycin 12/16-12/18.  - Hold lisinopril and metoprolol    # Prophylaxis   -  mg BID  - Micafungin 50 mg IV for now with concurrent chemo   - Posa/vori both $0/month copay  - Levaquin 250 mg daily      MSK/NEURO  # Acute on chronic back pain  Patient endorses ongoing back pain prior to hospitalization though over the past week has been endorsing rib pain which radiates into his upper back and between his shoulder blades. No red flag sx. Of note he does have splenomegaly on recent CT imaging which could be contributing. EKG shows NSR. Lipase WNL. MRI thoracic spine 12/17 showed \"scattered subtle mottled enhancement of bones most pronounced on L 7th rib, could be 2/2 underlying disease process.\" It is possible that pt has some extramedullary infiltration of cortex causing pain.   - Scheduled Voltaren gel QID, Robaxin QID  - PRN Tylenol, heat packs, Flexeril    - PTA oxycodone 10-20 mg q6h PRN and IV dilaudid 1 mg q2hr PRN  - Pain responds well to dexamethasone. S/p dex 10 mg x1 on 12/11-13, 6 mg on 12/14, 4 mg on 12/15 for pain. Increased back to 10 mg on 12/16, taper to 8 mg on 12/17-12/21. Continue to taper with initiation of treatment -- Dex 4 mg daily x12/22 and continue to reassess further taper in coming days.   - Palliative Care consulted; initiated Dilaudid PCA x12/20 - continue for now, likely to transition to PO regimen in coming days.     # Headaches, improved  Patient " has been experiencing nasal congestion, left sided headaches and eye pressure. No other neurologic complaints. Brain MRI 12/5 with diffuse linear and slightly nodular dural thickening and enhancement. Diffuse marrow signal abnormality within calvarium and central skull base. Differential includes lymphoproliferative abnormalities and metastatic disease.  - Requested images to be pushed into PACs and ealth over read ordered  - S/p diagnostic LP 12/12; flow negative  - Tylenol available PRN      PULM  # COPD   # Hypoxia, resolved   New O2 requirement 12/11 to 3-4L in the setting of recent continuous IVF, splenomegaly and poor pain control. After improvement in volume status, ongoing mild hypoxia to high 80s, may be baseline from COPD.  - CXR showed cardiomegaly, enlarged pulmonary vasculature and diffuse mild interstitial markings bilaterally likely representing pulmonary edema.   - PTA Bumex above  - DuoNebs BID x12/13. Neb treatments q2hr PRN   - Continue PTA Breo Ellipta   - Incentive spirometry  - CT chest 12/16 showed possible RLL pneumonia. See pneumonia treatment as above.     # CATHERINE  - Continue CPAP at night  - Hypoxia at night, should follow up outpatient for possible CPAP adjustment    CARDS  # HTN   - Hold PTA metoprolol ER 12.5 mg daily and lisinopril 5 mg daily in the setting of recent hypotension     # HLD   - PTA atorvastatin held on admission for potential treatment      # Chronic venous insufficiency, CEAP 5   # Bilateral LE edema   Worsening 12/20 secondary to IVF in the setting of TLS.   - Echo completed as above  - Continue PTA Bumex 1 mg daily, s/p additional 1x 1 mg dose on 12/22 - reassess need for additional doses daily based on sx and tolerance.    - Lymphedema consulted, patient has home compression wraps     GI  # Constipation, intermittent   Pt is having BMs but stools are small and firm.   - Senna BID and MiraLax daily   - Milk of magnesium daily PRN  - Drinking prune juice    ENDO  #  "T2DM   Follows with [facility], last seen 11/15/22.   - PTA Ozempic held on admission   - Medium intensity sliding scale insulin   - Hypoglycemia protocol in place     RESOLVED  # Sore throat - Strep negative, HSV swab negative. Cepacol lozenges PRN.   # Epistaxis - intermittent over the past month PTA. No recurrence this admission.     Clinically Significant Risk Factors        # Hyperkalemia: Highest K = 6.1 mmol/L in last 2 days, will monitor as appropriate  # Hyponatremia: Lowest Na = 128 mmol/L in last 2 days, will monitor as appropriate      # Hypoalbuminemia: Lowest albumin = 3.2 g/dL at 12/11/2022  7:01 AM, will monitor as appropriate   # Thrombocytopenia: Lowest platelets = 44 in last 2 days, will monitor for bleeding         # Severe Obesity: Estimated body mass index is 50.27 kg/m  as calculated from the following:    Height as of this encounter: 1.854 m (6' 1\").    Weight as of this encounter: 172.8 kg (381 lb).   # Moderate Malnutrition: based on nutrition assessment      FEN  Diet: Regular Diet Adult   IVF: Bolus PRN; cautious with volume overload  Lytes: Replete per protocol    PPX  VTE: None given thrombocytopenia  Bowel: Senna/MiraLax/MoM PRN  GI/PUD: Protonix    MISC  Code Status: Full Code   Lines/Drains: PIV  Social: Lives at home alone near [place], [place]. Disability paperwork returned to pt on 12/21.   Dispo: Started chemo with 10-days of Decitabine + Venetoclax, dispo TBD pending ongoing plan.   Follow Up: TBD; will need to send message to establish with one of our Hematologists once plan is more clear.       Patient and plan of care was discussed with attending physician Dr. Guzman.     >55 minutes spent on the date of the encounter. Over 50% of time was spent counseling the patient and/or coordinating care.     Shannon Irving PA-C  Hematology/Oncology  Ph: [phone], Pager: [phone]    Interval History   No acute events overnight. [place] has remained afebrile and VSS. TLS labs " improved without need for further IVF overnight. Continues with chemotherapy. Patient endorses ongoing worsening LE edema and discomfort since getting IVF over the past 1-2 days. He is voiding well. Fair PO intake. Reports back pain is stable this morning. Did utilize Dilaudid PCA a couple times yesterday, palliative is following and anticipating possibly transitioning to oral regimen today per their note yesterday. No other specific complaints today. All questions answered.      A comprehensive review of systems was obtained and is negative other than noted here or in the HPI.     Vital Signs with Ranges  Temp:  [97.4  F (36.3  C)-98.2  F (36.8  C)] 97.9  F (36.6  C)  Pulse:  [56-74] 69  Resp:  [15-18] 15  BP: (110-140)/(58-66) 113/64  SpO2:  [90 %-98 %] 98 %  I/O last 3 completed shifts:  In: 3416 [P.O.:2400; I.V.:36; IV Piggyback:980]  Out: 4450 [Urine:4450]    Physical Exam   General: Sitting on edge of bed, comfortable, alert. Large habitus.   Skin: Chronic venous stasis changes to BLE from ankle to mid-calf. No concerning lesions, rash, jaundice, cyanosis, erythema, or ecchymoses on exposed surfaces.   HEENT: NCAT. EOMI, anicteric sclera.  Respiratory: Non-labored breathing on room air, good air exchange, lungs clear to auscultation bilaterally.  Cardiovascular: RRR. No murmur or rub.   Gastrointestinal: Normoactive BS. Abdomen soft, ND.   Extremities: 2-3+ bilateral pitting edema to LE, worse at ankles   Neurologic: A&O x 3, speech normal, no deficits grossly.    Medications     HYDROmorphone       - MEDICATION INSTRUCTIONS -       sodium chloride 10 mL/hr at 12/21/22 2207       acyclovir  400 mg Oral BID     allopurinol  300 mg Oral Daily     ammonium lactate   Topical Daily     bumetanide  1 mg Oral Once     bumetanide  1 mg Oral Daily     calcium acetate (phos binder)  1,334 mg Oral TID w/meals     decitabine  20 mg/m2 (Treatment Plan Recorded) Intravenous Q23H     dexamethasone  4 mg Oral Daily      diclofenac  2 g Topical 4x Daily     fluticasone-vilanterol  1 puff Inhalation Daily     heparin lock flush  5-20 mL Intracatheter Q24H     insulin aspart  1-7 Units Subcutaneous TID AC     insulin aspart  1-5 Units Subcutaneous At Bedtime     ipratropium - albuterol 0.5 mg/2.5 mg/3 mL  3 mL Nebulization BID     levofloxacin  250 mg Oral Daily     lidocaine  1-2 patch Transdermal Q24H     lidocaine   Transdermal Q8H GREG     [Held by provider] lisinopril  5 mg Oral Daily     methocarbamol  500 mg Oral 4x Daily     [Held by provider] metoprolol succinate ER  12.5 mg Oral Daily     micafungin  50 mg Intravenous Q24H     pantoprazole  40 mg Oral QAM AC     polyethylene glycol  17 g Oral Daily     [Held by provider] rivaroxaban ANTICOAGULANT  20 mg Oral Daily with supper     senna-docusate  1 tablet Oral BID    Or     senna-docusate  2 tablet Oral BID     venetoclax  400 mg Oral Daily     Data   Results for orders placed or performed during the hospital encounter of 12/09/22 (from the past 24 hour(s))   Uric acid   Result Value Ref Range    Uric Acid 6.8 3.4 - 7.0 mg/dL   Phosphorus   Result Value Ref Range    Phosphorus 4.5 2.5 - 4.5 mg/dL   Glucose by meter   Result Value Ref Range    GLUCOSE BY METER POCT 234 (H) 70 - 99 mg/dL   Basic metabolic panel   Result Value Ref Range    Sodium 128 (L) 136 - 145 mmol/L    Potassium 5.0 3.4 - 5.3 mmol/L    Chloride 95 (L) 98 - 107 mmol/L    Carbon Dioxide (CO2) 21 (L) 22 - 29 mmol/L    Anion Gap 12 7 - 15 mmol/L    Urea Nitrogen 27.1 (H) 8.0 - 23.0 mg/dL    Creatinine 0.88 0.67 - 1.17 mg/dL    Calcium 9.3 8.8 - 10.2 mg/dL    Glucose 234 (H) 70 - 99 mg/dL    GFR Estimate >90 >60 mL/min/1.73m2   Glucose by meter   Result Value Ref Range    GLUCOSE BY METER POCT 188 (H) 70 - 99 mg/dL   Glucose by meter   Result Value Ref Range    GLUCOSE BY METER POCT 196 (H) 70 - 99 mg/dL   Glucose by meter   Result Value Ref Range    GLUCOSE BY METER POCT 107 (H) 70 - 99 mg/dL   CBC with  platelets differential    Narrative    The following orders were created for panel order CBC with platelets differential.  Procedure                               Abnormality         Status                     ---------                               -----------         ------                     CBC with platelets and d...[[id number]]  Abnormal            Final result               Manual Differential[[id number]]          Abnormal            Final result                 Please view results for these tests on the individual orders.   Comprehensive metabolic panel   Result Value Ref Range    Sodium 131 (L) 136 - 145 mmol/L    Potassium 5.0 3.4 - 5.3 mmol/L    Chloride 94 (L) 98 - 107 mmol/L    Carbon Dioxide (CO2) 23 22 - 29 mmol/L    Anion Gap 14 7 - 15 mmol/L    Urea Nitrogen 26.4 (H) 8.0 - 23.0 mg/dL    Creatinine 0.80 0.67 - 1.17 mg/dL    Calcium 10.1 8.8 - 10.2 mg/dL    Glucose 107 (H) 70 - 99 mg/dL    Alkaline Phosphatase 82 40 - 129 U/L    AST 30 10 - 50 U/L    ALT 16 10 - 50 U/L    Protein Total 7.3 6.4 - 8.3 g/dL    Albumin 3.8 3.5 - 5.2 g/dL    Bilirubin Total 0.6 <=1.2 mg/dL    GFR Estimate >90 >60 mL/min/1.73m2   Lactate Dehydrogenase   Result Value Ref Range    Lactate Dehydrogenase 1,384 (H) 0 - 250 U/L   INR   Result Value Ref Range    INR 1.12 0.85 - 1.15   Partial thromboplastin time   Result Value Ref Range    aPTT 25 22 - 38 Seconds   Fibrinogen activity   Result Value Ref Range    Fibrinogen Activity 315 170 - 490 mg/dL   Magnesium   Result Value Ref Range    Magnesium 2.5 (H) 1.7 - 2.3 mg/dL   Uric acid   Result Value Ref Range    Uric Acid 7.4 (H) 3.4 - 7.0 mg/dL   Phosphorus   Result Value Ref Range    Phosphorus 5.0 (H) 2.5 - 4.5 mg/dL   CBC with platelets and differential   Result Value Ref Range    WBC Count 13.3 (H) 4.0 - 11.0 10e3/uL    RBC Count 2.91 (L) 4.40 - 5.90 10e6/uL    Hemoglobin 8.8 (L) 13.3 - 17.7 g/dL    Hematocrit 29.0 (L) 40.0 - 53.0 %     78 - 100 fL    MCH 30.2 26.5 -  "33.0 pg    MCHC 30.3 (L) 31.5 - 36.5 g/dL    RDW 19.5 (H) 10.0 - 15.0 %    Platelet Count 44 (LL) 150 - 450 10e3/uL    Narrative    Previously reported component [ NRBCs ] is no longer reported.\"  Previously reported component [ NRBCs Absolute ] is no longer reported.\"   Manual Differential   Result Value Ref Range    % Neutrophils 73 %    % Lymphocytes 6 %    % Monocytes 10 %    % Eosinophils 0 %    % Basophils 0 %    % Metamyelocytes 4 %    % Myelocytes 3 %    % Other Cells 4 %    NRBCs per 100 WBC 4 (H) <=0 %    Absolute Neutrophils 9.7 (H) 1.6 - 8.3 10e3/uL    Absolute Lymphocytes 0.8 0.8 - 5.3 10e3/uL    Absolute Monocytes 1.3 0.0 - 1.3 10e3/uL    Absolute Eosinophils 0.0 0.0 - 0.7 10e3/uL    Absolute Basophils 0.0 0.0 - 0.2 10e3/uL    Absolute Metamyelocytes 0.5 (H) <=0.0 10e3/uL    Absolute Myelocytes 0.4 (H) <=0.0 10e3/uL    Absolute Other Cells 0.5 (H) <=0.0 10e3/uL    Absolute NRBCs 0.5 (H) <=0.0 10e3/uL    RBC Morphology Confirmed RBC Indices     Platelet Assessment  Automated Count Confirmed. Platelet morphology is normal.     Automated Count Confirmed. Platelet morphology is normal.    Polychromasia Moderate (A) None Seen     "

## 2022-12-23 NOTE — PROGRESS NOTES
MEDICINE CROSS COVER:  Followed up TLS labs this evening. K, Ca WNL - no intervention needed. Uric acid 7.8 - since <8 no need for rasburicase. Cr increased to 1 from 0.8. Given increase, will give  ml over 4 hrs. Repeat labs already ordered for AM.    Patricia Philippe MD  Internal Medicine/Pediatrics Hospitalist   of Internal Medicine and Pediatrics  [facility]  Pager: [phone]

## 2022-12-23 NOTE — CONSULTS
Outpatient IR Biopsy Referral    Patient is a 63 y/o male with a PMH of PE, DVT, on xarelto, DM2, COPD, HTN, gout, leukemia with technically challenging BMBXs. IR has been asked to for image guided bone marrow biopsies going forward. Patient currently admitted pending discharge.     12/9/22 Prior bmbx at [facility] noted to be technically very difficult.          Case and imaging CT 12/16/22 was reviewed with Dr. Israel from IR and CT guided bone marrow biopsy is approved. Series 26 Image 263.    Procedure order, placed sample orders placed by referring team.     Primary team  Shannon JONES Hematology made aware of IR recommendations via epic messaging.    RASHEED Burgos CNP  Interventional Radiology   IR on-call pager: [phone]

## 2022-12-23 NOTE — PROGRESS NOTES
Nursing Focus: Chemotherapy  D: Positive blood return via PICC. Insertion site is clean/dry/intact, dressing intact with no complaints of pain.  Urine output is recorded in intake in Doc Flowsheet.    I: Premedications given per order (see electronic medical administration record). Dose #3 of decitabine started to infuse over 1 hours. Reviewed pt teaching on chemotherapy side effects.  Pt denies need for further teaching. Chemotherapy double checked per protocol by two chemotherapy competent RN's.   A: Tolerating procedure well. Denies nausea and or pain.   P: Continue to monitor urine output and symptoms of nausea. Screen for symptoms of toxicity.    [id number]:  VSS on RA, afebrile. PCA discontinued. Pt promoted to use oxycodone as first line and IV dilaudid as second line for breakthru pain.  at dinner time, sliding scale given. Up independently in room. Pt able to make needs known. Continue with POC.

## 2022-12-23 NOTE — PROGRESS NOTES
December 23, 2022    Hematology/Oncology [facility] referral reviewed.     Received from the inpatient [facility] team for pt with new AML who needs to establish with Hematologist. S/p inpatient chemo with Decitabine/Venetoclax. Requesting outpatient follow-up in 2-3 weeks.     IB to Dr. Velasquez who has an opening 1/5 confirming he is willing to accept patient. Will follow-up pending response. Hold 1/5 1045 Dr. Velasquez.     Hematology intake scheduling team (NPS phone number [phone] Monday - Friday 8am - 4:30 pm) will contact pt in the next 1-2 business days to schedule the consultation.    Obdulia Romano, BSN, RN, PHN, OCN  Hematology/Oncology Nurse Navigator  [facility]  [phone]

## 2022-12-23 NOTE — PROGRESS NOTES
[facility]  Palliative Care Daily Progress Note       Recommendations & Counseling     Pain: continues to be improved and controlled at present; since stopping Dilaudid PCA and adjusting PO opiate regimen, Pt has used oxycodone 10mg three times (9:13pm yesterday, 2:26am and 8:48am today) and has not needed IV Dilaudid dosing.    Recommend continuing oxycodone to 5-10mg PO Q3hrs PRN with option for 5mg, 7.5mg or 10mg dosing (used 3 doses since starting). Please offer oxycodone first for pain. I anticipate that dosing may be able to be tapered some prior to discharge.    Recommend Dilaudid 0.5mg IV Q4hrs PRN only for pain not relieved by oxycodone (not using)    Dexamethasone 4mg PO daily also likely assisting some with pain, agree with continuing at this dose and when ready to taper further, recommend stepping down to dexamethasone 2mg PO daily for at least 3 days prior to stopping (defer to primary team)     Nausea: none present at this time     Opiate-induced constipation: resolved, had two full bowel movements 12/22 with initiation of bowel regimen and felt much better.     Continue scheduled senna 1-2 tabs PO BID    Continue Miralax PO daily and having an additional PRN dose      **Our team will not plan to see patient in person over weekend, but please page on-call provider if needs over weekend. I will see him again in person on Monday 12/26.**    Total time spent was 25 minutes,  >50% of time was spent counseling and/or coordination of care regarding symptom management, support with coping. Recommendations communicated with primary team by note, phone.    Betzy Cifuentes -[phone]   Team Consult Pager [phone] (answered 8am-430pm M-F) - ok to text page via [employer] / After-Hours Answering Service [phone] / Palliative Clinic in the [facility] at the [facility] [place] - [phone] (scheduling); [phone] (triage).      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 Palliative consulted to assist with pain.     Today, the patient was seen for:  AML  Chemotherapy initiation  Cancer related pain  Symptom management  Support with coping    Prognosis, Goals, or Advance Care Planning was addressed today with: Yes.  Mood, coping, and/or meaning in the context of serious illness were addressed today: Yes.  Summary/Comments:            Interval History:     Chart review/discussion with unit or clinical team members:   No acute events overnight.    Per patient or family/caregivers today:  Pt notes he is getting good analgesia with oxycodone 10mg dosing. He has not had return of the severe sharp pain he had earlier this week and PTA. He continues to be very fearful of return of the severe sharp pain he had previously. No nausea, appetite doing okay, does not feel constipated, no respiratory symptoms.             Review of Systems:     Besides above, an additional 1 system ROS was reviewed and is unremarkable          Medications:     I have reviewed this patient's medication profile and medications during this hospitalization.             Physical Exam:   Vitals were reviewed  Temp: 97.7  F (36.5  C) Temp src: Oral BP: 108/56 Pulse: 64   Resp: 16 SpO2: 99 % O2 Device: None (Room air)    Gen: NAD, sitting up in chair, pleasant and cooperative with interview and exam  HEENT: normocephalic, no scleral icterus, no perioral lesions, oral mucosa moist  Pulm: no increased work of breathing, no tachypnea  Abd: obese, nondistended  LE: stable edema bilaterally  Skin: no rash or jaundice on limited exam; stable venous stasis changes in LE that are chronic  Neuro: AOx3, no tremor  Psych: mood-affect congruent             Data Reviewed:     Reviewed recent pertinent imaging, comments:   No new imaging over last 24 hours on  chart review.    Reviewed recent labs, comments:   Today, creatinine 0.90, AST 23, ALT 16, Tbili 0.5, WBC 9, Hgb 8, plts 44.

## 2022-12-23 NOTE — PROGRESS NOTES
[facility]    Hematology / Oncology Progress Note    Patient: Angel Cardenas  MRN: [id number]  Admission Date: 12/9/2022  Date of Service (when I saw the patient): 12/23/2022  Hospital Day # 14     Assessment & Plan   Angel Cardenas is a 62 year old male with past medical history of PE (previously on Xarelto), T2DM, COPD, HTN, and gout. He was admitted for further work up and initiation of treatment. He was diagnosed with AMML and began treatment with Venetoclax + Decitabine ([id number]=4612/19/22). 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      TODAY:   - Day 5 Venetoclax + Decitabine, Venetoclax is at 400 mg daily dosing  - Reduce Venetoclax dosing to 100 mg daily starting tomorrow 12/24 with concurrent -azole (ordered).    - Plan to discontinue Lynnette and start Posaconazole tomorrow 12/24 (ordered)  - TLS labs stable, de-escalate to monitor daily. Continue with Allopurinol and Phoslo. IVF bolus prn.   - Continue with Dex 4 mg daily for now. Consider further taper in the coming days. Of note, pt is concerned for pain flare if he goes down on steroids. Would consider taper to 2 mg for a couple days prior to off.   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  - Scheduled Voltaren gel, Lido patches, Robaxin   - PCA now discontinued. PRN Tylenol, Oxycodone, IV Dilaudid, Flexeril available prn.   - Resume half-dose Lovenox (0.5 mg/kg BID), hold if Plt <30K. Monitor platelets closely.   - Continue with best supportive cares      HEME   # AMML with SRSF2, TET2 mutations  # Leukocytosis with peripheral blasts, resolved  Patient was undergoing work up with his PCP for fatigue, subjective fevers, lack of appetite, weight loss,  intermittent nose bleed over the last month and rib pain. He was found to have WBC 8.5 Hgb 8.8 and plts 82 with 2% promyelocytes. OP peripheral smear with bicytopenia and 6% circulating blasts, no adrianne rods noted. Flow cytometry of peripheral blood 12/9 with 1% CD34 blasts, reviewed at [facility] with noted 6% blasts. He was referred to [facility] for further work-up and initiation of treatment. On admission WBC 18.2 Hgb 8.3 and Plt 94. He was seen by oncall fellow and attending, please see full note 12/9. Peripheral smear was reviewed and there was a very low suspicion for APL based on review so no treatment was initiated. 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   - Peripheral flow 12/9 resulted with abnormal monocytic population (25%) and abnormal CD34 positive myeloid blast population (1.1%) worrisome for high-grade myeloid neoplasm. Peripheral BCR/ABL1 and FLT3 negative. FISH with no rearrangement of NUP98 or KMT2A.   - OP BMBx 12/9/22. Arrived from [facility] on 12/13. Per [facility] heme path review, biopsy shows AMML with 80% blasts.   - NGS shows SRSF2 and TET2 mutations. In the absence of a favorable genetic finding, SRSF2 is associated with adverse risk. TET2 does not have prognostic significance. BCR/ABL undetectable.   - FISH negative for rearrangement.   - Of note, BMBx procedure was technically very difficult and OSH recommended IR consult for future biopsies. Per nursing, appears more sacral so unsure of quality. Plan for repeat BMBx ~D28, requested scheduling for CT-guided procedure outpatient.   - Virologies: HSV 1/2 negative, CMV IgG-, EBV+, HIV-, HepB/C-   - HLA typing sent on admission   - EKG 12/9 with NSR QTc 441. Echo 12/10 with hyperkinetic LVEF at 65-70%.   - PICC line placed, would remove at discharge  - WBC 18K on admission. Hydrea 1g daily 12/11-12/19, stopped with initiation of treatment -- WBC is down-trending.   - Request sent for BMT intake       Treatment Plan: Venetoclax/Decitabine  ([id number]=9912/19/22)    - Venetoclax 100 mg PO daily - D1; complete     - Venetoclax 200 mg PO daily - D2; complete     - Venetoclax 400 mg PO daily - D3 - 28 - will reduce back to 100 mg x12/24 with concurrent -azole.     - Decitabine 20 mg/m2 (60 mg) IV daily - D1-10     # Anemia   # Thrombocytopenia   - Transfuse to keep Hgb >7 and plt > 10k (may consider increasing if recurrent epistaxis)      # TLS, stable to improved  # Hyperkalemia, resolved  # Hyperuricemia   # Hyperphosphatemia  # H/o gout   # Risk of DIC  On admission uric acid 9.1, LDH 2,475, though all other TLS labs WNL including Cr 0.88. Cr trending up slightly as of 12/11 to 1.11 and uric acid remains elevated at 9.6. Of note patient does have a known h/o gout. INR mildly elevated (~1.2). He is s/p Rasburicase x1 on 12/11 and again on 12/16. IVF now discontinued d/t volume overload and improvement in labs. TLS labs again trending up since starting chemo as of 12/21 -- K elevated to 5.8 (now resolved), uric acid 7.3, Phos 5.6 and Cr 1.27 (baseline ~0.9-1.0). EKG show NSR. Labs improved following additional IVF.   - Allopurinol 300 mg daily. Consider re-dosing Rasburicase if uric acid >8.0 AND if up-trending Cr (does have h/o gout).   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 Amari Cortes (4/15/22). Patient has history of multiple DVTs in the 1990s without known cause, unprovoked PE at age 43, DVT with PE in context of hip replacement. He most recently was diagnosed with bilateral PE with right heart strain on 1/19/22 after presenting to the ED with SOB.   - Resumed PTA Xarelto 12/13; held x12/16 with plt dropping  - Repeat CTA 12/16 w/o evidence of PE  - Start half-dose Lovenox (0.5 mg/kg) q12h x12/23; hold if Plt <30K. Would increase to full dose (vs resumption  "of DOAC) if Plt are consistently >50K.      ID  # Undifferentiated shock, resolved  # Possible pneumonia, s/p empiric abx   # Hypotension, resolved  # Possible adrenal insufficiency  Acute symptomatic hypotension on 12/16 from 123/61 ? 77/39 in the setting of acutely worsening upper back pain, diaphoresis, dizziness. Rapid response called. EKG NSR, trop 33 ? 30. VBG normal. Hgb stable. No e/o bleed, PE, dissection on CTA/CAP w/ contrast. CT did note RLL consolidation suggestion possible pneumonia. Lactic 2.2 ? 1.8. BCx NGTD. Initially pressures improved with bolus then became soft again to 92/46 around 1400. Recent echo with EF 65-70%.  - Suspect that hypotension was possibly from adrenal insufficiency with steroid taper as pressure improved after dexamethasone 10 mg was ordered 12/16 afternoon. AM cortisol 12/17 was WNL but likely skewed by recent steroid administration. Less likely septic shock in the absense of fever and tachycardia.   - Cefepime 12/16-12/18, discontinued as low suspicion for infection as etiology of hypotension. Completed azithromycin 12/16-12/18.  - Hold lisinopril and metoprolol    # Prophylaxis   -  mg BID  - Micafungin 50 mg IV for now with concurrent chemo; will discontinue 12/24. Plan to start Posaconazole 300 mg daily x12/24.    - Posa/vori both $0/month copay  - Levaquin 250 mg daily      MSK/NEURO  # Acute on chronic back pain  Patient endorses ongoing back pain prior to hospitalization though over the past week has been endorsing rib pain which radiates into his upper back and between his shoulder blades. No red flag sx. Of note he does have splenomegaly on recent CT imaging which could be contributing. EKG shows NSR. Lipase WNL. MRI thoracic spine 12/17 showed \"scattered subtle mottled enhancement of bones most pronounced on L 7th rib, could be 2/2 underlying disease process.\" It is possible that pt has some extramedullary infiltration of cortex causing pain.   - Scheduled " Voltaren gel QID, Robaxin QID  - PRN Tylenol, heat packs, Flexeril    - Pain responds well to dexamethasone. S/p dex 10 mg x1 on 12/11-13, 6 mg on 12/14, 4 mg on 12/15 for pain. Increased back to 10 mg on 12/16, taper to 8 mg on 12/17-12/21. Continue to taper with initiation of treatment -- Dex 4 mg daily x12/22 and continue to reassess further taper in coming days.   - Of note, pt is concerned for pain flare if he goes down on steroids. Would consider taper to 2 mg for a couple days prior to off.   - Palliative Care consulted; initiated Dilaudid PCA - now discontinued (12/20-12/22). PTA oxycodone 10-20 mg q6h PRN ? transitioned to 5-10 mg q3h PRN (with option for 5mg, 7.5mg or 10mg dosing). 0.5 mg IV Dilaudid q4h PRN available.     # Headaches, improved  Patient has been experiencing nasal congestion, left sided headaches and eye pressure. No other neurologic complaints. Brain MRI 12/5 with diffuse linear and slightly nodular dural thickening and enhancement. Diffuse marrow signal abnormality within calvarium and central skull base. Differential includes lymphoproliferative abnormalities and metastatic disease.  - Requested images to be pushed into PACs and [facility] over read ordered  - S/p diagnostic LP 12/12; flow negative  - Tylenol available PRN      PULM  # COPD   # Hypoxia, resolved   New O2 requirement 12/11 to 3-4L in the setting of recent continuous IVF, splenomegaly and poor pain control. After improvement in volume status, ongoing mild hypoxia to high 80s, may be baseline from COPD.  - CXR showed cardiomegaly, enlarged pulmonary vasculature and diffuse mild interstitial markings bilaterally likely representing pulmonary edema.   - PTA Bumex above  - DuoNebs BID x12/13. Neb treatments q2hr PRN   - Continue PTA Breo Ellipta   - Incentive spirometry  - CT chest 12/16 showed possible RLL pneumonia. See pneumonia treatment as above.     # CATHERINE  - Continue CPAP at night  - Hypoxia at night, should follow up  "outpatient for possible CPAP adjustment    CARDS  # HTN   - Hold PTA metoprolol ER 12.5 mg daily and lisinopril 5 mg daily in the setting of recent hypotension     # HLD   - PTA atorvastatin held on admission for potential treatment      # Chronic venous insufficiency, CEAP 5   # Bilateral LE edema   Worsening 12/20 secondary to IVF in the setting of TLS.   - Echo completed as above  - Continue PTA Bumex 1 mg daily. Reassess need for additional doses daily based on sx and tolerance (responds well to additional 1 mg PRN).   - Lymphedema consulted, patient has home compression wraps     GI  # Constipation, intermittent   Pt is having BMs but stools are small and firm.   - Senna BID and MiraLax daily   - Milk of magnesium daily PRN  - Drinking prune juice    ENDO  # T2DM   Follows with [facility], last seen 11/15/22.   - PTA Ozempic held on admission   - Medium intensity sliding scale insulin   - Hypoglycemia protocol in place     RESOLVED  # Sore throat - Strep negative, HSV swab negative. Cepacol lozenges PRN.   # Epistaxis - intermittent over the past month PTA. No recurrence this admission.     Clinically Significant Risk Factors         # Hyponatremia: Lowest Na = 128 mmol/L in last 2 days, will monitor as appropriate      # Hypoalbuminemia: Lowest albumin = 3.2 g/dL at 12/11/2022  7:01 AM, will monitor as appropriate   # Thrombocytopenia: Lowest platelets = 44 in last 2 days, will monitor for bleeding         # Severe Obesity: Estimated body mass index is 50.11 kg/m  as calculated from the following:    Height as of this encounter: 1.854 m (6' 1\").    Weight as of this encounter: 172.3 kg (379 lb 12.8 oz).   # Moderate Malnutrition: based on nutrition assessment      FEN  Diet: Regular Diet Adult   IVF: Bolus PRN; cautious with volume overload  Lytes: Replete per protocol    PPX  VTE: None given thrombocytopenia  Bowel: Senna/MiraLax/MoM PRN  GI/PUD: Protonix    MISC  Code Status: Full Code "   Lines/Drains: PIV  Social: Lives at home alone near [place], [place]. Disability paperwork returned to pt on 12/21.   Dispo: Started chemo with 10-days of Decitabine + Venetoclax, dispo TBD pending ongoing plan. Will likely remain inpatient through count rom and recovery given limited support at home and does not live locally.     Follow Up: TBD;   - Will need to arrange labs/possible transfusions locally at [facility]; will need to request closer to discharge  - Hem/Onc referral placed and message to Deepika Finn sent to establish with one of our Hematologist; awaiting scheduling  - Pending timing of visit with Hematologist, will likely need to request interim virtual FILIPPO visit.   - Plan for repeat BMBx ~D28, orders for CT-guided BMBx placed through discharge navigator; not yet scheduled      Patient and plan of care was discussed with attending physician Dr. Guzman.     >55 minutes spent on the date of the encounter. Over 50% of time was spent counseling the patient and/or coordinating care.     Shannon Irving PA-C  Hematology/Oncology  Ph: [phone], Pager: [phone]    Interval History   AVSS overnight, nursing notes reviewed. Receive 500 ml bolus for up-trending Cr (just slightly). Uric acid remains elevated though he has a h/o gout. Reports good PO intake. Pain is stable this morning despite stopping Dilaudid PCA yesterday. Reports LE swelling is improved today following additional dose of Bumex yesterday. Will continue home regimen today. Continues with chemo, no specific complaints or GI sx. Discussed plan to start Lovenox injections today. All questions answered.     A comprehensive review of systems was obtained and is negative other than noted here or in the HPI.     Vital Signs with Ranges  Temp:  [97.2  F (36.2  C)-98.7  F (37.1  C)] 97.7  F (36.5  C)  Pulse:  [63-72] 64  Resp:  [16-18] 16  BP: ()/(50-66) 108/56  SpO2:  [92 %-99 %] 99 %  I/O last 3 completed shifts:  In: 620 [I.V.:120; IV  Piggyback:500]  Out: 3300 [Urine:3300]    Physical Exam   General: Sitting upright in bed, comfortable, alert. Large habitus.   Skin: Chronic venous stasis changes to BLE from ankle to mid-calf. No concerning lesions, rash, jaundice, cyanosis, erythema, or ecchymoses on exposed surfaces.   HEENT: NCAT. EOMI, anicteric sclera.  Respiratory: Non-labored breathing on room air, good air exchange, lungs clear to auscultation bilaterally.  Cardiovascular: RRR. No murmur or rub.   Gastrointestinal: Normoactive BS. Abdomen soft, ND.   Extremities: 2-3+ bilateral pitting edema to LE, ankles look better today   Neurologic: A&O x 3, speech normal, no deficits grossly.    Medications     - MEDICATION INSTRUCTIONS -       sodium chloride 10 mL/hr at 12/21/22 2355       acyclovir  400 mg Oral BID     allopurinol  300 mg Oral Daily     ammonium lactate   Topical Daily     bumetanide  1 mg Oral Daily     calcium acetate (phos binder)  1,334 mg Oral TID w/meals     decitabine  20 mg/m2 (Treatment Plan Recorded) Intravenous Q23H     dexamethasone  4 mg Oral Daily     diclofenac  2 g Topical 4x Daily     enoxaparin ANTICOAGULANT  0.5 mg/kg Subcutaneous Q12H     fluticasone-vilanterol  1 puff Inhalation Daily     heparin lock flush  5-20 mL Intracatheter Q24H     insulin aspart  1-7 Units Subcutaneous TID AC     insulin aspart  1-5 Units Subcutaneous At Bedtime     ipratropium - albuterol 0.5 mg/2.5 mg/3 mL  3 mL Nebulization BID     levofloxacin  250 mg Oral Daily     lidocaine  1-2 patch Transdermal Q24H     lidocaine   Transdermal Q8H [facility]     [Held by provider] lisinopril  5 mg Oral Daily     methocarbamol  500 mg Oral 4x Daily     [Held by provider] metoprolol succinate ER  12.5 mg Oral Daily     micafungin  50 mg Intravenous Q24H     pantoprazole  40 mg Oral QAM AC     polyethylene glycol  17 g Oral Daily     [Held by provider] rivaroxaban ANTICOAGULANT  20 mg Oral Daily with supper     senna-docusate  1 tablet Oral BID    Or      senna-docusate  2 tablet Oral BID     venetoclax  400 mg Oral Daily     Data   Results for orders placed or performed during the hospital encounter of 12/09/22 (from the past 24 hour(s))   Glucose by meter   Result Value Ref Range    GLUCOSE BY METER POCT 174 (H) 70 - 99 mg/dL   Uric acid   Result Value Ref Range    Uric Acid 7.8 (H) 3.4 - 7.0 mg/dL   Phosphorus   Result Value Ref Range    Phosphorus 5.3 (H) 2.5 - 4.5 mg/dL   Basic metabolic panel   Result Value Ref Range    Sodium 130 (L) 136 - 145 mmol/L    Potassium 4.9 3.4 - 5.3 mmol/L    Chloride 92 (L) 98 - 107 mmol/L    Carbon Dioxide (CO2) 23 22 - 29 mmol/L    Anion Gap 15 7 - 15 mmol/L    Urea Nitrogen 33.3 (H) 8.0 - 23.0 mg/dL    Creatinine 1.01 0.67 - 1.17 mg/dL    Calcium 9.8 8.8 - 10.2 mg/dL    Glucose 214 (H) 70 - 99 mg/dL    GFR Estimate 85 >60 mL/min/1.73m2   Glucose by meter   Result Value Ref Range    GLUCOSE BY METER POCT 265 (H) 70 - 99 mg/dL   Glucose by meter   Result Value Ref Range    GLUCOSE BY METER POCT 176 (H) 70 - 99 mg/dL   CBC with platelets differential    Narrative    The following orders were created for panel order CBC with platelets differential.  Procedure                               Abnormality         Status                     ---------                               -----------         ------                     CBC with platelets and d...[[id number]]  Abnormal            Preliminary result           Please view results for these tests on the individual orders.   Comprehensive metabolic panel   Result Value Ref Range    Sodium 133 (L) 136 - 145 mmol/L    Potassium 4.8 3.4 - 5.3 mmol/L    Chloride 96 (L) 98 - 107 mmol/L    Carbon Dioxide (CO2) 24 22 - 29 mmol/L    Anion Gap 13 7 - 15 mmol/L    Urea Nitrogen 29.0 (H) 8.0 - 23.0 mg/dL    Creatinine 0.90 0.67 - 1.17 mg/dL    Calcium 9.5 8.8 - 10.2 mg/dL    Glucose 104 (H) 70 - 99 mg/dL    Alkaline Phosphatase 71 40 - 129 U/L    AST 23 10 - 50 U/L    ALT 16 10 - 50 U/L    Protein  Total 6.6 6.4 - 8.3 g/dL    Albumin 3.5 3.5 - 5.2 g/dL    Bilirubin Total 0.5 <=1.2 mg/dL    GFR Estimate >90 >60 mL/min/1.73m2   Lactate Dehydrogenase   Result Value Ref Range    Lactate Dehydrogenase 974 (H) 0 - 250 U/L   INR   Result Value Ref Range    INR 1.11 0.85 - 1.15   Partial thromboplastin time   Result Value Ref Range    aPTT 26 22 - 38 Seconds   Fibrinogen activity   Result Value Ref Range    Fibrinogen Activity 256 170 - 490 mg/dL   Magnesium   Result Value Ref Range    Magnesium 2.1 1.7 - 2.3 mg/dL   Uric acid   Result Value Ref Range    Uric Acid 8.1 (H) 3.4 - 7.0 mg/dL   Phosphorus   Result Value Ref Range    Phosphorus 5.4 (H) 2.5 - 4.5 mg/dL   CBC with platelets and differential   Result Value Ref Range    WBC Count 9.0 4.0 - 11.0 10e3/uL    RBC Count 2.67 (L) 4.40 - 5.90 10e6/uL    Hemoglobin 8.0 (L) 13.3 - 17.7 g/dL    Hematocrit 26.4 (L) 40.0 - 53.0 %    MCV 99 78 - 100 fL    MCH 30.0 26.5 - 33.0 pg    MCHC 30.3 (L) 31.5 - 36.5 g/dL    RDW 19.4 (H) 10.0 - 15.0 %    Platelet Count 44 (LL) 150 - 450 10e3/uL    NRBCs per 100 WBC 6 (H) <1 /100    Absolute NRBCs 0.5 10e3/uL   Glucose by meter   Result Value Ref Range    GLUCOSE BY METER POCT 94 70 - 99 mg/dL

## 2022-12-24 NOTE — PLAN OF CARE
[id number]: VSS on RA. Back pain managed with scheduled Voltaren. Denies nausea and SOB. . Continues on PhosLo for mild TLS. Walked the hallways this evening. Good appetite. Ate 100% of dinner. Voiding well. LBM 12/22. Passing gas. Miralax given.

## 2022-12-24 NOTE — PLAN OF CARE
Goal Outcome Evaluation:      Plan of Care Reviewed With: patient      [id number]:     Received 10 mg Oxycodone x 1 this am for upper back pain. Reports pain is well controlled this afternoon and declined further intervention. Ate 100% of his meals, showered and up in the chair. Ambulation encouraged. P[facility] dressing changed by [facility] as it was starting to peel.    Dose #6 of Decitabine hung over 1 hour via PICC with excellent blood return. Denies nausea.

## 2022-12-24 NOTE — PLAN OF CARE
Nursing Focus: Chemotherapy  D: Positive blood return via PICC. Insertion site is clean/dry/intact, dressing intact with no complaints of pain.  Urine output is recorded in intake in Doc Flowsheet.    I: Premedications given per order (see electronic medical administration record). Dose #5 of decitabine started to infuse over 1 hours. Reviewed pt teaching on chemotherapy side effects.  Pt denies need for further teaching. Chemotherapy double checked per protocol by two chemotherapy competent RN's.   A: Tolerating procedure well. Denies nausea and or pain.   P: Continue to monitor urine output and symptoms of nausea. Screen for symptoms of toxicity.     [id number]:  VSS on RA, afebrile. Back pain managed with PRN oxycodone. BG checks AC/HS, sliding scale given as indicated- see MAR. Day 5 chemo complete, tolerating well. Denies N/V, SOB. Up independently in room. Pt able to make needs known. Continue with POC.

## 2022-12-24 NOTE — PROGRESS NOTES
[facility]    Hematology / Oncology Progress Note    Patient: Angel Cardenas  MRN: [id number]  Admission Date: 12/9/2022  Date of Service (when I saw the patient): 12/24/2022  Hospital Day # 15     Assessment & Plan   Angel Cardenas is a 62 year old male with past medical history of PE (previously on Xarelto), T2DM, COPD, HTN, and gout. He was admitted for further work up and initiation of treatment. He was diagnosed with AMML and began treatment with Venetoclax + Decitabine (J5H0=9612/19/22). 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      TODAY:   - Day 6 Venetoclax + Decitabine, Venetoclax is at 400 mg daily dosing  - Reduce Venetoclax dosing to 100 mg daily starting 12/24 with concurrent -azole (ordered).    - Plan to discontinue Lynnette and start Posaconazole 12/24 (ordered)  - TLS labs stable, de-escalate to monitor daily. Continue with Allopurinol and Phoslo. IVF bolus prn. Uric acid was 8.3 on 12/24, we will monitor.  - Continue with Dex 4 mg daily for now. Consider further taper in the coming days. Of note, pt is concerned for pain flare if he goes down on steroids. Would consider taper to 2 mg for a couple days prior to off.   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  - Scheduled Voltaren gel, Robaxin   - PCA now discontinued. PRN Tylenol, Oxycodone, IV Dilaudid, Flexeril available prn.   - Resume half-dose Lovenox (0.5 mg/kg BID), hold if Plt <30K. Monitor platelets closely.   - Continue with best supportive cares  - Plan is to decrease Dexamethasone from 4 to 2 mg on 12/25.      HEME   # AMML with SRSF2, TET2 mutations  # Leukocytosis with peripheral blasts, resolved  Patient was undergoing work up with  his PCP for fatigue, subjective fevers, lack of appetite, weight loss, intermittent nose bleed over the last month and rib pain. He was found to have WBC 8.5 Hgb 8.8 and plts 82 with 2% promyelocytes. OP peripheral smear with bicytopenia and 6% circulating blasts, no adrianne rods noted. Flow cytometry of peripheral blood 12/9 with 1% CD34 blasts, reviewed at [facility] with noted 6% blasts. He was referred to [facility] for further work-up and initiation of treatment. On admission WBC 18.2 Hgb 8.3 and Plt 94. He was seen by oncall fellow and attending, please see full note 12/9. Peripheral smear was reviewed and there was a very low suspicion for APL based on review so no treatment was initiated. 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   - Peripheral flow 12/9 resulted with abnormal monocytic population (25%) and abnormal CD34 positive myeloid blast population (1.1%) worrisome for high-grade myeloid neoplasm. Peripheral BCR/ABL1 and FLT3 negative. FISH with no rearrangement of NUP98 or KMT2A.   - OP BMBx 12/9/22. Arrived from [facility] on 12/13. Per [facility] heme path review, biopsy shows AMML with 80% blasts.   - NGS shows SRSF2 and TET2 mutations. In the absence of a favorable genetic finding, SRSF2 is associated with adverse risk. TET2 does not have prognostic significance. BCR/ABL undetectable.   - FISH negative for rearrangement.   - Of note, BMBx procedure was technically very difficult and OSH recommended IR consult for future biopsies. Per nursing, appears more sacral so unsure of quality. Plan for repeat BMBx ~D28, requested scheduling for CT-guided procedure outpatient.   - Virologies: HSV 1/2 negative, CMV IgG-, EBV+, HIV-, HepB/C-   - HLA typing sent on admission   - EKG 12/9 with NSR QTc 441. Echo 12/10 with hyperkinetic LVEF at 65-70%.   - PICC line placed, would remove at discharge  - WBC 18K on admission. Hydrea 1g daily 12/11-12/19, stopped with initiation of treatment -- WBC is down-trending.   - Request sent for  BMT intake       Treatment Plan: Venetoclax/Decitabine (J8U4=4512/19/22)    - Venetoclax 100 mg PO daily - D1; complete     - Venetoclax 200 mg PO daily - D2; complete     - Venetoclax 400 mg PO daily - D3 - 28 - will reduce back to 100 mg x12/24 with concurrent -azole.     - Decitabine 20 mg/m2 (60 mg) IV daily - D1-10     # Anemia   # Thrombocytopenia   - Transfuse to keep Hgb >7 and plt > 10k (may consider increasing if recurrent epistaxis)      # TLS, stable to improved  # Hyperkalemia, resolved  # Hyperuricemia   # Hyperphosphatemia  # H/o gout   # Risk of DIC  On admission uric acid 9.1, LDH 2,475, though all other TLS labs WNL including Cr 0.88. Cr trending up slightly as of 12/11 to 1.11 and uric acid remains elevated at 9.6. Of note patient does have a known h/o gout. INR mildly elevated (~1.2). He is s/p Rasburicase x1 on 12/11 and again on 12/16. IVF now discontinued d/t volume overload and improvement in labs. TLS labs again trending up since starting chemo as of 12/21 -- K elevated to 5.8 (now resolved), uric acid 7.3, Phos 5.6 and Cr 1.27 (baseline ~0.9-1.0). EKG show NSR. Labs improved following additional IVF.   - Allopurinol 300 mg daily. Consider re-dosing Rasburicase if uric acid >8.0 AND if up-trending Cr (does have h/o gout).   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 Amari Cortes (4/15/22). Patient has history of multiple DVTs in the 1990s without known cause, unprovoked PE at age 43, DVT with PE in context of hip replacement. He most recently was diagnosed with bilateral PE with right heart strain on 1/19/22 after presenting to the ED with SOB.   - Resumed PTA Xarelto 12/13; held x12/16 with plt dropping  - Repeat CTA 12/16 w/o evidence of PE  - Start half-dose Lovenox (0.5 mg/kg) q12h x12/23; hold if  "Plt <30K. Would increase to full dose (vs resumption of DOAC) if Plt are consistently >50K.      ID  # Undifferentiated shock, resolved  # Possible pneumonia, s/p empiric abx   # Hypotension, resolved  # Possible adrenal insufficiency  Acute symptomatic hypotension on 12/16 from 123/61 ? 77/39 in the setting of acutely worsening upper back pain, diaphoresis, dizziness. Rapid response called. EKG NSR, trop 33 ? 30. VBG normal. Hgb stable. No e/o bleed, PE, dissection on CTA/CAP w/ contrast. CT did note RLL consolidation suggestion possible pneumonia. Lactic 2.2 ? 1.8. BCx NGTD. Initially pressures improved with bolus then became soft again to 92/46 around 1400. Recent echo with EF 65-70%.  - Suspect that hypotension was possibly from adrenal insufficiency with steroid taper as pressure improved after dexamethasone 10 mg was ordered 12/16 afternoon. AM cortisol 12/17 was WNL but likely skewed by recent steroid administration. Less likely septic shock in the absense of fever and tachycardia.   - Cefepime 12/16-12/18, discontinued as low suspicion for infection as etiology of hypotension. Completed azithromycin 12/16-12/18.  - Hold lisinopril and metoprolol    # Prophylaxis   -  mg BID  - Micafungin 50 mg IV for now with concurrent chemo; will discontinue 12/24. Plan to start Posaconazole 300 mg daily x12/24.    - Posa/vori both $0/month copay  - Levaquin 250 mg daily      MSK/NEURO  # Acute on chronic back pain  Patient endorses ongoing back pain prior to hospitalization though over the past week has been endorsing rib pain which radiates into his upper back and between his shoulder blades. No red flag sx. Of note he does have splenomegaly on recent CT imaging which could be contributing. EKG shows NSR. Lipase WNL. MRI thoracic spine 12/17 showed \"scattered subtle mottled enhancement of bones most pronounced on L 7th rib, could be 2/2 underlying disease process.\" It is possible that pt has some extramedullary " infiltration of cortex causing pain.   - Scheduled Voltaren gel QID, Robaxin QID  - PRN Tylenol, heat packs, Flexeril    - Pain responds well to dexamethasone. S/p dex 10 mg x1 on 12/11-13, 6 mg on 12/14, 4 mg on 12/15 for pain. Increased back to 10 mg on 12/16, taper to 8 mg on 12/17-12/21. Continue to taper with initiation of treatment -- Dex 4 mg daily x12/22 and continue to reassess further taper in coming days. Plan is to decrease to 2 mg daily on 12/25.  - Of note, pt is concerned for pain flare if he goes down on steroids. Would consider taper to 2 mg for a couple days prior to off.   - Palliative Care consulted; initiated Dilaudid PCA - now discontinued (12/20-12/22). PTA oxycodone 10-20 mg q6h PRN ? transitioned to 5-10 mg q3h PRN (with option for 5mg, 7.5mg or 10mg dosing). 0.5 mg IV Dilaudid q4h PRN available.     # Headaches, improved  Patient has been experiencing nasal congestion, left sided headaches and eye pressure. No other neurologic complaints. Brain MRI 12/5 with diffuse linear and slightly nodular dural thickening and enhancement. Diffuse marrow signal abnormality within calvarium and central skull base. Differential includes lymphoproliferative abnormalities and metastatic disease.  - Requested images to be pushed into PACs and ealth over read ordered  - S/p diagnostic LP 12/12; flow negative  - Tylenol available PRN      PULM  # COPD   # Hypoxia, resolved   New O2 requirement 12/11 to 3-4L in the setting of recent continuous IVF, splenomegaly and poor pain control. After improvement in volume status, ongoing mild hypoxia to high 80s, may be baseline from COPD.  - CXR showed cardiomegaly, enlarged pulmonary vasculature and diffuse mild interstitial markings bilaterally likely representing pulmonary edema.   - PTA Bumex above  - DuoNebs BID x12/13. Neb treatments q2hr PRN   - Continue PTA Breo Ellipta   - Incentive spirometry  - CT chest 12/16 showed possible RLL pneumonia. See pneumonia  "treatment as above.     # CATHERINE  - Continue CPAP at night  - Hypoxia at night, should follow up outpatient for possible CPAP adjustment    CARDS  # HTN   - Hold PTA metoprolol ER 12.5 mg daily and lisinopril 5 mg daily in the setting of recent hypotension     # HLD   - PTA atorvastatin held on admission for potential treatment      # Chronic venous insufficiency, CEAP 5   # Bilateral LE edema -improved with Bumex  Worsening 12/20 secondary to IVF in the setting of TLS.   - Echo completed as above  - Lymphedema consulted, patient has home compression wraps     GI  # Constipation, intermittent   Pt is having BMs but stools are small and firm.   - Senna BID and MiraLax daily   - Milk of magnesium daily PRN  - Drinking prune juice    ENDO  # T2DM   Follows with [facility], last seen 11/15/22.   - PTA Ozempic held on admission   - Medium intensity sliding scale insulin   - Hypoglycemia protocol in place     RESOLVED  # Sore throat - Strep negative, HSV swab negative. Cepacol lozenges PRN.   # Epistaxis - intermittent over the past month PTA. No recurrence this admission.     Clinically Significant Risk Factors         # Hyponatremia: Lowest Na = 130 mmol/L in last 2 days, will monitor as appropriate      # Hypoalbuminemia: Lowest albumin = 3.2 g/dL at 12/11/2022  7:01 AM, will monitor as appropriate   # Thrombocytopenia: Lowest platelets = 41 in last 2 days, will monitor for bleeding         # Severe Obesity: Estimated body mass index is 49.13 kg/m  as calculated from the following:    Height as of this encounter: 1.854 m (6' 1\").    Weight as of this encounter: 168.9 kg (372 lb 6.4 oz).   # Moderate Malnutrition: based on nutrition assessment      FEN  Diet: Regular Diet Adult   IVF: Bolus PRN; cautious with volume overload  Lytes: Replete per protocol    PPX  VTE: None given thrombocytopenia  Bowel: Senna/MiraLax/MoM PRN  GI/PUD: Protonix    MISC  Code Status: Full Code   Lines/Drains: PIV  Social: " Lives at home alone near [place], [place]. Disability paperwork returned to pt on 12/21.   Dispo: Started chemo with 10-days of Decitabine + Venetoclax, dispo TBD pending ongoing plan. Will likely remain inpatient through count rom and recovery given limited support at home and does not live locally.     Follow Up: TBD;   - Will need to arrange labs/possible transfusions locally at [facility]; will need to request closer to discharge  - Hem/Onc referral placed and message to Deepika Finn sent to establish with one of our Hematologist; awaiting scheduling  - Pending timing of visit with Hematologist, will likely need to request interim virtual FILIPPO visit.   - Plan for repeat BMBx ~D28, orders for CT-guided BMBx placed through discharge navigator; not yet scheduled      Patient and plan of care was discussed with attending physician Dr. Guzman.     >55 minutes spent on the date of the encounter. Over 50% of time was spent counseling the patient and/or coordinating care.     Judith Downs MD  Hem-Onc Fellow  Pager: [phone]  Plan was discussed with attending    Interval History   AVSS overnight, nursing notes reviewed. Receive 500 ml bolus for up-trending Cr (just slightly). Uric acid remains elevated though he has a h/o gout. Reports good PO intake. Pain is stable this morning despite stopping Dilaudid PCA yesterday. Reports LE swelling is improved today following additional dose of Bumex yesterday. Will continue home regimen today. Continues with chemo, no specific complaints or GI sx. Discussed plan to start Lovenox injections today. All questions answered.     A comprehensive review of systems was obtained and is negative other than noted here or in the HPI.     Vital Signs with Ranges  Temp:  [97.8  F (36.6  C)-98.1  F (36.7  C)] 98.1  F (36.7  C)  Pulse:  [64-74] 64  Resp:  [16-18] 16  BP: (111-132)/(50-68) 124/56  SpO2:  [92 %-97 %] 94 %  I/O last 3 completed shifts:  In: 10 [I.V.:10]  Out: 1800  [Urine:1800]    Physical Exam   General: Sitting upright in bed, comfortable, alert. Large habitus.   Skin: Chronic venous stasis changes to BLE from ankle to mid-calf. No concerning lesions, rash, jaundice, cyanosis, erythema, or ecchymoses on exposed surfaces.   HEENT: NCAT. EOMI, anicteric sclera.  Respiratory: Non-labored breathing on room air, good air exchange, lungs clear to auscultation bilaterally.  Cardiovascular: RRR. No murmur or rub.   Gastrointestinal: Normoactive BS. Abdomen soft, ND.   Extremities: 2-3+ bilateral pitting edema to LE, ankles look better today   Neurologic: A&O x 3, speech normal, no deficits grossly.    Medications     - MEDICATION INSTRUCTIONS -       sodium chloride 10 mL/hr at 12/21/22 7316       acyclovir  400 mg Oral BID     allopurinol  300 mg Oral Daily     ammonium lactate   Topical Daily     bumetanide  1 mg Oral Daily     calcium acetate (phos binder)  1,334 mg Oral TID w/meals     decitabine  20 mg/m2 (Treatment Plan Recorded) Intravenous Q23H     dexamethasone  4 mg Oral Daily     diclofenac  2 g Topical 4x Daily     enoxaparin ANTICOAGULANT  0.5 mg/kg Subcutaneous Q12H     fluticasone-vilanterol  1 puff Inhalation Daily     heparin lock flush  5-20 mL Intracatheter Q24H     insulin aspart  1-7 Units Subcutaneous TID AC     insulin aspart  1-5 Units Subcutaneous At Bedtime     ipratropium - albuterol 0.5 mg/2.5 mg/3 mL  3 mL Nebulization BID     levofloxacin  250 mg Oral Daily     lidocaine  1-2 patch Transdermal Q24H     lidocaine   Transdermal Q8H [facility]     [Held by provider] lisinopril  5 mg Oral Daily     methocarbamol  500 mg Oral 4x Daily     [Held by provider] metoprolol succinate ER  12.5 mg Oral Daily     pantoprazole  40 mg Oral QAM AC     polyethylene glycol  17 g Oral Daily     posaconazole  300 mg Oral QAM     [Held by provider] rivaroxaban ANTICOAGULANT  20 mg Oral Daily with supper     senna-docusate  1 tablet Oral BID    Or     senna-docusate  2 tablet Oral  BID     venetoclax  100 mg Oral Daily     Data   Results for orders placed or performed during the hospital encounter of 12/09/22 (from the past 24 hour(s))   Glucose by meter   Result Value Ref Range    GLUCOSE BY METER POCT 124 (H) 70 - 99 mg/dL   Glucose by meter   Result Value Ref Range    GLUCOSE BY METER POCT 214 (H) 70 - 99 mg/dL   Glucose by meter   Result Value Ref Range    GLUCOSE BY METER POCT 210 (H) 70 - 99 mg/dL   Glucose by meter   Result Value Ref Range    GLUCOSE BY METER POCT 140 (H) 70 - 99 mg/dL   CBC with platelets differential    Narrative    The following orders were created for panel order CBC with platelets differential.  Procedure                               Abnormality         Status                     ---------                               -----------         ------                     CBC with platelets and d...[[id number]]  Abnormal            Preliminary result           Please view results for these tests on the individual orders.   ABO/Rh type and screen    Narrative    The following orders were created for panel order ABO/Rh type and screen.  Procedure                               Abnormality         Status                     ---------                               -----------         ------                     Adult Type and Screen[[id number]]                            In process                   Please view results for these tests on the individual orders.   Comprehensive metabolic panel   Result Value Ref Range    Sodium 131 (L) 136 - 145 mmol/L    Potassium 4.3 3.4 - 5.3 mmol/L    Chloride 94 (L) 98 - 107 mmol/L    Carbon Dioxide (CO2) 23 22 - 29 mmol/L    Anion Gap 14 7 - 15 mmol/L    Urea Nitrogen 30.0 (H) 8.0 - 23.0 mg/dL    Creatinine 0.96 0.67 - 1.17 mg/dL    Calcium 9.1 8.8 - 10.2 mg/dL    Glucose 120 (H) 70 - 99 mg/dL    Alkaline Phosphatase 68 40 - 129 U/L    AST 21 10 - 50 U/L    ALT 19 10 - 50 U/L    Protein Total 6.6 6.4 - 8.3 g/dL    Albumin 3.6 3.5 - 5.2 g/dL     Bilirubin Total 0.6 <=1.2 mg/dL    GFR Estimate 90 >60 mL/min/1.73m2   Lactate Dehydrogenase   Result Value Ref Range    Lactate Dehydrogenase 765 (H) 0 - 250 U/L   INR   Result Value Ref Range    INR 1.14 0.85 - 1.15   Partial thromboplastin time   Result Value Ref Range    aPTT 37 22 - 38 Seconds   Fibrinogen activity   Result Value Ref Range    Fibrinogen Activity 267 170 - 490 mg/dL   Magnesium   Result Value Ref Range    Magnesium 2.0 1.7 - 2.3 mg/dL   Uric acid   Result Value Ref Range    Uric Acid 8.3 (H) 3.4 - 7.0 mg/dL   Phosphorus   Result Value Ref Range    Phosphorus 5.4 (H) 2.5 - 4.5 mg/dL   CBC with platelets and differential   Result Value Ref Range    WBC Count 6.5 4.0 - 11.0 10e3/uL    RBC Count 2.74 (L) 4.40 - 5.90 10e6/uL    Hemoglobin 8.3 (L) 13.3 - 17.7 g/dL    Hematocrit 27.2 (L) 40.0 - 53.0 %    MCV 99 78 - 100 fL    MCH 30.3 26.5 - 33.0 pg    MCHC 30.5 (L) 31.5 - 36.5 g/dL    RDW 19.4 (H) 10.0 - 15.0 %    Platelet Count 41 (LL) 150 - 450 10e3/uL    NRBCs per 100 WBC 5 (H) <1 /100    Absolute NRBCs 0.3 10e3/uL   Glucose by meter   Result Value Ref Range    GLUCOSE BY METER POCT 106 (H) 70 - 99 mg/dL

## 2022-12-24 NOTE — PLAN OF CARE
1900-0730:    VSS on Ra. Afebrile. Denies nausea and SOB. Pain 7/10, PRN oxycodone 10mg given x1 with relief.  and 140. Covered with sliding scale. PICC heparin locked. Up independently. Sleeping between cares.

## 2022-12-25 NOTE — PROGRESS NOTES
[facility]    Hematology / Oncology Progress Note    Patient: Angel Cardenas  MRN: [id number]  Admission Date: 12/9/2022  Date of Service (when I saw the patient): 12/25/2022  Hospital Day # 16     Assessment & Plan   Angel Cardenas is a 62 year old male with past medical history of PE (previously on Xarelto), T2DM, COPD, HTN, and gout. He was admitted for further work up and initiation of treatment. He was diagnosed with AMML and began treatment with Venetoclax + Decitabine (V9N9=2512/19/22). 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      TODAY:   - Day 7 Venetoclax + Decitabine, Venetoclax is at 400 mg daily dosing  - Reduce Venetoclax dosing to 100 mg daily starting 12/24 with concurrent -azole (ordered).    - Posaconazole was started on 12/24 (ordered)  - TLS labs stable, de-escalate to monitor daily. Continue with Allopurinol and Phoslo. IVF bolus prn. Uric acid was 8.3 on 12/24, we will monitor.  - We tapered Dexamethasone to 2 mg for a couple days prior to off.   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  - Scheduled Voltaren gel, Robaxin   - PCA now discontinued. PRN Tylenol, Oxycodone, IV Dilaudid, Flexeril available prn.   - Continue half-dose Lovenox (0.5 mg/kg BID), hold if Plt <30K. Monitor platelets closely.   - Continue with best supportive cares      HEME   # AMML with SRSF2, TET2 mutations  # Leukocytosis with peripheral blasts, resolved  Patient was undergoing work up with his PCP for fatigue, subjective fevers, lack of appetite, weight loss, intermittent nose bleed over the last month and rib pain. He was found to have WBC 8.5 Hgb 8.8 and plts 82 with 2% promyelocytes. OP peripheral smear with  bicytopenia and 6% circulating blasts, no adrianne rods noted. Flow cytometry of peripheral blood 12/9 with 1% CD34 blasts, reviewed at [facility] with noted 6% blasts. He was referred to [facility] for further work-up and initiation of treatment. On admission WBC 18.2 Hgb 8.3 and Plt 94. He was seen by oncall fellow and attending, please see full note 12/9. Peripheral smear was reviewed and there was a very low suspicion for APL based on review so no treatment was initiated. 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   - Peripheral flow 12/9 resulted with abnormal monocytic population (25%) and abnormal CD34 positive myeloid blast population (1.1%) worrisome for high-grade myeloid neoplasm. Peripheral BCR/ABL1 and FLT3 negative. FISH with no rearrangement of NUP98 or KMT2A.   - OP BMBx 12/9/22. Arrived from [facility] on 12/13. Per [facility] heme path review, biopsy shows AMML with 80% blasts.   - NGS shows SRSF2 and TET2 mutations. In the absence of a favorable genetic finding, SRSF2 is associated with adverse risk. TET2 does not have prognostic significance. BCR/ABL undetectable.   - FISH negative for rearrangement.   - Of note, BMBx procedure was technically very difficult and OSH recommended IR consult for future biopsies. Per nursing, appears more sacral so unsure of quality. Plan for repeat BMBx ~D28, requested scheduling for CT-guided procedure outpatient.   - Virologies: HSV 1/2 negative, CMV IgG-, EBV+, HIV-, HepB/C-   - HLA typing sent on admission   - EKG 12/9 with NSR QTc 441. Echo 12/10 with hyperkinetic LVEF at 65-70%.   - PICC line placed, would remove at discharge  - WBC 18K on admission. Hydrea 1g daily 12/11-12/19, stopped with initiation of treatment -- WBC is down-trending.   - Request sent for BMT intake       Treatment Plan: Venetoclax/Decitabine (V0M7=7012/19/22)    - Venetoclax 100 mg PO daily - D1; complete     - Venetoclax 200 mg PO daily - D2; complete     - Venetoclax 400 mg PO daily - D3 - 28 - reduce back to  100 mg x12/24 with concurrent -azole.     - Decitabine 20 mg/m2 (60 mg) IV daily - D1-10     # Anemia   # Thrombocytopenia   - Transfuse to keep Hgb >7 and plt > 10k (may consider increasing if recurrent epistaxis)      # TLS, stable to improved  # Hyperkalemia, resolved  # Hyperuricemia   # Hyperphosphatemia  # H/o gout   # Risk of DIC  On admission uric acid 9.1, LDH 2,475, though all other TLS labs WNL including Cr 0.88. Cr trending up slightly as of 12/11 to 1.11 and uric acid remains elevated at 9.6. Of note patient does have a known h/o gout. INR mildly elevated (~1.2). He is s/p Rasburicase x1 on 12/11 and again on 12/16. IVF now discontinued d/t volume overload and improvement in labs. TLS labs again trending up since starting chemo as of 12/21 -- K elevated to 5.8 (now resolved), uric acid 7.3, Phos 5.6 and Cr 1.27 (baseline ~0.9-1.0). EKG show NSR. Labs improved following additional IVF.   - Allopurinol 300 mg daily. Consider re-dosing Rasburicase if uric acid >8.0 AND if up-trending Cr (does have h/o gout). 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 Amari Cortes (4/15/22). Patient has history of multiple DVTs in the 1990s without known cause, unprovoked PE at age 43, DVT with PE in context of hip replacement. He most recently was diagnosed with bilateral PE with right heart strain on 1/19/22 after presenting to the ED with SOB.   - Resumed PTA Xarelto 12/13; held x12/16 with plt dropping  - Repeat CTA 12/16 w/o evidence of PE  - Start half-dose Lovenox (0.5 mg/kg) q12h x12/23; hold if Plt <30K. Would increase to full dose (vs resumption of DOAC) if Plt are consistently >50K.      ID  # Undifferentiated shock, resolved  # Possible pneumonia, s/p empiric abx   # Hypotension, resolved  #  "Possible adrenal insufficiency  Acute symptomatic hypotension on 12/16 from 123/61 ? 77/39 in the setting of acutely worsening upper back pain, diaphoresis, dizziness. Rapid response called. EKG NSR, trop 33 ? 30. VBG normal. Hgb stable. No e/o bleed, PE, dissection on CTA/CAP w/ contrast. CT did note RLL consolidation suggestion possible pneumonia. Lactic 2.2 ? 1.8. BCx NGTD. Initially pressures improved with bolus then became soft again to 92/46 around 1400. Recent echo with EF 65-70%.  - Suspect that hypotension was possibly from adrenal insufficiency with steroid taper as pressure improved after dexamethasone 10 mg was ordered 12/16 afternoon. AM cortisol 12/17 was WNL but likely skewed by recent steroid administration. Less likely septic shock in the absense of fever and tachycardia.   - Cefepime 12/16-12/18, discontinued as low suspicion for infection as etiology of hypotension. Completed azithromycin 12/16-12/18.  - Hold lisinopril and metoprolol    # Prophylaxis   -  mg BID  - Micafungin 50 mg IV for now with concurrent chemo; will discontinue 12/24. Plan to start Posaconazole 300 mg daily x12/24.    - Posa/vori both $0/month copay  - Levaquin 250 mg daily      MSK/NEURO  # Acute on chronic back pain  Patient endorses ongoing back pain prior to hospitalization though over the past week has been endorsing rib pain which radiates into his upper back and between his shoulder blades. No red flag sx. Of note he does have splenomegaly on recent CT imaging which could be contributing. EKG shows NSR. Lipase WNL. MRI thoracic spine 12/17 showed \"scattered subtle mottled enhancement of bones most pronounced on L 7th rib, could be 2/2 underlying disease process.\" It is possible that pt has some extramedullary infiltration of cortex causing pain.   - Scheduled Voltaren gel QID, Robaxin QID  - PRN Tylenol, heat packs, Flexeril    - Pain responds well to dexamethasone. S/p dex 10 mg x1 on 12/11-13, 6 mg on 12/14, 4 " mg on 12/15 for pain. Increased back to 10 mg on 12/16, taper to 8 mg on 12/17-12/21. Continue to taper with initiation of treatment -- Dex 4 mg daily x12/22 and continue to reassess further taper in coming days. Decrease to 2 mg daily on 12/25.  - Of note, pt is concerned for pain flare if he goes down on steroids.   - Palliative Care consulted; initiated Dilaudid PCA - now discontinued (12/20-12/22). PTA oxycodone 10-20 mg q6h PRN ? transitioned to 5-10 mg q3h PRN (with option for 5mg, 7.5mg or 10mg dosing). 0.5 mg IV Dilaudid q4h PRN available.     # Headaches, improved  Patient has been experiencing nasal congestion, left sided headaches and eye pressure. No other neurologic complaints. Brain MRI 12/5 with diffuse linear and slightly nodular dural thickening and enhancement. Diffuse marrow signal abnormality within calvarium and central skull base. Differential includes lymphoproliferative abnormalities and metastatic disease.  - Requested images to be pushed into PACs and [facility] over read ordered  - S/p diagnostic LP 12/12; flow negative  - Tylenol available PRN      PULM  # COPD   # Hypoxia, resolved   New O2 requirement 12/11 to 3-4L in the setting of recent continuous IVF, splenomegaly and poor pain control. After improvement in volume status, ongoing mild hypoxia to high 80s, may be baseline from COPD.  - CXR showed cardiomegaly, enlarged pulmonary vasculature and diffuse mild interstitial markings bilaterally likely representing pulmonary edema.   - PTA Bumex above  - DuoNebs BID x12/13. Neb treatments q2hr PRN   - Continue PTA Breo Ellipta   - Incentive spirometry  - CT chest 12/16 showed possible RLL pneumonia. See pneumonia treatment as above.     # CATHERINE  - Continue CPAP at night  - Hypoxia at night, should follow up outpatient for possible CPAP adjustment    CARDS  # HTN   - Hold PTA metoprolol ER 12.5 mg daily and lisinopril 5 mg daily in the setting of recent hypotension     # HLD   - PTA  "atorvastatin held on admission for potential treatment      # Chronic venous insufficiency, CEAP 5   # Bilateral LE edema -improved with Bumex  Worsening 12/20 secondary to IVF in the setting of TLS.   - Echo completed as above  - Lymphedema consulted, patient has home compression wraps     GI  # Constipation, intermittent   Pt is having BMs but stools are small and firm.   - Senna BID and MiraLax daily   - Milk of magnesium daily PRN  - Drinking prune juice    ENDO  # T2DM   Follows with [facility], last seen 11/15/22.   - PTA Ozempic held on admission   - Medium intensity sliding scale insulin   - Hypoglycemia protocol in place     RESOLVED  # Sore throat - Strep negative, HSV swab negative. Cepacol lozenges PRN.   # Epistaxis - intermittent over the past month PTA. No recurrence this admission.     Clinically Significant Risk Factors         # Hyponatremia: Lowest Na = 131 mmol/L in last 2 days, will monitor as appropriate      # Hypoalbuminemia: Lowest albumin = 3.2 g/dL at 12/11/2022  7:01 AM, will monitor as appropriate   # Thrombocytopenia: Lowest platelets = 41 in last 2 days, will monitor for bleeding         # Severe Obesity: Estimated body mass index is 49.15 kg/m  as calculated from the following:    Height as of this encounter: 1.854 m (6' 1\").    Weight as of this encounter: 169 kg (372 lb 8 oz).   # Moderate Malnutrition: based on nutrition assessment      FEN  Diet: Regular Diet Adult   IVF: Bolus PRN; cautious with volume overload  Lytes: Replete per protocol    PPX  VTE: None given thrombocytopenia  Bowel: Senna/MiraLax/MoM PRN  GI/PUD: Protonix    MISC  Code Status: Full Code   Lines/Drains: PIV  Social: Lives at home alone near [place], [place]. Disability paperwork returned to pt on 12/21.   Dispo: Started chemo with 10-days of Decitabine + Venetoclax, dispo TBD pending ongoing plan. Will likely remain inpatient through count rom and recovery given limited support at home and does " not live locally.     Follow Up: TBD;   - Will need to arrange labs/possible transfusions locally at [facility]; will need to request closer to discharge  - Hem/Onc referral placed and message to Deepika Finn sent to establish with one of our Hematologist; awaiting scheduling  - Pending timing of visit with Hematologist, will likely need to request interim virtual FILIPPO visit.   - Plan for repeat BMBx ~D28, orders for CT-guided BMBx placed through discharge navigator; not yet scheduled      Patient and plan of care was discussed with attending physician Dr. Guzman.     >55 minutes spent on the date of the encounter. Over 50% of time was spent counseling the patient and/or coordinating care.     Judith Downs MD  Hem-Onc Fellow  Pager: [phone]  Plan was discussed with attending    Interval History   AVSS overnight, nursing notes reviewed. Receive 500 ml bolus for up-trending Cr (just slightly). Uric acid remains elevated though he has a h/o gout. Reports good PO intake. Pain is stable this morning despite stopping Dilaudid PCA yesterday. Reports LE swelling is improved today following additional dose of Bumex yesterday. Will continue home regimen today. Continues with chemo, no specific complaints or GI sx. Discussed plan to start Lovenox injections today. All questions answered.     A comprehensive review of systems was obtained and is negative other than noted here or in the HPI.     Vital Signs with Ranges  Temp:  [97.8  F (36.6  C)-98.1  F (36.7  C)] 98  F (36.7  C)  Pulse:  [60-78] 69  Resp:  [16-18] 18  BP: (104-124)/(48-65) 104/48  SpO2:  [94 %-98 %] 97 %  I/O last 3 completed shifts:  In: 2480 [P.O.:2480]  Out: 5000 [Urine:5000]    Physical Exam   General: Sitting upright in bed, comfortable, alert. Large habitus.   Skin: Chronic venous stasis changes to BLE from ankle to mid-calf. No concerning lesions, rash, jaundice, cyanosis, erythema, or ecchymoses on exposed surfaces.   HEENT: NCAT. EOMI, anicteric  sclera.  Respiratory: Non-labored breathing on room air, good air exchange, lungs clear to auscultation bilaterally.  Cardiovascular: RRR. No murmur or rub.   Gastrointestinal: Normoactive BS. Abdomen soft, ND.   Extremities: 2-3+ bilateral pitting edema to LE, ankles look better today   Neurologic: A&O x 3, speech normal, no deficits grossly.    Medications     - MEDICATION INSTRUCTIONS -       sodium chloride 10 mL/hr at 12/21/22 5445       acyclovir  400 mg Oral BID     allopurinol  300 mg Oral Daily     ammonium lactate   Topical Daily     bumetanide  1 mg Oral Daily     calcium acetate (phos binder)  1,334 mg Oral TID w/meals     decitabine  20 mg/m2 (Treatment Plan Recorded) Intravenous Q23H     dexamethasone  2 mg Oral Daily     diclofenac  2 g Topical 4x Daily     enoxaparin ANTICOAGULANT  0.5 mg/kg Subcutaneous Q12H     fluticasone-vilanterol  1 puff Inhalation Daily     heparin lock flush  5-20 mL Intracatheter Q24H     insulin aspart  1-7 Units Subcutaneous TID AC     insulin aspart  1-5 Units Subcutaneous At Bedtime     ipratropium - albuterol 0.5 mg/2.5 mg/3 mL  3 mL Nebulization BID     levofloxacin  250 mg Oral Daily     [Held by provider] lisinopril  5 mg Oral Daily     methocarbamol  500 mg Oral 4x Daily     [Held by provider] metoprolol succinate ER  12.5 mg Oral Daily     pantoprazole  40 mg Oral QAM AC     polyethylene glycol  17 g Oral Daily     posaconazole  300 mg Oral QAM     [Held by provider] rivaroxaban ANTICOAGULANT  20 mg Oral Daily with supper     senna-docusate  1 tablet Oral BID    Or     senna-docusate  2 tablet Oral BID     venetoclax  100 mg Oral Daily     Data   Results for orders placed or performed during the hospital encounter of 12/09/22 (from the past 24 hour(s))   Glucose by meter   Result Value Ref Range    GLUCOSE BY METER POCT 217 (H) 70 - 99 mg/dL   Glucose by meter   Result Value Ref Range    GLUCOSE BY METER POCT 180 (H) 70 - 99 mg/dL   Glucose by meter   Result Value  Ref Range    GLUCOSE BY METER POCT 153 (H) 70 - 99 mg/dL   CBC with platelets differential    Narrative    The following orders were created for panel order CBC with platelets differential.  Procedure                               Abnormality         Status                     ---------                               -----------         ------                     CBC with platelets and d...[[id number]]  Abnormal            Final result               Manual Differential[[id number]]          Abnormal            Final result                 Please view results for these tests on the individual orders.   Comprehensive metabolic panel   Result Value Ref Range    Sodium 131 (L) 136 - 145 mmol/L    Potassium 4.8 3.4 - 5.3 mmol/L    Chloride 94 (L) 98 - 107 mmol/L    Carbon Dioxide (CO2) 24 22 - 29 mmol/L    Anion Gap 13 7 - 15 mmol/L    Urea Nitrogen 27.2 (H) 8.0 - 23.0 mg/dL    Creatinine 0.95 0.67 - 1.17 mg/dL    Calcium 10.1 8.8 - 10.2 mg/dL    Glucose 121 (H) 70 - 99 mg/dL    Alkaline Phosphatase 69 40 - 129 U/L    AST 23 10 - 50 U/L    ALT 22 10 - 50 U/L    Protein Total 7.0 6.4 - 8.3 g/dL    Albumin 3.9 3.5 - 5.2 g/dL    Bilirubin Total 0.7 <=1.2 mg/dL    GFR Estimate >90 >60 mL/min/1.73m2   Lactate Dehydrogenase   Result Value Ref Range    Lactate Dehydrogenase 690 (H) 0 - 250 U/L   INR   Result Value Ref Range    INR 1.11 0.85 - 1.15   Partial thromboplastin time   Result Value Ref Range    aPTT 32 22 - 38 Seconds   Fibrinogen activity   Result Value Ref Range    Fibrinogen Activity 282 170 - 490 mg/dL   Magnesium   Result Value Ref Range    Magnesium 2.1 1.7 - 2.3 mg/dL   Phosphorus   Result Value Ref Range    Phosphorus 5.1 (H) 2.5 - 4.5 mg/dL   Uric acid   Result Value Ref Range    Uric Acid 7.7 (H) 3.4 - 7.0 mg/dL   CBC with platelets and differential   Result Value Ref Range    WBC Count 5.1 4.0 - 11.0 10e3/uL    RBC Count 2.88 (L) 4.40 - 5.90 10e6/uL    Hemoglobin 8.5 (L) 13.3 - 17.7 g/dL    Hematocrit 27.8 (L)  40.0 - 53.0 %    MCV 97 78 - 100 fL    MCH 29.5 26.5 - 33.0 pg    MCHC 30.6 (L) 31.5 - 36.5 g/dL    RDW 19.0 (H) 10.0 - 15.0 %    Platelet Count 50 (L) 150 - 450 10e3/uL   Manual Differential   Result Value Ref Range    % Neutrophils 74 %    % Lymphocytes 18 %    % Monocytes 4 %    % Eosinophils 0 %    % Basophils 0 %    % Metamyelocytes 1 %    % Myelocytes 2 %    % Other Cells 1 %    NRBCs per 100 WBC 3 (H) <=0 %    Absolute Neutrophils 3.8 1.6 - 8.3 10e3/uL    Absolute Lymphocytes 0.9 0.8 - 5.3 10e3/uL    Absolute Monocytes 0.2 0.0 - 1.3 10e3/uL    Absolute Eosinophils 0.0 0.0 - 0.7 10e3/uL    Absolute Basophils 0.0 0.0 - 0.2 10e3/uL    Absolute Metamyelocytes 0.1 (H) <=0.0 10e3/uL    Absolute Myelocytes 0.1 (H) <=0.0 10e3/uL    Absolute Other Cells 0.1 (H) <=0.0 10e3/uL    Absolute NRBCs 0.2 (H) <=0.0 10e3/uL    RBC Morphology Confirmed RBC Indices     Platelet Assessment  Automated Count Confirmed. Platelet morphology is normal.     Automated Count Confirmed. Platelet morphology is normal.    Polychromasia Slight (A) None Seen   Glucose by meter   Result Value Ref Range    GLUCOSE BY METER POCT 187 (H) 70 - 99 mg/dL

## 2022-12-25 NOTE — PLAN OF CARE
Goal Outcome Evaluation:      Plan of Care Reviewed With: patient           7868-7701: Denies nausea and reports back pain is well managed. Declined Oxycodone. Voltaren gel used. Eating well, showered and voiding well. Plans to walk this afternoon. Up in the recliner most of the day.     Day #7 Decitabine hung via right PICC with great blood return over 1 hour.     7480-9423: Ambulated in the halls. Reports he did 12 laps. LBM 3 days ago. Had Miralax and 2 Senna-S this am. MOM given this afternoon. Oxycodone x 1 for back pain.

## 2022-12-25 NOTE — PLAN OF CARE
2625-0070:    VSS on Ra. Afebrile. Denies nausea and SOB. Pain 6/10. PRN oxycodone given x1. .  PICC heparin locked. Voiding adequately. LBM 12/22.Up independently. Sleeping between cares.

## 2022-12-25 NOTE — PROGRESS NOTES
"[id number]    /58   Pulse 67   Temp 97.8  F (36.6  C) (Oral)   Resp 18   Ht 1.854 m (6' 1\")   Wt (!) 168.9 kg (372 lb 6.4 oz)   SpO2 95%   BMI 49.13 kg/m      Reason for admission: Admitted for further work up and initiation of treatment. He was diagnosed with AMML and began treatment with Venetoclax + Decitabine (R3T3=2712/19/22).   Activity: UAL  Pain: Pt reporting 5/10 back pain, given PRN Oxycodone x1 with moderate effect.   Neuro: AxOx4. Neuros intact.   Cardiac: WDL  Respiratory: NLB on RA. O2 sats WDL.  GI/: Voiding spontaneously with adequate UOP. LBM 12/22. +BS, +flatus. Given PRN bowel meds.   Diet: Regular  Lines: DL PICC intact, HL x2. Site WDL.   Wounds: No new deficits.   Labs/imaging: Reviewed. See chart.       Continue to monitor and follow POC    "

## 2022-12-26 NOTE — PLAN OF CARE
[id number]:    Afebrile. O2 at 93% on RA. BP and HR stable. Pt is up independent in room. Continues to experience back pain, Voltaren gel applied as scheduled and PRN oxy x 1. Denied N/V. Appetite and oral intake are fair. Bedtime BG covered per sliding scale. PICC in right arm heparin locked. Pt has not had a bowel movement since 12/22. Received another dose tonight of senna. Pt denies any discomfort with the constipation and reports that he is passing gas. Plan for pt is to receive another dose of Decitabine tomorrow afternoon. Continue plan of care.     Problem: Coping Ineffective (Oncology Care)  Goal: Effective Coping  Outcome: Progressing     Problem: Fatigue (Oncology Care)  Goal: Improved Activity Tolerance  Outcome: Progressing  Intervention: Promote Improved Energy  Recent Flowsheet Documentation  Taken 12/25/2022 2000 by Cris Daniel RN  Activity Management: activity encouraged     Problem: Oral Intake Altered (Oncology Care)  Goal: Optimal Oral Intake  Outcome: Progressing  Intervention: Minimize and Manage Barriers to Oral Intake  Recent Flowsheet Documentation  Taken 12/25/2022 2000 by Cris Daniel RN  Oral Care: teeth brushed     Problem: Oral Mucositis (Oncology Care)  Goal: Improved Oral Mucous Membrane Integrity  Outcome: Progressing  Intervention: Promote Oral Comfort and Health  Recent Flowsheet Documentation  Taken 12/25/2022 2000 by Cris Daniel RN  Oral Care: teeth brushed     Problem: Pain Acute (Oncology Care)  Goal: Optimal Pain Control  Outcome: Progressing  Intervention: Prevent or Manage Pain  Recent Flowsheet Documentation  Taken 12/25/2022 2000 by Cris Daniel RN  Medication Review/Management: medications reviewed     Problem: Plan of Care - These are the overarching goals to be used throughout the patient stay.    Goal: Plan of Care Review  Description: The Plan of Care Review/Shift note should be completed every shift.  The Outcome Evaluation is a brief  "statement about your assessment that the patient is improving, declining, or no change.  This information will be displayed automatically on your shift note.  Outcome: Progressing  Goal: Patient-Specific Goal (Individualized)  Description: You can add care plan individualizations to a care plan. Examples of Individualization might be:  \"Parent requests to be called daily at 9am for status\", \"I have a hard time hearing out of my right ear\", or \"Do not touch me to wake me up as it startles me\".  Outcome: Progressing  Goal: Absence of Hospital-Acquired Illness or Injury  Outcome: Progressing  Intervention: Identify and Manage Fall Risk  Recent Flowsheet Documentation  Taken 12/25/2022 2000 by Cris Daniel, RN  Safety Promotion/Fall Prevention:    clutter free environment maintained    fall prevention program maintained    chemotherapeutic precautions    nonskid shoes/slippers when out of bed    patient and family education  Intervention: Prevent Skin Injury  Recent Flowsheet Documentation  Taken 12/25/2022 2000 by Cris Daniel, RN  Body Position: position changed independently  Intervention: Prevent and Manage VTE (Venous Thromboembolism) Risk  Recent Flowsheet Documentation  Taken 12/25/2022 2000 by Cris Daniel RN  VTE Prevention/Management: compression stockings off  Intervention: Prevent Infection  Recent Flowsheet Documentation  Taken 12/25/2022 2000 by Cris Daniel RN  Infection Prevention:    cohorting utilized    environmental surveillance performed  Goal: Optimal Comfort and Wellbeing  Outcome: Progressing  Goal: Readiness for Transition of Care  Outcome: Progressing   Goal Outcome Evaluation:                        "

## 2022-12-26 NOTE — PROGRESS NOTES
CLINICAL NUTRITION SERVICES - REASSESSMENT NOTE     Nutrition Prescription    RECOMMENDATIONS FOR MDs/PROVIDERS TO ORDER:  - Total fluids/adjustments per Provider    Malnutrition Status:    - Patient does not meet two of the established criteria necessary for diagnosing malnutrition but is at risk for malnutrition      Recommendations already ordered by Registered Dietitian (RD):  - None at this time    Future/Additional Recommendations:  - Continue to encourage good po intakes at meals   - Monitor wt trends and lytes and need for diet modification d/t persistent hyperphosphatemia.     EVALUATION OF THE PROGRESS TOWARD GOALS   Diet: Regular    Intake: Pt reports that he has been eating well with consuming 100% of his meals over the past week. Commented that his last day of chemo is on Wed.     NEW FINDINGS   Weight: 168.9 kg (today), 172.8 kg (12/19), 177.9 kg (12/12); Wt continues to trend downward over the past 2 weeks (despite pt receiving IV bolus flush of 500 ml on 12/21 and 12/22) with loss of 9 kg (5% loss). Suspect wt loss d/t diuresis secondary to pt receiving Bumex daily.    ASSESSED NUTRITION NEEDS (new dosing wt of 105 kg, based on lowest wt of 168.9 kg and IBW), IBW: 83.6 kg  Estimated Energy Needs: 2875-0194 kcals/day (20 - 25 kcals/kg)  Justification: Maintenance and Obese  Estimated Protein Needs: 126-158 grams protein/day (1.2 - 1.5 grams of pro/kg)  Justification: Increased needs    Labs:  NA++ 130 (low)  BUN 31.1 (high)  PO4 5.8 (high)   (high); secondary to steroids. Per chart review, Dexamethasone is expected to taper off over next 2-3 days.      Meds:  Bumex daily  Phoslo TID  Decadron - daily with dose reduced from 4 mg to 2 mg on 12/25  Novolog (MEDIUM INSULIN RESISTANCE DOSING) TID daily before meals ans at bedtime      MALNUTRITION  % Intake: No decreased intake noted  % Weight Loss: Weight loss does not meet criteria  Subcutaneous Fat Loss: None observed  Muscle Loss: None  observed  Fluid Accumulation/Edema: Moderate (per chart review d/t both lower legs wrapped)  Malnutrition Diagnosis: Patient does not meet two of the established criteria necessary for diagnosing malnutrition but is at risk for malnutrition    Previous Goals   1. Patient to consume % of nutritionally adequate meal trays TID, or the equivalent with supplements/snacks.    Evaluation: Met    2. Wt not to decline < 172 kg    Evaluation: Not met    Previous Nutrition Diagnosis  Unintended weight loss related to question volume depletion d/t diuresis vs inadequate po intakes d/t periods of decreased appetite as evidenced by h/o 14.3% in 1 month, in addition to  3% of the past week and consuming <75% of his meal intakes over the past week.     Evaluation: No longer applicable, nutrition diagnosis changed below    CURRENT NUTRITION DIAGNOSIS  Predicted inadequate nutrient intake (calorie-protein) related to tolerating po and consuming 100% of his meals, but at risk for po to falter d/t menu fatigue with prolong hospitalization.      INTERVENTIONS  Implementation  - Collaboration with other providers - bedside RN  -     Goals  Patient to consume >75% of nutritionally adequate meal trays TID, or the equivalent with supplements/snacks.    Monitoring/Evaluation  Progress toward goals will be monitored and evaluated per protocol.        Liana Hernandez RD,LD  [phone] pager [phone]

## 2022-12-26 NOTE — PROGRESS NOTES
Chemo note:  D/I: Brisk blood return from PICC . Day 8 Decitabine infused over 1 hr.  A/P: Pt tolerated well. Good urine output. Denies nausea. Continue to monitor for side effects.

## 2022-12-26 NOTE — PROGRESS NOTES
[facility]    Hematology / Oncology Progress Note    Patient: Angel Cardenas  MRN: [id number]  Admission Date: 12/9/2022  Date of Service (when I saw the patient): 12/26/2022  Hospital Day # 17     Assessment & Plan   Angel Cardenas is a 62 year old male with past medical history of PE (previously on Xarelto), T2DM, COPD, HTN, and gout. He was admitted for further work up and initiation of treatment. He was diagnosed with AMML and began treatment with Venetoclax + Decitabine (G6H0=7912/19/22). 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      TODAY:   - Day 8 Venetoclax + Decitabine  - Continue Dexamethasone 2 mg for next 2-3 days then stop   - Increased Phoslo 2,001 mg TID for persistent hyperphosphatemia   - Increased Allopurinol 300 mg BID (x12/26) for hyperuricemia   - Pain control improved; appreciate palliative care input    - Continue half-dose Lovenox (0.5 mg/kg BID), hold if Plt <30K. Monitor platelets closely.   - Continue with best supportive cares    HEME   # AMML with SRSF2, TET2 mutations  # Leukocytosis with peripheral blasts, resolved  Patient was undergoing work up with his PCP for fatigue, subjective fevers, lack of appetite, weight loss, intermittent nose bleed over the last month and rib pain. He was found to have WBC 8.5 Hgb 8.8 and plts 82 with 2% promyelocytes. OP peripheral smear with bicytopenia and 6% circulating blasts, no adrianne rods noted. Flow cytometry of peripheral blood 12/9 with 1% CD34 blasts, reviewed at [facility] with noted 6% blasts. He was referred to [facility] for further work-up and initiation of treatment. On admission WBC 18.2 Hgb 8.3 and Plt 94. He was seen by oncall fellow and attending, please see full note 12/9. Peripheral smear was reviewed and there  was a very low suspicion for APL based on review so no treatment was initiated. 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   - Peripheral flow 12/9 resulted with abnormal monocytic population (25%) and abnormal CD34 positive myeloid blast population (1.1%) worrisome for high-grade myeloid neoplasm. Peripheral BCR/ABL1 and FLT3 negative. FISH with no rearrangement of NUP98 or KMT2A.   - OP BMBx 12/9/22. Arrived from [facility] on 12/13. Per [facility] heme path review, biopsy shows AMML with 80% blasts.   - NGS shows SRSF2 and TET2 mutations. In the absence of a favorable genetic finding, SRSF2 is associated with adverse risk. TET2 does not have prognostic significance. BCR/ABL undetectable.   - FISH negative for rearrangement.   - Of note, BMBx procedure was technically very difficult and OSH recommended IR consult for future biopsies. Per nursing, appears more sacral so unsure of quality. Plan for repeat BMBx ~D28, CT-guided procedure outpatient on 1/13/22.  - Virologies: HSV 1/2 negative, CMV IgG-, EBV+, HIV-, HepB/C-   - HLA typing sent on admission   - EKG 12/9 with NSR QTc 441. Echo 12/10 with hyperkinetic LVEF at 65-70%.   - PICC line placed, would remove at discharge  - WBC 18K on admission. Hydrea 1g daily 12/11-12/19, stopped with initiation of treatment -- WBC is down-trending.   - Request sent for BMT intake       Treatment Plan: Venetoclax/Decitabine (M6M1=3112/19/22)    - Venetoclax 100 mg PO daily - D1; complete     - Venetoclax 200 mg PO daily - D2; complete     - Venetoclax 400 mg PO daily - D3 - 28 - reduce back to 100 mg x12/24 with concurrent -azole.     - Decitabine 20 mg/m2 (60 mg) IV daily - D1-10     # Anemia   # Thrombocytopenia   - Transfuse to keep Hgb >7 and plt > 10k (may consider increasing if recurrent epistaxis)      # TLS, stable to improved  # Hyperkalemia, resolved  # Hyperuricemia   # Hyperphosphatemia  # H/o gout   # Risk of DIC  On admission uric acid 9.1, LDH 2,475, though all other TLS  labs WNL including Cr 0.88. Cr trending up slightly as of 12/11 to 1.11 and uric acid remains elevated at 9.6. Of note patient does have a known h/o gout. INR mildly elevated (~1.2). He is s/p Rasburicase x1 on 12/11 and again on 12/16. IVF now discontinued d/t volume overload and improvement in labs. TLS labs again trending up since starting chemo as of 12/21 -- K elevated to 5.8 (now resolved), uric acid 7.3, Phos 5.6 and Cr 1.27 (baseline ~0.9-1.0). EKG show NSR. Labs improved following additional IVF.   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 Amari Cortes (4/15/22). Patient has history of multiple DVTs in the 1990s without known cause, unprovoked PE at age 43, DVT with PE in context of hip replacement. He most recently was diagnosed with bilateral PE with right heart strain on 1/19/22 after presenting to the ED with SOB.   - Resumed PTA Xarelto 12/13; held x12/16 with plt dropping  - Repeat CTA 12/16 w/o evidence of PE  - Start half-dose Lovenox (0.5 mg/kg) q12h x12/23; hold if Plt <30K. Would increase to full dose (vs resumption of DOAC) if Plt are consistently >50K.      ID  # Undifferentiated shock, resolved  # Possible pneumonia, s/p empiric abx   # Hypotension, resolved  # Possible adrenal insufficiency  Acute symptomatic hypotension on 12/16 from 123/61 ? 77/39 in the setting of acutely worsening upper back pain, diaphoresis, dizziness. Rapid response called. EKG NSR, trop 33 ? 30. VBG normal. Hgb stable. No e/o bleed, PE, dissection on CTA/CAP w/ contrast. CT did note RLL consolidation suggestion possible pneumonia. Lactic 2.2 ? 1.8. BCx NGTD. Initially pressures improved with bolus then became soft again to 92/46 around 1400. Recent echo with EF 65-70%.  - Suspect that hypotension was possibly from adrenal insufficiency  "with steroid taper as pressure improved after dexamethasone 10 mg was ordered 12/16 afternoon. AM cortisol 12/17 was WNL but likely skewed by recent steroid administration. Less likely septic shock in the absense of fever and tachycardia.   - Cefepime 12/16-12/18, discontinued as low suspicion for infection as etiology of hypotension. Completed azithromycin 12/16-12/18.  - Hold lisinopril and metoprolol    # Prophylaxis   -  mg BID  - Posaconazole 300 mg daily (x12/24)   - Posa/vori both $0/month copay  - Levaquin 250 mg daily      MSK/NEURO  # Acute on chronic back pain  Patient endorses ongoing back pain prior to hospitalization though over the past week has been endorsing rib pain which radiates into his upper back and between his shoulder blades. No red flag sx. Of note he does have splenomegaly on recent CT imaging which could be contributing. EKG shows NSR. Lipase WNL. MRI thoracic spine 12/17 showed \"scattered subtle mottled enhancement of bones most pronounced on L 7th rib, could be 2/2 underlying disease process.\" It is possible that pt has some extramedullary infiltration of cortex causing pain.   - Scheduled Voltaren gel QID, Robaxin QID  - PRN Tylenol, heat packs, Flexeril    - Pain responds well to dexamethasone. S/p dex 10 mg x1 on 12/11-13, 6 mg on 12/14, 4 mg on 12/15 for pain. Increased back to 10 mg on 12/16, taper to 8 mg on 12/17-12/21. Continue to taper with initiation of treatment -- Dex 4 mg daily x12/22 and continue to reassess further taper in coming days. Decrease to 2 mg daily on 12/25.  - Of note, pt is concerned for pain flare if he goes down on steroids.   - Palliative Care consulted; initiated Dilaudid PCA - now discontinued (12/20-12/22). PTA oxycodone 10-20 mg q6h PRN ? transitioned to 5-10 mg q3h PRN (with option for 5mg, 7.5mg or 10mg dosing). 0.5 mg IV Dilaudid q4h PRN available.     # Headaches, improved  Patient has been experiencing nasal congestion, left sided headaches " and eye pressure. No other neurologic complaints. Brain MRI 12/5 with diffuse linear and slightly nodular dural thickening and enhancement. Diffuse marrow signal abnormality within calvarium and central skull base. Differential includes lymphoproliferative abnormalities and metastatic disease.  - Requested images to be pushed into PACs and ealth over read ordered  - S/p diagnostic LP 12/12; flow negative  - Tylenol available PRN      PULM  # COPD   # Hypoxia, resolved   New O2 requirement 12/11 to 3-4L in the setting of recent continuous IVF, splenomegaly and poor pain control. After improvement in volume status, ongoing mild hypoxia to high 80s, may be baseline from COPD.  - CXR showed cardiomegaly, enlarged pulmonary vasculature and diffuse mild interstitial markings bilaterally likely representing pulmonary edema.   - PTA Bumex above  - DuoNebs BID x12/13. Neb treatments q2hr PRN   - Continue PTA Breo Ellipta   - Incentive spirometry  - CT chest 12/16 showed possible RLL pneumonia. See pneumonia treatment as above.     # CATHERINE  - Continue CPAP at night  - Hypoxia at night, should follow up outpatient for possible CPAP adjustment    CARDS  # HTN   - Hold PTA metoprolol ER 12.5 mg daily and lisinopril 5 mg daily in the setting of recent hypotension     # HLD   - PTA atorvastatin held on admission for potential treatment      # Chronic venous insufficiency, CEAP 5   # Bilateral LE edema -improved with Bumex  Worsening 12/20 secondary to IVF in the setting of TLS.   - Echo completed as above  - Lymphedema consulted, patient has home compression wraps     GI  # Constipation, intermittent   Pt is having BMs but stools are small and firm.   - Senna BID and MiraLax daily   - Milk of magnesium daily PRN  - Drinking prune juice    ENDO  # T2DM   Follows with [facility], last seen 11/15/22.   - PTA Ozempic held on admission   - Medium intensity sliding scale insulin   - Hypoglycemia protocol in place  "    RESOLVED  # Sore throat - Strep negative, HSV swab negative. Cepacol lozenges PRN.   # Epistaxis - intermittent over the past month PTA. No recurrence this admission.     Clinically Significant Risk Factors         # Hyponatremia: Lowest Na = 130 mmol/L in last 2 days, will monitor as appropriate      # Hypoalbuminemia: Lowest albumin = 3.2 g/dL at 12/11/2022  7:01 AM, will monitor as appropriate   # Thrombocytopenia: Lowest platelets = 50 in last 2 days, will monitor for bleeding         # Severe Obesity: Estimated body mass index is 49.12 kg/m  as calculated from the following:    Height as of this encounter: 1.854 m (6' 1\").    Weight as of this encounter: 168.9 kg (372 lb 4.8 oz).   # Moderate Malnutrition: based on nutrition assessment      FEN  Diet: Regular Diet Adult   IVF: Bolus PRN; cautious with volume overload  Lytes: Replete per protocol    PPX  VTE: None given thrombocytopenia  Bowel: Senna/MiraLax/MoM PRN  GI/PUD: Protonix    MISC  Code Status: Full Code   Lines/Drains: PIV  Social: Lives at home alone near [place], [place]. Disability paperwork returned to pt on 12/21.   Dispo: Started chemo with 10-days of Decitabine + Venetoclax, dispo TBD pending ongoing plan. Will likely remain inpatient through count rom and recovery given limited support at home and does not live locally.     Follow Up: TBD;   - Will need to arrange labs/possible transfusions locally at [facility]; will need to request closer to discharge  - Hem/Onc referral placed and message to Deepika Finn sent to establish with one of our Hematologist; awaiting scheduling  - Pending timing of visit with Hematologist, will likely need to request interim virtual FILIPPO visit.   - Plan for repeat BMBx ~D28, orders for CT-guided BMBx placed through discharge navigator; not yet scheduled    Patient and plan of care was discussed with attending physician Dr. Guzman.     >45 minutes spent on the date of the encounter. Over 50% of time was spent " counseling the patient and/or coordinating care.     Katrin Colbert PA-C ([facility])  Hematology/Oncology  Pager #[phone]    Interval History   Nursing notes reviewed. AGUSTIN Ortiz is overall feeling well. Reports his pain is being well controlled with his current regimen. Reviewed continuing chemotherapy today and that it will go through Wednesday. Discussed that this would be potentially the earliest day of discharge but we will assess in the next couple days. He voiced understanding. He is agreeable to going to the [facility] for labs and transfusions and occasionally coming to the [facility] for appointments. Informed him I would help arrange these appointments. All questions answered at this time.     A comprehensive review of systems was obtained and is negative other than noted here or in the HPI.     Vital Signs with Ranges  Temp:  [97.7  F (36.5  C)-98.8  F (37.1  C)] 98.1  F (36.7  C)  Pulse:  [65-78] 78  Resp:  [16-18] 16  BP: ()/(53-74) 104/61  SpO2:  [93 %-98 %] 94 %  I/O last 3 completed shifts:  In: 2245 [P.O.:2100; IV Piggyback:145]  Out: 5800 [Urine:5800]    Physical Exam   General: Sitting upright in chait, comfortable, alert. Large habitus.   Skin: Chronic venous stasis changes to BLE from ankle to mid-calf. No concerning lesions, rash, jaundice, cyanosis, erythema, or ecchymoses on exposed surfaces.   HEENT: NCAT. EOMI, anicteric sclera.  Respiratory: Non-labored breathing on room air, good air exchange, lungs clear to auscultation bilaterally.  Cardiovascular: RRR. No murmur or rub.   Gastrointestinal: Normoactive BS. Abdomen soft, ND.   Extremities: 2-3+ bilateral pitting edema to LE, ankles look better today   Neurologic: A&O x 3, speech normal, no deficits grossly.    Medications     - MEDICATION INSTRUCTIONS -         acyclovir  400 mg Oral BID     allopurinol  300 mg Oral BID     ammonium lactate   Topical Daily     bumetanide  1 mg Oral Daily     calcium acetate (phos binder)  1,334 mg Oral  TID w/meals     decitabine  20 mg/m2 (Treatment Plan Recorded) Intravenous Q23H     dexamethasone  2 mg Oral Daily     diclofenac  2 g Topical 4x Daily     enoxaparin ANTICOAGULANT  0.5 mg/kg Subcutaneous Q12H     fluticasone-vilanterol  1 puff Inhalation Daily     heparin lock flush  5-20 mL Intracatheter Q24H     insulin aspart  1-7 Units Subcutaneous TID AC     insulin aspart  1-5 Units Subcutaneous At Bedtime     ipratropium - albuterol 0.5 mg/2.5 mg/3 mL  3 mL Nebulization BID     levofloxacin  250 mg Oral Daily     [Held by provider] lisinopril  5 mg Oral Daily     methocarbamol  500 mg Oral 4x Daily     [Held by provider] metoprolol succinate ER  12.5 mg Oral Daily     pantoprazole  40 mg Oral QAM AC     polyethylene glycol  17 g Oral Daily     posaconazole  300 mg Oral QAM     [Held by provider] rivaroxaban ANTICOAGULANT  20 mg Oral Daily with supper     senna-docusate  1 tablet Oral BID    Or     senna-docusate  2 tablet Oral BID     venetoclax  100 mg Oral Daily     Data   Results for orders placed or performed during the hospital encounter of 12/09/22 (from the past 24 hour(s))   Glucose by meter   Result Value Ref Range    GLUCOSE BY METER POCT 187 (H) 70 - 99 mg/dL   Glucose by meter   Result Value Ref Range    GLUCOSE BY METER POCT 201 (H) 70 - 99 mg/dL   Glucose by meter   Result Value Ref Range    GLUCOSE BY METER POCT 217 (H) 70 - 99 mg/dL   Glucose by meter   Result Value Ref Range    GLUCOSE BY METER POCT 139 (H) 70 - 99 mg/dL   CBC with platelets differential    Narrative    The following orders were created for panel order CBC with platelets differential.  Procedure                               Abnormality         Status                     ---------                               -----------         ------                     CBC with platelets and d...[[id number]]  Abnormal            Final result               Manual Differential[[id number]]          Abnormal            Final result                  Please view results for these tests on the individual orders.   Comprehensive metabolic panel   Result Value Ref Range    Sodium 130 (L) 136 - 145 mmol/L    Potassium 4.9 3.4 - 5.3 mmol/L    Chloride 93 (L) 98 - 107 mmol/L    Carbon Dioxide (CO2) 25 22 - 29 mmol/L    Anion Gap 12 7 - 15 mmol/L    Urea Nitrogen 31.1 (H) 8.0 - 23.0 mg/dL    Creatinine 1.01 0.67 - 1.17 mg/dL    Calcium 9.8 8.8 - 10.2 mg/dL    Glucose 123 (H) 70 - 99 mg/dL    Alkaline Phosphatase 64 40 - 129 U/L    AST 25 10 - 50 U/L    ALT 30 10 - 50 U/L    Protein Total 6.7 6.4 - 8.3 g/dL    Albumin 3.7 3.5 - 5.2 g/dL    Bilirubin Total 0.6 <=1.2 mg/dL    GFR Estimate 85 >60 mL/min/1.73m2   Lactate Dehydrogenase   Result Value Ref Range    Lactate Dehydrogenase 549 (H) 0 - 250 U/L   INR   Result Value Ref Range    INR 1.08 0.85 - 1.15   Partial thromboplastin time   Result Value Ref Range    aPTT 32 22 - 38 Seconds   Fibrinogen activity   Result Value Ref Range    Fibrinogen Activity 253 170 - 490 mg/dL   Magnesium   Result Value Ref Range    Magnesium 2.3 1.7 - 2.3 mg/dL   Phosphorus   Result Value Ref Range    Phosphorus 5.8 (H) 2.5 - 4.5 mg/dL   Uric acid   Result Value Ref Range    Uric Acid 7.8 (H) 3.4 - 7.0 mg/dL   CBC with platelets and differential   Result Value Ref Range    WBC Count 3.2 (L) 4.0 - 11.0 10e3/uL    RBC Count 2.66 (L) 4.40 - 5.90 10e6/uL    Hemoglobin 8.0 (L) 13.3 - 17.7 g/dL    Hematocrit 25.8 (L) 40.0 - 53.0 %    MCV 97 78 - 100 fL    MCH 30.1 26.5 - 33.0 pg    MCHC 31.0 (L) 31.5 - 36.5 g/dL    RDW 19.1 (H) 10.0 - 15.0 %    Platelet Count 50 (L) 150 - 450 10e3/uL   Manual Differential   Result Value Ref Range    % Neutrophils 75 %    % Lymphocytes 16 %    % Monocytes 7 %    % Eosinophils 0 %    % Basophils 0 %    % Metamyelocytes 2 %    NRBCs per 100 WBC 3 (H) <=0 %    Absolute Neutrophils 2.4 1.6 - 8.3 10e3/uL    Absolute Lymphocytes 0.5 (L) 0.8 - 5.3 10e3/uL    Absolute Monocytes 0.2 0.0 - 1.3 10e3/uL    Absolute  Eosinophils 0.0 0.0 - 0.7 10e3/uL    Absolute Basophils 0.0 0.0 - 0.2 10e3/uL    Absolute Metamyelocytes 0.1 (H) <=0.0 10e3/uL    Absolute NRBCs 0.1 (H) <=0.0 10e3/uL    RBC Morphology Confirmed RBC Indices     Platelet Assessment  Automated Count Confirmed. Platelet morphology is normal.     Automated Count Confirmed. Platelet morphology is normal.    Polychromasia Slight (A) None Seen   Glucose by meter   Result Value Ref Range    GLUCOSE BY METER POCT 237 (H) 70 - 99 mg/dL

## 2022-12-26 NOTE — PLAN OF CARE
Goal Outcome Evaluation:    6604-9098: A&O x4, back pain 3/10 pt using heating pad and voltaren gel. PRN oxycodone available. Denies N/V or SOB. BG at 0200 139. UAL, adequate urine output. Per patient report LBM 12/22, pt is passing gas and taking bowel meds. Sleeping between cares.

## 2022-12-26 NOTE — PLAN OF CARE
Goal Outcome Evaluation:      Plan of Care Reviewed With: patient    Overall Patient Progress: no changeOverall Patient Progress: no change  Pt afebrile with stable vs. No blood products or electrolyte replacement required today. Received day 8 decitabine and venetoclax without difficulty (see note). Good po intake, Good urine output. Had BM this morning. Only robaxin and voltaren gel required for back pain. Blood glucose 123 and 237.

## 2022-12-26 NOTE — PROGRESS NOTES
[facility]  Palliative Care Daily Progress Note       Recommendations & Counseling     Pain: controlled, when present is in middle of back around T5-T7 and aching with mild radiation to medial aspects of scapula bilaterally    Agree with continuing oxycodone 5-10mg PO Q3hrs PRN with option for 5mg, 7.5mg or 10mg. Encouraged patient to continue to use lowest effective dose and he is doing this.     Anticipate it is likely that he will be able to discharge on oxycodone 5mg PO Q4hrs PRN.     Agree with gentle steroid taper to dexamethasone 2mg, could consider staying at 2mg for 3 days then stop (from pain perspective) but defer to primary team.    Pain follow up outpatient will be with Oncology team    Bowel regimen: constipation present earlier this admission resolved    Recommend continued scheduled bowel regimen to avoid opiate-induced constipation. Has senna and miralax scheduled.      Total time spent was 20 minutes,  >50% of time was spent counseling and/or coordination of care regarding symptom management, support with coping. Recommendations communicated with primary team by note, phone.    Betzy Cifuentes -[phone]   Team Consult Pager [phone] (answered 8am-430pm M-F) - ok to text page via [employer] / After-Hours Answering Service [phone] / Palliative Clinic in the [facility] - [phone] (scheduling); [phone] (triage).      Assessments          Angel Cardenas is a 62 year old male with PMH that includes new diagnosis AML, T spine with mottled appearance of L 7th rib and adjacent T spine, began Decitabine/Venetoclax on 12/19. Palliative consulted to assist with pain. Pain much improved at present.      Today, the patient was seen for:  AML  Chemotherapy initiation  Cancer related pain  Symptom management  Support with coping    Prognosis, Goals, or Advance Care Planning was addressed today with: Yes.  Mood, coping, and/or  meaning in the context of serious illness were addressed today: Yes.  Summary/Comments: Pt looking forward to getting out of hospital soon, but also worries about needing to come back if something goes wrong and very much wants to make sure he is doing well before he is discharged.            Interval History:     Chart review/discussion with unit or clinical team members:   No acute events overnight.    Per patient or family/caregivers today:  Pain controlled at present, no sleepiness or grogginess from opiate medications. Pt is trying to use lower dosing of oxycodone, has not needed IV opiate for days. No nausea, appetite is good, no constipation reported, no difficulty sleeping reported. Able to take walks on the unit without acute increase in pain.               Review of Systems:     Besides above, an additional 1 system ROS was reviewed and is unremarkable          Medications:     I have reviewed this patient's medication profile and medications during this hospitalization.             Physical Exam:   Vitals were reviewed  Temp: 98.1  F (36.7  C) Temp src: Oral BP: 104/61 Pulse: 78   Resp: 16 SpO2: 94 % O2 Device: None (Room air)    Gen: NAD, sitting up in chair, pleasant and cooperative with interview and exam  HEENT: normocephalic, no scleral icterus, no perioral lesions, oral mucosa moist  CV: no mottling on UE  Pulm: no increased work of breathing, no tachypnea  Abd: obese, nondistended  Skin: no rash or jaundice on limited exam  Neuro: AOx3, no tremor  Psych: mood-affect congruent; mildly intense eye contact             Data Reviewed:     Reviewed recent pertinent imaging, comments:   No new imaging over last 24 hours per chart review.    Reviewed recent labs, comments:   Today, Na 130, K 4.9, creatinine 1.01, WBC 3.2, Hgb 8.0, plts 50.

## 2022-12-27 NOTE — PROGRESS NOTES
Chemo note:  D/I: Brisk blood return from PICC . Day 9 Decitabine infused over 1 hr.  A/P: Pt tolerated well. Good urine output. Denies nausea. Continue to monitor for side effects.

## 2022-12-27 NOTE — PLAN OF CARE
1900-0730:  VSS on RA, afebrile. Back pain managed with PRN oxycodone x1. Denies N/V, SOB. Good appetite. BG  225,178. Voiding spontaneously, not saving. Pt up independently in room. Slept between cares.

## 2022-12-27 NOTE — PROGRESS NOTES
Labs and Transfusion orders:  Date: 2022    Patient: Angel Cardenas  : 1960  Diagnosis: AML C92.00     LABS:  [  ] Check CBC with differential and CMP twice a week for the next 3 weeks starting on 23 (fax labs to [facility] at [phone])  [  ] Type and cross PRN    TRANSFUSION PARAMETERS:   [  ] Please transfuse 1 (one) unit PRBCs if Hgb is less than or equal to 7.5  [  ] Please transfuse 1 (one) unit platelets if plt count less than or equal to 15K.   [  ] If patient experiences transfusion reaction during transfusion:   [  ] 25-50 mg benadryl IV x once PRN   [  ] Tylenol 1000 mg PO x once PRN   [  ] Hydrocortisone 100 mg IV x once PRN   [  ] Zantac 150 mg IV x once PRN   [  ] Notify provider covering infusion clinic per protocol      Please call the [facility] at [phone] for any questions, and ask to speak with the care coordinator for Dr. Velasquez  (patient's primary oncologist).    Thank you,         Katrin Colbert PA-C    [facility]  Department of Hematology/Oncology  [address] [place] [address]  Pager: [phone]

## 2022-12-27 NOTE — PROGRESS NOTES
Brief Palliative Care note:    On chart review, Pt has not used oxycodone 10mg dosing since 12/25, and since then has utilized mainly oxycodone 7.5mg and occasional 5mg dosing. Will adjust range of oxycodone to 5-7.5mg dosing Q4hrs PRN, recommend consideration of further taper to oxycodone 5mg PO Q4hrs PRN as early as tomorrow but would discuss with patient first.     Betzy Cifuentes MD  [phone]

## 2022-12-27 NOTE — PROGRESS NOTES
Update from inpatient team: this patient is having a BMBx on 1/13 and it may be more helpful to have the appt with Dr. Velasquez be after this procedure to review results if he has availability the following week.     Scheduling hold adjusted to 1/19 with Dr. Velasquez. Will route to NPS to arrange appointment pending confirmation from Dr. Velasquez whether he is able to accept patient.

## 2022-12-27 NOTE — PROGRESS NOTES
[facility]    Hematology / Oncology Progress Note    Patient: Angel Cardenas  MRN: [id number]  Admission Date: 12/9/2022  Date of Service (when I saw the patient): 12/27/2022  Hospital Day # 18     Assessment & Plan   Angel Cardenas is a 62 year old male with past medical history of PE (previously on Xarelto), T2DM, COPD, HTN, and gout. He was admitted for further work up and initiation of treatment. He was diagnosed with AMML and began treatment with Venetoclax + Decitabine (Q4O0=1012/19/22). 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      TODAY:   - Day 9 Venetoclax + Decitabine  - Will discontinue Dexamethasone tomorrow   - Continue  Phoslo 2,001 mg TID for persistent hyperphosphatemia   - Increased Allopurinol 300 mg BID (x12/26) for hyperuricemia   - Pain control improved; appreciate palliative care input    - Continue half-dose Lovenox (0.5 mg/kg BID), hold if Plt <30K. Monitor platelets closely.   - Continue with best supportive cares    HEME   # AMML with SRSF2, TET2 mutations  # Leukocytosis with peripheral blasts, resolved  Patient was undergoing work up with his PCP for fatigue, subjective fevers, lack of appetite, weight loss, intermittent nose bleed over the last month and rib pain. He was found to have WBC 8.5 Hgb 8.8 and plts 82 with 2% promyelocytes. OP peripheral smear with bicytopenia and 6% circulating blasts, no adrianne rods noted. Flow cytometry of peripheral blood 12/9 with 1% CD34 blasts, reviewed at [facility] with noted 6% blasts. He was referred to [facility] for further work-up and initiation of treatment. On admission WBC 18.2 Hgb 8.3 and Plt 94. He was seen by oncall fellow and attending, please see full note 12/9. Peripheral smear was reviewed and there was a very low  suspicion for APL based on review so no treatment was initiated. 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   - Peripheral flow 12/9 resulted with abnormal monocytic population (25%) and abnormal CD34 positive myeloid blast population (1.1%) worrisome for high-grade myeloid neoplasm. Peripheral BCR/ABL1 and FLT3 negative. FISH with no rearrangement of NUP98 or KMT2A.   - OP BMBx 12/9/22. Arrived from [facility] on 12/13. Per [facility] heme path review, biopsy shows AMML with 80% blasts.   - NGS shows SRSF2 and TET2 mutations. In the absence of a favorable genetic finding, SRSF2 is associated with adverse risk. TET2 does not have prognostic significance. BCR/ABL undetectable.   - FISH negative for rearrangement.   - Of note, BMBx procedure was technically very difficult and OSH recommended IR consult for future biopsies. Per nursing, appears more sacral so unsure of quality. Plan for repeat BMBx ~D28, CT-guided procedure outpatient on 1/13/22.  - Virologies: HSV 1/2 negative, CMV IgG-, EBV+, HIV-, HepB/C-   - HLA typing sent on admission   - EKG 12/9 with NSR QTc 441. Echo 12/10 with hyperkinetic LVEF at 65-70%.   - PICC line placed, would remove at discharge  - WBC 18K on admission. Hydrea 1g daily 12/11-12/19, stopped with initiation of treatment -- WBC is down-trending.   - Request sent for BMT intake       Treatment Plan: Venetoclax/Decitabine (Z1F5=7612/19/22)    - Venetoclax 100 mg PO daily - D1; complete     - Venetoclax 200 mg PO daily - D2; complete     - Venetoclax 400 mg PO daily - D3 - 28 - reduce back to 100 mg x12/24 with concurrent -azole.     - Decitabine 20 mg/m2 (60 mg) IV daily - D1-10     # Anemia   # Thrombocytopenia   - Transfuse to keep Hgb >7 and plt > 10k (may consider increasing if recurrent epistaxis)      # TLS, stable to improved  # Hyperkalemia, resolved  # Hyperuricemia   # Hyperphosphatemia  # H/o gout   # Risk of DIC  On admission uric acid 9.1, LDH 2,475, though all other TLS labs WNL  including Cr 0.88. Cr trending up slightly as of 12/11 to 1.11 and uric acid remains elevated at 9.6. Of note patient does have a known h/o gout. INR mildly elevated (~1.2). He is s/p Rasburicase x1 on 12/11 and again on 12/16. IVF now discontinued d/t volume overload and improvement in labs. TLS labs again trending up since starting chemo as of 12/21 -- K elevated to 5.8 (now resolved), uric acid 7.3, Phos 5.6 and Cr 1.27 (baseline ~0.9-1.0). EKG show NSR. Labs improved following additional IVF.   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 Amari Cortes (4/15/22). Patient has history of multiple DVTs in the 1990s without known cause, unprovoked PE at age 43, DVT with PE in context of hip replacement. He most recently was diagnosed with bilateral PE with right heart strain on 1/19/22 after presenting to the ED with SOB.   - Resumed PTA Xarelto 12/13; held x12/16 with plt dropping  - Repeat CTA 12/16 w/o evidence of PE  - Start half-dose Lovenox (0.5 mg/kg) q12h x12/23; hold if Plt <30K. Would increase to full dose (vs resumption of DOAC) if Plt are consistently >50K.      ID  # Undifferentiated shock, resolved  # Possible pneumonia, s/p empiric abx   # Hypotension, resolved  # Possible adrenal insufficiency  Acute symptomatic hypotension on 12/16 from 123/61 ? 77/39 in the setting of acutely worsening upper back pain, diaphoresis, dizziness. Rapid response called. EKG NSR, trop 33 ? 30. VBG normal. Hgb stable. No e/o bleed, PE, dissection on CTA/CAP w/ contrast. CT did note RLL consolidation suggestion possible pneumonia. Lactic 2.2 ? 1.8. BCx NGTD. Initially pressures improved with bolus then became soft again to 92/46 around 1400. Recent echo with EF 65-70%.  - Suspect that hypotension was possibly from adrenal insufficiency with  "steroid taper as pressure improved after dexamethasone 10 mg was ordered 12/16 afternoon. AM cortisol 12/17 was WNL but likely skewed by recent steroid administration. Less likely septic shock in the absense of fever and tachycardia.   - Cefepime 12/16-12/18, discontinued as low suspicion for infection as etiology of hypotension. Completed azithromycin 12/16-12/18.  - Hold lisinopril and metoprolol    # Prophylaxis   -  mg BID  - Posaconazole 300 mg daily (x12/24)   - Posa/vori both $0/month copay  - Levaquin 250 mg daily      MSK/NEURO  # Acute on chronic back pain  Patient endorses ongoing back pain prior to hospitalization though over the past week has been endorsing rib pain which radiates into his upper back and between his shoulder blades. No red flag sx. Of note he does have splenomegaly on recent CT imaging which could be contributing. EKG shows NSR. Lipase WNL. MRI thoracic spine 12/17 showed \"scattered subtle mottled enhancement of bones most pronounced on L 7th rib, could be 2/2 underlying disease process.\" It is possible that pt has some extramedullary infiltration of cortex causing pain.   - Scheduled Voltaren gel QID, Robaxin QID  - PRN Tylenol, heat packs, Flexeril    - Pain responds well to dexamethasone. S/p dex 10 mg x1 on 12/11-13, 6 mg on 12/14, 4 mg on 12/15 for pain. Increased back to 10 mg on 12/16, taper to 8 mg on 12/17-12/21. Continue to taper with initiation of treatment -- Dex 4 mg daily x12/22 and continue to reassess further taper in coming days. Decrease to 2 mg daily on 12/25 -12/27   - Of note, pt is concerned for pain flare if he goes down on steroids.   - Palliative Care consulted; initiated Dilaudid PCA - now discontinued (12/20-12/22). PTA oxycodone 10-20 mg q6h PRN ? transitioned to 5-10 mg q3h PRN (with option for 5mg, 7.5mg or 10mg dosing). 0.5 mg IV Dilaudid q4h PRN available.     # Headaches, improved  Patient has been experiencing nasal congestion, left sided " headaches and eye pressure. No other neurologic complaints. Brain MRI 12/5 with diffuse linear and slightly nodular dural thickening and enhancement. Diffuse marrow signal abnormality within calvarium and central skull base. Differential includes lymphoproliferative abnormalities and metastatic disease.  - Requested images to be pushed into PACs and ealth over read ordered  - S/p diagnostic LP 12/12; flow negative  - Tylenol available PRN      PULM  # COPD   # Hypoxia, resolved   New O2 requirement 12/11 to 3-4L in the setting of recent continuous IVF, splenomegaly and poor pain control. After improvement in volume status, ongoing mild hypoxia to high 80s, may be baseline from COPD.  - CXR showed cardiomegaly, enlarged pulmonary vasculature and diffuse mild interstitial markings bilaterally likely representing pulmonary edema.   - PTA Bumex above  - DuoNebs BID x12/13. Neb treatments q2hr PRN   - Continue PTA Breo Ellipta   - Incentive spirometry  - CT chest 12/16 showed possible RLL pneumonia. See pneumonia treatment as above.     # CATHERINE  - Continue CPAP at night  - Hypoxia at night, should follow up outpatient for possible CPAP adjustment    CARDS  # HTN   - Hold PTA metoprolol ER 12.5 mg daily and lisinopril 5 mg daily in the setting of recent hypotension     # HLD   - PTA atorvastatin held on admission for potential treatment      # Chronic venous insufficiency, CEAP 5   # Bilateral LE edema -improved with Bumex  Worsening 12/20 secondary to IVF in the setting of TLS.   - Echo completed as above  - Lymphedema consulted, patient has home compression wraps     GI  # Constipation, intermittent   Pt is having BMs but stools are small and firm.   - Senna BID and MiraLax daily   - Milk of magnesium daily PRN  - Drinking prune juice    ENDO  # T2DM   Follows with [facility], last seen 11/15/22.   - PTA Ozempic held on admission   - Medium intensity sliding scale insulin   - Hypoglycemia protocol in  "place     RESOLVED  # Sore throat - Strep negative, HSV swab negative. Cepacol lozenges PRN.   # Epistaxis - intermittent over the past month PTA. No recurrence this admission.     Clinically Significant Risk Factors         # Hyponatremia: Lowest Na = 128 mmol/L in last 2 days, will monitor as appropriate      # Hypoalbuminemia: Lowest albumin = 3.2 g/dL at 12/11/2022  7:01 AM, will monitor as appropriate   # Thrombocytopenia: Lowest platelets = 44 in last 2 days, will monitor for bleeding         # Severe Obesity: Estimated body mass index is 48.75 kg/m  as calculated from the following:    Height as of this encounter: 1.854 m (6' 1\").    Weight as of this encounter: 167.6 kg (369 lb 8 oz).   # Moderate Malnutrition: based on nutrition assessment      FEN  Diet: Regular Diet Adult   IVF: Bolus PRN; cautious with volume overload  Lytes: Replete per protocol    PPX  VTE: None given thrombocytopenia  Bowel: Senna/MiraLax/MoM PRN  GI/PUD: Protonix    MISC  Code Status: Full Code   Lines/Drains: PIV  Social: Lives at home alone near [place], [place]. Disability paperwork returned to pt on 12/21.   Dispo: Started chemo with 10-days of Decitabine + Venetoclax, dispo TBD pending ongoing plan. Will likely remain inpatient through count rom and recovery given limited support at home and does not live locally.     Follow Up: TBD;   - Faxed transfusion orders to PCP Dr. Wills to be entered; will follow up tomorrow to confirm entered and scheduled   - Will establish with Dr. Velasquez as an outpatient   - Pending timing of visit with Hematologist, will likely need to request interim virtual FILIPPO visit.   - Plan for repeat BMBx ~D28, scheduled for CT-guided BMBx 1/13/23    Patient and plan of care was discussed with attending physician Dr. Guzman.     >45 minutes spent on the date of the encounter. Over 50% of time was spent counseling the patient and/or coordinating care.     Katrin Colbert (Johnson) PAMICHAEL  Hematology/Oncology  Pager " #[phone]    Interval History   Nursing notes reviewed. AGUSTIN Ortiz is overall feeling well. He has no new symptoms or concerns. Reviewed that we are getting closer to discharge. He is hopeful to discharge on Thursday which I feel is realistic as I need continue to adjust his pain regimen as well as Phoslo dosing. Continuing to work on outpatient follow locally at [facility]  All questions answered at this time.     A comprehensive review of systems was obtained and is negative other than noted here or in the HPI.     Vital Signs with Ranges  Temp:  [97.3  F (36.3  C)-98.5  F (36.9  C)] 97.7  F (36.5  C)  Pulse:  [72-88] 73  Resp:  [17-19] 18  BP: (107-129)/(49-65) 107/49  SpO2:  [93 %-97 %] 93 %  I/O last 3 completed shifts:  In: 3132 [P.O.:3000; I.V.:10; IV Piggyback:122]  Out: 4300 [Urine:4300]    Physical Exam   General: Sitting upright in chair, comfortable, alert. Large habitus.   Skin: Chronic venous stasis changes to BLE from ankle to mid-calf. No concerning lesions, rash, jaundice, cyanosis, erythema, or ecchymoses on exposed surfaces.   HEENT: NCAT. EOMI, anicteric sclera.  Respiratory: Non-labored breathing on room air, good air exchange, lungs clear to auscultation bilaterally.  Cardiovascular: RRR. No murmur or rub.   Gastrointestinal: Normoactive BS. Abdomen soft, ND.   Extremities: 2-3+ bilateral pitting edema to LE, lymphedema wraps in place   Neurologic: A&O x 3, speech normal, no deficits grossly.    Medications     - MEDICATION INSTRUCTIONS -         acyclovir  400 mg Oral BID     allopurinol  300 mg Oral BID     ammonium lactate   Topical Daily     bumetanide  1 mg Oral Daily     calcium acetate (phos binder)  2,001 mg Oral TID w/meals     decitabine  20 mg/m2 (Treatment Plan Recorded) Intravenous Q23H     dexamethasone  2 mg Oral Daily     diclofenac  2 g Topical 4x Daily     enoxaparin ANTICOAGULANT  0.5 mg/kg Subcutaneous Q12H     fluticasone-vilanterol  1 puff Inhalation Daily     heparin lock  flush  5-20 mL Intracatheter Q24H     insulin aspart  1-7 Units Subcutaneous TID AC     insulin aspart  1-5 Units Subcutaneous At Bedtime     levofloxacin  250 mg Oral Daily     [Held by provider] lisinopril  5 mg Oral Daily     methocarbamol  500 mg Oral 4x Daily     [Held by provider] metoprolol succinate ER  12.5 mg Oral Daily     pantoprazole  40 mg Oral QAM AC     polyethylene glycol  17 g Oral Daily     posaconazole  300 mg Oral QAM     [Held by provider] rivaroxaban ANTICOAGULANT  20 mg Oral Daily with supper     senna-docusate  1 tablet Oral BID    Or     senna-docusate  2 tablet Oral BID     venetoclax  100 mg Oral Daily     Data   Results for orders placed or performed during the hospital encounter of 12/09/22 (from the past 24 hour(s))   Glucose by meter   Result Value Ref Range    GLUCOSE BY METER POCT 174 (H) 70 - 99 mg/dL   Glucose by meter   Result Value Ref Range    GLUCOSE BY METER POCT 225 (H) 70 - 99 mg/dL   Glucose by meter   Result Value Ref Range    GLUCOSE BY METER POCT 172 (H) 70 - 99 mg/dL   CBC with platelets differential    Narrative    The following orders were created for panel order CBC with platelets differential.  Procedure                               Abnormality         Status                     ---------                               -----------         ------                     CBC with platelets and d...[[id number]]  Abnormal            Final result               Manual Differential[[id number]]          Abnormal            Final result                 Please view results for these tests on the individual orders.   ABO/Rh type and screen    Narrative    The following orders were created for panel order ABO/Rh type and screen.  Procedure                               Abnormality         Status                     ---------                               -----------         ------                     Adult Type and Screen[[id number]]                            Final result             "     Please view results for these tests on the individual orders.   Comprehensive metabolic panel   Result Value Ref Range    Sodium 128 (L) 136 - 145 mmol/L    Potassium 4.7 3.4 - 5.3 mmol/L    Chloride 92 (L) 98 - 107 mmol/L    Carbon Dioxide (CO2) 24 22 - 29 mmol/L    Anion Gap 12 7 - 15 mmol/L    Urea Nitrogen 29.3 (H) 8.0 - 23.0 mg/dL    Creatinine 1.02 0.67 - 1.17 mg/dL    Calcium 9.6 8.8 - 10.2 mg/dL    Glucose 115 (H) 70 - 99 mg/dL    Alkaline Phosphatase 62 40 - 129 U/L    AST 29 10 - 50 U/L    ALT 37 10 - 50 U/L    Protein Total 6.6 6.4 - 8.3 g/dL    Albumin 3.6 3.5 - 5.2 g/dL    Bilirubin Total 0.5 <=1.2 mg/dL    GFR Estimate 84 >60 mL/min/1.73m2   Lactate Dehydrogenase   Result Value Ref Range    Lactate Dehydrogenase 501 (H) 0 - 250 U/L   INR   Result Value Ref Range    INR 1.08 0.85 - 1.15   Partial thromboplastin time   Result Value Ref Range    aPTT 34 22 - 38 Seconds   Fibrinogen activity   Result Value Ref Range    Fibrinogen Activity 246 170 - 490 mg/dL   Magnesium   Result Value Ref Range    Magnesium 2.3 1.7 - 2.3 mg/dL   Phosphorus   Result Value Ref Range    Phosphorus 5.4 (H) 2.5 - 4.5 mg/dL   Uric acid   Result Value Ref Range    Uric Acid 6.9 3.4 - 7.0 mg/dL   CBC with platelets and differential   Result Value Ref Range    WBC Count 2.8 (L) 4.0 - 11.0 10e3/uL    RBC Count 2.57 (L) 4.40 - 5.90 10e6/uL    Hemoglobin 7.6 (L) 13.3 - 17.7 g/dL    Hematocrit 24.6 (L) 40.0 - 53.0 %    MCV 96 78 - 100 fL    MCH 29.6 26.5 - 33.0 pg    MCHC 30.9 (L) 31.5 - 36.5 g/dL    RDW 18.9 (H) 10.0 - 15.0 %    Platelet Count 44 (LL) 150 - 450 10e3/uL    Narrative    Previously reported component [ NRBCs ] is no longer reported.\"  Previously reported component [ NRBCs Absolute ] is no longer reported.\"   Adult Type and Screen   Result Value Ref Range    ABO/RH(D) A POS     Antibody Screen Negative Negative    SPECIMEN EXPIRATION DATE [id number]    Manual Differential   Result Value Ref Range    % Neutrophils " 76 %    % Lymphocytes 18 %    % Monocytes 5 %    % Eosinophils 0 %    % Basophils 0 %    % Myelocytes 1 %    NRBCs per 100 WBC 5 (H) <=0 %    Absolute Neutrophils 2.1 1.6 - 8.3 10e3/uL    Absolute Lymphocytes 0.5 (L) 0.8 - 5.3 10e3/uL    Absolute Monocytes 0.1 0.0 - 1.3 10e3/uL    Absolute Eosinophils 0.0 0.0 - 0.7 10e3/uL    Absolute Basophils 0.0 0.0 - 0.2 10e3/uL    Absolute Myelocytes 0.0 <=0.0 10e3/uL    Absolute NRBCs 0.1 (H) <=0.0 10e3/uL    RBC Morphology Confirmed RBC Indices     Platelet Assessment  Automated Count Confirmed. Platelet morphology is normal.     Automated Count Confirmed. Platelet morphology is normal.    Polychromasia Slight (A) None Seen    Teardrop Cells Slight (A) None Seen   Glucose by meter   Result Value Ref Range    GLUCOSE BY METER POCT 121 (H) 70 - 99 mg/dL   Glucose by meter   Result Value Ref Range    GLUCOSE BY METER POCT 195 (H) 70 - 99 mg/dL

## 2022-12-27 NOTE — PLAN OF CARE
Goal Outcome Evaluation:      Plan of Care Reviewed With: patient    Overall Patient Progress: no changeOverall Patient Progress: no change  Pt afebrile with stable vs. No blood products or electrolyte replacement required today. Received day 9 decitabine without difficulty (see note). Good po intake, Good urine output. No BM today, received MOM as well as miralax and senna. Only robaxin and voltaren gel required for back pain. Blood glucose 121 and 195.

## 2022-12-27 NOTE — PLAN OF CARE
Goal Outcome Evaluation:      Plan of Care Reviewed With: patient    Overall Patient Progress: improvingOverall Patient Progress: improving  Continues to tolerate po with good intakes

## 2022-12-27 NOTE — PROGRESS NOTES
Care Management Follow Up    Length of Stay (days): 18    Expected Discharge Date: 12/30/2022     Concerns to be Addressed: SW notified in rounds yesterday that family expressed some concern about resources, transportation and [place]   Patient plan of care discussed at interdisciplinary rounds: Yes    Anticipated Discharge Disposition: Home     Anticipated Discharge Services: None  Anticipated Discharge DME: None    Referrals Placed by CM/SW:  None  Private pay costs discussed: Not applicable    Additional Information:  Met with pt in room to offer support, discuss resources and discharge planning. Pt stated that he lives in [place], [place], approx [address] [place] [facility]. Pt reports that he has no concerns about transportation to f/u appointments which he was told was being arranged in [place]. Pt aware that he should have someone else give him ride when he has a chemo appointment and that he should not drive if taking pain medication within ~4 hrs but otherwise can drive himself. Pt independent with ADL's/driving. Pt reports that he has had many family and friends offer to give him rides. He reports that he has a ride home upon discharge. Pt declined needing/wanting SW to call his sister or any other family member at this time.    Pt submitted short-term disability paperwork to his employer. He reports no financial concerns at this time.    7D   Phone: [phone]  ANKUSH Saucedo on 12/27/2022 at 11:12 AM

## 2022-12-27 NOTE — PROGRESS NOTES
RT administered pt AM DUO neb and completed an assessment per [facility] protocol. Upon completion of assessment and discussion with pt the decision was made to PRN pt DUO neb BID; along with continuing home routine of BREO inhaler daily. Assessment revealed that lung sounds were clear and diminished at the bases bilaterally. Pt is on no oxygen, has a strong non-productive cough, is independent in room, alert and orientated, and has not needed a chest xray in over a week. Pt states his breathing is feeling good and he looks comfortable.    Pt will continue to take BREO inhaler daily.  Have DUO neb available BID PRN.  Will continue to wear home CPAP at [facility].    RT will be available for any additional respiratory concerns that arise.    Luis Knight,AYAAN.

## 2022-12-27 NOTE — PLAN OF CARE
Goal Outcome Evaluation:      Plan of Care Reviewed With: patient    Overall Patient Progress: no changeOverall Patient Progress: no change     [id number]  AVSS. Denied nausea/vomiting. Back pain comfortably manageable with 7.5 mg po oxycodone x 1. Up ad tami in room and halls. , covered with sliding scale insulin. Good po intake. Voiding spontaneously without difficulty. BM earlier today. No acute events. Continue with plan of care.

## 2022-12-27 NOTE — PLAN OF CARE
Problem: Oral Intake Altered (Oncology Care)  Goal: Optimal Oral Intake  Outcome: Progressing   Goal Outcome Evaluation:      Plan of Care Reviewed With: patient    Overall Patient Progress: improvingOverall Patient Progress: improving  Eating well with consuming 100% of his meals.

## 2022-12-28 NOTE — PLAN OF CARE
"Goal Outcome Evaluation:    1500 - 2330:   /60 (BP Location: Left arm, Cuff Size: Adult Large)   Pulse 71   Temp 98.1  F (36.7  C) (Oral)   Resp 18   Ht 1.854 m (6' 1\")   Wt (!) 167.6 kg (369 lb 8 oz)   SpO2 95%   BMI 48.75 kg/m      Day#9 Venetoclax + Decitabine.  A&O x 4, AVSS, c/o medial back pain 5/10 managed with oxycodone 5 mg x 1.  No c/o nausea, good appetite.  Discontinued dexamethasone for tomorrow.   Pt ate his supper before BG check,  at bedtime.   UAL, ambulate in hallway, voiding spontaneously, had a small bm, gave prn miralax & scheduled senokot.  PICC on HL  Continue with poc...                        "

## 2022-12-28 NOTE — PROGRESS NOTES
[facility]    Hematology / Oncology Progress Note    Patient: Angel Cardenas  MRN: [id number]  Admission Date: 12/9/2022  Date of Service (when I saw the patient): 12/28/2022  Hospital Day # 19     Assessment & Plan   Angel aCrdenas is a 62 year old male with past medical history of PE (previously on Xarelto), T2DM, COPD, HTN, and gout. He was admitted for further work up and initiation of treatment. He was diagnosed with AMML and began treatment with Venetoclax + Decitabine (R8P9=3012/19/22). 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      TODAY:   - Day 10 Venetoclax + Decitabine  - Continue  Phoslo 2,001 mg TID for persistent hyperphosphatemia  - Pain control stable, appreciate palliative care input. Dex now discontinued.   - Continue half-dose Lovenox (0.5 mg/kg BID), hold if Plt <30K. Monitor platelets closely. PLC consulted for teaching prior to discharge vs per bedside RN.   - Continue with best supportive cares  - Planning for tentative discharge tomorrow. Follow-up has been requested locally.        HEME   # AMML with SRSF2, TET2 mutations  # Leukocytosis with peripheral blasts, resolved  Patient was undergoing work up with his PCP for fatigue, subjective fevers, lack of appetite, weight loss, intermittent nose bleed over the last month and rib pain. He was found to have WBC 8.5 Hgb 8.8 and plts 82 with 2% promyelocytes. OP peripheral smear with bicytopenia and 6% circulating blasts, no adrianne rods noted. Flow cytometry of peripheral blood 12/9 with 1% CD34 blasts, reviewed at [facility] with noted 6% blasts. He was referred to [facility] for further work-up and initiation of treatment. On admission WBC 18.2 Hgb 8.3 and Plt 94. He was seen by oncall fellow and attending, please see full  note 12/9. Peripheral smear was reviewed and there was a very low suspicion for APL based on review so no treatment was initiated. 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   - Peripheral flow 12/9 resulted with abnormal monocytic population (25%) and abnormal CD34 positive myeloid blast population (1.1%) worrisome for high-grade myeloid neoplasm. Peripheral BCR/ABL1 and FLT3 negative. FISH with no rearrangement of NUP98 or KMT2A.   - OP BMBx 12/9/22. Arrived from [facility] on 12/13. Per [facility] heme path review, biopsy shows AMML with 80% blasts.   - NGS shows SRSF2 and TET2 mutations. In the absence of a favorable genetic finding, SRSF2 is associated with adverse risk. TET2 does not have prognostic significance. BCR/ABL undetectable.   - FISH negative for rearrangement.   - Of note, BMBx procedure was technically very difficult and OSH recommended IR consult for future biopsies. Per nursing, appears more sacral so unsure of quality. Plan for repeat BMBx ~D28, CT-guided procedure outpatient on 1/13/22.  - Virologies: HSV 1/2 negative, CMV IgG-, EBV+, HIV-, HepB/C-   - HLA typing sent on admission   - EKG 12/9 with NSR QTc 441. Echo 12/10 with hyperkinetic LVEF at 65-70%.   - PICC line placed, would remove at discharge  - WBC 18K on admission. Hydrea 1g daily 12/11-12/19, stopped with initiation of treatment -- WBC has trended down.   - Request sent for BMT intake       Treatment Plan: Venetoclax/Decitabine (M6S2=8012/19/22)    - Venetoclax 100 mg PO daily - D1; complete     - Venetoclax 200 mg PO daily - D2; complete     - Venetoclax 400 mg PO daily - D3 - 28 - reduced back to 100 mg x12/24 with concurrent -azole.     - Decitabine 20 mg/m2 (60 mg) IV daily - D1-10     # Pancytopenia  2/2 chemotherapy and new AML.   - Transfuse to keep Hgb >7 and plt > 10k (may consider increasing if recurrent epistaxis)      # TLS, stable to improved  # Hyperkalemia, resolved  # Hyperuricemia   # Hyperphosphatemia  # H/o gout   # Risk of  DIC  On admission uric acid 9.1, LDH 2,475, though all other TLS labs WNL including Cr 0.88. Cr trending up slightly as of 12/11 to 1.11 and uric acid remains elevated at 9.6. Of note patient does have a known h/o gout. INR mildly elevated (~1.2). He is s/p Rasburicase x1 on 12/11 and again on 12/16. IVF now discontinued d/t volume overload and improvement in labs. TLS labs again trending up since starting chemo as of 12/21 -- K elevated to 5.8 (now resolved), uric acid 7.3, Phos 5.6 and Cr 1.27 (baseline ~0.9-1.0). EKG show NSR. Labs improved following additional IVF.   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 Amari Cortes (4/15/22). Patient has history of multiple DVTs in the 1990s without known cause, unprovoked PE at age 43, DVT with PE in context of hip replacement. He most recently was diagnosed with bilateral PE with right heart strain on 1/19/22 after presenting to the ED with SOB.   - Resumed PTA Xarelto 12/13; held x12/16 with plt dropping - will continue to hold on discharge in the setting of TCP.   - Repeat CTA 12/16 w/o evidence of PE  - Start half-dose Lovenox (0.5 mg/kg) q12h x12/23; hold if Plt <30K. Would increase to full dose (vs resumption of DOAC) if Plt are consistently >50K. Plan to continue on discharge - PLC consulted for teaching vs bedside RN prior to discharge.      ID  # Undifferentiated shock, resolved  # Possible pneumonia, s/p empiric abx   # Hypotension, resolved  # Possible adrenal insufficiency  Acute symptomatic hypotension on 12/16 from 123/61 ? 77/39 in the setting of acutely worsening upper back pain, diaphoresis, dizziness. Rapid response called. EKG NSR, trop 33 ? 30. VBG normal. Hgb stable. No e/o bleed, PE, dissection on CTA/CAP w/ contrast. CT did note RLL consolidation suggestion possible  "pneumonia. Lactic 2.2 ? 1.8. BCx NGTD. Initially pressures improved with bolus then became soft again to 92/46 around 1400. Recent echo with EF 65-70%.  - Suspect that hypotension was possibly from adrenal insufficiency with steroid taper as pressure improved after dexamethasone 10 mg was ordered 12/16 afternoon. AM cortisol 12/17 was WNL but likely skewed by recent steroid administration. Less likely septic shock in the absense of fever and tachycardia.   - Cefepime 12/16-12/18, discontinued as low suspicion for infection as etiology of hypotension. Completed azithromycin 12/16-12/18.  - Hold lisinopril and metoprolol    # Prophylaxis   -  mg BID  - Posaconazole 300 mg daily (x12/24)   - Posa/vori both $0/month copay  - Levaquin 250 mg daily      MSK/NEURO  # Acute on chronic back pain  Patient endorses ongoing back pain prior to hospitalization though over the past week has been endorsing rib pain which radiates into his upper back and between his shoulder blades. No red flag sx. Of note he does have splenomegaly on recent CT imaging which could be contributing. EKG shows NSR. Lipase WNL. MRI thoracic spine 12/17 showed \"scattered subtle mottled enhancement of bones most pronounced on L 7th rib, could be 2/2 underlying disease process.\" It is possible that pt has some extramedullary infiltration of cortex causing pain.   - Scheduled Voltaren gel QID, Robaxin QID  - PRN Tylenol, heat packs, Flexeril    - Pain responds well to dexamethasone. S/p dex 10 mg x1 on 12/11-13, 6 mg on 12/14, 4 mg on 12/15 for pain. Increased back to 10 mg on 12/16, taper to 8 mg on 12/17-12/21. Continue to taper with initiation of treatment -- Dex 4 mg daily x12/22 and continue to reassess further taper in coming days. Decrease to 2 mg daily on 12/25 -12/27; now discontinued.    - Of note, pt is concerned for pain flare if he goes down on steroids.   - Palliative Care consulted; initiated Dilaudid PCA - now discontinued " (12/20-12/22). PTA oxycodone 10-20 mg q6h PRN ? transitioned to 5-10 mg q3h PRN (with option for 5mg, 7.5mg or 10mg dosing). 0.5 mg IV Dilaudid q4h PRN available.     # Headaches, improved  Patient has been experiencing nasal congestion, left sided headaches and eye pressure. No other neurologic complaints. Brain MRI 12/5 with diffuse linear and slightly nodular dural thickening and enhancement. Diffuse marrow signal abnormality within calvarium and central skull base. Differential includes lymphoproliferative abnormalities and metastatic disease.  - Requested images to be pushed into PACs and [facility] over read ordered  - S/p diagnostic LP 12/12; flow negative  - Tylenol available PRN      PULM  # COPD   # Hypoxia, resolved   New O2 requirement 12/11 to 3-4L in the setting of recent continuous IVF, splenomegaly and poor pain control. After improvement in volume status, ongoing mild hypoxia to high 80s, may be baseline from COPD.  - CXR showed cardiomegaly, enlarged pulmonary vasculature and diffuse mild interstitial markings bilaterally likely representing pulmonary edema.   - PTA Bumex above  - DuoNebs BID x12/13. Neb treatments q2hr PRN   - Continue PTA Breo Ellipta   - Incentive spirometry  - CT chest 12/16 showed possible RLL pneumonia. See pneumonia treatment as above.     # CATHERINE  - Continue CPAP at night  - Hypoxia at night, should follow up outpatient for possible CPAP adjustment    CARDS  # HTN   - Hold PTA metoprolol ER 12.5 mg daily and lisinopril 5 mg daily in the setting of recent hypotension     # HLD   - PTA atorvastatin held on admission for potential treatment      # Chronic venous insufficiency, CEAP 5   # Bilateral LE edema -improved with Bumex  Worsening 12/20 secondary to IVF in the setting of TLS.   - Echo completed as above  - Lymphedema consulted, patient has home compression wraps     GI  # Constipation, intermittent   Pt is having BMs but stools are small and firm.   - Senna BID and MiraLax  "daily   - Milk of magnesium daily PRN  - Drinking prune juice    ENDO  # T2DM   Follows with [facility], last seen 11/15/22.   - PTA Ozempic held on admission   - Medium intensity sliding scale insulin   - Hypoglycemia protocol in place     RESOLVED  # Sore throat - Strep negative, HSV swab negative. Cepacol lozenges PRN.   # Epistaxis - intermittent over the past month PTA. No recurrence this admission.     Clinically Significant Risk Factors         # Hyponatremia: Lowest Na = 128 mmol/L in last 2 days, will monitor as appropriate      # Hypoalbuminemia: Lowest albumin = 3.2 g/dL at 12/11/2022  7:01 AM, will monitor as appropriate   # Thrombocytopenia: Lowest platelets = 44 in last 2 days, will monitor for bleeding         # Severe Obesity: Estimated body mass index is 48.88 kg/m  as calculated from the following:    Height as of this encounter: 1.854 m (6' 1\").    Weight as of this encounter: 168.1 kg (370 lb 8 oz).   # Moderate Malnutrition: based on nutrition assessment      FEN  Diet: Regular Diet Adult   IVF: Bolus PRN; cautious with volume overload  Lytes: Replete per protocol    PPX  VTE: None given thrombocytopenia  Bowel: Senna/MiraLax/MoM PRN  GI/PUD: Protonix    MISC  Code Status: Full Code   Lines/Drains: PIV  Social: Lives at home alone near [place], [place]. Disability paperwork returned to pt on 12/21.   Dispo: Started chemo with 10-days of Decitabine + Venetoclax, dispo TBD pending ongoing plan. Will likely remain inpatient through count rom and recovery given limited support at home and does not live locally.     Follow Up: TBD;   - Faxed orders for ongoing labs/tx to [facility] in [place] and spoke with Estephania. Plan to call and confirm receipt of orders on 12/29.   - 1/4: Virtual FILIPPO visit   - 1/13: ~D28 BMBx with CT guidance   - 1/19: Visit to establish with Dr. Velasquez       Patient and plan of care was discussed with attending physician Dr. Zaragoza.     >50 minutes " spent on the date of the encounter. Over 50% of time was spent counseling the patient and/or coordinating care.     Shannon Irving PA-C  Hematology/Oncology  Ph: [phone], Pager: [phone]    Interval History   No acute events overnight. Continues with chemo and is tolerating well. To complete Decitabine today. Dex was discontinued yesterday and patient is worried he may have a pain flare. Planning to monitor closely. Thus far today pain is well controlled. We discussed plan for discharge tomorrow as long as patient is feeling well and he is agreeable to the plan. Follow-up is being arranged locally. All questions answered.     A comprehensive review of systems was obtained and is negative other than noted here or in the HPI.     Vital Signs with Ranges  Temp:  [97.4  F (36.3  C)-98.1  F (36.7  C)] 97.8  F (36.6  C)  Pulse:  [60-87] 75  Resp:  [16-18] 17  BP: (105-124)/(51-66) 107/66  SpO2:  [92 %-95 %] 94 %  I/O last 3 completed shifts:  In: 3362 [P.O.:3240; IV Piggyback:122]  Out: 2650 [Urine:2650]    Physical Exam   General: Sitting upright in bed, comfortable, alert. Large habitus.   Skin: Chronic venous stasis changes to BLE from ankle to mid-calf. Bruise noted to R upper arm surrounding PICC. No concerning lesions, rash, jaundice, cyanosis, erythema exposed surfaces.   HEENT: NCAT. EOMI, anicteric sclera.  Respiratory: Non-labored breathing on room air, good air exchange, lungs clear to auscultation bilaterally.  Cardiovascular: RRR. No murmur or rub.   Gastrointestinal: Normoactive BS. Abdomen soft, ND.   Extremities: 2+ bilateral pitting edema to LE, lymphedema wraps in place   Neurologic: A&O x 3, speech normal, no deficits grossly.    Medications     - MEDICATION INSTRUCTIONS -         acyclovir  400 mg Oral BID     allopurinol  300 mg Oral BID     ammonium lactate   Topical Daily     bumetanide  1 mg Oral Daily     calcium acetate (phos binder)  2,001 mg Oral TID w/meals     enoxaparin ANTICOAGULANT  0.5  mg/kg Subcutaneous Q12H     fluticasone-vilanterol  1 puff Inhalation Daily     heparin lock flush  5-20 mL Intracatheter Q24H     insulin aspart  1-7 Units Subcutaneous TID AC     insulin aspart  1-5 Units Subcutaneous At Bedtime     levofloxacin  250 mg Oral Daily     [Held by provider] lisinopril  5 mg Oral Daily     methocarbamol  500 mg Oral 4x Daily     [Held by provider] metoprolol succinate ER  12.5 mg Oral Daily     pantoprazole  40 mg Oral QAM AC     polyethylene glycol  17 g Oral Daily     posaconazole  300 mg Oral QAM     [Held by provider] rivaroxaban ANTICOAGULANT  20 mg Oral Daily with supper     senna-docusate  1 tablet Oral BID    Or     senna-docusate  2 tablet Oral BID     venetoclax  100 mg Oral Daily     Data   Results for orders placed or performed during the hospital encounter of 12/09/22 (from the past 24 hour(s))   Glucose by meter   Result Value Ref Range    GLUCOSE BY METER POCT 168 (H) 70 - 99 mg/dL   Glucose by meter   Result Value Ref Range    GLUCOSE BY METER POCT 146 (H) 70 - 99 mg/dL   Glucose by meter   Result Value Ref Range    GLUCOSE BY METER POCT 117 (H) 70 - 99 mg/dL   CBC with platelets differential    Narrative    The following orders were created for panel order CBC with platelets differential.  Procedure                               Abnormality         Status                     ---------                               -----------         ------                     CBC with platelets and d...[[id number]]  Abnormal            Final result               Manual Differential[[id number]]          Abnormal            Final result                 Please view results for these tests on the individual orders.   Comprehensive metabolic panel   Result Value Ref Range    Sodium 130 (L) 136 - 145 mmol/L    Potassium 4.4 3.4 - 5.3 mmol/L    Chloride 92 (L) 98 - 107 mmol/L    Carbon Dioxide (CO2) 24 22 - 29 mmol/L    Anion Gap 14 7 - 15 mmol/L    Urea Nitrogen 30.6 (H) 8.0 - 23.0 mg/dL     "Creatinine 1.04 0.67 - 1.17 mg/dL    Calcium 9.9 8.8 - 10.2 mg/dL    Glucose 162 (H) 70 - 99 mg/dL    Alkaline Phosphatase 67 40 - 129 U/L    AST 27 10 - 50 U/L    ALT 39 10 - 50 U/L    Protein Total 6.9 6.4 - 8.3 g/dL    Albumin 3.9 3.5 - 5.2 g/dL    Bilirubin Total 0.6 <=1.2 mg/dL    GFR Estimate 82 >60 mL/min/1.73m2   Lactate Dehydrogenase   Result Value Ref Range    Lactate Dehydrogenase 463 (H) 0 - 250 U/L   INR   Result Value Ref Range    INR 1.06 0.85 - 1.15   Partial thromboplastin time   Result Value Ref Range    aPTT 30 22 - 38 Seconds   Fibrinogen activity   Result Value Ref Range    Fibrinogen Activity 243 170 - 490 mg/dL   Magnesium   Result Value Ref Range    Magnesium 2.3 1.7 - 2.3 mg/dL   Phosphorus   Result Value Ref Range    Phosphorus 5.2 (H) 2.5 - 4.5 mg/dL   Uric acid   Result Value Ref Range    Uric Acid 6.7 3.4 - 7.0 mg/dL   CBC with platelets and differential   Result Value Ref Range    WBC Count 2.3 (L) 4.0 - 11.0 10e3/uL    RBC Count 2.74 (L) 4.40 - 5.90 10e6/uL    Hemoglobin 8.2 (L) 13.3 - 17.7 g/dL    Hematocrit 26.2 (L) 40.0 - 53.0 %    MCV 96 78 - 100 fL    MCH 29.9 26.5 - 33.0 pg    MCHC 31.3 (L) 31.5 - 36.5 g/dL    RDW 18.8 (H) 10.0 - 15.0 %    Platelet Count 46 (LL) 150 - 450 10e3/uL    Narrative    Previously reported component [ NRBCs ] is no longer reported.\"  Previously reported component [ NRBCs Absolute ] is no longer reported.\"   Manual Differential   Result Value Ref Range    % Neutrophils 74 %    % Lymphocytes 24 %    % Monocytes 0 %    % Eosinophils 0 %    % Basophils 0 %    % Promyelocytes 2 %    NRBCs per 100 WBC 5 (H) <=0 %    Absolute Neutrophils 1.7 1.6 - 8.3 10e3/uL    Absolute Lymphocytes 0.6 (L) 0.8 - 5.3 10e3/uL    Absolute Monocytes 0.0 0.0 - 1.3 10e3/uL    Absolute Eosinophils 0.0 0.0 - 0.7 10e3/uL    Absolute Basophils 0.0 0.0 - 0.2 10e3/uL    Absolute Promyelocytes 0.0 <=0.0 10e3/uL    Absolute NRBCs 0.1 (H) <=0.0 10e3/uL    RBC Morphology Confirmed RBC Indices "     Platelet Assessment  Automated Count Confirmed. Platelet morphology is normal.     Automated Count Confirmed. Platelet morphology is normal.    Polychromasia Slight (A) None Seen    Teardrop Cells Slight (A) None Seen   Glucose by meter   Result Value Ref Range    GLUCOSE BY METER POCT 115 (H) 70 - 99 mg/dL

## 2022-12-28 NOTE — PLAN OF CARE
Goal Outcome Evaluation:      Plan of Care Reviewed With: patient    Overall Patient Progress: improvingOverall Patient Progress: improving  Pt afebrile with stable vs. No blood products or electrolyte replacement required today. Received 10th and final day of decitabine without difficulty (see note). Good po intake, Good urine output. Very small BM x1. Received MOM again today. No oxycodone required for back pain, only robaxin and voltaren gel. Blood glucose 117 and 115. Possible discharge tomorrow.

## 2022-12-28 NOTE — PROGRESS NOTES
Chemo note:  D/I: Brisk blood return from PICC . Day 10 Decitabine infused over 1 hr.  A/P: Pt tolerated well. Good urine output. Denies nausea. Continue to monitor for side effects.

## 2022-12-28 NOTE — PROGRESS NOTES
"COVID neg 12/9;  Hx: history of prior PE (on Xarelto), DM2, COPD, HTN, and gout   Dx:  New AML    Goal outcome evaluation:  Time: 2300 - 0700  Plan of care reviewed with: Patient  Overall patient progress: No change  VS /55 (BP Location: Left arm)   Pulse 61   Temp 97.8  F (36.6  C) (Oral)   Resp 18   Ht 1.854 m (6' 1\")   Wt (!) 167.6 kg (369 lb 8 oz)   SpO2 93%   BMI 48.75 kg/m        Independent. A&O x4. Calls appropriately. Able to make needs known. Clear and appropriate speech. Follows instructions. Denies chest pain or SOB. VSS on RA. Voiding spontaneously, tolerating regular diet. R PICC hep locked. Back pain rated 5, managed with Oxycodone 7.5 mg given at 02:14, Robaxin 500 mg given at 05:16. Denies nausea. Comfortable in bed. Continue POC.  "

## 2022-12-28 NOTE — PROGRESS NOTES
Addendum to Progress Note dated 12/27/22    # Pancytopenia 2/2 chemotherapy   Transfused to keep Hgb >7 and Plts >10K     Katrin Colbert PA-C

## 2022-12-29 NOTE — PLAN OF CARE
Goal Outcome Evaluation:      Plan of Care Reviewed With: patient    Overall Patient Progress: improvingOverall Patient Progress: improving  Discharge  D: Pt afebrile, vital signs stable, Up ad tami, Reports comfort. Chemotherapy complete. Orders for discharge and outpatient medications written.  I: Home medications and return to clinic schedule reviewed with patient. Discharge instructions and parameters for calling Health Care Provider reviewed. Patient left at 1100 accompanied by friend.   A: Patient verbalized understanding and was ready for discharge.   P: Patient instructed to  medications in Pharmacy. Follow up as scheduled Ashwini Camacho PA-C  Wednesday Jan 4, 2023 2:30 PM for virtual visit.

## 2022-12-29 NOTE — PLAN OF CARE
[id number]    Goal Outcome Evaluation:    VSS on RA, denies nausea. Back pain managed w/scheduled robaxin and repositioning. Wearing lymph wraps on BLE overnight. Blood sugar 131, Good PO intake. Voiding adequately, plan for possible discharge today.

## 2022-12-29 NOTE — PROGRESS NOTES
Brief Palliative Care note:     Stopped by this morning to check in with patient and he is going through discharge AVS with nursing and let me know he's going home today and feels well, comfortable with discharge. Discharge regimen per primary team.     Betzy Cifuentes MD  [phone]

## 2022-12-29 NOTE — DISCHARGE SUMMARY
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 He was admitted given concern for new acute leukemia after presenting to his PCP with generalized complaints (fatigue, subjective fevers, lack of appetite, weight loss, intermittent nose bleed over the last month, rib pain), pancytopenia (WBC 8.5, Hgb 8.8, plts 82) and circulating peripheral blasts (6%). 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 Diagnostic LP completed and flow was negative. He began treatment with Venetoclax + 10-day Decitabine (N8O2=9612/19/22) - non-intensive induction selected d/t his several pre-existing co-morbidities. 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 Pain largely improved following initiation of chemotherapy. Ongoing plan is to repeat BMBx ~D28 and establish with Dr. Velasquez in clinic for ongoing management. Labs and transfusions have been arranged locally ([place]). Close follow-up is scheduled as per below.     On day of discharge Angel feels well and denies specific complaints.  He finished Decitabine chemotherapy yesterday. Reports pain is under good control despite stopping steroids a few days ago. He endorses good PO intake and leg swelling is at baseline. We discussed plan for close follow-up and reviewed meds prior to discharge. Additionally discussed that we will hold Lovenox injections until he is directed to resume by the outpatient team based on his labs due to down-trending platelets at time of discharge. Supply was sent for when he does resume and teaching was provided by bedside RN. PICC will be removed. Patient is in agreement with the plan and is looking forward to going home.     Recs for outpatient provider:    Was receiving half-dose Lovenox BID in hospital in setting of thrombocytopenia for Plt >30K but <50K. Held on discharge with down-trending platelets to 30s. Monitor platelets closely and resume as appropriate. Supply was sent for when he does resume and teaching was provided by bedside RN for self-administration at home.     Phos remains mildly elevated, discontinue Phoslo as appropriate    Recommend CT-guided BMBx procedures d/t technically challenging body habitus/procedure (scheduled)    Monitor BP and need for resumption of PTA BP meds (discontinued in hospital) - Lisinopril, Metoprolol     Follow-up:     Faxed orders for ongoing labs/tx to [facility] in [place] and spoke with Estephania. Confirmed receipt of orders prior to discharge. Plan to start on Monday 1/2.      1/4: Virtual FILIPPO visit     1/13: ~D28 BMBx with CT guidance     1/19: Visit to establish with Dr. Velasquez     Medication Highlights:    Continue Venetoclax 100 mg daily     Started Allopurinol, Phoslo, PPI    Started ppx ACV, Levaquin, Posaconazole    Discontinue Lisinopril, Metoprolol d/t soft BP while in hospital    Discontinue Xarelto    PRN meds sent for pain - Voltaren gel, Robaxin, Oxycodone, Ammonium lactate cream (legs)    Hospital Course   Angel Cardenas was admitted on 12/9/2022.   The following problems were addressed during his hospitalization:    HEME   # AMML with SRSF2, TET2 mutations  # Leukocytosis with peripheral blasts, resolved  Patient was undergoing work up with his PCP for fatigue, subjective fevers, lack of appetite, weight loss, intermittent nose bleed over the last month and rib pain. He was found to have WBC 8.5 Hgb 8.8 and plts 82 with 2% promyelocytes. OP peripheral smear with bicytopenia and 6% circulating blasts, no adrianne rods noted. Flow cytometry of peripheral blood 12/9 with 1% CD34 blasts, reviewed at [facility] with noted 6% blasts. He was referred to [facility] for further work-up and initiation of treatment. On admission WBC 18.2 Hgb 8.3 and Plt 94. He was seen by oncall fellow and attending, please see full note 12/9. Peripheral smear was reviewed and there was a very low suspicion for APL based on review so no treatment was initiated. 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   - Peripheral flow 12/9 resulted with abnormal monocytic population (25%) and abnormal CD34 positive myeloid blast population (1.1%) worrisome for high-grade myeloid neoplasm. Peripheral BCR/ABL1 and FLT3 negative. FISH with no rearrangement of NUP98 or KMT2A.   - OP BMBx 12/9/22. Arrived from [facility] on 12/13. Per [facility] heme path review, biopsy shows AMML with 80% blasts.   - NGS shows SRSF2 and TET2 mutations. In the absence of a favorable genetic finding, SRSF2 is associated with adverse risk. TET2 does not have prognostic significance. BCR/ABL undetectable.   - FISH negative for rearrangement.   - Of note, BMBx procedure was technically very difficult and OSH recommended IR consult for future biopsies. Per nursing, appears more sacral so unsure of quality. Plan for repeat BMBx ~D28, CT-guided procedure outpatient on 1/13/22.  - Virologies: HSV 1/2 negative, CMV IgG-, EBV+, HIV-, HepB/C-   - HLA typing sent on admission   - EKG 12/9 with NSR QTc 441. Echo 12/10 with hyperkinetic LVEF at 65-70%.   -  PICC line placed, would remove at discharge  - WBC 18K on admission. Hydrea 1g daily 12/11-12/19, stopped with initiation of treatment -- WBC has trended down.   - Request sent for BMT intake                                Treatment Plan: Venetoclax/Decitabine (B0W8=2412/19/22)                            - Venetoclax 100 mg PO daily - D1; complete                             - Venetoclax 200 mg PO daily - D2; complete                             - Venetoclax 400 mg PO daily - D3 - 28 - reduced back to 100 mg x12/24 with concurrent -azole.                             - Decitabine 20 mg/m2 (60 mg) IV daily - D1-10      # Pancytopenia  2/2 chemotherapy and new AML.   - Transfuse to keep Hgb >7 and plt > 10k (may consider increasing if recurrent epistaxis)      # TLS, stable to improved  # Hyperkalemia, resolved  # Hyperuricemia, stable to improved  # Hyperphosphatemia  # H/o gout   # Risk of DIC  On admission uric acid 9.1, LDH 2,475, though all other TLS labs WNL including Cr 0.88. Cr trending up slightly as of 12/11 to 1.11 and uric acid remains elevated at 9.6. Of note patient does have a known h/o gout. INR mildly elevated (~1.2). He is s/p Rasburicase x1 on 12/11 and again on 12/16. IVF now discontinued d/t volume overload and improvement in labs. TLS labs again trending up since starting chemo as of 12/21 -- K elevated to 5.8 (now resolved), uric acid 7.3, Phos 5.6 and Cr 1.27 (baseline ~0.9-1.0). EKG show NSR. Labs improved following additional IVF.   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 Amari Cortes (4/15/22). Patient has history of multiple DVTs in the 1990s without known cause, unprovoked PE at age 43, DVT with PE in context of hip replacement. He most recently was diagnosed  with bilateral PE with right heart strain on 1/19/22 after presenting to the ED with SOB.   - Resumed PTA Xarelto 12/13; held x12/16 with plt dropping - will continue to hold on discharge in the setting of TCP.   - Repeat CTA 12/16 w/o evidence of PE  - Start half-dose Lovenox (0.5 mg/kg) q12h x12/23; hold if Plt <30K. Would increase to full dose (vs resumption of DOAC) if Plt are consistently >50K. However HOLD on discharge with down-trending platelets to 30s. Monitor platelets closely and resume as appropriate per OP team. Supply was sent for when he does resume and teaching was provided by bedside RN for self-administration at home.      ID  # Undifferentiated shock, resolved  # Possible pneumonia, s/p empiric abx   # Hypotension, resolved  # Possible adrenal insufficiency, resolved  Acute symptomatic hypotension on 12/16 from 123/61 ? 77/39 in the setting of acutely worsening upper back pain, diaphoresis, dizziness. Rapid response called. EKG NSR, trop 33 ? 30. VBG normal. Hgb stable. No e/o bleed, PE, dissection on CTA/CAP w/ contrast. CT did note RLL consolidation suggestion possible pneumonia. Lactic 2.2 ? 1.8. BCx NGTD. Initially pressures improved with bolus then became soft again to 92/46 around 1400. Recent echo with EF 65-70%.  - Suspect that hypotension was possibly from adrenal insufficiency with steroid taper as pressure improved after dexamethasone 10 mg was ordered 12/16 afternoon. AM cortisol 12/17 was WNL but likely skewed by recent steroid administration. Less likely septic shock in the absense of fever and tachycardia.   - Cefepime 12/16-12/18, discontinued as low suspicion for infection as etiology of hypotension. Completed azithromycin 12/16-12/18.  - Hold lisinopril and metoprolol     # Prophylaxis   -  mg BID  - Posaconazole 300 mg daily (x12/24)               - Posa/vori both $0/month copay  - Levaquin 250 mg daily      MSK/NEURO  # Acute on chronic back pain  Patient endorses  "ongoing back pain prior to hospitalization though over the past week has been endorsing rib pain which radiates into his upper back and between his shoulder blades. No red flag sx. Of note he does have splenomegaly on recent CT imaging which could be contributing. EKG shows NSR. Lipase WNL. MRI thoracic spine 12/17 showed \"scattered subtle mottled enhancement of bones most pronounced on L 7th rib, could be 2/2 underlying disease process.\" It is possible that pt has some extramedullary infiltration of cortex causing pain.   - Scheduled Voltaren gel QID, Robaxin QID  - PRN Tylenol, heat packs, Flexeril    - Pain responds well to dexamethasone. S/p dex 10 mg x1 on 12/11-13, 6 mg on 12/14, 4 mg on 12/15 for pain. Increased back to 10 mg on 12/16, taper to 8 mg on 12/17-12/21. Continue to taper with initiation of treatment -- Dex 4 mg daily x12/22 and continue to reassess further taper in coming days. Decrease to 2 mg daily on 12/25 -12/27; now discontinued - no pain flare noted.   - Palliative Care consulted; initiated Dilaudid PCA - now discontinued (12/20-12/22). PTA oxycodone 10-20 mg q6h PRN ? transitioned to 5-10 mg q3h PRN (with option for 5mg, 7.5mg or 10mg dosing). 0.5 mg IV Dilaudid q4h PRN available.      # Headaches, improved  Patient has been experiencing nasal congestion, left sided headaches and eye pressure. No other neurologic complaints. Brain MRI 12/5 with diffuse linear and slightly nodular dural thickening and enhancement. Diffuse marrow signal abnormality within calvarium and central skull base. Differential includes lymphoproliferative abnormalities and metastatic disease.  - Requested images to be pushed into PACs and [facility] over read ordered  - S/p diagnostic LP 12/12; flow negative  - Tylenol available PRN      PULM  # COPD   # Hypoxia, resolved   New O2 requirement 12/11 to 3-4L in the setting of recent continuous IVF, splenomegaly and poor pain control. After improvement in volume status, " ongoing mild hypoxia to high 80s, may be baseline from COPD.  - CXR showed cardiomegaly, enlarged pulmonary vasculature and diffuse mild interstitial markings bilaterally likely representing pulmonary edema.   - PTA Bumex above  - DuoNebs BID x12/13. Neb treatments q2hr PRN   - Continue PTA Breo Ellipta   - Incentive spirometry  - CT chest 12/16 showed possible RLL pneumonia. See pneumonia treatment as above.     # CATHERINE  - Continue CPAP at night  - Hypoxia at night, should follow up outpatient for possible CPAP adjustment     CARDS  # HTN   - Hold PTA metoprolol ER 12.5 mg daily and lisinopril 5 mg daily in the setting of recent hypotension; continue holding on discharge given ongoing soft/normotensive BP without antihypertensives. OP team to direct resumption if appropriate.      # HLD   - PTA atorvastatin held on admission for potential treatment      # Chronic venous insufficiency, CEAP 5   # Bilateral LE edema - stable-improved with PTA Bumex  Worsening 12/20 secondary to IVF in the setting of TLS.   - Echo completed as above  - Lymphedema consulted, patient has home compression wraps      GI  # Constipation, intermittent   Pt is having BMs but stools are small and firm.   - Senna BID and MiraLax daily   - Milk of magnesium daily PRN  - Drinking prune juice     ENDO  # T2DM   Follows with [facility], last seen 11/15/22.   - PTA Ozempic held on admission, resume on discharge  - Medium intensity sliding scale insulin, insulin discontinued on discharge  - Hypoglycemia protocol in place      RESOLVED  # Sore throat - Strep negative, HSV swab negative. Cepacol lozenges PRN.   # Epistaxis - intermittent over the past month PTA. No recurrence this admission.      Clinically Significant Risk Factors            # Hyponatremia: Lowest Na = 128 mmol/L in last 2 days, will monitor as appropriate      # Hypoalbuminemia: Lowest albumin = 3.2 g/dL at 12/11/2022  7:01 AM, will monitor as appropriate   #  "Thrombocytopenia: Lowest platelets = 44 in last 2 days, will monitor for bleeding         # Severe Obesity: Estimated body mass index is 48.88 kg/m  as calculated from the following:    Height as of this encounter: 1.854 m (6' 1\").    Weight as of this encounter: 168.1 kg (370 lb 8 oz).   # Moderate Malnutrition: based on nutrition assessment         FEN  Diet: Regular Diet Adult   IVF: Bolus PRN; cautious with volume overload  Lytes: Replete per protocol     PPX  VTE: None given thrombocytopenia  Bowel: Senna/MiraLax/MoM PRN  GI/PUD: Protonix     MISC  Code Status: Full Code   Lines/Drains: PICC, removed on discharge  Social: Lives at home alone near [place], [place]. Disability paperwork returned to pt on 12/21.   Dispo: Discharge today with chemo completion and good pain control      Follow Up: TBD;   - Faxed orders for ongoing labs/tx to [facility] in [place] and spoke with Estephania. Confirmed receipt of orders prior to discharge. Plan to start on Monday 1/2.    - 1/4: Virtual FILIPPO visit   - 1/13: ~D28 BMBx with CT guidance   - 1/19: Visit to establish with Dr. Velasquez       Patient and plan of care was discussed with attending physician Dr. Zaragoza.     >60 minutes spent on the date of the encounter. Over 50% of time was spent counseling the patient and/or coordinating care.     Shannon Irving PA-C  Hematology/Oncology  Pager: [phone]    Pending Results   Unresulted Labs Ordered in the Past 30 Days of this Admission     No orders found from 11/9/2022 to 12/10/2022.        Code Status   Full Code    Primary Care Physician   Gagan Wills    General: Sitting upright in bedside chair, comfortable, alert. Large habitus.   Skin: Chronic venous stasis changes to BLE from ankle to mid-calf. Bruise noted to R upper arm surrounding PICC. No concerning lesions, rash, jaundice, cyanosis, erythema exposed surfaces.   HEENT: NCAT. EOMI, anicteric sclera.  Respiratory: Non-labored breathing on room air, good air " exchange, lungs clear to auscultation bilaterally.  Cardiovascular: RRR. No murmur or rub.   Gastrointestinal: Normoactive BS. Abdomen soft, ND.   Extremities: 2+ bilateral pitting edema to LE (stable at baseline)  Neurologic: A&O x 3, speech normal, no deficits grossly.    Time Spent on this Encounter   IShannon PA-C, personally saw the patient today and spent greater than 30 minutes discharging this patient.    Discharge Disposition   Discharged to home  Condition at discharge: Stable    Consultations This Hospital Stay   NURSING TO CONSULT FOR VASCULAR ACCESS CARE IP CONSULT  LYMPHEDEMA THERAPY IP CONSULT  INTERNAL MEDICINE PROCEDURE TEAM ADULT IP CONSULT [facility] - LUMBAR PUNCTURE  PODIATRY IP CONSULT  CARE MANAGEMENT / SOCIAL WORK IP CONSULT  NURSING TO CONSULT FOR VASCULAR ACCESS CARE IP CONSULT  VASCULAR ACCESS FOR PICC PLACEMENT ADULT IP CONSULT  WOUND OSTOMY CONTINENCE NURSE  IP CONSULT  PHARMACY LIAISON FOR MEDICATION COVERAGE CONSULT  PALLIATIVE CARE ADULT IP CONSULT  NURSING TO CONSULT FOR VASCULAR ACCESS CARE IP CONSULT  PATIENT LEARNING CENTER IP CONSULT    Discharge Orders      IR Referral     Adult Oncology/Hematology  Referral      Reason for your hospital stay    Admission for treatment of new acute leukemia     Activity    Your activity upon discharge: activity as tolerated     When to contact your care team    [facility] [place] [place] [facility] clinic triage line at [phone] for temp > or = 100.4, uncontrolled nausea/vomiting/diarrhea/constipation, unrelieved pain, bleeding not relieved with pressure, dizziness, chest pain, shortness of breath, loss of consciousness, and any new or concerning symptoms.     Discharge Instructions    Continue taking daily chemo pill (Venetoclax) on discharge.     HOLD Lovenox injections on discharge as platelets are less than 50K and blood counts are trending down. Clinic team will instruct when to resume.    Continue taking Phoslo and Levaquin until  instructed to stop by your clinic team.     Follow Up and recommended labs and tests    Follow-up as currently scheduled with labs and provider visit in the [facility]. Times and locations as listed below.    CT-guided BMBx is scheduled on 1/13, please arrive to hospital at 9:30 AM    You will also have ongoing twice weekly labs and possible transfusions at [facility] in [place]. They are working on scheduling labs starting 1/2 - these are in-process. They will call you once scheduled. If you do not hear form them by Friday 12/30, please call [phone] to inquire.     Bone marrow biopsy     Flow Cytometry Bone Marrow     CHROMOSOME ANALYSIS, BONE MARROW, DIAGNOSIS/RELAPSE With Professional Interpretation     FISH With Professional Interpretation     Diet    Follow this diet upon discharge: Regular     AML Expanded NGS Panel Blood BM     Process and hold DNA     Check Out Appointment Request    Requesting scheduling of the following:   - D28 CT guided BMBx ~1/16, IR referral placed  - Establish with Hematologist in the next 2-3 weeks, referral placed     Check Out Appointment Request    Requesting virtual FILIPPO visit for next week (week of 1/2)     Discharge Medications   Discharge Medication List as of 12/29/2022  9:38 AM      START taking these medications    Details   acyclovir (ZOVIRAX) 400 MG tablet Take 1 tablet (400 mg) by mouth 2 times daily, Disp-60 tablet, R-0, E-Prescribe      ammonium lactate (AMLACTIN) 12 % external cream Apply topically dailyDisp-385 g, [id number]      calcium acetate (PHOSLO) 667 MG CAPS capsule Take 3 capsules (2,001 mg) by mouth 3 times daily (with meals), Disp-30 capsule, R-0, E-Prescribe      diclofenac (VOLTAREN) 1 % topical gel Apply 2 g topically 4 times daily as needed for moderate pain (4-6), Disp-50 g, R-0, E-Prescribe      levofloxacin (LEVAQUIN) 250 MG tablet Take 1 tablet (250 mg) by mouth daily, Disp-30 tablet, R-0, E-Prescribe       methocarbamol (ROBAXIN) 500 MG tablet Take 1 tablet (500 mg) by mouth 4 times daily as needed for muscle spasms, Disp-60 tablet, R-0, E-Prescribe      oxyCODONE (ROXICODONE) 5 MG tablet Take 1 tablet (5 mg) by mouth every 4 hours as needed for moderate pain (4-6), Disp-20 tablet, R-0, Local Print      pantoprazole (PROTONIX) 40 MG EC tablet Take 1 tablet (40 mg) by mouth every morning (before breakfast), Disp-30 tablet, R-0, E-Prescribe      polyethylene glycol (MIRALAX) 17 GM/Dose powder Take 17 g by mouth daily as needed for constipation, Disp-225 g, R-0, E-Prescribe      posaconazole (NOXAFIL) 100 MG EC tablet Take 3 tablets (300 mg) by mouth every morning, Disp-90 tablet, R-0, E-Prescribe      senna-docusate (SENOKOT-S/PERICOLACE) 8.6-50 MG tablet Take 1 tablet by mouth 2 times daily as needed for constipation, Disp-30 tablet, R-0, E-Prescribe      venetoclax (VENCLEXTA) 100 MG tablet Take 1 tablet (100 mg) by mouth daily, Disp-30 tablet, R-0, E-Prescribe         CONTINUE these medications which have CHANGED    Details   allopurinol (ZYLOPRIM) 300 MG tablet Take 1 tablet (300 mg) by mouth 2 times daily, Disp-60 tablet, R-0, E-Prescribe      enoxaparin ANTICOAGULANT (LOVENOX) 100 MG/ML syringe Inject 0.9 mLs (90 mg) Subcutaneous every 12 hours HOLD if platelets are less than 50K, Disp-40 mL, R-0, E-Prescribe         CONTINUE these medications which have NOT CHANGED    Details   acetaminophen (TYLENOL) 500 MG tablet Take 500-1,000 mg by mouth every 6 hours as needed for mild pain, Historical      atorvastatin (LIPITOR) 20 MG tablet Take 20 mg by mouth daily, Historical      budesonide-formoterol (SYMBICORT) 160-4.5 MCG/ACT Inhaler Inhale 2 puffs into the lungs 2 times daily, Historical      bumetanide (BUMEX) 1 MG tablet Take 1 mg by mouth daily, Historical      semaglutide (OZEMPIC, 1 MG/DOSE,) 2 MG/1.5ML pen Inject 2 mg Subcutaneous every 7 days, Historical         STOP taking these medications        dexamethasone (DECADRON) 4 MG tablet Comments:   Reason for Stopping:         lisinopril (ZESTRIL) 5 MG tablet Comments:   Reason for Stopping:         metoprolol succinate ER (TOPROL XL) 25 MG 24 hr tablet Comments:   Reason for Stopping:         oxyCODONE (OXY-IR) 5 MG capsule Comments:   Reason for Stopping:         rivaroxaban ANTICOAGULANT (XARELTO) 20 MG TABS tablet Comments:   Reason for Stopping:             Allergies   Allergies   Allergen Reactions     Metformin Unknown     Data   Most Recent 3 CBC's:Recent Labs   Lab Test 12/29/22  0550 12/28/22  0903 12/27/22  0619   WBC 2.1* 2.3* 2.8*   HGB 7.4* 8.2* 7.6*   MCV 93 96 96   PLT 33* 46* 44*      Most Recent 3 BMP's:  Recent Labs   Lab Test 12/29/22  0720 12/29/22  0550 12/29/22  0215 12/28/22  1255 12/28/22  0903 12/27/22  0758 12/27/22  0619   NA  --  127*  --   --  130*  --  128*   POTASSIUM  --  4.7  --   --  4.4  --  4.7   CHLORIDE  --  90*  --   --  92*  --  92*   CO2  --  26  --   --  24  --  24   BUN  --  34.1*  --   --  30.6*  --  29.3*   CR  --  1.15  --   --  1.04  --  1.02   ANIONGAP  --  11  --   --  14  --  12   ZEHRA  --  9.8  --   --  9.9  --  9.6   * 120* 131*   < > 162*   < > 115*    < > = values in this interval not displayed.     Most Recent 2 LFT's:  Recent Labs   Lab Test 12/29/22  0550 12/28/22  0903   AST 34 27   ALT 39 39   ALKPHOS 74 67   BILITOTAL 0.7 0.6     Most Recent INR's and Anticoagulation Dosing History:  Anticoagulation Dose History     Recent Dosing and Labs Latest Ref Rng & Units 12/23/2022 12/24/2022 12/25/2022 12/26/2022 12/27/2022 12/28/2022 12/29/2022    INR 0.85 - 1.15 1.11 1.14 1.11 1.08 1.08 1.06 1.05        Most Recent 3 Troponin's:No lab results found.  Most Recent Cholesterol Panel:No lab results found.  Most Recent 6 Bacteria Isolates From Any Culture (See EPIC Reports for Culture Details):No lab results found.  Most Recent TSH, T4 and A1c Labs:No lab results found.

## 2022-12-29 NOTE — ORAL ONC MGMT
Oral Chemotherapy Monitoring Program     Placed call to patient in follow up of pozaconazole therapy. Due to a drug interaction with atorvastatin, Dr. Velasquez would like to stop atorvastatin and he will check cholesterol later this month to assess the need for statins going forward. Angel expressed understanding and agreement with this plan. He has a 30 day supply of Venclexta on hand and will continue taking it. He plans to follow up with a video visit with Ashwini Camacho on 1/4/2022 (message sent to Ashwini and Dr. Velasquez asking when they would like to check Venclexta monitoring labs).     Jaron GonzalezD  [facility]  [phone]  December 29, 2022

## 2022-12-29 NOTE — PLAN OF CARE
[id number]    Day #10 of Decitabine + Venetoclax. VSS, afebrile and on RA. A&Ox4. Denies nausea/SOB, although endorses some DEAN. Rates pain at a baseline of 5/10, does not interfere with activity/sleep. Pain managed with scheduled robaxan. Voiding spontaneously, reporting good UOP. Had large BM during shift. Good appetite,  and 154, covered with sliding scale when appropriate. Reinforced earlier education on lovenox injection, pt practiced administration with evening dose. Up independently in room. Had 2 visitors this evening. Possible discharge tomorrow morning. Continue to monitor & w/ POC.

## 2023-01-01 ENCOUNTER — APPOINTMENT (OUTPATIENT)
Dept: CT IMAGING | Facility: CLINIC | Age: 63
DRG: 834 | End: 2023-01-01
Attending: PHYSICIAN ASSISTANT
Payer: COMMERCIAL

## 2023-01-01 ENCOUNTER — APPOINTMENT (OUTPATIENT)
Dept: OCCUPATIONAL THERAPY | Facility: CLINIC | Age: 63
DRG: 834 | End: 2023-01-01
Attending: PHYSICIAN ASSISTANT
Payer: COMMERCIAL

## 2023-01-01 ENCOUNTER — APPOINTMENT (OUTPATIENT)
Dept: OCCUPATIONAL THERAPY | Facility: CLINIC | Age: 63
DRG: 834 | End: 2023-01-01
Attending: INTERNAL MEDICINE
Payer: COMMERCIAL

## 2023-01-01 ENCOUNTER — MEDICAL CORRESPONDENCE (OUTPATIENT)
Dept: HEALTH INFORMATION MANAGEMENT | Facility: CLINIC | Age: 63
End: 2023-01-01

## 2023-01-01 ENCOUNTER — APPOINTMENT (OUTPATIENT)
Dept: GENERAL RADIOLOGY | Facility: CLINIC | Age: 63
DRG: 834 | End: 2023-01-01
Attending: PHYSICIAN ASSISTANT
Payer: COMMERCIAL

## 2023-01-01 ENCOUNTER — PRE VISIT (OUTPATIENT)
Dept: ONCOLOGY | Facility: CLINIC | Age: 63
End: 2023-01-01

## 2023-01-01 ENCOUNTER — PATIENT OUTREACH (OUTPATIENT)
Dept: ONCOLOGY | Facility: CLINIC | Age: 63
End: 2023-01-01

## 2023-01-01 ENCOUNTER — TELEPHONE (OUTPATIENT)
Dept: ONCOLOGY | Facility: CLINIC | Age: 63
End: 2023-01-01

## 2023-01-01 ENCOUNTER — APPOINTMENT (OUTPATIENT)
Dept: INTERVENTIONAL RADIOLOGY/VASCULAR | Facility: CLINIC | Age: 63
End: 2023-01-01
Attending: PHYSICIAN ASSISTANT
Payer: COMMERCIAL

## 2023-01-01 ENCOUNTER — VIRTUAL VISIT (OUTPATIENT)
Dept: ONCOLOGY | Facility: CLINIC | Age: 63
End: 2023-01-01
Attending: PHYSICIAN ASSISTANT
Payer: COMMERCIAL

## 2023-01-01 ENCOUNTER — PATIENT OUTREACH (OUTPATIENT)
Dept: CARE COORDINATION | Facility: CLINIC | Age: 63
End: 2023-01-01

## 2023-01-01 ENCOUNTER — TELEPHONE (OUTPATIENT)
Dept: INTERVENTIONAL RADIOLOGY/VASCULAR | Facility: CLINIC | Age: 63
End: 2023-01-01

## 2023-01-01 ENCOUNTER — ONCOLOGY VISIT (OUTPATIENT)
Dept: ONCOLOGY | Facility: CLINIC | Age: 63
DRG: 834 | End: 2023-01-01
Attending: PHYSICIAN ASSISTANT
Payer: COMMERCIAL

## 2023-01-01 ENCOUNTER — TELEPHONE (OUTPATIENT)
Dept: INTERVENTIONAL RADIOLOGY/VASCULAR | Facility: CLINIC | Age: 63
End: 2023-01-01
Payer: COMMERCIAL

## 2023-01-01 ENCOUNTER — LAB (OUTPATIENT)
Dept: LAB | Facility: CLINIC | Age: 63
DRG: 834 | End: 2023-01-01
Attending: PHYSICIAN ASSISTANT
Payer: COMMERCIAL

## 2023-01-01 ENCOUNTER — APPOINTMENT (OUTPATIENT)
Dept: CARDIOLOGY | Facility: CLINIC | Age: 63
DRG: 834 | End: 2023-01-01
Attending: PHYSICIAN ASSISTANT
Payer: COMMERCIAL

## 2023-01-01 ENCOUNTER — HOSPITAL ENCOUNTER (INPATIENT)
Facility: CLINIC | Age: 63
LOS: 2 days | End: 2023-01-29
Admitting: INTERNAL MEDICINE
Payer: COMMERCIAL

## 2023-01-01 ENCOUNTER — DOCUMENTATION ONLY (OUTPATIENT)
Dept: ONCOLOGY | Facility: CLINIC | Age: 63
End: 2023-01-01

## 2023-01-01 ENCOUNTER — APPOINTMENT (OUTPATIENT)
Dept: PHYSICAL THERAPY | Facility: CLINIC | Age: 63
DRG: 834 | End: 2023-01-01
Attending: PHYSICIAN ASSISTANT
Payer: COMMERCIAL

## 2023-01-01 ENCOUNTER — HOSPITAL ENCOUNTER (INPATIENT)
Facility: CLINIC | Age: 63
LOS: 8 days | Discharge: HOSPICE | DRG: 834 | End: 2023-01-27
Attending: INTERNAL MEDICINE | Admitting: INTERNAL MEDICINE
Payer: COMMERCIAL

## 2023-01-01 ENCOUNTER — HOSPITAL ENCOUNTER (OUTPATIENT)
Facility: CLINIC | Age: 63
Discharge: HOME | End: 2023-01-13
Attending: INTERNAL MEDICINE | Admitting: INTERNAL MEDICINE
Payer: COMMERCIAL

## 2023-01-01 ENCOUNTER — APPOINTMENT (OUTPATIENT)
Dept: GENERAL RADIOLOGY | Facility: CLINIC | Age: 63
DRG: 834 | End: 2023-01-01
Attending: INTERNAL MEDICINE
Payer: COMMERCIAL

## 2023-01-01 ENCOUNTER — APPOINTMENT (OUTPATIENT)
Dept: MEDSURG UNIT | Facility: CLINIC | Age: 63
End: 2023-01-01
Attending: INTERNAL MEDICINE
Payer: COMMERCIAL

## 2023-01-01 ENCOUNTER — TELEPHONE (OUTPATIENT)
Dept: ONCOLOGY | Facility: CLINIC | Age: 63
End: 2023-01-01
Payer: COMMERCIAL

## 2023-01-01 ENCOUNTER — PATIENT OUTREACH (OUTPATIENT)
Dept: CARE COORDINATION | Facility: CLINIC | Age: 63
End: 2023-01-01
Payer: COMMERCIAL

## 2023-01-01 ENCOUNTER — NURSE TRIAGE (OUTPATIENT)
Dept: NURSING | Facility: CLINIC | Age: 63
End: 2023-01-01
Payer: COMMERCIAL

## 2023-01-01 ENCOUNTER — APPOINTMENT (OUTPATIENT)
Dept: PHYSICAL THERAPY | Facility: CLINIC | Age: 63
DRG: 834 | End: 2023-01-01
Attending: INTERNAL MEDICINE
Payer: COMMERCIAL

## 2023-01-01 VITALS
DIASTOLIC BLOOD PRESSURE: 80 MMHG | BODY MASS INDEX: 41.75 KG/M2 | TEMPERATURE: 97.9 F | SYSTOLIC BLOOD PRESSURE: 156 MMHG | HEIGHT: 73 IN | WEIGHT: 315 LBS | RESPIRATION RATE: 20 BRPM | OXYGEN SATURATION: 94 % | HEART RATE: 85 BPM

## 2023-01-01 VITALS
SYSTOLIC BLOOD PRESSURE: 157 MMHG | BODY MASS INDEX: 41.75 KG/M2 | RESPIRATION RATE: 24 BRPM | DIASTOLIC BLOOD PRESSURE: 103 MMHG | WEIGHT: 315 LBS | HEIGHT: 73 IN | OXYGEN SATURATION: 85 % | HEART RATE: 63 BPM | TEMPERATURE: 97.7 F

## 2023-01-01 VITALS
WEIGHT: 315 LBS | OXYGEN SATURATION: 88 % | DIASTOLIC BLOOD PRESSURE: 74 MMHG | RESPIRATION RATE: 22 BRPM | BODY MASS INDEX: 51.72 KG/M2 | SYSTOLIC BLOOD PRESSURE: 129 MMHG | HEART RATE: 98 BPM | TEMPERATURE: 99.8 F

## 2023-01-01 VITALS — TEMPERATURE: 102.6 F

## 2023-01-01 DIAGNOSIS — C92.00 ACUTE MYELOID LEUKEMIA NOT HAVING ACHIEVED REMISSION (H): ICD-10-CM

## 2023-01-01 DIAGNOSIS — C92.00 ACUTE MYELOID LEUKEMIA NOT HAVING ACHIEVED REMISSION (H): Primary | ICD-10-CM

## 2023-01-01 DIAGNOSIS — C95.90 LEUKEMIA NOT HAVING ACHIEVED REMISSION, UNSPECIFIED LEUKEMIA TYPE (H): ICD-10-CM

## 2023-01-01 DIAGNOSIS — D64.9 ANEMIA, UNSPECIFIED TYPE: Primary | ICD-10-CM

## 2023-01-01 LAB
1,3 BETA GLUCAN SER-MCNC: <31 PG/ML
ABO/RH(D): NORMAL
ALBUMIN SERPL BCG-MCNC: 2.8 G/DL (ref 3.5–5.2)
ALBUMIN SERPL BCG-MCNC: 2.9 G/DL (ref 3.5–5.2)
ALBUMIN SERPL BCG-MCNC: 2.9 G/DL (ref 3.5–5.2)
ALBUMIN SERPL BCG-MCNC: 3.2 G/DL (ref 3.5–5.2)
ALBUMIN SERPL BCG-MCNC: 3.4 G/DL (ref 3.5–5.2)
ALBUMIN UR-MCNC: 100 MG/DL
ALBUMIN UR-MCNC: NEGATIVE MG/DL
ALP SERPL-CCNC: 169 U/L (ref 40–129)
ALP SERPL-CCNC: 185 U/L (ref 40–129)
ALP SERPL-CCNC: 194 U/L (ref 40–129)
ALP SERPL-CCNC: 216 U/L (ref 40–129)
ALP SERPL-CCNC: 254 U/L (ref 40–129)
ALT SERPL W P-5'-P-CCNC: 10 U/L (ref 10–50)
ALT SERPL W P-5'-P-CCNC: <5 U/L (ref 10–50)
AMMONIA PLAS-SCNC: 29 UMOL/L (ref 16–60)
ANION GAP SERPL CALCULATED.3IONS-SCNC: 14 MMOL/L (ref 7–15)
ANION GAP SERPL CALCULATED.3IONS-SCNC: 15 MMOL/L (ref 7–15)
ANION GAP SERPL CALCULATED.3IONS-SCNC: 16 MMOL/L (ref 7–15)
ANION GAP SERPL CALCULATED.3IONS-SCNC: 16 MMOL/L (ref 7–15)
ANION GAP SERPL CALCULATED.3IONS-SCNC: 21 MMOL/L (ref 7–15)
ANION GAP SERPL CALCULATED.3IONS-SCNC: 22 MMOL/L (ref 7–15)
ANTIBODY SCREEN: NEGATIVE
APPEARANCE UR: ABNORMAL
APPEARANCE UR: CLEAR
APTT PPP: 40 SECONDS (ref 22–38)
AST SERPL W P-5'-P-CCNC: 105 U/L (ref 10–50)
AST SERPL W P-5'-P-CCNC: 37 U/L (ref 10–50)
AST SERPL W P-5'-P-CCNC: 38 U/L (ref 10–50)
AST SERPL W P-5'-P-CCNC: 43 U/L (ref 10–50)
AST SERPL W P-5'-P-CCNC: 54 U/L (ref 10–50)
ATRIAL RATE - MUSE: 91 BPM
BACTERIA BLD CULT: NO GROWTH
BACTERIA SPT CULT: NORMAL
BACTERIA UR CULT: NO GROWTH
BASE EXCESS BLDV CALC-SCNC: 1.2 MMOL/L (ref -7.7–1.9)
BASE EXCESS BLDV CALC-SCNC: 5.5 MMOL/L (ref -7.7–1.9)
BASOPHILS # BLD MANUAL: 0 10E3/UL (ref 0–0.2)
BASOPHILS # BLD MANUAL: 0.1 10E3/UL (ref 0–0.2)
BASOPHILS # BLD MANUAL: 0.3 10E3/UL (ref 0–0.2)
BASOPHILS NFR BLD MANUAL: 0 %
BASOPHILS NFR BLD MANUAL: 1 %
BASOPHILS NFR BLD MANUAL: 2 %
BILIRUB DIRECT SERPL-MCNC: 0.26 MG/DL (ref 0–0.3)
BILIRUB DIRECT SERPL-MCNC: <0.2 MG/DL (ref 0–0.3)
BILIRUB DIRECT SERPL-MCNC: <0.2 MG/DL (ref 0–0.3)
BILIRUB SERPL-MCNC: 0.5 MG/DL
BILIRUB SERPL-MCNC: 0.7 MG/DL
BILIRUB SERPL-MCNC: 0.8 MG/DL
BILIRUB UR QL STRIP: NEGATIVE
BILIRUB UR QL STRIP: NEGATIVE
BLD PROD TYP BPU: NORMAL
BLOOD COMPONENT TYPE: NORMAL
BUN SERPL-MCNC: 10.3 MG/DL (ref 8–23)
BUN SERPL-MCNC: 11.4 MG/DL (ref 8–23)
BUN SERPL-MCNC: 11.4 MG/DL (ref 8–23)
BUN SERPL-MCNC: 12.4 MG/DL (ref 8–23)
BUN SERPL-MCNC: 12.5 MG/DL (ref 8–23)
BUN SERPL-MCNC: 12.7 MG/DL (ref 8–23)
BUN SERPL-MCNC: 14.7 MG/DL (ref 8–23)
BUN SERPL-MCNC: 15.6 MG/DL (ref 8–23)
BUN SERPL-MCNC: 16.2 MG/DL (ref 8–23)
BUN SERPL-MCNC: 17.6 MG/DL (ref 8–23)
BUN SERPL-MCNC: 17.9 MG/DL (ref 8–23)
BUN SERPL-MCNC: 18.6 MG/DL (ref 8–23)
BUN SERPL-MCNC: 23.1 MG/DL (ref 8–23)
C PNEUM DNA SPEC QL NAA+PROBE: NOT DETECTED
CALCIUM SERPL-MCNC: 10 MG/DL (ref 8.8–10.2)
CALCIUM SERPL-MCNC: 10.4 MG/DL (ref 8.8–10.2)
CALCIUM SERPL-MCNC: 10.6 MG/DL (ref 8.8–10.2)
CALCIUM SERPL-MCNC: 9.2 MG/DL (ref 8.8–10.2)
CALCIUM SERPL-MCNC: 9.4 MG/DL (ref 8.8–10.2)
CALCIUM SERPL-MCNC: 9.5 MG/DL (ref 8.8–10.2)
CALCIUM SERPL-MCNC: 9.7 MG/DL (ref 8.8–10.2)
CALCIUM SERPL-MCNC: 9.7 MG/DL (ref 8.8–10.2)
CALCIUM SERPL-MCNC: 9.8 MG/DL (ref 8.8–10.2)
CELLULAR CAST: 8 /LPF
CHLORIDE SERPL-SCNC: 100 MMOL/L (ref 98–107)
CHLORIDE SERPL-SCNC: 101 MMOL/L (ref 98–107)
CHLORIDE SERPL-SCNC: 101 MMOL/L (ref 98–107)
CHLORIDE SERPL-SCNC: 102 MMOL/L (ref 98–107)
CHLORIDE SERPL-SCNC: 96 MMOL/L (ref 98–107)
CHLORIDE SERPL-SCNC: 97 MMOL/L (ref 98–107)
CHLORIDE SERPL-SCNC: 99 MMOL/L (ref 98–107)
CODING SYSTEM: NORMAL
COLOR UR AUTO: ABNORMAL
COLOR UR AUTO: YELLOW
CREAT SERPL-MCNC: 0.85 MG/DL (ref 0.67–1.17)
CREAT SERPL-MCNC: 0.85 MG/DL (ref 0.67–1.17)
CREAT SERPL-MCNC: 0.86 MG/DL (ref 0.67–1.17)
CREAT SERPL-MCNC: 0.89 MG/DL (ref 0.67–1.17)
CREAT SERPL-MCNC: 0.89 MG/DL (ref 0.67–1.17)
CREAT SERPL-MCNC: 0.95 MG/DL (ref 0.67–1.17)
CREAT SERPL-MCNC: 0.96 MG/DL (ref 0.67–1.17)
CREAT SERPL-MCNC: 0.98 MG/DL (ref 0.67–1.17)
CREAT SERPL-MCNC: 1.01 MG/DL (ref 0.67–1.17)
CREAT SERPL-MCNC: 1.04 MG/DL (ref 0.67–1.17)
CREAT SERPL-MCNC: 1.11 MG/DL (ref 0.67–1.17)
CREAT SERPL-MCNC: 1.13 MG/DL (ref 0.67–1.17)
CREAT SERPL-MCNC: 1.21 MG/DL (ref 0.67–1.17)
CROSSMATCH: NORMAL
CRP SERPL-MCNC: 151 MG/L
CRP SERPL-MCNC: 242 MG/L
CRYPTOC AG SPEC QL: NEGATIVE
DACRYOCYTES BLD QL SMEAR: SLIGHT
DACRYOCYTES BLD QL SMEAR: SLIGHT
DEPRECATED HCO3 PLAS-SCNC: 16 MMOL/L (ref 22–29)
DEPRECATED HCO3 PLAS-SCNC: 17 MMOL/L (ref 22–29)
DEPRECATED HCO3 PLAS-SCNC: 20 MMOL/L (ref 22–29)
DEPRECATED HCO3 PLAS-SCNC: 21 MMOL/L (ref 22–29)
DEPRECATED HCO3 PLAS-SCNC: 21 MMOL/L (ref 22–29)
DEPRECATED HCO3 PLAS-SCNC: 22 MMOL/L (ref 22–29)
DEPRECATED HCO3 PLAS-SCNC: 23 MMOL/L (ref 22–29)
DEPRECATED HCO3 PLAS-SCNC: 23 MMOL/L (ref 22–29)
DEPRECATED HCO3 PLAS-SCNC: 25 MMOL/L (ref 22–29)
DIASTOLIC BLOOD PRESSURE - MUSE: NORMAL MMHG
EOSINOPHIL # BLD MANUAL: 0 10E3/UL (ref 0–0.7)
EOSINOPHIL # BLD MANUAL: 0.2 10E3/UL (ref 0–0.7)
EOSINOPHIL NFR BLD MANUAL: 0 %
EOSINOPHIL NFR BLD MANUAL: 1 %
ERYTHROCYTE [DISTWIDTH] IN BLOOD BY AUTOMATED COUNT: 20.1 % (ref 10–15)
ERYTHROCYTE [DISTWIDTH] IN BLOOD BY AUTOMATED COUNT: 20.2 % (ref 10–15)
ERYTHROCYTE [DISTWIDTH] IN BLOOD BY AUTOMATED COUNT: 20.2 % (ref 10–15)
ERYTHROCYTE [DISTWIDTH] IN BLOOD BY AUTOMATED COUNT: 20.5 % (ref 10–15)
ERYTHROCYTE [DISTWIDTH] IN BLOOD BY AUTOMATED COUNT: 20.7 % (ref 10–15)
ERYTHROCYTE [DISTWIDTH] IN BLOOD BY AUTOMATED COUNT: 20.8 % (ref 10–15)
ERYTHROCYTE [DISTWIDTH] IN BLOOD BY AUTOMATED COUNT: 20.9 % (ref 10–15)
ERYTHROCYTE [DISTWIDTH] IN BLOOD BY AUTOMATED COUNT: 21.2 % (ref 10–15)
ERYTHROCYTE [DISTWIDTH] IN BLOOD BY AUTOMATED COUNT: 21.3 % (ref 10–15)
FIBRINOGEN PPP-MCNC: 381 MG/DL (ref 170–490)
FIBRINOGEN PPP-MCNC: 552 MG/DL (ref 170–490)
FIBRINOGEN PPP-MCNC: 567 MG/DL (ref 170–490)
FIBRINOGEN PPP-MCNC: 619 MG/DL (ref 170–490)
FIBRINOGEN PPP-MCNC: 640 MG/DL (ref 170–490)
FIBRINOGEN PPP-MCNC: 645 MG/DL (ref 170–490)
FIBRINOGEN PPP-MCNC: 671 MG/DL (ref 170–490)
FLUAV H1 2009 PAND RNA SPEC QL NAA+PROBE: NOT DETECTED
FLUAV H1 RNA SPEC QL NAA+PROBE: NOT DETECTED
FLUAV H3 RNA SPEC QL NAA+PROBE: NOT DETECTED
FLUAV RNA SPEC QL NAA+PROBE: NOT DETECTED
FLUBV RNA SPEC QL NAA+PROBE: NOT DETECTED
GALACTOMANNAN AG SERPL QL IA: NEGATIVE
GALACTOMANNAN AG SPEC IA-ACNC: 0.05
GAMMA INTERFERON BACKGROUND BLD IA-ACNC: 0.02 IU/ML
GFR SERPL CREATININE-BSD FRML MDRD: 69 ML/MIN/1.73M2
GFR SERPL CREATININE-BSD FRML MDRD: 74 ML/MIN/1.73M2
GFR SERPL CREATININE-BSD FRML MDRD: 76 ML/MIN/1.73M2
GFR SERPL CREATININE-BSD FRML MDRD: 82 ML/MIN/1.73M2
GFR SERPL CREATININE-BSD FRML MDRD: 85 ML/MIN/1.73M2
GFR SERPL CREATININE-BSD FRML MDRD: 88 ML/MIN/1.73M2
GFR SERPL CREATININE-BSD FRML MDRD: 90 ML/MIN/1.73M2
GFR SERPL CREATININE-BSD FRML MDRD: >90 ML/MIN/1.73M2
GLUCOSE BLDC GLUCOMTR-MCNC: 100 MG/DL (ref 70–99)
GLUCOSE BLDC GLUCOMTR-MCNC: 101 MG/DL (ref 70–99)
GLUCOSE BLDC GLUCOMTR-MCNC: 102 MG/DL (ref 70–99)
GLUCOSE BLDC GLUCOMTR-MCNC: 105 MG/DL (ref 70–99)
GLUCOSE BLDC GLUCOMTR-MCNC: 110 MG/DL (ref 70–99)
GLUCOSE BLDC GLUCOMTR-MCNC: 114 MG/DL (ref 70–99)
GLUCOSE BLDC GLUCOMTR-MCNC: 114 MG/DL (ref 70–99)
GLUCOSE BLDC GLUCOMTR-MCNC: 122 MG/DL (ref 70–99)
GLUCOSE BLDC GLUCOMTR-MCNC: 122 MG/DL (ref 70–99)
GLUCOSE BLDC GLUCOMTR-MCNC: 127 MG/DL (ref 70–99)
GLUCOSE BLDC GLUCOMTR-MCNC: 129 MG/DL (ref 70–99)
GLUCOSE BLDC GLUCOMTR-MCNC: 130 MG/DL (ref 70–99)
GLUCOSE BLDC GLUCOMTR-MCNC: 143 MG/DL (ref 70–99)
GLUCOSE BLDC GLUCOMTR-MCNC: 146 MG/DL (ref 70–99)
GLUCOSE BLDC GLUCOMTR-MCNC: 160 MG/DL (ref 70–99)
GLUCOSE BLDC GLUCOMTR-MCNC: 163 MG/DL (ref 70–99)
GLUCOSE BLDC GLUCOMTR-MCNC: 170 MG/DL (ref 70–99)
GLUCOSE BLDC GLUCOMTR-MCNC: 177 MG/DL (ref 70–99)
GLUCOSE BLDC GLUCOMTR-MCNC: 182 MG/DL (ref 70–99)
GLUCOSE BLDC GLUCOMTR-MCNC: 193 MG/DL (ref 70–99)
GLUCOSE BLDC GLUCOMTR-MCNC: 91 MG/DL (ref 70–99)
GLUCOSE BLDC GLUCOMTR-MCNC: 93 MG/DL (ref 70–99)
GLUCOSE BLDC GLUCOMTR-MCNC: 95 MG/DL (ref 70–99)
GLUCOSE SERPL-MCNC: 100 MG/DL (ref 70–99)
GLUCOSE SERPL-MCNC: 102 MG/DL (ref 70–99)
GLUCOSE SERPL-MCNC: 105 MG/DL (ref 70–99)
GLUCOSE SERPL-MCNC: 110 MG/DL (ref 70–99)
GLUCOSE SERPL-MCNC: 114 MG/DL (ref 70–99)
GLUCOSE SERPL-MCNC: 115 MG/DL (ref 70–99)
GLUCOSE SERPL-MCNC: 130 MG/DL (ref 70–99)
GLUCOSE SERPL-MCNC: 170 MG/DL (ref 70–99)
GLUCOSE SERPL-MCNC: 174 MG/DL (ref 70–99)
GLUCOSE SERPL-MCNC: 97 MG/DL (ref 70–99)
GLUCOSE SERPL-MCNC: 99 MG/DL (ref 70–99)
GLUCOSE UR STRIP-MCNC: NEGATIVE MG/DL
GLUCOSE UR STRIP-MCNC: NEGATIVE MG/DL
GRAM STAIN RESULT: NORMAL
GRANULAR CAST: 1 /LPF
H CAPSUL AG UR QL IA: NOT DETECTED
H CAPSUL AG UR-MCNC: NOT DETECTED NG/ML
HADV DNA SPEC QL NAA+PROBE: NOT DETECTED
HCO3 BLDV-SCNC: 28 MMOL/L (ref 21–28)
HCO3 BLDV-SCNC: 31 MMOL/L (ref 21–28)
HCOV PNL SPEC NAA+PROBE: NOT DETECTED
HCT VFR BLD AUTO: 22.2 % (ref 40–53)
HCT VFR BLD AUTO: 22.3 % (ref 40–53)
HCT VFR BLD AUTO: 22.7 % (ref 40–53)
HCT VFR BLD AUTO: 23.8 % (ref 40–53)
HCT VFR BLD AUTO: 24.5 % (ref 40–53)
HCT VFR BLD AUTO: 25.3 % (ref 40–53)
HCT VFR BLD AUTO: 25.8 % (ref 40–53)
HCT VFR BLD AUTO: 26.1 % (ref 40–53)
HCT VFR BLD AUTO: 28.8 % (ref 40–53)
HGB BLD-MCNC: 6.6 G/DL (ref 13.3–17.7)
HGB BLD-MCNC: 6.7 G/DL (ref 13.3–17.7)
HGB BLD-MCNC: 6.7 G/DL (ref 13.3–17.7)
HGB BLD-MCNC: 6.9 G/DL (ref 13.3–17.7)
HGB BLD-MCNC: 7.2 G/DL (ref 13.3–17.7)
HGB BLD-MCNC: 7.5 G/DL (ref 13.3–17.7)
HGB BLD-MCNC: 7.7 G/DL (ref 13.3–17.7)
HGB BLD-MCNC: 7.8 G/DL (ref 13.3–17.7)
HGB BLD-MCNC: 8.5 G/DL (ref 13.3–17.7)
HGB UR QL STRIP: ABNORMAL
HGB UR QL STRIP: NEGATIVE
HHV6 DNA # SPEC NAA+PROBE: NOT DETECTED COPIES/ML
HMPV RNA SPEC QL NAA+PROBE: NOT DETECTED
HOLD SPECIMEN: NORMAL
HOLD SPECIMEN: NORMAL
HPIV1 RNA SPEC QL NAA+PROBE: NOT DETECTED
HPIV2 RNA SPEC QL NAA+PROBE: NOT DETECTED
HPIV3 RNA SPEC QL NAA+PROBE: NOT DETECTED
HPIV4 RNA SPEC QL NAA+PROBE: NOT DETECTED
HSV1 IGG SERPL QL IA: 25.1 INDEX
HSV1 IGG SERPL QL IA: ABNORMAL
HSV2 IGG SERPL QL IA: 0.1 INDEX
HSV2 IGG SERPL QL IA: ABNORMAL
HYALINE CASTS: 11 /LPF
INR PPP: 1.19 (ref 0.85–1.15)
INR PPP: 1.23 (ref 0.85–1.15)
INR PPP: 1.23 (ref 0.85–1.15)
INR PPP: 1.25 (ref 0.85–1.15)
INR PPP: 1.28 (ref 0.85–1.15)
INR PPP: 1.29 (ref 0.85–1.15)
INR PPP: 1.31 (ref 0.85–1.15)
INTERPRETATION ECG - MUSE: NORMAL
ISSUE DATE AND TIME: NORMAL
KETONES UR STRIP-MCNC: ABNORMAL MG/DL
KETONES UR STRIP-MCNC: NEGATIVE MG/DL
L PNEUMO1 AG UR QL IA: NEGATIVE
LACTATE SERPL-SCNC: 1.7 MMOL/L (ref 0.7–2)
LACTATE SERPL-SCNC: 2 MMOL/L (ref 0.7–2)
LACTATE SERPL-SCNC: 2.3 MMOL/L (ref 0.7–2)
LACTATE SERPL-SCNC: 2.6 MMOL/L (ref 0.7–2)
LACTATE SERPL-SCNC: 2.9 MMOL/L (ref 0.7–2)
LDH SERPL L TO P-CCNC: 1396 U/L (ref 0–250)
LDH SERPL L TO P-CCNC: 1407 U/L (ref 0–250)
LDH SERPL L TO P-CCNC: 1497 U/L (ref 0–250)
LDH SERPL L TO P-CCNC: 1558 U/L (ref 0–250)
LDH SERPL L TO P-CCNC: 1580 U/L (ref 0–250)
LDH SERPL L TO P-CCNC: 1608 U/L (ref 0–250)
LDH SERPL L TO P-CCNC: 1706 U/L (ref 0–250)
LDH SERPL L TO P-CCNC: 1897 U/L (ref 0–250)
LDH SERPL L TO P-CCNC: 1917 U/L (ref 0–250)
LDH SERPL L TO P-CCNC: 2242 U/L (ref 0–250)
LDH SERPL L TO P-CCNC: >2500 U/L (ref 0–250)
LEUKOCYTE ESTERASE UR QL STRIP: NEGATIVE
LEUKOCYTE ESTERASE UR QL STRIP: NEGATIVE
LMWH PPP CHRO-ACNC: 0.47 IU/ML
LVEF ECHO: NORMAL
LYMPHOCYTES # BLD MANUAL: 0.7 10E3/UL (ref 0.8–5.3)
LYMPHOCYTES # BLD MANUAL: 1.2 10E3/UL (ref 0.8–5.3)
LYMPHOCYTES # BLD MANUAL: 1.9 10E3/UL (ref 0.8–5.3)
LYMPHOCYTES # BLD MANUAL: 2 10E3/UL (ref 0.8–5.3)
LYMPHOCYTES # BLD MANUAL: 2.4 10E3/UL (ref 0.8–5.3)
LYMPHOCYTES # BLD MANUAL: 2.6 10E3/UL (ref 0.8–5.3)
LYMPHOCYTES # BLD MANUAL: 3.3 10E3/UL (ref 0.8–5.3)
LYMPHOCYTES # BLD MANUAL: 5.3 10E3/UL (ref 0.8–5.3)
LYMPHOCYTES NFR BLD MANUAL: 10 %
LYMPHOCYTES NFR BLD MANUAL: 14 %
LYMPHOCYTES NFR BLD MANUAL: 17 %
LYMPHOCYTES NFR BLD MANUAL: 17 %
LYMPHOCYTES NFR BLD MANUAL: 19 %
LYMPHOCYTES NFR BLD MANUAL: 19 %
LYMPHOCYTES NFR BLD MANUAL: 21 %
LYMPHOCYTES NFR BLD MANUAL: 4 %
M PNEUMO DNA SPEC QL NAA+PROBE: NOT DETECTED
M TB IFN-G BLD-IMP: NEGATIVE
M TB IFN-G CD4+ BCKGRND COR BLD-ACNC: 2.42 IU/ML
MAGNESIUM SERPL-MCNC: 1.6 MG/DL (ref 1.7–2.3)
MAGNESIUM SERPL-MCNC: 1.6 MG/DL (ref 1.7–2.3)
MAGNESIUM SERPL-MCNC: 1.7 MG/DL (ref 1.7–2.3)
MAGNESIUM SERPL-MCNC: 1.7 MG/DL (ref 1.7–2.3)
MAGNESIUM SERPL-MCNC: 1.8 MG/DL (ref 1.7–2.3)
MAGNESIUM SERPL-MCNC: 1.8 MG/DL (ref 1.7–2.3)
MAGNESIUM SERPL-MCNC: 1.9 MG/DL (ref 1.7–2.3)
MAGNESIUM SERPL-MCNC: 2.3 MG/DL (ref 1.7–2.3)
MCH RBC QN AUTO: 26.9 PG (ref 26.5–33)
MCH RBC QN AUTO: 27 PG (ref 26.5–33)
MCH RBC QN AUTO: 27.1 PG (ref 26.5–33)
MCH RBC QN AUTO: 27.2 PG (ref 26.5–33)
MCH RBC QN AUTO: 27.3 PG (ref 26.5–33)
MCH RBC QN AUTO: 27.4 PG (ref 26.5–33)
MCH RBC QN AUTO: 28.4 PG (ref 26.5–33)
MCHC RBC AUTO-ENTMCNC: 29 G/DL (ref 31.5–36.5)
MCHC RBC AUTO-ENTMCNC: 29.4 G/DL (ref 31.5–36.5)
MCHC RBC AUTO-ENTMCNC: 29.5 G/DL (ref 31.5–36.5)
MCHC RBC AUTO-ENTMCNC: 29.5 G/DL (ref 31.5–36.5)
MCHC RBC AUTO-ENTMCNC: 29.6 G/DL (ref 31.5–36.5)
MCHC RBC AUTO-ENTMCNC: 29.6 G/DL (ref 31.5–36.5)
MCHC RBC AUTO-ENTMCNC: 29.8 G/DL (ref 31.5–36.5)
MCHC RBC AUTO-ENTMCNC: 29.9 G/DL (ref 31.5–36.5)
MCHC RBC AUTO-ENTMCNC: 30.2 G/DL (ref 31.5–36.5)
MCV RBC AUTO: 91 FL (ref 78–100)
MCV RBC AUTO: 91 FL (ref 78–100)
MCV RBC AUTO: 92 FL (ref 78–100)
MCV RBC AUTO: 93 FL (ref 78–100)
MCV RBC AUTO: 94 FL (ref 78–100)
METAMYELOCYTES # BLD MANUAL: 0.1 10E3/UL
METAMYELOCYTES # BLD MANUAL: 0.2 10E3/UL
METAMYELOCYTES # BLD MANUAL: 0.3 10E3/UL
METAMYELOCYTES # BLD MANUAL: 0.6 10E3/UL
METAMYELOCYTES NFR BLD MANUAL: 1 %
METAMYELOCYTES NFR BLD MANUAL: 1 %
METAMYELOCYTES NFR BLD MANUAL: 2 %
METAMYELOCYTES NFR BLD MANUAL: 2 %
MITOGEN IGNF BCKGRD COR BLD-ACNC: 0 IU/ML
MITOGEN IGNF BCKGRD COR BLD-ACNC: 0.01 IU/ML
MONOCYTES # BLD MANUAL: 0.7 10E3/UL (ref 0–1.3)
MONOCYTES # BLD MANUAL: 0.9 10E3/UL (ref 0–1.3)
MONOCYTES # BLD MANUAL: 0.9 10E3/UL (ref 0–1.3)
MONOCYTES # BLD MANUAL: 1.2 10E3/UL (ref 0–1.3)
MONOCYTES # BLD MANUAL: 2.9 10E3/UL (ref 0–1.3)
MONOCYTES # BLD MANUAL: 3.5 10E3/UL (ref 0–1.3)
MONOCYTES # BLD MANUAL: 5.1 10E3/UL (ref 0–1.3)
MONOCYTES # BLD MANUAL: 7.2 10E3/UL (ref 0–1.3)
MONOCYTES NFR BLD MANUAL: 11 %
MONOCYTES NFR BLD MANUAL: 20 %
MONOCYTES NFR BLD MANUAL: 21 %
MONOCYTES NFR BLD MANUAL: 23 %
MONOCYTES NFR BLD MANUAL: 4 %
MONOCYTES NFR BLD MANUAL: 44 %
MONOCYTES NFR BLD MANUAL: 6 %
MONOCYTES NFR BLD MANUAL: 8 %
MRSA DNA SPEC QL NAA+PROBE: NEGATIVE
MUCOUS THREADS #/AREA URNS LPF: PRESENT /LPF
MUCOUS THREADS #/AREA URNS LPF: PRESENT /LPF
MYELOCYTES # BLD MANUAL: 0.2 10E3/UL
MYELOCYTES # BLD MANUAL: 0.2 10E3/UL
MYELOCYTES # BLD MANUAL: 0.3 10E3/UL
MYELOCYTES NFR BLD MANUAL: 1 %
MYELOCYTES NFR BLD MANUAL: 1 %
MYELOCYTES NFR BLD MANUAL: 2 %
MYELOCYTES NFR BLD MANUAL: 3 %
NEUTROPHILS # BLD MANUAL: 1.5 10E3/UL (ref 1.6–8.3)
NEUTROPHILS # BLD MANUAL: 1.7 10E3/UL (ref 1.6–8.3)
NEUTROPHILS # BLD MANUAL: 1.8 10E3/UL (ref 1.6–8.3)
NEUTROPHILS # BLD MANUAL: 1.9 10E3/UL (ref 1.6–8.3)
NEUTROPHILS # BLD MANUAL: 2 10E3/UL (ref 1.6–8.3)
NEUTROPHILS # BLD MANUAL: 2.4 10E3/UL (ref 1.6–8.3)
NEUTROPHILS # BLD MANUAL: 3.5 10E3/UL (ref 1.6–8.3)
NEUTROPHILS # BLD MANUAL: 4.7 10E3/UL (ref 1.6–8.3)
NEUTROPHILS NFR BLD MANUAL: 11 %
NEUTROPHILS NFR BLD MANUAL: 12 %
NEUTROPHILS NFR BLD MANUAL: 13 %
NEUTROPHILS NFR BLD MANUAL: 15 %
NEUTROPHILS NFR BLD MANUAL: 16 %
NEUTROPHILS NFR BLD MANUAL: 17 %
NEUTROPHILS NFR BLD MANUAL: 18 %
NEUTROPHILS NFR BLD MANUAL: 20 %
NITRATE UR QL: NEGATIVE
NITRATE UR QL: NEGATIVE
NRBC # BLD AUTO: 0.3 10E3/UL
NRBC # BLD AUTO: 0.5 10E3/UL
NRBC # BLD AUTO: 0.7 10E3/UL
NRBC # BLD AUTO: 0.7 10E3/UL
NRBC # BLD AUTO: 0.8 10E3/UL
NRBC # BLD AUTO: 0.9 10E3/UL
NRBC # BLD AUTO: 1.6 10E3/UL
NRBC # BLD AUTO: 1.9 10E3/UL
NRBC BLD MANUAL-RTO: 16 %
NRBC BLD MANUAL-RTO: 2 %
NRBC BLD MANUAL-RTO: 4 %
NRBC BLD MANUAL-RTO: 4 %
NRBC BLD MANUAL-RTO: 5 %
NRBC BLD MANUAL-RTO: 6 %
NRBC BLD MANUAL-RTO: 6 %
NRBC BLD MANUAL-RTO: 7 %
NT-PROBNP SERPL-MCNC: 499 PG/ML (ref 0–900)
O2/TOTAL GAS SETTING VFR VENT: 4 %
O2/TOTAL GAS SETTING VFR VENT: 57 %
OBSERVATION IMP: NEGATIVE
OTHER CELLS # BLD MANUAL: 10.6 10E3/UL
OTHER CELLS # BLD MANUAL: 13.1 10E3/UL
OTHER CELLS # BLD MANUAL: 3.3 10E3/UL
OTHER CELLS # BLD MANUAL: 5.4 10E3/UL
OTHER CELLS # BLD MANUAL: 6 10E3/UL
OTHER CELLS # BLD MANUAL: 6.4 10E3/UL
OTHER CELLS # BLD MANUAL: 9.4 10E3/UL
OTHER CELLS # BLD MANUAL: 9.5 10E3/UL
OTHER CELLS NFR BLD MANUAL: 28 %
OTHER CELLS NFR BLD MANUAL: 42 %
OTHER CELLS NFR BLD MANUAL: 44 %
OTHER CELLS NFR BLD MANUAL: 51 %
OTHER CELLS NFR BLD MANUAL: 54 %
OTHER CELLS NFR BLD MANUAL: 55 %
OTHER CELLS NFR BLD MANUAL: 61 %
OTHER CELLS NFR BLD MANUAL: 62 %
P AXIS - MUSE: 41 DEGREES
P JIROVECII DNA SPEC QL NAA+PROBE: NOT DETECTED
PATH REV: ABNORMAL
PCO2 BLDV: 52 MM HG (ref 40–50)
PCO2 BLDV: 54 MM HG (ref 40–50)
PH BLDV: 7.33 [PH] (ref 7.32–7.43)
PH BLDV: 7.38 [PH] (ref 7.32–7.43)
PH UR STRIP: 5 [PH] (ref 5–7)
PH UR STRIP: 6 [PH] (ref 5–7)
PHOSPHATE SERPL-MCNC: 3.3 MG/DL (ref 2.5–4.5)
PHOSPHATE SERPL-MCNC: 3.4 MG/DL (ref 2.5–4.5)
PHOSPHATE SERPL-MCNC: 3.5 MG/DL (ref 2.5–4.5)
PHOSPHATE SERPL-MCNC: 4 MG/DL (ref 2.5–4.5)
PHOSPHATE SERPL-MCNC: 4.1 MG/DL (ref 2.5–4.5)
PHOSPHATE SERPL-MCNC: 4.2 MG/DL (ref 2.5–4.5)
PHOSPHATE SERPL-MCNC: 4.2 MG/DL (ref 2.5–4.5)
PHOSPHATE SERPL-MCNC: 4.3 MG/DL (ref 2.5–4.5)
PHOSPHATE SERPL-MCNC: 4.3 MG/DL (ref 2.5–4.5)
PHOSPHATE SERPL-MCNC: 4.4 MG/DL (ref 2.5–4.5)
PHOSPHATE SERPL-MCNC: 4.5 MG/DL (ref 2.5–4.5)
PHOSPHATE SERPL-MCNC: 4.9 MG/DL (ref 2.5–4.5)
PLAT MORPH BLD: ABNORMAL
PLATELET # BLD AUTO: 108 10E3/UL (ref 150–450)
PLATELET # BLD AUTO: 310 10E3/UL (ref 150–450)
PLATELET # BLD AUTO: 36 10E3/UL (ref 150–450)
PLATELET # BLD AUTO: 40 10E3/UL (ref 150–450)
PLATELET # BLD AUTO: 40 10E3/UL (ref 150–450)
PLATELET # BLD AUTO: 41 10E3/UL (ref 150–450)
PLATELET # BLD AUTO: 50 10E3/UL (ref 150–450)
PLATELET # BLD AUTO: 54 10E3/UL (ref 150–450)
PLATELET # BLD AUTO: 58 10E3/UL (ref 150–450)
PLATELET # BLD AUTO: 79 10E3/UL (ref 150–450)
PO2 BLDV: 31 MM HG (ref 25–47)
PO2 BLDV: 38 MM HG (ref 25–47)
POLYCHROMASIA BLD QL SMEAR: SLIGHT
POTASSIUM SERPL-SCNC: 3.3 MMOL/L (ref 3.4–5.3)
POTASSIUM SERPL-SCNC: 3.4 MMOL/L (ref 3.4–5.3)
POTASSIUM SERPL-SCNC: 3.4 MMOL/L (ref 3.4–5.3)
POTASSIUM SERPL-SCNC: 3.5 MMOL/L (ref 3.4–5.3)
POTASSIUM SERPL-SCNC: 3.5 MMOL/L (ref 3.4–5.3)
POTASSIUM SERPL-SCNC: 3.6 MMOL/L (ref 3.4–5.3)
POTASSIUM SERPL-SCNC: 3.7 MMOL/L (ref 3.4–5.3)
POTASSIUM SERPL-SCNC: 3.8 MMOL/L (ref 3.4–5.3)
POTASSIUM SERPL-SCNC: 3.9 MMOL/L (ref 3.4–5.3)
POTASSIUM SERPL-SCNC: 4.2 MMOL/L (ref 3.4–5.3)
POTASSIUM SERPL-SCNC: 4.4 MMOL/L (ref 3.4–5.3)
PR INTERVAL - MUSE: 164 MS
PROCALCITONIN SERPL IA-MCNC: 0.67 NG/ML
PROCALCITONIN SERPL IA-MCNC: 1.09 NG/ML
PROMYELOCYTES # BLD MANUAL: 0.1 10E3/UL
PROMYELOCYTES # BLD MANUAL: 0.2 10E3/UL
PROMYELOCYTES NFR BLD MANUAL: 1 %
PROMYELOCYTES NFR BLD MANUAL: 1 %
PROT SERPL-MCNC: 6.4 G/DL (ref 6.4–8.3)
PROT SERPL-MCNC: 6.5 G/DL (ref 6.4–8.3)
PROT SERPL-MCNC: 6.5 G/DL (ref 6.4–8.3)
PROT SERPL-MCNC: 6.9 G/DL (ref 6.4–8.3)
PROT SERPL-MCNC: 7.2 G/DL (ref 6.4–8.3)
QRS DURATION - MUSE: 90 MS
QT - MUSE: 354 MS
QTC - MUSE: 435 MS
QUANTIFERON MITOGEN: 2.44 IU/ML
QUANTIFERON NIL TUBE: 0.02 IU/ML
QUANTIFERON TB1 TUBE: 0.03 IU/ML
QUANTIFERON TB2 TUBE: 0.02
R AXIS - MUSE: 19 DEGREES
RBC # BLD AUTO: 2.36 10E6/UL (ref 4.4–5.9)
RBC # BLD AUTO: 2.43 10E6/UL (ref 4.4–5.9)
RBC # BLD AUTO: 2.48 10E6/UL (ref 4.4–5.9)
RBC # BLD AUTO: 2.55 10E6/UL (ref 4.4–5.9)
RBC # BLD AUTO: 2.66 10E6/UL (ref 4.4–5.9)
RBC # BLD AUTO: 2.77 10E6/UL (ref 4.4–5.9)
RBC # BLD AUTO: 2.82 10E6/UL (ref 4.4–5.9)
RBC # BLD AUTO: 2.85 10E6/UL (ref 4.4–5.9)
RBC # BLD AUTO: 3.16 10E6/UL (ref 4.4–5.9)
RBC MORPH BLD: ABNORMAL
RBC URINE: 0 /HPF
RBC URINE: <1 /HPF
RSV RNA SPEC QL NAA+PROBE: NOT DETECTED
RSV RNA SPEC QL NAA+PROBE: NOT DETECTED
RV+EV RNA SPEC QL NAA+PROBE: NOT DETECTED
SA TARGET DNA: NEGATIVE
SCANNED LAB RESULT: NORMAL
SODIUM SERPL-SCNC: 132 MMOL/L (ref 136–145)
SODIUM SERPL-SCNC: 133 MMOL/L (ref 136–145)
SODIUM SERPL-SCNC: 133 MMOL/L (ref 136–145)
SODIUM SERPL-SCNC: 134 MMOL/L (ref 136–145)
SODIUM SERPL-SCNC: 136 MMOL/L (ref 136–145)
SODIUM SERPL-SCNC: 136 MMOL/L (ref 136–145)
SODIUM SERPL-SCNC: 137 MMOL/L (ref 136–145)
SODIUM SERPL-SCNC: 138 MMOL/L (ref 136–145)
SODIUM SERPL-SCNC: 138 MMOL/L (ref 136–145)
SODIUM SERPL-SCNC: 139 MMOL/L (ref 136–145)
SODIUM SERPL-SCNC: 141 MMOL/L (ref 136–145)
SP GR UR STRIP: 1.02 (ref 1–1.03)
SP GR UR STRIP: 1.02 (ref 1–1.03)
SPECIMEN EXPIRATION DATE: NORMAL
SQUAMOUS EPITHELIAL: <1 /HPF
SYSTOLIC BLOOD PRESSURE - MUSE: NORMAL MMHG
T AXIS - MUSE: 61 DEGREES
T PALLIDUM AB SER QL: NONREACTIVE
TRANSITIONAL EPI: <1 /HPF
TSH SERPL DL<=0.005 MIU/L-ACNC: 0.87 UIU/ML (ref 0.3–4.2)
UNIT ABO/RH: NORMAL
UNIT NUMBER: NORMAL
UNIT STATUS: NORMAL
UNIT TYPE ISBT: 6200
URATE SERPL-MCNC: 6 MG/DL (ref 3.4–7)
URATE SERPL-MCNC: 6 MG/DL (ref 3.4–7)
URATE SERPL-MCNC: 6.1 MG/DL (ref 3.4–7)
URATE SERPL-MCNC: 6.2 MG/DL (ref 3.4–7)
URATE SERPL-MCNC: 6.3 MG/DL (ref 3.4–7)
URATE SERPL-MCNC: 6.4 MG/DL (ref 3.4–7)
URATE SERPL-MCNC: 6.6 MG/DL (ref 3.4–7)
URATE SERPL-MCNC: 6.7 MG/DL (ref 3.4–7)
URATE SERPL-MCNC: 8.6 MG/DL (ref 3.4–7)
UROBILINOGEN UR STRIP-MCNC: NORMAL MG/DL
UROBILINOGEN UR STRIP-MCNC: NORMAL MG/DL
VANCOMYCIN SERPL-MCNC: 14.4 UG/ML
VENTRICULAR RATE- MUSE: 91 BPM
WBC # BLD AUTO: 10.5 10E3/UL (ref 4–11)
WBC # BLD AUTO: 11.6 10E3/UL (ref 4–11)
WBC # BLD AUTO: 11.7 10E3/UL (ref 4–11)
WBC # BLD AUTO: 13.6 10E3/UL (ref 4–11)
WBC # BLD AUTO: 15.3 10E3/UL (ref 4–11)
WBC # BLD AUTO: 17.4 10E3/UL (ref 4–11)
WBC # BLD AUTO: 17.4 10E3/UL (ref 4–11)
WBC # BLD AUTO: 31.1 10E3/UL (ref 4–11)
WBC # BLD AUTO: 4.6 10E3/UL (ref 4–11)
WBC URINE: 0 /HPF
WBC URINE: 0 /HPF

## 2023-01-01 PROCEDURE — 99418 PROLNG IP/OBS E/M EA 15 MIN: CPT | Performed by: PHYSICIAN ASSISTANT

## 2023-01-01 PROCEDURE — 250N000011 HC RX IP 250 OP 636: Performed by: PHYSICIAN ASSISTANT

## 2023-01-01 PROCEDURE — 250N000009 HC RX 250: Performed by: PHYSICIAN ASSISTANT

## 2023-01-01 PROCEDURE — 99232 SBSQ HOSP IP/OBS MODERATE 35: CPT | Performed by: INTERNAL MEDICINE

## 2023-01-01 PROCEDURE — 250N000013 HC RX MED GY IP 250 OP 250 PS 637: Performed by: INTERNAL MEDICINE

## 2023-01-01 PROCEDURE — 36415 COLL VENOUS BLD VENIPUNCTURE: CPT | Performed by: INTERNAL MEDICINE

## 2023-01-01 PROCEDURE — 84550 ASSAY OF BLOOD/URIC ACID: CPT | Performed by: PHYSICIAN ASSISTANT

## 2023-01-01 PROCEDURE — 99223 1ST HOSP IP/OBS HIGH 75: CPT | Performed by: STUDENT IN AN ORGANIZED HEALTH CARE EDUCATION/TRAINING PROGRAM

## 2023-01-01 PROCEDURE — 97530 THERAPEUTIC ACTIVITIES: CPT | Mod: GP

## 2023-01-01 PROCEDURE — 94640 AIRWAY INHALATION TREATMENT: CPT | Mod: 76

## 2023-01-01 PROCEDURE — 99222 1ST HOSP IP/OBS MODERATE 55: CPT | Mod: GC | Performed by: INTERNAL MEDICINE

## 2023-01-01 PROCEDURE — 250N000009 HC RX 250: Performed by: STUDENT IN AN ORGANIZED HEALTH CARE EDUCATION/TRAINING PROGRAM

## 2023-01-01 PROCEDURE — 85007 BL SMEAR W/DIFF WBC COUNT: CPT | Performed by: PHYSICIAN ASSISTANT

## 2023-01-01 PROCEDURE — 80053 COMPREHEN METABOLIC PANEL: CPT | Performed by: PHYSICIAN ASSISTANT

## 2023-01-01 PROCEDURE — 80048 BASIC METABOLIC PNL TOTAL CA: CPT | Performed by: PHYSICIAN ASSISTANT

## 2023-01-01 PROCEDURE — 83735 ASSAY OF MAGNESIUM: CPT | Performed by: PHYSICIAN ASSISTANT

## 2023-01-01 PROCEDURE — 84100 ASSAY OF PHOSPHORUS: CPT | Performed by: PHYSICIAN ASSISTANT

## 2023-01-01 PROCEDURE — 93321 DOPPLER ECHO F-UP/LMTD STD: CPT | Mod: 26 | Performed by: INTERNAL MEDICINE

## 2023-01-01 PROCEDURE — 84145 PROCALCITONIN (PCT): CPT | Performed by: PHYSICIAN ASSISTANT

## 2023-01-01 PROCEDURE — G0452 MOLECULAR PATHOLOGY INTERPR: HCPCS | Mod: 26 | Performed by: STUDENT IN AN ORGANIZED HEALTH CARE EDUCATION/TRAINING PROGRAM

## 2023-01-01 PROCEDURE — 87899 AGENT NOS ASSAY W/OPTIC: CPT | Performed by: PHYSICIAN ASSISTANT

## 2023-01-01 PROCEDURE — 85610 PROTHROMBIN TIME: CPT | Performed by: PHYSICIAN ASSISTANT

## 2023-01-01 PROCEDURE — 87798 DETECT AGENT NOS DNA AMP: CPT | Performed by: PHYSICIAN ASSISTANT

## 2023-01-01 PROCEDURE — 36415 COLL VENOUS BLD VENIPUNCTURE: CPT | Performed by: PHYSICIAN ASSISTANT

## 2023-01-01 PROCEDURE — 250N000011 HC RX IP 250 OP 636: Performed by: INTERNAL MEDICINE

## 2023-01-01 PROCEDURE — 86696 HERPES SIMPLEX TYPE 2 TEST: CPT | Performed by: STUDENT IN AN ORGANIZED HEALTH CARE EDUCATION/TRAINING PROGRAM

## 2023-01-01 PROCEDURE — 258N000003 HC RX IP 258 OP 636: Performed by: PHYSICIAN ASSISTANT

## 2023-01-01 PROCEDURE — 99215 OFFICE O/P EST HI 40 MIN: CPT | Mod: 25 | Performed by: STUDENT IN AN ORGANIZED HEALTH CARE EDUCATION/TRAINING PROGRAM

## 2023-01-01 PROCEDURE — 82140 ASSAY OF AMMONIA: CPT | Performed by: PHYSICIAN ASSISTANT

## 2023-01-01 PROCEDURE — 87070 CULTURE OTHR SPECIMN AEROBIC: CPT | Performed by: PHYSICIAN ASSISTANT

## 2023-01-01 PROCEDURE — 84100 ASSAY OF PHOSPHORUS: CPT | Performed by: INTERNAL MEDICINE

## 2023-01-01 PROCEDURE — 87581 M.PNEUMON DNA AMP PROBE: CPT | Performed by: PHYSICIAN ASSISTANT

## 2023-01-01 PROCEDURE — 120N000002 HC R&B MED SURG/OB UMMC

## 2023-01-01 PROCEDURE — 87040 BLOOD CULTURE FOR BACTERIA: CPT | Performed by: PHYSICIAN ASSISTANT

## 2023-01-01 PROCEDURE — 250N000013 HC RX MED GY IP 250 OP 250 PS 637: Performed by: PHYSICIAN ASSISTANT

## 2023-01-01 PROCEDURE — 93010 ELECTROCARDIOGRAM REPORT: CPT | Mod: 59 | Performed by: INTERNAL MEDICINE

## 2023-01-01 PROCEDURE — 71045 X-RAY EXAM CHEST 1 VIEW: CPT | Mod: 26 | Performed by: RADIOLOGY

## 2023-01-01 PROCEDURE — 36592 COLLECT BLOOD FROM PICC: CPT | Performed by: INTERNAL MEDICINE

## 2023-01-01 PROCEDURE — 258N000003 HC RX IP 258 OP 636: Performed by: INTERNAL MEDICINE

## 2023-01-01 PROCEDURE — 77012 CT SCAN FOR NEEDLE BIOPSY: CPT

## 2023-01-01 PROCEDURE — 88311 DECALCIFY TISSUE: CPT | Mod: 26 | Performed by: PATHOLOGY

## 2023-01-01 PROCEDURE — 87533 HHV-6 DNA QUANT: CPT | Performed by: PHYSICIAN ASSISTANT

## 2023-01-01 PROCEDURE — 999N000157 HC STATISTIC RCP TIME EA 10 MIN

## 2023-01-01 PROCEDURE — 36592 COLLECT BLOOD FROM PICC: CPT | Performed by: PHYSICIAN ASSISTANT

## 2023-01-01 PROCEDURE — 999N000226 HC STATISTIC SLP IP EVAL DEFER

## 2023-01-01 PROCEDURE — 83615 LACTATE (LD) (LDH) ENZYME: CPT | Performed by: PHYSICIAN ASSISTANT

## 2023-01-01 PROCEDURE — 97165 OT EVAL LOW COMPLEX 30 MIN: CPT | Mod: GO

## 2023-01-01 PROCEDURE — 250N000009 HC RX 250: Performed by: INTERNAL MEDICINE

## 2023-01-01 PROCEDURE — 87116 MYCOBACTERIA CULTURE: CPT | Performed by: PHYSICIAN ASSISTANT

## 2023-01-01 PROCEDURE — 999N000208 ECHOCARDIOGRAM LIMITED

## 2023-01-01 PROCEDURE — 86140 C-REACTIVE PROTEIN: CPT | Performed by: PHYSICIAN ASSISTANT

## 2023-01-01 PROCEDURE — 99233 SBSQ HOSP IP/OBS HIGH 50: CPT | Mod: FS | Performed by: PHYSICIAN ASSISTANT

## 2023-01-01 PROCEDURE — 99232 SBSQ HOSP IP/OBS MODERATE 35: CPT | Mod: GC | Performed by: STUDENT IN AN ORGANIZED HEALTH CARE EDUCATION/TRAINING PROGRAM

## 2023-01-01 PROCEDURE — 87086 URINE CULTURE/COLONY COUNT: CPT | Performed by: PHYSICIAN ASSISTANT

## 2023-01-01 PROCEDURE — 999N000007 HC SITE CHECK

## 2023-01-01 PROCEDURE — 99233 SBSQ HOSP IP/OBS HIGH 50: CPT | Mod: FS

## 2023-01-01 PROCEDURE — 85384 FIBRINOGEN ACTIVITY: CPT | Performed by: PHYSICIAN ASSISTANT

## 2023-01-01 PROCEDURE — 97535 SELF CARE MNGMENT TRAINING: CPT | Mod: GO | Performed by: OCCUPATIONAL THERAPIST

## 2023-01-01 PROCEDURE — 85730 THROMBOPLASTIN TIME PARTIAL: CPT | Performed by: PHYSICIAN ASSISTANT

## 2023-01-01 PROCEDURE — 82248 BILIRUBIN DIRECT: CPT | Performed by: PHYSICIAN ASSISTANT

## 2023-01-01 PROCEDURE — 85027 COMPLETE CBC AUTOMATED: CPT | Performed by: PHYSICIAN ASSISTANT

## 2023-01-01 PROCEDURE — 250N000013 HC RX MED GY IP 250 OP 250 PS 637: Performed by: STUDENT IN AN ORGANIZED HEALTH CARE EDUCATION/TRAINING PROGRAM

## 2023-01-01 PROCEDURE — 99214 OFFICE O/P EST MOD 30 MIN: CPT | Mod: GT | Performed by: PHYSICIAN ASSISTANT

## 2023-01-01 PROCEDURE — 97110 THERAPEUTIC EXERCISES: CPT | Mod: GP

## 2023-01-01 PROCEDURE — 86481 TB AG RESPONSE T-CELL SUSP: CPT | Performed by: PHYSICIAN ASSISTANT

## 2023-01-01 PROCEDURE — 250N000011 HC RX IP 250 OP 636

## 2023-01-01 PROCEDURE — 83605 ASSAY OF LACTIC ACID: CPT

## 2023-01-01 PROCEDURE — 93325 DOPPLER ECHO COLOR FLOW MAPG: CPT | Mod: 26 | Performed by: INTERNAL MEDICINE

## 2023-01-01 PROCEDURE — 85049 AUTOMATED PLATELET COUNT: CPT | Performed by: PHYSICIAN ASSISTANT

## 2023-01-01 PROCEDURE — 88368 INSITU HYBRIDIZATION MANUAL: CPT | Mod: 26 | Performed by: MEDICAL GENETICS

## 2023-01-01 PROCEDURE — 94640 AIRWAY INHALATION TREATMENT: CPT

## 2023-01-01 PROCEDURE — 85027 COMPLETE CBC AUTOMATED: CPT | Performed by: NURSE PRACTITIONER

## 2023-01-01 PROCEDURE — 99238 HOSP IP/OBS DSCHRG MGMT 30/<: CPT | Mod: GV | Performed by: PHYSICIAN ASSISTANT

## 2023-01-01 PROCEDURE — 97140 MANUAL THERAPY 1/> REGIONS: CPT | Mod: GO

## 2023-01-01 PROCEDURE — G0463 HOSPITAL OUTPT CLINIC VISIT: HCPCS

## 2023-01-01 PROCEDURE — 36415 COLL VENOUS BLD VENIPUNCTURE: CPT

## 2023-01-01 PROCEDURE — 74177 CT ABD & PELVIS W/CONTRAST: CPT

## 2023-01-01 PROCEDURE — 110N000005 HC R&B HOSPICE, ACCENT

## 2023-01-01 PROCEDURE — 250N000009 HC RX 250: Performed by: RADIOLOGY

## 2023-01-01 PROCEDURE — 97116 GAIT TRAINING THERAPY: CPT | Mod: GP

## 2023-01-01 PROCEDURE — 81450 HL NEO GSAP 5-50DNA/DNA&RNA: CPT | Performed by: PHYSICIAN ASSISTANT

## 2023-01-01 PROCEDURE — 99417 PROLNG OP E/M EACH 15 MIN: CPT | Performed by: STUDENT IN AN ORGANIZED HEALTH CARE EDUCATION/TRAINING PROGRAM

## 2023-01-01 PROCEDURE — 999N000142 HC STATISTIC PROCEDURE PREP ONLY

## 2023-01-01 PROCEDURE — 80048 BASIC METABOLIC PNL TOTAL CA: CPT | Performed by: INTERNAL MEDICINE

## 2023-01-01 PROCEDURE — 97535 SELF CARE MNGMENT TRAINING: CPT | Mod: GO

## 2023-01-01 PROCEDURE — 258N000003 HC RX IP 258 OP 636: Performed by: NURSE PRACTITIONER

## 2023-01-01 PROCEDURE — 272N000155 HC KIT CR15

## 2023-01-01 PROCEDURE — 250N000011 HC RX IP 250 OP 636: Performed by: STUDENT IN AN ORGANIZED HEALTH CARE EDUCATION/TRAINING PROGRAM

## 2023-01-01 PROCEDURE — 88341 IMHCHEM/IMCYTCHM EA ADD ANTB: CPT | Mod: 26 | Performed by: PATHOLOGY

## 2023-01-01 PROCEDURE — 38222 DX BONE MARROW BX & ASPIR: CPT | Performed by: RADIOLOGY

## 2023-01-01 PROCEDURE — 999N000133 HC STATISTIC PP CARE STAGE 2

## 2023-01-01 PROCEDURE — 86780 TREPONEMA PALLIDUM: CPT | Performed by: PHYSICIAN ASSISTANT

## 2023-01-01 PROCEDURE — 99418 PROLNG IP/OBS E/M EA 15 MIN: CPT

## 2023-01-01 PROCEDURE — 255N000002 HC RX 255 OP 636: Performed by: INTERNAL MEDICINE

## 2023-01-01 PROCEDURE — 77012 CT SCAN FOR NEEDLE BIOPSY: CPT | Mod: 26 | Performed by: RADIOLOGY

## 2023-01-01 PROCEDURE — 88275 CYTOGENETICS 100-300: CPT | Mod: XU | Performed by: PHYSICIAN ASSISTANT

## 2023-01-01 PROCEDURE — 258N000003 HC RX IP 258 OP 636: Performed by: STUDENT IN AN ORGANIZED HEALTH CARE EDUCATION/TRAINING PROGRAM

## 2023-01-01 PROCEDURE — 88369 M/PHMTRC ALYSISHQUANT/SEMIQ: CPT | Mod: 26 | Performed by: MEDICAL GENETICS

## 2023-01-01 PROCEDURE — 86901 BLOOD TYPING SEROLOGIC RH(D): CPT | Performed by: PHYSICIAN ASSISTANT

## 2023-01-01 PROCEDURE — 81003 URINALYSIS AUTO W/O SCOPE: CPT | Performed by: PHYSICIAN ASSISTANT

## 2023-01-01 PROCEDURE — 84155 ASSAY OF PROTEIN SERUM: CPT | Performed by: PHYSICIAN ASSISTANT

## 2023-01-01 PROCEDURE — 999N000044 HC STATISTIC CVC DRESSING CHANGE

## 2023-01-01 PROCEDURE — 82962 GLUCOSE BLOOD TEST: CPT

## 2023-01-01 PROCEDURE — P9016 RBC LEUKOCYTES REDUCED: HCPCS | Performed by: PHYSICIAN ASSISTANT

## 2023-01-01 PROCEDURE — 999N000215 HC STATISTIC HFNC ADULT NON-CPAP

## 2023-01-01 PROCEDURE — 36592 COLLECT BLOOD FROM PICC: CPT | Performed by: STUDENT IN AN ORGANIZED HEALTH CARE EDUCATION/TRAINING PROGRAM

## 2023-01-01 PROCEDURE — 83735 ASSAY OF MAGNESIUM: CPT | Performed by: INTERNAL MEDICINE

## 2023-01-01 PROCEDURE — 87385 HISTOPLASMA CAPSUL AG IA: CPT | Performed by: PHYSICIAN ASSISTANT

## 2023-01-01 PROCEDURE — 71045 X-RAY EXAM CHEST 1 VIEW: CPT

## 2023-01-01 PROCEDURE — 250N000012 HC RX MED GY IP 250 OP 636 PS 637: Performed by: PHYSICIAN ASSISTANT

## 2023-01-01 PROCEDURE — G0463 HOSPITAL OUTPT CLINIC VISIT: HCPCS | Mod: 25 | Performed by: STUDENT IN AN ORGANIZED HEALTH CARE EDUCATION/TRAINING PROGRAM

## 2023-01-01 PROCEDURE — 36415 COLL VENOUS BLD VENIPUNCTURE: CPT | Performed by: NURSE PRACTITIONER

## 2023-01-01 PROCEDURE — 88237 TISSUE CULTURE BONE MARROW: CPT | Performed by: PHYSICIAN ASSISTANT

## 2023-01-01 PROCEDURE — 81001 URINALYSIS AUTO W/SCOPE: CPT | Performed by: PHYSICIAN ASSISTANT

## 2023-01-01 PROCEDURE — 87449 NOS EACH ORGANISM AG IA: CPT | Performed by: PHYSICIAN ASSISTANT

## 2023-01-01 PROCEDURE — 83605 ASSAY OF LACTIC ACID: CPT | Performed by: INTERNAL MEDICINE

## 2023-01-01 PROCEDURE — 97161 PT EVAL LOW COMPLEX 20 MIN: CPT | Mod: GP

## 2023-01-01 PROCEDURE — 88342 IMHCHEM/IMCYTCHM 1ST ANTB: CPT | Mod: 26 | Performed by: PATHOLOGY

## 2023-01-01 PROCEDURE — 99233 SBSQ HOSP IP/OBS HIGH 50: CPT | Performed by: STUDENT IN AN ORGANIZED HEALTH CARE EDUCATION/TRAINING PROGRAM

## 2023-01-01 PROCEDURE — 88313 SPECIAL STAINS GROUP 2: CPT | Mod: 26 | Performed by: PATHOLOGY

## 2023-01-01 PROCEDURE — 82310 ASSAY OF CALCIUM: CPT | Performed by: PHYSICIAN ASSISTANT

## 2023-01-01 PROCEDURE — 99152 MOD SED SAME PHYS/QHP 5/>YRS: CPT | Performed by: RADIOLOGY

## 2023-01-01 PROCEDURE — 99291 CRITICAL CARE FIRST HOUR: CPT | Mod: 25 | Performed by: PHYSICIAN ASSISTANT

## 2023-01-01 PROCEDURE — 85014 HEMATOCRIT: CPT | Performed by: PHYSICIAN ASSISTANT

## 2023-01-01 PROCEDURE — 93308 TTE F-UP OR LMTD: CPT | Mod: 26 | Performed by: INTERNAL MEDICINE

## 2023-01-01 PROCEDURE — 88313 SPECIAL STAINS GROUP 2: CPT | Mod: TC | Performed by: PHYSICIAN ASSISTANT

## 2023-01-01 PROCEDURE — 82803 BLOOD GASES ANY COMBINATION: CPT | Performed by: INTERNAL MEDICINE

## 2023-01-01 PROCEDURE — 99152 MOD SED SAME PHYS/QHP 5/>YRS: CPT

## 2023-01-01 PROCEDURE — 85007 BL SMEAR W/DIFF WBC COUNT: CPT | Performed by: INTERNAL MEDICINE

## 2023-01-01 PROCEDURE — 83880 ASSAY OF NATRIURETIC PEPTIDE: CPT | Performed by: PHYSICIAN ASSISTANT

## 2023-01-01 PROCEDURE — 3E04305 INTRODUCTION OF OTHER ANTINEOPLASTIC INTO CENTRAL VEIN, PERCUTANEOUS APPROACH: ICD-10-PCS | Performed by: PHYSICIAN ASSISTANT

## 2023-01-01 PROCEDURE — 88189 FLOWCYTOMETRY/READ 16 & >: CPT | Mod: GC | Performed by: PATHOLOGY

## 2023-01-01 PROCEDURE — 80051 ELECTROLYTE PANEL: CPT | Performed by: PHYSICIAN ASSISTANT

## 2023-01-01 PROCEDURE — 71275 CT ANGIOGRAPHY CHEST: CPT | Mod: 26 | Performed by: RADIOLOGY

## 2023-01-01 PROCEDURE — 999N000127 HC STATISTIC PERIPHERAL IV START W US GUIDANCE

## 2023-01-01 PROCEDURE — 71275 CT ANGIOGRAPHY CHEST: CPT

## 2023-01-01 PROCEDURE — 99238 HOSP IP/OBS DSCHRG MGMT 30/<: CPT | Mod: FS | Performed by: PHYSICIAN ASSISTANT

## 2023-01-01 PROCEDURE — 5A09357 ASSISTANCE WITH RESPIRATORY VENTILATION, LESS THAN 24 CONSECUTIVE HOURS, CONTINUOUS POSITIVE AIRWAY PRESSURE: ICD-10-PCS

## 2023-01-01 PROCEDURE — 99223 1ST HOSP IP/OBS HIGH 75: CPT | Mod: 25 | Performed by: INTERNAL MEDICINE

## 2023-01-01 PROCEDURE — 87641 MR-STAPH DNA AMP PROBE: CPT | Performed by: PHYSICIAN ASSISTANT

## 2023-01-01 PROCEDURE — 93005 ELECTROCARDIOGRAM TRACING: CPT

## 2023-01-01 PROCEDURE — 85097 BONE MARROW INTERPRETATION: CPT | Mod: GC | Performed by: PATHOLOGY

## 2023-01-01 PROCEDURE — 74177 CT ABD & PELVIS W/CONTRAST: CPT | Mod: 26 | Performed by: STUDENT IN AN ORGANIZED HEALTH CARE EDUCATION/TRAINING PROGRAM

## 2023-01-01 PROCEDURE — 87205 SMEAR GRAM STAIN: CPT | Performed by: PHYSICIAN ASSISTANT

## 2023-01-01 PROCEDURE — 86923 COMPATIBILITY TEST ELECTRIC: CPT | Performed by: PHYSICIAN ASSISTANT

## 2023-01-01 PROCEDURE — C8924 2D TTE W OR W/O FOL W/CON,FU: HCPCS

## 2023-01-01 PROCEDURE — 85027 COMPLETE CBC AUTOMATED: CPT | Performed by: INTERNAL MEDICINE

## 2023-01-01 PROCEDURE — 250N000009 HC RX 250

## 2023-01-01 PROCEDURE — 99231 SBSQ HOSP IP/OBS SF/LOW 25: CPT | Mod: GV | Performed by: INTERNAL MEDICINE

## 2023-01-01 PROCEDURE — 88305 TISSUE EXAM BY PATHOLOGIST: CPT | Mod: 26 | Performed by: PATHOLOGY

## 2023-01-01 PROCEDURE — 99233 SBSQ HOSP IP/OBS HIGH 50: CPT | Performed by: INTERNAL MEDICINE

## 2023-01-01 PROCEDURE — 83605 ASSAY OF LACTIC ACID: CPT | Performed by: STUDENT IN AN ORGANIZED HEALTH CARE EDUCATION/TRAINING PROGRAM

## 2023-01-01 PROCEDURE — 99223 1ST HOSP IP/OBS HIGH 75: CPT | Mod: AI | Performed by: PHYSICIAN ASSISTANT

## 2023-01-01 PROCEDURE — 85520 HEPARIN ASSAY: CPT | Performed by: PHYSICIAN ASSISTANT

## 2023-01-01 PROCEDURE — 87305 ASPERGILLUS AG IA: CPT | Performed by: PHYSICIAN ASSISTANT

## 2023-01-01 PROCEDURE — 88185 FLOWCYTOMETRY/TC ADD-ON: CPT | Performed by: PHYSICIAN ASSISTANT

## 2023-01-01 PROCEDURE — 85060 BLOOD SMEAR INTERPRETATION: CPT | Mod: GC | Performed by: PATHOLOGY

## 2023-01-01 PROCEDURE — 82803 BLOOD GASES ANY COMBINATION: CPT | Performed by: PHYSICIAN ASSISTANT

## 2023-01-01 PROCEDURE — 84443 ASSAY THYROID STIM HORMONE: CPT | Performed by: INTERNAL MEDICINE

## 2023-01-01 PROCEDURE — 80202 ASSAY OF VANCOMYCIN: CPT | Performed by: INTERNAL MEDICINE

## 2023-01-01 RX ORDER — HALOPERIDOL 5 MG/ML
5 INJECTION INTRAMUSCULAR EVERY 6 HOURS PRN
Status: DISCONTINUED | OUTPATIENT
Start: 2023-01-01 | End: 2023-01-01 | Stop reason: HOSPADM

## 2023-01-01 RX ORDER — ENOXAPARIN SODIUM 150 MG/ML
0.75 INJECTION SUBCUTANEOUS EVERY 12 HOURS
Status: DISCONTINUED | OUTPATIENT
Start: 2023-01-01 | End: 2023-01-01

## 2023-01-01 RX ORDER — ENOXAPARIN SODIUM 100 MG/ML
0.5 INJECTION SUBCUTANEOUS EVERY 12 HOURS
Status: DISCONTINUED | OUTPATIENT
Start: 2023-01-01 | End: 2023-01-01

## 2023-01-01 RX ORDER — LIDOCAINE 40 MG/G
CREAM TOPICAL
Status: ACTIVE | OUTPATIENT
Start: 2023-01-01 | End: 2023-01-01

## 2023-01-01 RX ORDER — POTASSIUM CHLORIDE 1.5 G/1.58G
40 POWDER, FOR SOLUTION ORAL ONCE
Status: COMPLETED | OUTPATIENT
Start: 2023-01-01 | End: 2023-01-01

## 2023-01-01 RX ORDER — HALOPERIDOL 2 MG/ML
1 SOLUTION ORAL
Status: CANCELLED | OUTPATIENT
Start: 2023-01-01

## 2023-01-01 RX ORDER — ONDANSETRON 2 MG/ML
8 INJECTION INTRAMUSCULAR; INTRAVENOUS EVERY 8 HOURS PRN
Status: DISCONTINUED | OUTPATIENT
Start: 2023-01-01 | End: 2023-01-01 | Stop reason: HOSPADM

## 2023-01-01 RX ORDER — LORAZEPAM 2 MG/ML
1 INJECTION INTRAMUSCULAR
Status: DISCONTINUED | OUTPATIENT
Start: 2023-01-01 | End: 2023-01-01 | Stop reason: HOSPADM

## 2023-01-01 RX ORDER — ENOXAPARIN SODIUM 300 MG/3ML
130 INJECTION INTRAVENOUS; SUBCUTANEOUS EVERY 12 HOURS
Status: DISCONTINUED | OUTPATIENT
Start: 2023-01-01 | End: 2023-01-01

## 2023-01-01 RX ORDER — DEXMEDETOMIDINE HYDROCHLORIDE 4 UG/ML
.2-.7 INJECTION, SOLUTION INTRAVENOUS CONTINUOUS
Status: CANCELLED | OUTPATIENT
Start: 2023-01-01

## 2023-01-01 RX ORDER — HEPARIN SODIUM,PORCINE 10 UNIT/ML
5-20 VIAL (ML) INTRAVENOUS
Status: DISCONTINUED | OUTPATIENT
Start: 2023-01-01 | End: 2023-01-01 | Stop reason: HOSPADM

## 2023-01-01 RX ORDER — NICOTINE POLACRILEX 4 MG
15-30 LOZENGE BUCCAL
Status: DISCONTINUED | OUTPATIENT
Start: 2023-01-01 | End: 2023-01-01 | Stop reason: HOSPADM

## 2023-01-01 RX ORDER — SALIVA STIMULANT COMB. NO.3
1-2 SPRAY, NON-AEROSOL (ML) MUCOUS MEMBRANE 4 TIMES DAILY PRN
Status: DISCONTINUED | OUTPATIENT
Start: 2023-01-01 | End: 2023-01-01

## 2023-01-01 RX ORDER — NALOXONE HYDROCHLORIDE 0.4 MG/ML
0.2 INJECTION, SOLUTION INTRAMUSCULAR; INTRAVENOUS; SUBCUTANEOUS
Status: DISCONTINUED | OUTPATIENT
Start: 2023-01-01 | End: 2023-01-01 | Stop reason: HOSPADM

## 2023-01-01 RX ORDER — HALOPERIDOL 5 MG/ML
5 INJECTION INTRAMUSCULAR ONCE
Status: COMPLETED | OUTPATIENT
Start: 2023-01-01 | End: 2023-01-01

## 2023-01-01 RX ORDER — BUMETANIDE 1 MG/1
1 TABLET ORAL
Status: DISCONTINUED | OUTPATIENT
Start: 2023-01-01 | End: 2023-01-01

## 2023-01-01 RX ORDER — SODIUM CHLORIDE 9 MG/ML
INJECTION, SOLUTION INTRAVENOUS CONTINUOUS
Status: DISCONTINUED | OUTPATIENT
Start: 2023-01-01 | End: 2023-01-01 | Stop reason: HOSPADM

## 2023-01-01 RX ORDER — SALIVA STIMULANT COMB. NO.3
1-2 SPRAY, NON-AEROSOL (ML) MUCOUS MEMBRANE
Status: DISCONTINUED | OUTPATIENT
Start: 2023-01-01 | End: 2023-01-01 | Stop reason: HOSPADM

## 2023-01-01 RX ORDER — SALIVA STIMULANT COMB. NO.3
1-2 SPRAY, NON-AEROSOL (ML) MUCOUS MEMBRANE
Status: CANCELLED | OUTPATIENT
Start: 2023-01-01

## 2023-01-01 RX ORDER — HYDRALAZINE HYDROCHLORIDE 20 MG/ML
10 INJECTION INTRAMUSCULAR; INTRAVENOUS EVERY 6 HOURS PRN
Status: DISCONTINUED | OUTPATIENT
Start: 2023-01-01 | End: 2023-01-01

## 2023-01-01 RX ORDER — DEXTROSE MONOHYDRATE 25 G/50ML
25-50 INJECTION, SOLUTION INTRAVENOUS
Status: DISCONTINUED | OUTPATIENT
Start: 2023-01-01 | End: 2023-01-01

## 2023-01-01 RX ORDER — BUMETANIDE 0.25 MG/ML
2 INJECTION INTRAMUSCULAR; INTRAVENOUS ONCE
Status: COMPLETED | OUTPATIENT
Start: 2023-01-01 | End: 2023-01-01

## 2023-01-01 RX ORDER — GLYCOPYRROLATE 0.2 MG/ML
0.2 INJECTION, SOLUTION INTRAMUSCULAR; INTRAVENOUS
Status: CANCELLED | OUTPATIENT
Start: 2023-01-01

## 2023-01-01 RX ORDER — NALOXONE HYDROCHLORIDE 0.4 MG/ML
0.4 INJECTION, SOLUTION INTRAMUSCULAR; INTRAVENOUS; SUBCUTANEOUS
Status: DISCONTINUED | OUTPATIENT
Start: 2023-01-01 | End: 2023-01-01 | Stop reason: HOSPADM

## 2023-01-01 RX ORDER — LORAZEPAM 1 MG/1
1 TABLET ORAL
Status: CANCELLED | OUTPATIENT
Start: 2023-01-01

## 2023-01-01 RX ORDER — NALOXONE HYDROCHLORIDE 0.4 MG/ML
0.4 INJECTION, SOLUTION INTRAMUSCULAR; INTRAVENOUS; SUBCUTANEOUS
Status: DISCONTINUED | OUTPATIENT
Start: 2023-01-01 | End: 2023-01-01

## 2023-01-01 RX ORDER — BUMETANIDE 1 MG/1
1 TABLET ORAL ONCE
Status: DISCONTINUED | OUTPATIENT
Start: 2023-01-01 | End: 2023-01-01

## 2023-01-01 RX ORDER — BUMETANIDE 0.25 MG/ML
1 INJECTION INTRAMUSCULAR; INTRAVENOUS ONCE
Status: COMPLETED | OUTPATIENT
Start: 2023-01-01 | End: 2023-01-01

## 2023-01-01 RX ORDER — CALCIUM ACETATE 667 MG/1
1334 CAPSULE ORAL
Status: DISCONTINUED | OUTPATIENT
Start: 2023-01-01 | End: 2023-01-01

## 2023-01-01 RX ORDER — BUMETANIDE 1 MG/1
1 TABLET ORAL DAILY
Status: DISCONTINUED | OUTPATIENT
Start: 2023-01-01 | End: 2023-01-01

## 2023-01-01 RX ORDER — LORAZEPAM 2 MG/ML
1-2 INJECTION INTRAMUSCULAR
Status: DISCONTINUED | OUTPATIENT
Start: 2023-01-01 | End: 2023-01-01 | Stop reason: HOSPADM

## 2023-01-01 RX ORDER — LORAZEPAM 2 MG/ML
1 INJECTION INTRAMUSCULAR
Status: DISCONTINUED | OUTPATIENT
Start: 2023-01-01 | End: 2023-01-01

## 2023-01-01 RX ORDER — IPRATROPIUM BROMIDE AND ALBUTEROL SULFATE 2.5; .5 MG/3ML; MG/3ML
3 SOLUTION RESPIRATORY (INHALATION) 2 TIMES DAILY
Status: DISCONTINUED | OUTPATIENT
Start: 2023-01-01 | End: 2023-01-01

## 2023-01-01 RX ORDER — PROCHLORPERAZINE MALEATE 10 MG
10 TABLET ORAL EVERY 6 HOURS PRN
Status: DISCONTINUED | OUTPATIENT
Start: 2023-01-01 | End: 2023-01-01

## 2023-01-01 RX ORDER — NALOXONE HYDROCHLORIDE 0.4 MG/ML
0.1 INJECTION, SOLUTION INTRAMUSCULAR; INTRAVENOUS; SUBCUTANEOUS
Status: DISCONTINUED | OUTPATIENT
Start: 2023-01-01 | End: 2023-01-01 | Stop reason: HOSPADM

## 2023-01-01 RX ORDER — HYDROMORPHONE HYDROCHLORIDE 1 MG/ML
0.5 INJECTION, SOLUTION INTRAMUSCULAR; INTRAVENOUS; SUBCUTANEOUS EVERY 4 HOURS PRN
Status: DISCONTINUED | OUTPATIENT
Start: 2023-01-01 | End: 2023-01-01

## 2023-01-01 RX ORDER — NALOXONE HYDROCHLORIDE 0.4 MG/ML
0.2 INJECTION, SOLUTION INTRAMUSCULAR; INTRAVENOUS; SUBCUTANEOUS
Status: DISCONTINUED | OUTPATIENT
Start: 2023-01-01 | End: 2023-01-01

## 2023-01-01 RX ORDER — LORAZEPAM 2 MG/ML
.5-1 INJECTION INTRAMUSCULAR EVERY 6 HOURS PRN
Status: DISCONTINUED | OUTPATIENT
Start: 2023-01-01 | End: 2023-01-01

## 2023-01-01 RX ORDER — GLYCOPYRROLATE 0.2 MG/ML
0.2 INJECTION, SOLUTION INTRAMUSCULAR; INTRAVENOUS
Status: DISCONTINUED | OUTPATIENT
Start: 2023-01-01 | End: 2023-01-01 | Stop reason: HOSPADM

## 2023-01-01 RX ORDER — HYDROMORPHONE HYDROCHLORIDE 1 MG/ML
0.3 INJECTION, SOLUTION INTRAMUSCULAR; INTRAVENOUS; SUBCUTANEOUS
Status: DISCONTINUED | OUTPATIENT
Start: 2023-01-01 | End: 2023-01-01 | Stop reason: HOSPADM

## 2023-01-01 RX ORDER — MEPERIDINE HYDROCHLORIDE 25 MG/ML
25 INJECTION INTRAMUSCULAR; INTRAVENOUS; SUBCUTANEOUS EVERY 30 MIN PRN
Status: DISCONTINUED | OUTPATIENT
Start: 2023-01-01 | End: 2023-01-01

## 2023-01-01 RX ORDER — NALOXONE HYDROCHLORIDE 0.4 MG/ML
0.2 INJECTION, SOLUTION INTRAMUSCULAR; INTRAVENOUS; SUBCUTANEOUS
Status: CANCELLED | OUTPATIENT
Start: 2023-01-01

## 2023-01-01 RX ORDER — BUPIVACAINE HYDROCHLORIDE 5 MG/ML
1-30 INJECTION, SOLUTION PERINEURAL ONCE
Status: COMPLETED | OUTPATIENT
Start: 2023-01-01 | End: 2023-01-01

## 2023-01-01 RX ORDER — IPRATROPIUM BROMIDE AND ALBUTEROL SULFATE 2.5; .5 MG/3ML; MG/3ML
3 SOLUTION RESPIRATORY (INHALATION)
Status: DISCONTINUED | OUTPATIENT
Start: 2023-01-01 | End: 2023-01-01 | Stop reason: HOSPADM

## 2023-01-01 RX ORDER — HEPARIN SODIUM,PORCINE 10 UNIT/ML
5-20 VIAL (ML) INTRAVENOUS EVERY 24 HOURS
Status: DISCONTINUED | OUTPATIENT
Start: 2023-01-01 | End: 2023-01-01

## 2023-01-01 RX ORDER — MINERAL OIL/HYDROPHIL PETROLAT
OINTMENT (GRAM) TOPICAL
Status: DISCONTINUED | OUTPATIENT
Start: 2023-01-01 | End: 2023-01-01 | Stop reason: HOSPADM

## 2023-01-01 RX ORDER — METHYLPREDNISOLONE SODIUM SUCCINATE 125 MG/2ML
125 INJECTION, POWDER, LYOPHILIZED, FOR SOLUTION INTRAMUSCULAR; INTRAVENOUS
Status: DISCONTINUED | OUTPATIENT
Start: 2023-01-01 | End: 2023-01-01

## 2023-01-01 RX ORDER — DEXAMETHASONE 4 MG/1
4 TABLET ORAL DAILY
Status: DISCONTINUED | OUTPATIENT
Start: 2023-01-01 | End: 2023-01-01

## 2023-01-01 RX ORDER — MINERAL OIL/HYDROPHIL PETROLAT
OINTMENT (GRAM) TOPICAL
Status: CANCELLED | OUTPATIENT
Start: 2023-01-01

## 2023-01-01 RX ORDER — DEXMEDETOMIDINE HYDROCHLORIDE 4 UG/ML
.2-.7 INJECTION, SOLUTION INTRAVENOUS CONTINUOUS
Status: DISCONTINUED | OUTPATIENT
Start: 2023-01-01 | End: 2023-01-01 | Stop reason: HOSPADM

## 2023-01-01 RX ORDER — DEXAMETHASONE SODIUM PHOSPHATE 4 MG/ML
10 INJECTION, SOLUTION INTRA-ARTICULAR; INTRALESIONAL; INTRAMUSCULAR; INTRAVENOUS; SOFT TISSUE ONCE
Status: COMPLETED | OUTPATIENT
Start: 2023-01-01 | End: 2023-01-01

## 2023-01-01 RX ORDER — SCOLOPAMINE TRANSDERMAL SYSTEM 1 MG/1
1 PATCH, EXTENDED RELEASE TRANSDERMAL
Status: DISCONTINUED | OUTPATIENT
Start: 2023-01-01 | End: 2023-01-01 | Stop reason: HOSPADM

## 2023-01-01 RX ORDER — AMMONIUM LACTATE 12 G/100G
CREAM TOPICAL
Status: DISCONTINUED | OUTPATIENT
Start: 2023-01-01 | End: 2023-01-01 | Stop reason: HOSPADM

## 2023-01-01 RX ORDER — ALBUTEROL SULFATE 0.83 MG/ML
2.5 SOLUTION RESPIRATORY (INHALATION)
Status: DISCONTINUED | OUTPATIENT
Start: 2023-01-01 | End: 2023-01-01

## 2023-01-01 RX ORDER — ATROPINE SULFATE 10 MG/ML
2 SOLUTION/ DROPS OPHTHALMIC EVERY 4 HOURS PRN
Status: DISCONTINUED | OUTPATIENT
Start: 2023-01-01 | End: 2023-01-01 | Stop reason: HOSPADM

## 2023-01-01 RX ORDER — ACETAMINOPHEN 650 MG/1
650 SUPPOSITORY RECTAL EVERY 4 HOURS PRN
Status: DISCONTINUED | OUTPATIENT
Start: 2023-01-01 | End: 2023-01-01 | Stop reason: HOSPADM

## 2023-01-01 RX ORDER — POLYETHYLENE GLYCOL 3350 17 G/17G
17 POWDER, FOR SOLUTION ORAL 2 TIMES DAILY
Status: DISCONTINUED | OUTPATIENT
Start: 2023-01-01 | End: 2023-01-01

## 2023-01-01 RX ORDER — ALLOPURINOL 300 MG/1
300 TABLET ORAL 2 TIMES DAILY
Status: DISCONTINUED | OUTPATIENT
Start: 2023-01-01 | End: 2023-01-01

## 2023-01-01 RX ORDER — POSACONAZOLE 100 MG/1
300 TABLET, DELAYED RELEASE ORAL EVERY MORNING
Status: DISCONTINUED | OUTPATIENT
Start: 2023-01-01 | End: 2023-01-01

## 2023-01-01 RX ORDER — ATORVASTATIN CALCIUM 20 MG/1
20 TABLET, FILM COATED ORAL DAILY
COMMUNITY

## 2023-01-01 RX ORDER — OXYCODONE HYDROCHLORIDE 5 MG/1
5-10 TABLET ORAL EVERY 4 HOURS PRN
Status: DISCONTINUED | OUTPATIENT
Start: 2023-01-01 | End: 2023-01-01

## 2023-01-01 RX ORDER — AMOXICILLIN 250 MG
2 CAPSULE ORAL 2 TIMES DAILY
Status: DISCONTINUED | OUTPATIENT
Start: 2023-01-01 | End: 2023-01-01

## 2023-01-01 RX ORDER — LORAZEPAM 2 MG/ML
1 INJECTION INTRAMUSCULAR ONCE
Status: COMPLETED | OUTPATIENT
Start: 2023-01-01 | End: 2023-01-01

## 2023-01-01 RX ORDER — HYDROMORPHONE HYDROCHLORIDE 1 MG/ML
0.5 INJECTION, SOLUTION INTRAMUSCULAR; INTRAVENOUS; SUBCUTANEOUS ONCE
Status: COMPLETED | OUTPATIENT
Start: 2023-01-01 | End: 2023-01-01

## 2023-01-01 RX ORDER — METHOCARBAMOL 500 MG/1
500 TABLET, FILM COATED ORAL 4 TIMES DAILY
Status: DISCONTINUED | OUTPATIENT
Start: 2023-01-01 | End: 2023-01-01

## 2023-01-01 RX ORDER — LIDOCAINE 40 MG/G
CREAM TOPICAL
Status: DISCONTINUED | OUTPATIENT
Start: 2023-01-01 | End: 2023-01-01

## 2023-01-01 RX ORDER — CYCLOBENZAPRINE HCL 10 MG
10 TABLET ORAL 3 TIMES DAILY PRN
Status: DISCONTINUED | OUTPATIENT
Start: 2023-01-01 | End: 2023-01-01

## 2023-01-01 RX ORDER — NALOXONE HYDROCHLORIDE 0.4 MG/ML
0.1 INJECTION, SOLUTION INTRAMUSCULAR; INTRAVENOUS; SUBCUTANEOUS
Status: CANCELLED | OUTPATIENT
Start: 2023-01-01

## 2023-01-01 RX ORDER — SODIUM CHLORIDE 9 MG/ML
INJECTION, SOLUTION INTRAVENOUS CONTINUOUS
Status: CANCELLED | OUTPATIENT
Start: 2023-01-01

## 2023-01-01 RX ORDER — OXYCODONE HYDROCHLORIDE 5 MG/1
5 TABLET ORAL EVERY 4 HOURS PRN
Qty: 20 TABLET | Refills: 0 | Status: SHIPPED | OUTPATIENT
Start: 2023-01-01

## 2023-01-01 RX ORDER — EPINEPHRINE 1 MG/ML
0.3 INJECTION, SOLUTION, CONCENTRATE INTRAVENOUS EVERY 5 MIN PRN
Status: DISCONTINUED | OUTPATIENT
Start: 2023-01-01 | End: 2023-01-01

## 2023-01-01 RX ORDER — ALBUTEROL SULFATE 90 UG/1
1-2 AEROSOL, METERED RESPIRATORY (INHALATION)
Status: DISCONTINUED | OUTPATIENT
Start: 2023-01-01 | End: 2023-01-01

## 2023-01-01 RX ORDER — DEXTROSE MONOHYDRATE 25 G/50ML
25-50 INJECTION, SOLUTION INTRAVENOUS
Status: DISCONTINUED | OUTPATIENT
Start: 2023-01-01 | End: 2023-01-01 | Stop reason: HOSPADM

## 2023-01-01 RX ORDER — FLUMAZENIL 0.1 MG/ML
0.2 INJECTION, SOLUTION INTRAVENOUS
Status: DISCONTINUED | OUTPATIENT
Start: 2023-01-01 | End: 2023-01-01 | Stop reason: HOSPADM

## 2023-01-01 RX ORDER — ONDANSETRON 4 MG/1
8 TABLET, ORALLY DISINTEGRATING ORAL EVERY 8 HOURS PRN
Status: DISCONTINUED | OUTPATIENT
Start: 2023-01-01 | End: 2023-01-01 | Stop reason: HOSPADM

## 2023-01-01 RX ORDER — IPRATROPIUM BROMIDE AND ALBUTEROL SULFATE 2.5; .5 MG/3ML; MG/3ML
3 SOLUTION RESPIRATORY (INHALATION)
Status: DISCONTINUED | OUTPATIENT
Start: 2023-01-01 | End: 2023-01-01

## 2023-01-01 RX ORDER — GLYCOPYRROLATE 0.2 MG/ML
0.2 INJECTION, SOLUTION INTRAMUSCULAR; INTRAVENOUS
Status: DISCONTINUED | OUTPATIENT
Start: 2023-01-01 | End: 2023-01-01

## 2023-01-01 RX ORDER — HALOPERIDOL 5 MG/ML
5 INJECTION INTRAMUSCULAR EVERY 6 HOURS PRN
Status: CANCELLED | OUTPATIENT
Start: 2023-01-01

## 2023-01-01 RX ORDER — BUDESONIDE 0.5 MG/2ML
0.5 INHALANT ORAL 2 TIMES DAILY PRN
Status: DISCONTINUED | OUTPATIENT
Start: 2023-01-01 | End: 2023-01-01 | Stop reason: HOSPADM

## 2023-01-01 RX ORDER — HALOPERIDOL 2 MG/ML
1 SOLUTION ORAL
Status: DISCONTINUED | OUTPATIENT
Start: 2023-01-01 | End: 2023-01-01 | Stop reason: HOSPADM

## 2023-01-01 RX ORDER — METHOCARBAMOL 500 MG/1
500 TABLET, FILM COATED ORAL 4 TIMES DAILY PRN
Status: DISCONTINUED | OUTPATIENT
Start: 2023-01-01 | End: 2023-01-01

## 2023-01-01 RX ORDER — DEXAMETHASONE 4 MG/1
4 TABLET ORAL 2 TIMES DAILY
Status: DISCONTINUED | OUTPATIENT
Start: 2023-01-01 | End: 2023-01-01

## 2023-01-01 RX ORDER — POLYETHYLENE GLYCOL 3350 17 G/17G
17 POWDER, FOR SOLUTION ORAL DAILY
Status: DISCONTINUED | OUTPATIENT
Start: 2023-01-01 | End: 2023-01-01

## 2023-01-01 RX ORDER — LORAZEPAM 1 MG/1
1 TABLET ORAL
Status: DISCONTINUED | OUTPATIENT
Start: 2023-01-01 | End: 2023-01-01

## 2023-01-01 RX ORDER — HYDROMORPHONE HYDROCHLORIDE 1 MG/ML
0.3 INJECTION, SOLUTION INTRAMUSCULAR; INTRAVENOUS; SUBCUTANEOUS
Status: CANCELLED | OUTPATIENT
Start: 2023-01-01

## 2023-01-01 RX ORDER — LORAZEPAM 1 MG/1
1-2 TABLET ORAL
Status: DISCONTINUED | OUTPATIENT
Start: 2023-01-01 | End: 2023-01-01 | Stop reason: HOSPADM

## 2023-01-01 RX ORDER — DIPHENHYDRAMINE HYDROCHLORIDE 50 MG/ML
50 INJECTION INTRAMUSCULAR; INTRAVENOUS
Status: DISCONTINUED | OUTPATIENT
Start: 2023-01-01 | End: 2023-01-01

## 2023-01-01 RX ORDER — AMOXICILLIN 250 MG
1 CAPSULE ORAL 2 TIMES DAILY
Status: DISCONTINUED | OUTPATIENT
Start: 2023-01-01 | End: 2023-01-01

## 2023-01-01 RX ORDER — ACETAMINOPHEN 325 MG/1
650 TABLET ORAL EVERY 4 HOURS PRN
Status: DISCONTINUED | OUTPATIENT
Start: 2023-01-01 | End: 2023-01-01 | Stop reason: HOSPADM

## 2023-01-01 RX ORDER — GLYCOPYRROLATE 0.2 MG/ML
0.4 INJECTION, SOLUTION INTRAMUSCULAR; INTRAVENOUS
Status: DISCONTINUED | OUTPATIENT
Start: 2023-01-01 | End: 2023-01-01 | Stop reason: HOSPADM

## 2023-01-01 RX ORDER — METHOCARBAMOL 500 MG/1
500 TABLET, FILM COATED ORAL 4 TIMES DAILY PRN
Qty: 60 TABLET | Refills: 0 | Status: SHIPPED | OUTPATIENT
Start: 2023-01-01

## 2023-01-01 RX ORDER — DEXMEDETOMIDINE HYDROCHLORIDE 4 UG/ML
.1-1.2 INJECTION, SOLUTION INTRAVENOUS CONTINUOUS
Status: DISCONTINUED | OUTPATIENT
Start: 2023-01-01 | End: 2023-01-01

## 2023-01-01 RX ORDER — ATROPINE SULFATE 10 MG/ML
2 SOLUTION/ DROPS OPHTHALMIC EVERY 4 HOURS PRN
Status: CANCELLED | OUTPATIENT
Start: 2023-01-01

## 2023-01-01 RX ORDER — ENOXAPARIN SODIUM 100 MG/ML
0.38 INJECTION SUBCUTANEOUS EVERY 12 HOURS
Status: DISCONTINUED | OUTPATIENT
Start: 2023-01-01 | End: 2023-01-01

## 2023-01-01 RX ORDER — HEPARIN SODIUM,PORCINE 10 UNIT/ML
5 VIAL (ML) INTRAVENOUS
Status: CANCELLED | OUTPATIENT
Start: 2023-01-01

## 2023-01-01 RX ORDER — HYDROMORPHONE HYDROCHLORIDE 1 MG/ML
0.3 INJECTION, SOLUTION INTRAMUSCULAR; INTRAVENOUS; SUBCUTANEOUS
Status: DISCONTINUED | OUTPATIENT
Start: 2023-01-01 | End: 2023-01-01

## 2023-01-01 RX ORDER — PANTOPRAZOLE SODIUM 40 MG/1
40 TABLET, DELAYED RELEASE ORAL
Status: DISCONTINUED | OUTPATIENT
Start: 2023-01-01 | End: 2023-01-01

## 2023-01-01 RX ORDER — ALBUTEROL SULFATE 0.83 MG/ML
2.5 SOLUTION RESPIRATORY (INHALATION)
Status: DISCONTINUED | OUTPATIENT
Start: 2023-01-01 | End: 2023-01-01 | Stop reason: HOSPADM

## 2023-01-01 RX ORDER — FENTANYL CITRATE 50 UG/ML
25-50 INJECTION, SOLUTION INTRAMUSCULAR; INTRAVENOUS EVERY 5 MIN PRN
Status: DISCONTINUED | OUTPATIENT
Start: 2023-01-01 | End: 2023-01-01 | Stop reason: HOSPADM

## 2023-01-01 RX ORDER — AMMONIUM LACTATE 12 G/100G
CREAM TOPICAL DAILY
Status: DISCONTINUED | OUTPATIENT
Start: 2023-01-01 | End: 2023-01-01

## 2023-01-01 RX ORDER — BUDESONIDE 0.5 MG/2ML
0.5 INHALANT ORAL 2 TIMES DAILY
Status: DISCONTINUED | OUTPATIENT
Start: 2023-01-01 | End: 2023-01-01

## 2023-01-01 RX ORDER — ENOXAPARIN SODIUM 100 MG/ML
80 INJECTION SUBCUTANEOUS EVERY 12 HOURS
Status: DISCONTINUED | OUTPATIENT
Start: 2023-01-01 | End: 2023-01-01

## 2023-01-01 RX ORDER — BUMETANIDE 1 MG/1
2 TABLET ORAL ONCE
Status: COMPLETED | OUTPATIENT
Start: 2023-01-01 | End: 2023-01-01

## 2023-01-01 RX ORDER — LORAZEPAM 0.5 MG/1
.5-1 TABLET ORAL EVERY 6 HOURS PRN
Status: DISCONTINUED | OUTPATIENT
Start: 2023-01-01 | End: 2023-01-01

## 2023-01-01 RX ORDER — ONDANSETRON 8 MG/1
8 TABLET, FILM COATED ORAL EVERY 8 HOURS PRN
Status: DISCONTINUED | OUTPATIENT
Start: 2023-01-01 | End: 2023-01-01 | Stop reason: HOSPADM

## 2023-01-01 RX ORDER — OXYCODONE HYDROCHLORIDE 10 MG/1
10-20 TABLET ORAL EVERY 4 HOURS
Status: DISCONTINUED | OUTPATIENT
Start: 2023-01-01 | End: 2023-01-01

## 2023-01-01 RX ORDER — LORAZEPAM 2 MG/ML
1 INJECTION INTRAMUSCULAR
Status: CANCELLED | OUTPATIENT
Start: 2023-01-01

## 2023-01-01 RX ORDER — HEPARIN SODIUM (PORCINE) LOCK FLUSH IV SOLN 100 UNIT/ML 100 UNIT/ML
5 SOLUTION INTRAVENOUS
Status: CANCELLED | OUTPATIENT
Start: 2023-01-01

## 2023-01-01 RX ORDER — IOPAMIDOL 755 MG/ML
135 INJECTION, SOLUTION INTRAVASCULAR ONCE
Status: COMPLETED | OUTPATIENT
Start: 2023-01-01 | End: 2023-01-01

## 2023-01-01 RX ORDER — NICOTINE POLACRILEX 4 MG
15-30 LOZENGE BUCCAL
Status: DISCONTINUED | OUTPATIENT
Start: 2023-01-01 | End: 2023-01-01

## 2023-01-01 RX ORDER — ACETAMINOPHEN 650 MG/1
650 SUPPOSITORY RECTAL EVERY 4 HOURS PRN
Status: CANCELLED | OUTPATIENT
Start: 2023-01-01

## 2023-01-01 RX ORDER — FLUTICASONE FUROATE AND VILANTEROL 200; 25 UG/1; UG/1
1 POWDER RESPIRATORY (INHALATION) DAILY
Status: DISCONTINUED | OUTPATIENT
Start: 2023-01-01 | End: 2023-01-01

## 2023-01-01 RX ORDER — POTASSIUM CHLORIDE 7.45 MG/ML
10 INJECTION INTRAVENOUS
Status: DISCONTINUED | OUTPATIENT
Start: 2023-01-01 | End: 2023-01-01

## 2023-01-01 RX ORDER — HALOPERIDOL 5 MG/ML
2.5 INJECTION INTRAMUSCULAR ONCE
Status: COMPLETED | OUTPATIENT
Start: 2023-01-01 | End: 2023-01-01

## 2023-01-01 RX ORDER — LORAZEPAM 2 MG/ML
0.5 INJECTION INTRAMUSCULAR ONCE
Status: COMPLETED | OUTPATIENT
Start: 2023-01-01 | End: 2023-01-01

## 2023-01-01 RX ORDER — ACYCLOVIR 400 MG/1
400 TABLET ORAL 2 TIMES DAILY
Status: DISCONTINUED | OUTPATIENT
Start: 2023-01-01 | End: 2023-01-01

## 2023-01-01 RX ORDER — LIDOCAINE 40 MG/G
CREAM TOPICAL
Status: DISCONTINUED | OUTPATIENT
Start: 2023-01-01 | End: 2023-01-01 | Stop reason: HOSPADM

## 2023-01-01 RX ORDER — PROCHLORPERAZINE MALEATE 5 MG
5-10 TABLET ORAL EVERY 6 HOURS PRN
Status: DISCONTINUED | OUTPATIENT
Start: 2023-01-01 | End: 2023-01-01 | Stop reason: HOSPADM

## 2023-01-01 RX ORDER — OLANZAPINE 5 MG/1
10 TABLET, ORALLY DISINTEGRATING ORAL 2 TIMES DAILY
Status: DISCONTINUED | OUTPATIENT
Start: 2023-01-01 | End: 2023-01-01 | Stop reason: HOSPADM

## 2023-01-01 RX ADMIN — LORAZEPAM 1 MG: 2 INJECTION INTRAMUSCULAR; INTRAVENOUS at 01:58

## 2023-01-01 RX ADMIN — BUDESONIDE 0.5 MG: 0.5 SUSPENSION RESPIRATORY (INHALATION) at 08:20

## 2023-01-01 RX ADMIN — SENNOSIDES AND DOCUSATE SODIUM 1 TABLET: 50; 8.6 TABLET ORAL at 09:42

## 2023-01-01 RX ADMIN — ALLOPURINOL 300 MG: 300 TABLET ORAL at 07:34

## 2023-01-01 RX ADMIN — GLYCOPYRROLATE 0.2 MG: 0.2 INJECTION INTRAMUSCULAR; INTRAVENOUS at 10:05

## 2023-01-01 RX ADMIN — CALCIUM ACETATE 1334 MG: 667 CAPSULE ORAL at 07:34

## 2023-01-01 RX ADMIN — GLYCOPYRROLATE 0.4 MG: 0.4 INJECTION INTRAMUSCULAR; INTRAVENOUS at 21:30

## 2023-01-01 RX ADMIN — CALCIUM ACETATE 1334 MG: 667 CAPSULE ORAL at 17:20

## 2023-01-01 RX ADMIN — GLYCOPYRROLATE 0.2 MG: 0.2 INJECTION INTRAMUSCULAR; INTRAVENOUS at 21:41

## 2023-01-01 RX ADMIN — DEXMEDETOMIDINE HYDROCHLORIDE 0.5 MCG/KG/HR: 400 INJECTION INTRAVENOUS at 00:16

## 2023-01-01 RX ADMIN — CEFEPIME HYDROCHLORIDE 2 G: 2 INJECTION, POWDER, FOR SOLUTION INTRAVENOUS at 14:26

## 2023-01-01 RX ADMIN — ACYCLOVIR 400 MG: 400 TABLET ORAL at 20:06

## 2023-01-01 RX ADMIN — PANTOPRAZOLE SODIUM 40 MG: 40 TABLET, DELAYED RELEASE ORAL at 08:29

## 2023-01-01 RX ADMIN — GLYCOPYRROLATE 0.4 MG: 0.4 INJECTION INTRAMUSCULAR; INTRAVENOUS at 20:31

## 2023-01-01 RX ADMIN — FENTANYL CITRATE 50 MCG: 50 INJECTION, SOLUTION INTRAMUSCULAR; INTRAVENOUS at 11:48

## 2023-01-01 RX ADMIN — DEXMEDETOMIDINE HYDROCHLORIDE 0.4 MCG/KG/HR: 400 INJECTION INTRAVENOUS at 03:14

## 2023-01-01 RX ADMIN — ENOXAPARIN SODIUM 135 MG: 150 INJECTION SUBCUTANEOUS at 08:46

## 2023-01-01 RX ADMIN — SENNOSIDES AND DOCUSATE SODIUM 1 TABLET: 50; 8.6 TABLET ORAL at 20:14

## 2023-01-01 RX ADMIN — INSULIN ASPART 1 UNITS: 100 INJECTION, SOLUTION INTRAVENOUS; SUBCUTANEOUS at 08:55

## 2023-01-01 RX ADMIN — VANCOMYCIN HYDROCHLORIDE 1250 MG: 10 INJECTION, POWDER, LYOPHILIZED, FOR SOLUTION INTRAVENOUS at 02:32

## 2023-01-01 RX ADMIN — FENTANYL CITRATE 50 MCG: 50 INJECTION, SOLUTION INTRAMUSCULAR; INTRAVENOUS at 11:32

## 2023-01-01 RX ADMIN — HYDROMORPHONE HYDROCHLORIDE 0.5 MG: 1 INJECTION, SOLUTION INTRAMUSCULAR; INTRAVENOUS; SUBCUTANEOUS at 22:50

## 2023-01-01 RX ADMIN — BUDESONIDE 0.5 MG: 0.5 SUSPENSION RESPIRATORY (INHALATION) at 11:47

## 2023-01-01 RX ADMIN — HYDROMORPHONE HYDROCHLORIDE 2 MG: 1 INJECTION, SOLUTION INTRAMUSCULAR; INTRAVENOUS; SUBCUTANEOUS at 07:36

## 2023-01-01 RX ADMIN — IPRATROPIUM BROMIDE AND ALBUTEROL SULFATE 3 ML: 2.5; .5 SOLUTION RESPIRATORY (INHALATION) at 21:13

## 2023-01-01 RX ADMIN — LORAZEPAM 1 MG: 2 INJECTION INTRAMUSCULAR; INTRAVENOUS at 07:49

## 2023-01-01 RX ADMIN — HYDROMORPHONE HYDROCHLORIDE 2 MG: 1 INJECTION, SOLUTION INTRAMUSCULAR; INTRAVENOUS; SUBCUTANEOUS at 17:41

## 2023-01-01 RX ADMIN — OXYCODONE HYDROCHLORIDE 10 MG: 5 TABLET ORAL at 14:00

## 2023-01-01 RX ADMIN — ALLOPURINOL 300 MG: 300 TABLET ORAL at 21:27

## 2023-01-01 RX ADMIN — HYDROMORPHONE HYDROCHLORIDE 2 MG: 1 INJECTION, SOLUTION INTRAMUSCULAR; INTRAVENOUS; SUBCUTANEOUS at 14:56

## 2023-01-01 RX ADMIN — Medication 2 SPRAY: at 00:58

## 2023-01-01 RX ADMIN — LORAZEPAM 1 MG: 2 INJECTION INTRAMUSCULAR; INTRAVENOUS at 06:33

## 2023-01-01 RX ADMIN — GLYCOPYRROLATE 0.4 MG: 0.4 INJECTION INTRAMUSCULAR; INTRAVENOUS at 00:58

## 2023-01-01 RX ADMIN — GLYCOPYRROLATE 0.2 MG: 0.2 INJECTION INTRAMUSCULAR; INTRAVENOUS at 12:53

## 2023-01-01 RX ADMIN — MICAFUNGIN 50 MG: 10 INJECTION, POWDER, LYOPHILIZED, FOR SOLUTION INTRAVENOUS at 22:47

## 2023-01-01 RX ADMIN — SODIUM CHLORIDE: 9 INJECTION, SOLUTION INTRAVENOUS at 09:46

## 2023-01-01 RX ADMIN — SENNOSIDES AND DOCUSATE SODIUM 1 TABLET: 50; 8.6 TABLET ORAL at 19:14

## 2023-01-01 RX ADMIN — CALCIUM ACETATE 1334 MG: 667 CAPSULE ORAL at 18:00

## 2023-01-01 RX ADMIN — HYDROMORPHONE HYDROCHLORIDE 1 MG: 1 INJECTION, SOLUTION INTRAMUSCULAR; INTRAVENOUS; SUBCUTANEOUS at 16:10

## 2023-01-01 RX ADMIN — GLYCOPYRROLATE 0.2 MG: 0.2 INJECTION INTRAMUSCULAR; INTRAVENOUS at 06:24

## 2023-01-01 RX ADMIN — OXYCODONE HYDROCHLORIDE 10 MG: 5 TABLET ORAL at 05:35

## 2023-01-01 RX ADMIN — ATROPINE SULFATE 2 DROP: 10 SOLUTION/ DROPS OPHTHALMIC at 19:33

## 2023-01-01 RX ADMIN — SENNOSIDES AND DOCUSATE SODIUM 2 TABLET: 8.6; 5 TABLET ORAL at 08:42

## 2023-01-01 RX ADMIN — BUDESONIDE 0.5 MG: 0.5 SUSPENSION RESPIRATORY (INHALATION) at 09:21

## 2023-01-01 RX ADMIN — HUMAN ALBUMIN MICROSPHERES AND PERFLUTREN 6 ML: 10; .22 INJECTION, SOLUTION INTRAVENOUS at 09:11

## 2023-01-01 RX ADMIN — BUMETANIDE 1 MG: 1 TABLET ORAL at 16:01

## 2023-01-01 RX ADMIN — DEXMEDETOMIDINE HYDROCHLORIDE 0.7 MCG/KG/HR: 400 INJECTION INTRAVENOUS at 13:27

## 2023-01-01 RX ADMIN — Medication 5 ML: at 06:31

## 2023-01-01 RX ADMIN — OXYCODONE HYDROCHLORIDE 10 MG: 5 TABLET ORAL at 12:47

## 2023-01-01 RX ADMIN — ALLOPURINOL 300 MG: 300 TABLET ORAL at 08:46

## 2023-01-01 RX ADMIN — IPRATROPIUM BROMIDE AND ALBUTEROL SULFATE 3 ML: 2.5; .5 SOLUTION RESPIRATORY (INHALATION) at 21:19

## 2023-01-01 RX ADMIN — IPRATROPIUM BROMIDE AND ALBUTEROL SULFATE 3 ML: 2.5; .5 SOLUTION RESPIRATORY (INHALATION) at 21:46

## 2023-01-01 RX ADMIN — LORAZEPAM 1 MG: 2 INJECTION INTRAMUSCULAR; INTRAVENOUS at 05:22

## 2023-01-01 RX ADMIN — HYDROMORPHONE HYDROCHLORIDE 1 MG: 1 INJECTION, SOLUTION INTRAMUSCULAR; INTRAVENOUS; SUBCUTANEOUS at 20:11

## 2023-01-01 RX ADMIN — OXYCODONE HYDROCHLORIDE 10 MG: 5 TABLET ORAL at 10:40

## 2023-01-01 RX ADMIN — HYDROMORPHONE HYDROCHLORIDE 2 MG: 1 INJECTION, SOLUTION INTRAMUSCULAR; INTRAVENOUS; SUBCUTANEOUS at 05:06

## 2023-01-01 RX ADMIN — ACETAMINOPHEN 650 MG: 325 TABLET, FILM COATED ORAL at 21:52

## 2023-01-01 RX ADMIN — GLYCOPYRROLATE 0.4 MG: 0.4 INJECTION INTRAMUSCULAR; INTRAVENOUS at 01:59

## 2023-01-01 RX ADMIN — HYDROMORPHONE HYDROCHLORIDE 1 MG: 1 INJECTION, SOLUTION INTRAMUSCULAR; INTRAVENOUS; SUBCUTANEOUS at 07:46

## 2023-01-01 RX ADMIN — HYDROMORPHONE HYDROCHLORIDE 1 MG: 1 INJECTION, SOLUTION INTRAMUSCULAR; INTRAVENOUS; SUBCUTANEOUS at 12:20

## 2023-01-01 RX ADMIN — IPRATROPIUM BROMIDE AND ALBUTEROL SULFATE 3 ML: 2.5; .5 SOLUTION RESPIRATORY (INHALATION) at 09:01

## 2023-01-01 RX ADMIN — BUDESONIDE 0.5 MG: 0.5 SUSPENSION RESPIRATORY (INHALATION) at 21:13

## 2023-01-01 RX ADMIN — GLYCOPYRROLATE 0.2 MG: 0.2 INJECTION INTRAMUSCULAR; INTRAVENOUS at 15:47

## 2023-01-01 RX ADMIN — OXYCODONE HYDROCHLORIDE 10 MG: 5 TABLET ORAL at 16:01

## 2023-01-01 RX ADMIN — CALCIUM ACETATE 1334 MG: 667 CAPSULE ORAL at 10:40

## 2023-01-01 RX ADMIN — HYDROMORPHONE HYDROCHLORIDE 0.5 MG: 1 INJECTION, SOLUTION INTRAMUSCULAR; INTRAVENOUS; SUBCUTANEOUS at 13:33

## 2023-01-01 RX ADMIN — CEFEPIME HYDROCHLORIDE 2 G: 2 INJECTION, POWDER, FOR SOLUTION INTRAVENOUS at 14:31

## 2023-01-01 RX ADMIN — ALLOPURINOL 300 MG: 300 TABLET ORAL at 21:21

## 2023-01-01 RX ADMIN — ALLOPURINOL 300 MG: 300 TABLET ORAL at 08:29

## 2023-01-01 RX ADMIN — DEXMEDETOMIDINE HYDROCHLORIDE 0.5 MCG/KG/HR: 400 INJECTION INTRAVENOUS at 00:08

## 2023-01-01 RX ADMIN — BUMETANIDE 1 MG: 1 TABLET ORAL at 08:45

## 2023-01-01 RX ADMIN — DEXMEDETOMIDINE HYDROCHLORIDE 0.6 MCG/KG/HR: 400 INJECTION INTRAVENOUS at 12:18

## 2023-01-01 RX ADMIN — CALCIUM ACETATE 1334 MG: 667 CAPSULE ORAL at 09:42

## 2023-01-01 RX ADMIN — OXYCODONE HYDROCHLORIDE 10 MG: 5 TABLET ORAL at 00:15

## 2023-01-01 RX ADMIN — HALOPERIDOL LACTATE 5 MG: 5 INJECTION, SOLUTION INTRAMUSCULAR at 13:27

## 2023-01-01 RX ADMIN — DEXMEDETOMIDINE HYDROCHLORIDE 0.6 MCG/KG/HR: 400 INJECTION INTRAVENOUS at 15:47

## 2023-01-01 RX ADMIN — OXYCODONE HYDROCHLORIDE 5 MG: 5 TABLET ORAL at 18:09

## 2023-01-01 RX ADMIN — HYDROMORPHONE HYDROCHLORIDE 1 MG: 1 INJECTION, SOLUTION INTRAMUSCULAR; INTRAVENOUS; SUBCUTANEOUS at 00:58

## 2023-01-01 RX ADMIN — ACYCLOVIR 400 MG: 400 TABLET ORAL at 21:27

## 2023-01-01 RX ADMIN — GLYCOPYRROLATE 0.2 MG: 0.2 INJECTION INTRAMUSCULAR; INTRAVENOUS at 14:42

## 2023-01-01 RX ADMIN — DEXMEDETOMIDINE HYDROCHLORIDE 0.7 MCG/KG/HR: 400 INJECTION INTRAVENOUS at 00:18

## 2023-01-01 RX ADMIN — BUMETANIDE 1 MG: 1 TABLET ORAL at 07:34

## 2023-01-01 RX ADMIN — CEFEPIME HYDROCHLORIDE 2 G: 2 INJECTION, POWDER, FOR SOLUTION INTRAVENOUS at 06:32

## 2023-01-01 RX ADMIN — GLYCOPYRROLATE 0.2 MG: 0.2 INJECTION INTRAMUSCULAR; INTRAVENOUS at 02:16

## 2023-01-01 RX ADMIN — DEXMEDETOMIDINE HYDROCHLORIDE 0.7 MCG/KG/HR: 400 INJECTION INTRAVENOUS at 03:23

## 2023-01-01 RX ADMIN — ENOXAPARIN SODIUM 80 MG: 80 INJECTION SUBCUTANEOUS at 09:39

## 2023-01-01 RX ADMIN — ACETAMINOPHEN 650 MG: 325 TABLET, FILM COATED ORAL at 22:53

## 2023-01-01 RX ADMIN — HYDROMORPHONE HYDROCHLORIDE 3 MG: 1 INJECTION, SOLUTION INTRAMUSCULAR; INTRAVENOUS; SUBCUTANEOUS at 14:42

## 2023-01-01 RX ADMIN — METHOCARBAMOL 500 MG: 500 TABLET ORAL at 21:27

## 2023-01-01 RX ADMIN — GLYCOPYRROLATE 0.2 MG: 0.2 INJECTION INTRAMUSCULAR; INTRAVENOUS at 00:03

## 2023-01-01 RX ADMIN — ATROPINE SULFATE 2 DROP: 10 SOLUTION/ DROPS OPHTHALMIC at 09:02

## 2023-01-01 RX ADMIN — POLYETHYLENE GLYCOL 3350 17 G: 17 POWDER, FOR SOLUTION ORAL at 08:42

## 2023-01-01 RX ADMIN — SODIUM CHLORIDE, PRESERVATIVE FREE 5 ML: 5 INJECTION INTRAVENOUS at 05:40

## 2023-01-01 RX ADMIN — CEFEPIME HYDROCHLORIDE 2 G: 2 INJECTION, POWDER, FOR SOLUTION INTRAVENOUS at 05:57

## 2023-01-01 RX ADMIN — HALOPERIDOL LACTATE 5 MG: 5 INJECTION, SOLUTION INTRAMUSCULAR at 05:50

## 2023-01-01 RX ADMIN — HYDROMORPHONE HYDROCHLORIDE 2 MG: 1 INJECTION, SOLUTION INTRAMUSCULAR; INTRAVENOUS; SUBCUTANEOUS at 05:32

## 2023-01-01 RX ADMIN — Medication 2 SPRAY: at 00:15

## 2023-01-01 RX ADMIN — IPRATROPIUM BROMIDE AND ALBUTEROL SULFATE 3 ML: 2.5; .5 SOLUTION RESPIRATORY (INHALATION) at 09:27

## 2023-01-01 RX ADMIN — CEFEPIME HYDROCHLORIDE 2 G: 2 INJECTION, POWDER, FOR SOLUTION INTRAVENOUS at 21:28

## 2023-01-01 RX ADMIN — CEFEPIME HYDROCHLORIDE 2 G: 2 INJECTION, POWDER, FOR SOLUTION INTRAVENOUS at 05:51

## 2023-01-01 RX ADMIN — HYDROMORPHONE HYDROCHLORIDE 0.5 MG: 1 INJECTION, SOLUTION INTRAMUSCULAR; INTRAVENOUS; SUBCUTANEOUS at 06:43

## 2023-01-01 RX ADMIN — OXYCODONE HYDROCHLORIDE 5 MG: 5 TABLET ORAL at 03:15

## 2023-01-01 RX ADMIN — POLYETHYLENE GLYCOL 3350 17 G: 17 POWDER, FOR SOLUTION ORAL at 07:34

## 2023-01-01 RX ADMIN — POTASSIUM CHLORIDE 40 MEQ: 1.5 POWDER, FOR SOLUTION ORAL at 07:34

## 2023-01-01 RX ADMIN — HYDROMORPHONE HYDROCHLORIDE 2 MG: 1 INJECTION, SOLUTION INTRAMUSCULAR; INTRAVENOUS; SUBCUTANEOUS at 17:19

## 2023-01-01 RX ADMIN — ACETAMINOPHEN 650 MG: 325 TABLET, FILM COATED ORAL at 02:53

## 2023-01-01 RX ADMIN — HYDROMORPHONE HYDROCHLORIDE 2 MG: 1 INJECTION, SOLUTION INTRAMUSCULAR; INTRAVENOUS; SUBCUTANEOUS at 06:15

## 2023-01-01 RX ADMIN — DEXAMETHASONE 4 MG: 4 TABLET ORAL at 21:27

## 2023-01-01 RX ADMIN — HYDROMORPHONE HYDROCHLORIDE 2 MG: 1 INJECTION, SOLUTION INTRAMUSCULAR; INTRAVENOUS; SUBCUTANEOUS at 02:42

## 2023-01-01 RX ADMIN — MAGNESIUM HYDROXIDE 30 ML: 400 SUSPENSION ORAL at 18:35

## 2023-01-01 RX ADMIN — HYDROMORPHONE HYDROCHLORIDE 2 MG: 1 INJECTION, SOLUTION INTRAMUSCULAR; INTRAVENOUS; SUBCUTANEOUS at 13:36

## 2023-01-01 RX ADMIN — MIDAZOLAM HYDROCHLORIDE 1 MG: 1 INJECTION, SOLUTION INTRAMUSCULAR; INTRAVENOUS at 11:43

## 2023-01-01 RX ADMIN — ENOXAPARIN SODIUM 135 MG: 150 INJECTION SUBCUTANEOUS at 21:30

## 2023-01-01 RX ADMIN — BUDESONIDE 0.5 MG: 0.5 SUSPENSION RESPIRATORY (INHALATION) at 09:01

## 2023-01-01 RX ADMIN — HYDROMORPHONE HYDROCHLORIDE 1 MG: 1 INJECTION, SOLUTION INTRAMUSCULAR; INTRAVENOUS; SUBCUTANEOUS at 11:05

## 2023-01-01 RX ADMIN — SODIUM CHLORIDE, PRESERVATIVE FREE 5 ML: 5 INJECTION INTRAVENOUS at 16:00

## 2023-01-01 RX ADMIN — GLYCOPYRROLATE 0.2 MG: 0.2 INJECTION INTRAMUSCULAR; INTRAVENOUS at 18:52

## 2023-01-01 RX ADMIN — SODIUM CHLORIDE: 9 INJECTION, SOLUTION INTRAVENOUS at 11:45

## 2023-01-01 RX ADMIN — GLYCOPYRROLATE 0.2 MG: 0.2 INJECTION INTRAMUSCULAR; INTRAVENOUS at 09:00

## 2023-01-01 RX ADMIN — HYDROMORPHONE HYDROCHLORIDE 1 MG: 1 INJECTION, SOLUTION INTRAMUSCULAR; INTRAVENOUS; SUBCUTANEOUS at 22:55

## 2023-01-01 RX ADMIN — DEXMEDETOMIDINE HYDROCHLORIDE 0.5 MCG/KG/HR: 400 INJECTION INTRAVENOUS at 19:59

## 2023-01-01 RX ADMIN — DEXMEDETOMIDINE HYDROCHLORIDE 0.7 MCG/KG/HR: 400 INJECTION INTRAVENOUS at 17:59

## 2023-01-01 RX ADMIN — POLYETHYLENE GLYCOL 3350 17 G: 17 POWDER, FOR SOLUTION ORAL at 09:41

## 2023-01-01 RX ADMIN — MICAFUNGIN 50 MG: 10 INJECTION, POWDER, LYOPHILIZED, FOR SOLUTION INTRAVENOUS at 20:10

## 2023-01-01 RX ADMIN — CYCLOBENZAPRINE 10 MG: 10 TABLET, FILM COATED ORAL at 05:43

## 2023-01-01 RX ADMIN — ACYCLOVIR 400 MG: 400 TABLET ORAL at 08:45

## 2023-01-01 RX ADMIN — SODIUM CHLORIDE, PRESERVATIVE FREE 5 ML: 5 INJECTION INTRAVENOUS at 06:32

## 2023-01-01 RX ADMIN — SODIUM CHLORIDE: 9 INJECTION, SOLUTION INTRAVENOUS at 17:27

## 2023-01-01 RX ADMIN — ENOXAPARIN SODIUM 135 MG: 150 INJECTION SUBCUTANEOUS at 09:43

## 2023-01-01 RX ADMIN — Medication: at 10:46

## 2023-01-01 RX ADMIN — HYDROMORPHONE HYDROCHLORIDE 2 MG: 1 INJECTION, SOLUTION INTRAMUSCULAR; INTRAVENOUS; SUBCUTANEOUS at 11:13

## 2023-01-01 RX ADMIN — HYDROMORPHONE HYDROCHLORIDE 0.5 MG: 1 INJECTION, SOLUTION INTRAMUSCULAR; INTRAVENOUS; SUBCUTANEOUS at 23:35

## 2023-01-01 RX ADMIN — DEXAMETHASONE 4 MG: 4 TABLET ORAL at 11:19

## 2023-01-01 RX ADMIN — CEFEPIME HYDROCHLORIDE 2 G: 2 INJECTION, POWDER, FOR SOLUTION INTRAVENOUS at 21:30

## 2023-01-01 RX ADMIN — HYDROMORPHONE HYDROCHLORIDE 2 MG: 1 INJECTION, SOLUTION INTRAMUSCULAR; INTRAVENOUS; SUBCUTANEOUS at 10:05

## 2023-01-01 RX ADMIN — LORAZEPAM 1 MG: 2 INJECTION INTRAMUSCULAR; INTRAVENOUS at 22:33

## 2023-01-01 RX ADMIN — ACETAMINOPHEN 650 MG: 325 TABLET, FILM COATED ORAL at 11:07

## 2023-01-01 RX ADMIN — FENTANYL CITRATE 50 MCG: 50 INJECTION, SOLUTION INTRAMUSCULAR; INTRAVENOUS at 11:55

## 2023-01-01 RX ADMIN — CEFEPIME HYDROCHLORIDE 2 G: 2 INJECTION, POWDER, FOR SOLUTION INTRAVENOUS at 22:15

## 2023-01-01 RX ADMIN — ENOXAPARIN SODIUM 135 MG: 150 INJECTION SUBCUTANEOUS at 20:07

## 2023-01-01 RX ADMIN — PANTOPRAZOLE SODIUM 40 MG: 40 TABLET, DELAYED RELEASE ORAL at 07:33

## 2023-01-01 RX ADMIN — HYDROMORPHONE HYDROCHLORIDE 2 MG: 1 INJECTION, SOLUTION INTRAMUSCULAR; INTRAVENOUS; SUBCUTANEOUS at 11:52

## 2023-01-01 RX ADMIN — LORAZEPAM 1 MG: 2 INJECTION INTRAMUSCULAR; INTRAVENOUS at 21:04

## 2023-01-01 RX ADMIN — IPRATROPIUM BROMIDE AND ALBUTEROL SULFATE 3 ML: 2.5; .5 SOLUTION RESPIRATORY (INHALATION) at 22:26

## 2023-01-01 RX ADMIN — CALCIUM ACETATE 1334 MG: 667 CAPSULE ORAL at 20:06

## 2023-01-01 RX ADMIN — HYDROMORPHONE HYDROCHLORIDE 1 MG: 1 INJECTION, SOLUTION INTRAMUSCULAR; INTRAVENOUS; SUBCUTANEOUS at 18:13

## 2023-01-01 RX ADMIN — DEXMEDETOMIDINE HYDROCHLORIDE 0.5 MCG/KG/HR: 400 INJECTION INTRAVENOUS at 08:24

## 2023-01-01 RX ADMIN — HYDROMORPHONE HYDROCHLORIDE 1 MG: 1 INJECTION, SOLUTION INTRAMUSCULAR; INTRAVENOUS; SUBCUTANEOUS at 01:59

## 2023-01-01 RX ADMIN — HYDROMORPHONE HYDROCHLORIDE 1 MG: 1 INJECTION, SOLUTION INTRAMUSCULAR; INTRAVENOUS; SUBCUTANEOUS at 09:45

## 2023-01-01 RX ADMIN — ACETAMINOPHEN 650 MG: 325 TABLET, FILM COATED ORAL at 17:20

## 2023-01-01 RX ADMIN — GLYCOPYRROLATE 0.2 MG: 0.2 INJECTION INTRAMUSCULAR; INTRAVENOUS at 01:10

## 2023-01-01 RX ADMIN — BUMETANIDE 2 MG: 1 TABLET ORAL at 14:52

## 2023-01-01 RX ADMIN — HYDROMORPHONE HYDROCHLORIDE 1 MG: 1 INJECTION, SOLUTION INTRAMUSCULAR; INTRAVENOUS; SUBCUTANEOUS at 15:17

## 2023-01-01 RX ADMIN — CYTARABINE 20 MG: 100 INJECTION, SOLUTION INTRATHECAL; INTRAVENOUS; SUBCUTANEOUS at 20:13

## 2023-01-01 RX ADMIN — LORAZEPAM 1 MG: 2 INJECTION INTRAMUSCULAR; INTRAVENOUS at 04:07

## 2023-01-01 RX ADMIN — HYDROMORPHONE HYDROCHLORIDE 3 MG: 1 INJECTION, SOLUTION INTRAMUSCULAR; INTRAVENOUS; SUBCUTANEOUS at 18:52

## 2023-01-01 RX ADMIN — METHOCARBAMOL 500 MG: 500 TABLET ORAL at 16:41

## 2023-01-01 RX ADMIN — SODIUM CHLORIDE, PRESERVATIVE FREE 5 ML: 5 INJECTION INTRAVENOUS at 09:46

## 2023-01-01 RX ADMIN — BUMETANIDE 1 MG: 1 TABLET ORAL at 09:42

## 2023-01-01 RX ADMIN — ACYCLOVIR 400 MG: 400 TABLET ORAL at 20:14

## 2023-01-01 RX ADMIN — Medication 2 SPRAY: at 20:12

## 2023-01-01 RX ADMIN — BUMETANIDE 2 MG: 0.25 INJECTION INTRAMUSCULAR; INTRAVENOUS at 17:20

## 2023-01-01 RX ADMIN — DEXMEDETOMIDINE HYDROCHLORIDE 0.5 MCG/KG/HR: 400 INJECTION INTRAVENOUS at 10:52

## 2023-01-01 RX ADMIN — ENOXAPARIN SODIUM 80 MG: 80 INJECTION SUBCUTANEOUS at 08:29

## 2023-01-01 RX ADMIN — GLYCOPYRROLATE 0.4 MG: 0.4 INJECTION INTRAMUSCULAR; INTRAVENOUS at 06:33

## 2023-01-01 RX ADMIN — ALLOPURINOL 300 MG: 300 TABLET ORAL at 10:40

## 2023-01-01 RX ADMIN — OXYCODONE HYDROCHLORIDE 10 MG: 10 TABLET ORAL at 16:41

## 2023-01-01 RX ADMIN — POLYETHYLENE GLYCOL 3350 17 G: 17 POWDER, FOR SOLUTION ORAL at 16:08

## 2023-01-01 RX ADMIN — GLYCOPYRROLATE 0.4 MG: 0.4 INJECTION INTRAMUSCULAR; INTRAVENOUS at 11:05

## 2023-01-01 RX ADMIN — DEXAMETHASONE 4 MG: 4 TABLET ORAL at 08:29

## 2023-01-01 RX ADMIN — HYDROMORPHONE HYDROCHLORIDE 1 MG: 1 INJECTION, SOLUTION INTRAMUSCULAR; INTRAVENOUS; SUBCUTANEOUS at 13:20

## 2023-01-01 RX ADMIN — Medication: at 06:25

## 2023-01-01 RX ADMIN — MIDAZOLAM HYDROCHLORIDE 1 MG: 1 INJECTION, SOLUTION INTRAMUSCULAR; INTRAVENOUS at 11:33

## 2023-01-01 RX ADMIN — MIDAZOLAM HYDROCHLORIDE 1 MG: 1 INJECTION, SOLUTION INTRAMUSCULAR; INTRAVENOUS at 11:48

## 2023-01-01 RX ADMIN — SODIUM CHLORIDE 6 MG: 9 INJECTION, SOLUTION INTRAVENOUS at 20:17

## 2023-01-01 RX ADMIN — HYDROMORPHONE HYDROCHLORIDE 2 MG: 1 INJECTION, SOLUTION INTRAMUSCULAR; INTRAVENOUS; SUBCUTANEOUS at 12:54

## 2023-01-01 RX ADMIN — LORAZEPAM 1 MG: 2 INJECTION INTRAMUSCULAR; INTRAVENOUS at 23:27

## 2023-01-01 RX ADMIN — CYTARABINE 20 MG: 100 INJECTION, SOLUTION INTRATHECAL; INTRAVENOUS; SUBCUTANEOUS at 06:21

## 2023-01-01 RX ADMIN — HYDROMORPHONE HYDROCHLORIDE 2 MG: 1 INJECTION, SOLUTION INTRAMUSCULAR; INTRAVENOUS; SUBCUTANEOUS at 08:59

## 2023-01-01 RX ADMIN — ALLOPURINOL 300 MG: 300 TABLET ORAL at 08:42

## 2023-01-01 RX ADMIN — PANTOPRAZOLE SODIUM 40 MG: 40 TABLET, DELAYED RELEASE ORAL at 08:46

## 2023-01-01 RX ADMIN — CYCLOBENZAPRINE 10 MG: 10 TABLET, FILM COATED ORAL at 18:29

## 2023-01-01 RX ADMIN — LORAZEPAM 1 MG: 2 INJECTION INTRAMUSCULAR; INTRAVENOUS at 03:18

## 2023-01-01 RX ADMIN — GLYCOPYRROLATE 0.2 MG: 0.2 INJECTION INTRAMUSCULAR; INTRAVENOUS at 13:36

## 2023-01-01 RX ADMIN — VANCOMYCIN HYDROCHLORIDE 1250 MG: 10 INJECTION, POWDER, LYOPHILIZED, FOR SOLUTION INTRAVENOUS at 06:20

## 2023-01-01 RX ADMIN — SODIUM CHLORIDE, PRESERVATIVE FREE 5 ML: 5 INJECTION INTRAVENOUS at 16:18

## 2023-01-01 RX ADMIN — OLANZAPINE 10 MG: 5 TABLET, ORALLY DISINTEGRATING ORAL at 13:27

## 2023-01-01 RX ADMIN — CYCLOBENZAPRINE 10 MG: 10 TABLET, FILM COATED ORAL at 10:59

## 2023-01-01 RX ADMIN — SODIUM CHLORIDE, PRESERVATIVE FREE 5 ML: 5 INJECTION INTRAVENOUS at 09:18

## 2023-01-01 RX ADMIN — GLYCOPYRROLATE 0.4 MG: 0.4 INJECTION INTRAMUSCULAR; INTRAVENOUS at 18:13

## 2023-01-01 RX ADMIN — IPRATROPIUM BROMIDE AND ALBUTEROL SULFATE 3 ML: 2.5; .5 SOLUTION RESPIRATORY (INHALATION) at 08:09

## 2023-01-01 RX ADMIN — DEXMEDETOMIDINE HYDROCHLORIDE 0.6 MCG/KG/HR: 400 INJECTION INTRAVENOUS at 02:12

## 2023-01-01 RX ADMIN — HYDROMORPHONE HYDROCHLORIDE 2 MG: 1 INJECTION, SOLUTION INTRAMUSCULAR; INTRAVENOUS; SUBCUTANEOUS at 03:18

## 2023-01-01 RX ADMIN — HYDROMORPHONE HYDROCHLORIDE 2 MG: 1 INJECTION, SOLUTION INTRAMUSCULAR; INTRAVENOUS; SUBCUTANEOUS at 08:56

## 2023-01-01 RX ADMIN — PANTOPRAZOLE SODIUM 40 MG: 40 TABLET, DELAYED RELEASE ORAL at 10:40

## 2023-01-01 RX ADMIN — Medication: at 13:16

## 2023-01-01 RX ADMIN — CALCIUM ACETATE 1334 MG: 667 CAPSULE ORAL at 13:27

## 2023-01-01 RX ADMIN — DEXMEDETOMIDINE HYDROCHLORIDE 0.2 MCG/KG/HR: 400 INJECTION INTRAVENOUS at 02:45

## 2023-01-01 RX ADMIN — HYDROMORPHONE HYDROCHLORIDE 2 MG: 1 INJECTION, SOLUTION INTRAMUSCULAR; INTRAVENOUS; SUBCUTANEOUS at 13:00

## 2023-01-01 RX ADMIN — MIDAZOLAM HYDROCHLORIDE 1 MG: 1 INJECTION, SOLUTION INTRAMUSCULAR; INTRAVENOUS at 11:54

## 2023-01-01 RX ADMIN — OXYCODONE HYDROCHLORIDE 10 MG: 5 TABLET ORAL at 16:45

## 2023-01-01 RX ADMIN — OXYCODONE HYDROCHLORIDE 10 MG: 5 TABLET ORAL at 10:43

## 2023-01-01 RX ADMIN — ALLOPURINOL 300 MG: 300 TABLET ORAL at 20:09

## 2023-01-01 RX ADMIN — IPRATROPIUM BROMIDE AND ALBUTEROL SULFATE 3 ML: 2.5; .5 SOLUTION RESPIRATORY (INHALATION) at 12:01

## 2023-01-01 RX ADMIN — GLYCOPYRROLATE 0.2 MG: 0.2 INJECTION INTRAMUSCULAR; INTRAVENOUS at 20:02

## 2023-01-01 RX ADMIN — GLYCOPYRROLATE 0.2 MG: 0.2 INJECTION INTRAMUSCULAR; INTRAVENOUS at 17:28

## 2023-01-01 RX ADMIN — HYDROMORPHONE HYDROCHLORIDE 2 MG: 1 INJECTION, SOLUTION INTRAMUSCULAR; INTRAVENOUS; SUBCUTANEOUS at 22:16

## 2023-01-01 RX ADMIN — OXYCODONE HYDROCHLORIDE 10 MG: 5 TABLET ORAL at 16:39

## 2023-01-01 RX ADMIN — HYDROMORPHONE HYDROCHLORIDE 1 MG: 1 INJECTION, SOLUTION INTRAMUSCULAR; INTRAVENOUS; SUBCUTANEOUS at 21:41

## 2023-01-01 RX ADMIN — OXYCODONE HYDROCHLORIDE 10 MG: 5 TABLET ORAL at 07:33

## 2023-01-01 RX ADMIN — IPRATROPIUM BROMIDE AND ALBUTEROL SULFATE 3 ML: 2.5; .5 SOLUTION RESPIRATORY (INHALATION) at 11:47

## 2023-01-01 RX ADMIN — HYDROMORPHONE HYDROCHLORIDE 2 MG: 1 INJECTION, SOLUTION INTRAMUSCULAR; INTRAVENOUS; SUBCUTANEOUS at 18:23

## 2023-01-01 RX ADMIN — ATROPINE SULFATE 2 DROP: 10 SOLUTION/ DROPS OPHTHALMIC at 05:32

## 2023-01-01 RX ADMIN — ACETAMINOPHEN 650 MG: 325 TABLET, FILM COATED ORAL at 12:31

## 2023-01-01 RX ADMIN — HYDROMORPHONE HYDROCHLORIDE 2 MG: 1 INJECTION, SOLUTION INTRAMUSCULAR; INTRAVENOUS; SUBCUTANEOUS at 01:23

## 2023-01-01 RX ADMIN — LORAZEPAM 1 MG: 2 INJECTION INTRAMUSCULAR; INTRAVENOUS at 19:58

## 2023-01-01 RX ADMIN — LORAZEPAM 1 MG: 2 INJECTION INTRAMUSCULAR; INTRAVENOUS at 16:10

## 2023-01-01 RX ADMIN — DEXMEDETOMIDINE HYDROCHLORIDE 0.7 MCG/KG/HR: 400 INJECTION INTRAVENOUS at 21:08

## 2023-01-01 RX ADMIN — LORAZEPAM 1 MG: 2 INJECTION INTRAMUSCULAR; INTRAVENOUS at 21:30

## 2023-01-01 RX ADMIN — GLYCOPYRROLATE 0.4 MG: 0.4 INJECTION INTRAMUSCULAR; INTRAVENOUS at 05:24

## 2023-01-01 RX ADMIN — INSULIN ASPART 1 UNITS: 100 INJECTION, SOLUTION INTRAVENOUS; SUBCUTANEOUS at 18:02

## 2023-01-01 RX ADMIN — HYDROMORPHONE HYDROCHLORIDE 1 MG: 1 INJECTION, SOLUTION INTRAMUSCULAR; INTRAVENOUS; SUBCUTANEOUS at 00:04

## 2023-01-01 RX ADMIN — ACYCLOVIR 400 MG: 400 TABLET ORAL at 10:40

## 2023-01-01 RX ADMIN — HYDROMORPHONE HYDROCHLORIDE 1 MG: 1 INJECTION, SOLUTION INTRAMUSCULAR; INTRAVENOUS; SUBCUTANEOUS at 06:23

## 2023-01-01 RX ADMIN — CEFEPIME HYDROCHLORIDE 2 G: 2 INJECTION, POWDER, FOR SOLUTION INTRAVENOUS at 13:27

## 2023-01-01 RX ADMIN — Medication: at 06:24

## 2023-01-01 RX ADMIN — PANTOPRAZOLE SODIUM 40 MG: 40 TABLET, DELAYED RELEASE ORAL at 06:31

## 2023-01-01 RX ADMIN — OXYCODONE HYDROCHLORIDE 20 MG: 10 TABLET ORAL at 00:54

## 2023-01-01 RX ADMIN — OXYCODONE HYDROCHLORIDE 10 MG: 5 TABLET ORAL at 19:24

## 2023-01-01 RX ADMIN — FLUTICASONE FUROATE AND VILANTEROL TRIFENATATE 1 PUFF: 200; 25 POWDER RESPIRATORY (INHALATION) at 09:45

## 2023-01-01 RX ADMIN — SENNOSIDES AND DOCUSATE SODIUM 1 TABLET: 50; 8.6 TABLET ORAL at 20:09

## 2023-01-01 RX ADMIN — GLYCOPYRROLATE 0.4 MG: 0.4 INJECTION INTRAMUSCULAR; INTRAVENOUS at 17:24

## 2023-01-01 RX ADMIN — GLYCOPYRROLATE 0.4 MG: 0.4 INJECTION INTRAMUSCULAR; INTRAVENOUS at 16:19

## 2023-01-01 RX ADMIN — SENNOSIDES AND DOCUSATE SODIUM 2 TABLET: 8.6; 5 TABLET ORAL at 08:29

## 2023-01-01 RX ADMIN — HYDROMORPHONE HYDROCHLORIDE 1 MG: 1 INJECTION, SOLUTION INTRAMUSCULAR; INTRAVENOUS; SUBCUTANEOUS at 06:33

## 2023-01-01 RX ADMIN — CYTARABINE 20 MG: 100 INJECTION, SOLUTION INTRATHECAL; INTRAVENOUS; SUBCUTANEOUS at 06:04

## 2023-01-01 RX ADMIN — Medication: at 03:29

## 2023-01-01 RX ADMIN — HALOPERIDOL LACTATE 5 MG: 5 INJECTION, SOLUTION INTRAMUSCULAR at 01:39

## 2023-01-01 RX ADMIN — GLYCOPYRROLATE 0.2 MG: 0.2 INJECTION INTRAMUSCULAR; INTRAVENOUS at 20:11

## 2023-01-01 RX ADMIN — ACYCLOVIR 400 MG: 400 TABLET ORAL at 07:34

## 2023-01-01 RX ADMIN — INSULIN ASPART 1 UNITS: 100 INJECTION, SOLUTION INTRAVENOUS; SUBCUTANEOUS at 09:09

## 2023-01-01 RX ADMIN — HYDROMORPHONE HYDROCHLORIDE 1 MG: 1 INJECTION, SOLUTION INTRAMUSCULAR; INTRAVENOUS; SUBCUTANEOUS at 23:38

## 2023-01-01 RX ADMIN — HYDROMORPHONE HYDROCHLORIDE 0.5 MG: 1 INJECTION, SOLUTION INTRAMUSCULAR; INTRAVENOUS; SUBCUTANEOUS at 14:33

## 2023-01-01 RX ADMIN — IOPAMIDOL 135 ML: 755 INJECTION, SOLUTION INTRAVENOUS at 15:07

## 2023-01-01 RX ADMIN — ATROPINE SULFATE 2 DROP: 10 SOLUTION/ DROPS OPHTHALMIC at 07:46

## 2023-01-01 RX ADMIN — MICAFUNGIN 50 MG: 10 INJECTION, POWDER, LYOPHILIZED, FOR SOLUTION INTRAVENOUS at 19:12

## 2023-01-01 RX ADMIN — HYDROMORPHONE HYDROCHLORIDE 1 MG: 1 INJECTION, SOLUTION INTRAMUSCULAR; INTRAVENOUS; SUBCUTANEOUS at 21:30

## 2023-01-01 RX ADMIN — DEXMEDETOMIDINE HYDROCHLORIDE 0.5 MCG/KG/HR: 400 INJECTION INTRAVENOUS at 20:02

## 2023-01-01 RX ADMIN — CYTARABINE 20 MG: 100 INJECTION, SOLUTION INTRATHECAL; INTRAVENOUS; SUBCUTANEOUS at 18:27

## 2023-01-01 RX ADMIN — DEXMEDETOMIDINE HYDROCHLORIDE 0.6 MCG/KG/HR: 400 INJECTION INTRAVENOUS at 19:33

## 2023-01-01 RX ADMIN — ACYCLOVIR 400 MG: 400 TABLET ORAL at 20:09

## 2023-01-01 RX ADMIN — BUMETANIDE 2 MG: 0.25 INJECTION INTRAMUSCULAR; INTRAVENOUS at 08:30

## 2023-01-01 RX ADMIN — HYDROMORPHONE HYDROCHLORIDE 1 MG: 1 INJECTION, SOLUTION INTRAMUSCULAR; INTRAVENOUS; SUBCUTANEOUS at 05:04

## 2023-01-01 RX ADMIN — SENNOSIDES AND DOCUSATE SODIUM 1 TABLET: 50; 8.6 TABLET ORAL at 06:42

## 2023-01-01 RX ADMIN — PANTOPRAZOLE SODIUM 40 MG: 40 TABLET, DELAYED RELEASE ORAL at 09:42

## 2023-01-01 RX ADMIN — HYDROMORPHONE HYDROCHLORIDE 1 MG: 1 INJECTION, SOLUTION INTRAMUSCULAR; INTRAVENOUS; SUBCUTANEOUS at 16:20

## 2023-01-01 RX ADMIN — SENNOSIDES AND DOCUSATE SODIUM 2 TABLET: 8.6; 5 TABLET ORAL at 10:40

## 2023-01-01 RX ADMIN — ATROPINE SULFATE 2 DROP: 10 SOLUTION/ DROPS OPHTHALMIC at 14:17

## 2023-01-01 RX ADMIN — IPRATROPIUM BROMIDE AND ALBUTEROL SULFATE 3 ML: 2.5; .5 SOLUTION RESPIRATORY (INHALATION) at 19:52

## 2023-01-01 RX ADMIN — Medication: at 21:30

## 2023-01-01 RX ADMIN — METHOCARBAMOL 500 MG: 500 TABLET ORAL at 21:47

## 2023-01-01 RX ADMIN — GLYCOPYRROLATE 0.4 MG: 0.4 INJECTION INTRAMUSCULAR; INTRAVENOUS at 14:16

## 2023-01-01 RX ADMIN — VANCOMYCIN HYDROCHLORIDE 2000 MG: 1 INJECTION, POWDER, LYOPHILIZED, FOR SOLUTION INTRAVENOUS at 16:24

## 2023-01-01 RX ADMIN — CEFEPIME HYDROCHLORIDE 2 G: 2 INJECTION, POWDER, FOR SOLUTION INTRAVENOUS at 06:03

## 2023-01-01 RX ADMIN — SCOPALAMINE 1 PATCH: 1 PATCH, EXTENDED RELEASE TRANSDERMAL at 11:05

## 2023-01-01 RX ADMIN — OXYCODONE HYDROCHLORIDE 5 MG: 5 TABLET ORAL at 18:10

## 2023-01-01 RX ADMIN — HYDROMORPHONE HYDROCHLORIDE 1 MG: 1 INJECTION, SOLUTION INTRAMUSCULAR; INTRAVENOUS; SUBCUTANEOUS at 02:16

## 2023-01-01 RX ADMIN — IPRATROPIUM BROMIDE AND ALBUTEROL SULFATE 3 ML: 2.5; .5 SOLUTION RESPIRATORY (INHALATION) at 20:07

## 2023-01-01 RX ADMIN — CALCIUM ACETATE 1334 MG: 667 CAPSULE ORAL at 08:46

## 2023-01-01 RX ADMIN — ALLOPURINOL 300 MG: 300 TABLET ORAL at 09:42

## 2023-01-01 RX ADMIN — LORAZEPAM 1 MG: 2 INJECTION INTRAMUSCULAR; INTRAVENOUS at 08:56

## 2023-01-01 RX ADMIN — HYDROMORPHONE HYDROCHLORIDE 1 MG: 1 INJECTION, SOLUTION INTRAMUSCULAR; INTRAVENOUS; SUBCUTANEOUS at 08:33

## 2023-01-01 RX ADMIN — ENOXAPARIN SODIUM 80 MG: 80 INJECTION SUBCUTANEOUS at 21:25

## 2023-01-01 RX ADMIN — OXYCODONE HYDROCHLORIDE 20 MG: 10 TABLET ORAL at 21:51

## 2023-01-01 RX ADMIN — METHOCARBAMOL 500 MG: 500 TABLET ORAL at 16:08

## 2023-01-01 RX ADMIN — HYDROMORPHONE HYDROCHLORIDE 2 MG: 1 INJECTION, SOLUTION INTRAMUSCULAR; INTRAVENOUS; SUBCUTANEOUS at 10:53

## 2023-01-01 RX ADMIN — HYDROMORPHONE HYDROCHLORIDE 1 MG: 1 INJECTION, SOLUTION INTRAMUSCULAR; INTRAVENOUS; SUBCUTANEOUS at 20:31

## 2023-01-01 RX ADMIN — HYDROMORPHONE HYDROCHLORIDE 1 MG: 1 INJECTION, SOLUTION INTRAMUSCULAR; INTRAVENOUS; SUBCUTANEOUS at 05:23

## 2023-01-01 RX ADMIN — DEXMEDETOMIDINE HYDROCHLORIDE 0.7 MCG/KG/HR: 400 INJECTION INTRAVENOUS at 06:57

## 2023-01-01 RX ADMIN — BUMETANIDE 1 MG: 0.25 INJECTION INTRAMUSCULAR; INTRAVENOUS at 13:56

## 2023-01-01 RX ADMIN — IPRATROPIUM BROMIDE AND ALBUTEROL SULFATE 3 ML: 2.5; .5 SOLUTION RESPIRATORY (INHALATION) at 08:20

## 2023-01-01 RX ADMIN — CEFEPIME HYDROCHLORIDE 2 G: 2 INJECTION, POWDER, FOR SOLUTION INTRAVENOUS at 21:09

## 2023-01-01 RX ADMIN — ACETAMINOPHEN 650 MG: 325 TABLET, FILM COATED ORAL at 03:15

## 2023-01-01 RX ADMIN — LIDOCAINE HYDROCHLORIDE 5 ML: 10 INJECTION, SOLUTION EPIDURAL; INFILTRATION; INTRACAUDAL; PERINEURAL at 11:44

## 2023-01-01 RX ADMIN — POLYETHYLENE GLYCOL 3350 17 G: 17 POWDER, FOR SOLUTION ORAL at 21:21

## 2023-01-01 RX ADMIN — VANCOMYCIN HYDROCHLORIDE 2000 MG: 1 INJECTION, POWDER, LYOPHILIZED, FOR SOLUTION INTRAVENOUS at 14:12

## 2023-01-01 RX ADMIN — GLYCOPYRROLATE 0.4 MG: 0.4 INJECTION INTRAMUSCULAR; INTRAVENOUS at 04:05

## 2023-01-01 RX ADMIN — OXYCODONE HYDROCHLORIDE 5 MG: 5 TABLET ORAL at 22:53

## 2023-01-01 RX ADMIN — DEXMEDETOMIDINE HYDROCHLORIDE 0.6 MCG/KG/HR: 400 INJECTION INTRAVENOUS at 06:34

## 2023-01-01 RX ADMIN — ALLOPURINOL 300 MG: 300 TABLET ORAL at 20:14

## 2023-01-01 RX ADMIN — SODIUM CHLORIDE, PRESERVATIVE FREE 5 ML: 5 INJECTION INTRAVENOUS at 18:05

## 2023-01-01 RX ADMIN — INSULIN ASPART 1 UNITS: 100 INJECTION, SOLUTION INTRAVENOUS; SUBCUTANEOUS at 12:45

## 2023-01-01 RX ADMIN — CYTARABINE 20 MG: 100 INJECTION, SOLUTION INTRATHECAL; INTRAVENOUS; SUBCUTANEOUS at 13:35

## 2023-01-01 RX ADMIN — POLYETHYLENE GLYCOL 3350 17 G: 17 POWDER, FOR SOLUTION ORAL at 08:46

## 2023-01-01 RX ADMIN — DEXMEDETOMIDINE HYDROCHLORIDE 0.5 MCG/KG/HR: 400 INJECTION INTRAVENOUS at 11:13

## 2023-01-01 RX ADMIN — LORAZEPAM 1 MG: 2 INJECTION INTRAMUSCULAR; INTRAVENOUS at 11:51

## 2023-01-01 RX ADMIN — ATROPINE SULFATE 2 DROP: 10 SOLUTION/ DROPS OPHTHALMIC at 12:57

## 2023-01-01 RX ADMIN — OXYCODONE HYDROCHLORIDE 10 MG: 5 TABLET ORAL at 02:53

## 2023-01-01 RX ADMIN — HYDROMORPHONE HYDROCHLORIDE 2 MG: 1 INJECTION, SOLUTION INTRAMUSCULAR; INTRAVENOUS; SUBCUTANEOUS at 17:28

## 2023-01-01 RX ADMIN — IPRATROPIUM BROMIDE AND ALBUTEROL SULFATE 3 ML: 2.5; .5 SOLUTION RESPIRATORY (INHALATION) at 12:39

## 2023-01-01 RX ADMIN — SENNOSIDES AND DOCUSATE SODIUM 2 TABLET: 8.6; 5 TABLET ORAL at 18:28

## 2023-01-01 RX ADMIN — ACETAMINOPHEN 650 MG: 325 TABLET, FILM COATED ORAL at 12:48

## 2023-01-01 RX ADMIN — DEXMEDETOMIDINE HYDROCHLORIDE 0.5 MCG/KG/HR: 400 INJECTION INTRAVENOUS at 04:15

## 2023-01-01 RX ADMIN — SODIUM CHLORIDE, PRESERVATIVE FREE 10 ML: 5 INJECTION INTRAVENOUS at 13:56

## 2023-01-01 RX ADMIN — HYDROMORPHONE HYDROCHLORIDE 2 MG: 1 INJECTION, SOLUTION INTRAMUSCULAR; INTRAVENOUS; SUBCUTANEOUS at 10:00

## 2023-01-01 RX ADMIN — SODIUM CHLORIDE, PRESERVATIVE FREE 5 ML: 5 INJECTION INTRAVENOUS at 11:08

## 2023-01-01 RX ADMIN — SENNOSIDES AND DOCUSATE SODIUM 1 TABLET: 50; 8.6 TABLET ORAL at 07:34

## 2023-01-01 RX ADMIN — CEFEPIME HYDROCHLORIDE 2 G: 2 INJECTION, POWDER, FOR SOLUTION INTRAVENOUS at 14:51

## 2023-01-01 RX ADMIN — HYDROMORPHONE HYDROCHLORIDE 0.5 MG: 1 INJECTION, SOLUTION INTRAMUSCULAR; INTRAVENOUS; SUBCUTANEOUS at 06:35

## 2023-01-01 RX ADMIN — HYDROMORPHONE HYDROCHLORIDE 2 MG: 1 INJECTION, SOLUTION INTRAMUSCULAR; INTRAVENOUS; SUBCUTANEOUS at 03:48

## 2023-01-01 RX ADMIN — ALBUTEROL SULFATE 2.5 MG: 2.5 SOLUTION RESPIRATORY (INHALATION) at 16:46

## 2023-01-01 RX ADMIN — SODIUM CHLORIDE, PRESERVATIVE FREE 5 ML: 5 INJECTION INTRAVENOUS at 15:07

## 2023-01-01 RX ADMIN — ACYCLOVIR 400 MG: 400 TABLET ORAL at 19:14

## 2023-01-01 RX ADMIN — HYDROMORPHONE HYDROCHLORIDE 0.5 MG: 1 INJECTION, SOLUTION INTRAMUSCULAR; INTRAVENOUS; SUBCUTANEOUS at 11:08

## 2023-01-01 RX ADMIN — CALCIUM ACETATE 1334 MG: 667 CAPSULE ORAL at 18:34

## 2023-01-01 RX ADMIN — HYDROMORPHONE HYDROCHLORIDE 1 MG: 1 INJECTION, SOLUTION INTRAMUSCULAR; INTRAVENOUS; SUBCUTANEOUS at 01:10

## 2023-01-01 RX ADMIN — ACETAMINOPHEN 650 MG: 325 TABLET, FILM COATED ORAL at 11:00

## 2023-01-01 RX ADMIN — HYDROMORPHONE HYDROCHLORIDE 0.5 MG: 1 INJECTION, SOLUTION INTRAMUSCULAR; INTRAVENOUS; SUBCUTANEOUS at 13:00

## 2023-01-01 RX ADMIN — ATROPINE SULFATE 2 DROP: 10 SOLUTION/ DROPS OPHTHALMIC at 23:00

## 2023-01-01 RX ADMIN — HYDROMORPHONE HYDROCHLORIDE 2 MG: 1 INJECTION, SOLUTION INTRAMUSCULAR; INTRAVENOUS; SUBCUTANEOUS at 23:57

## 2023-01-01 RX ADMIN — CALCIUM ACETATE 1334 MG: 667 CAPSULE ORAL at 14:00

## 2023-01-01 RX ADMIN — ACYCLOVIR 400 MG: 400 TABLET ORAL at 09:42

## 2023-01-01 RX ADMIN — BUMETANIDE 1 MG: 0.25 INJECTION INTRAMUSCULAR; INTRAVENOUS at 09:44

## 2023-01-01 RX ADMIN — SENNOSIDES AND DOCUSATE SODIUM 2 TABLET: 8.6; 5 TABLET ORAL at 21:27

## 2023-01-01 RX ADMIN — DEXMEDETOMIDINE HYDROCHLORIDE 0.5 MCG/KG/HR: 400 INJECTION INTRAVENOUS at 03:51

## 2023-01-01 RX ADMIN — ACYCLOVIR 400 MG: 400 TABLET ORAL at 08:42

## 2023-01-01 RX ADMIN — POLYETHYLENE GLYCOL 3350 17 G: 17 POWDER, FOR SOLUTION ORAL at 18:27

## 2023-01-01 RX ADMIN — ACYCLOVIR 400 MG: 400 TABLET ORAL at 21:21

## 2023-01-01 RX ADMIN — GLYCOPYRROLATE 0.2 MG: 0.2 INJECTION INTRAMUSCULAR; INTRAVENOUS at 23:27

## 2023-01-01 RX ADMIN — OXYCODONE HYDROCHLORIDE 10 MG: 5 TABLET ORAL at 05:43

## 2023-01-01 RX ADMIN — CEFEPIME HYDROCHLORIDE 2 G: 2 INJECTION, POWDER, FOR SOLUTION INTRAVENOUS at 12:36

## 2023-01-01 RX ADMIN — IPRATROPIUM BROMIDE AND ALBUTEROL SULFATE 3 ML: 2.5; .5 SOLUTION RESPIRATORY (INHALATION) at 16:03

## 2023-01-01 RX ADMIN — Medication: at 09:45

## 2023-01-01 RX ADMIN — CYTARABINE 20 MG: 100 INJECTION, SOLUTION INTRATHECAL; INTRAVENOUS; SUBCUTANEOUS at 19:01

## 2023-01-01 RX ADMIN — HYDROMORPHONE HYDROCHLORIDE 3 MG: 1 INJECTION, SOLUTION INTRAMUSCULAR; INTRAVENOUS; SUBCUTANEOUS at 15:48

## 2023-01-01 RX ADMIN — LORAZEPAM 1 MG: 2 INJECTION INTRAMUSCULAR; INTRAVENOUS at 09:45

## 2023-01-01 RX ADMIN — CEFEPIME HYDROCHLORIDE 2 G: 2 INJECTION, POWDER, FOR SOLUTION INTRAVENOUS at 13:57

## 2023-01-01 RX ADMIN — VANCOMYCIN HYDROCHLORIDE 1250 MG: 10 INJECTION, POWDER, LYOPHILIZED, FOR SOLUTION INTRAVENOUS at 20:10

## 2023-01-01 RX ADMIN — SODIUM CHLORIDE: 9 INJECTION, SOLUTION INTRAVENOUS at 10:01

## 2023-01-01 RX ADMIN — VANCOMYCIN HYDROCHLORIDE 1250 MG: 10 INJECTION, POWDER, LYOPHILIZED, FOR SOLUTION INTRAVENOUS at 06:47

## 2023-01-01 RX ADMIN — HYDROMORPHONE HYDROCHLORIDE 2 MG: 1 INJECTION, SOLUTION INTRAMUSCULAR; INTRAVENOUS; SUBCUTANEOUS at 19:53

## 2023-01-01 RX ADMIN — HYDROMORPHONE HYDROCHLORIDE 0.5 MG: 1 INJECTION, SOLUTION INTRAMUSCULAR; INTRAVENOUS; SUBCUTANEOUS at 16:08

## 2023-01-01 RX ADMIN — HYDROMORPHONE HYDROCHLORIDE 2 MG: 1 INJECTION, SOLUTION INTRAMUSCULAR; INTRAVENOUS; SUBCUTANEOUS at 07:49

## 2023-01-01 RX ADMIN — BUDESONIDE 0.5 MG: 0.5 SUSPENSION RESPIRATORY (INHALATION) at 21:46

## 2023-01-01 RX ADMIN — SODIUM CHLORIDE, PRESERVATIVE FREE 5 ML: 5 INJECTION INTRAVENOUS at 22:57

## 2023-01-01 RX ADMIN — DEXAMETHASONE SODIUM PHOSPHATE 10 MG: 4 INJECTION, SOLUTION INTRAMUSCULAR; INTRAVENOUS at 15:01

## 2023-01-01 RX ADMIN — ALLOPURINOL 300 MG: 300 TABLET ORAL at 20:06

## 2023-01-01 RX ADMIN — HALOPERIDOL LACTATE 5 MG: 5 INJECTION, SOLUTION INTRAMUSCULAR at 17:27

## 2023-01-01 RX ADMIN — ACETAMINOPHEN 650 MG: 325 TABLET, FILM COATED ORAL at 16:44

## 2023-01-01 RX ADMIN — HYDROMORPHONE HYDROCHLORIDE 2 MG: 1 INJECTION, SOLUTION INTRAMUSCULAR; INTRAVENOUS; SUBCUTANEOUS at 23:06

## 2023-01-01 RX ADMIN — OLANZAPINE 10 MG: 5 TABLET, ORALLY DISINTEGRATING ORAL at 20:00

## 2023-01-01 RX ADMIN — INSULIN ASPART 1 UNITS: 100 INJECTION, SOLUTION INTRAVENOUS; SUBCUTANEOUS at 14:43

## 2023-01-01 RX ADMIN — BUDESONIDE 0.5 MG: 0.5 SUSPENSION RESPIRATORY (INHALATION) at 21:19

## 2023-01-01 RX ADMIN — HYDROMORPHONE HYDROCHLORIDE 1 MG: 1 INJECTION, SOLUTION INTRAMUSCULAR; INTRAVENOUS; SUBCUTANEOUS at 22:33

## 2023-01-01 RX ADMIN — BUPIVACAINE HYDROCHLORIDE 6 ML: 5 INJECTION, SOLUTION EPIDURAL; INTRACAUDAL; PERINEURAL at 11:45

## 2023-01-01 RX ADMIN — MICAFUNGIN 50 MG: 10 INJECTION, POWDER, LYOPHILIZED, FOR SOLUTION INTRAVENOUS at 20:06

## 2023-01-01 RX ADMIN — IPRATROPIUM BROMIDE AND ALBUTEROL SULFATE 3 ML: 2.5; .5 SOLUTION RESPIRATORY (INHALATION) at 09:20

## 2023-01-01 RX ADMIN — ATROPINE SULFATE 2 DROP: 10 SOLUTION/ DROPS OPHTHALMIC at 04:16

## 2023-01-01 RX ADMIN — HYDROMORPHONE HYDROCHLORIDE 1 MG: 1 INJECTION, SOLUTION INTRAMUSCULAR; INTRAVENOUS; SUBCUTANEOUS at 19:33

## 2023-01-01 RX ADMIN — HYDROMORPHONE HYDROCHLORIDE 1 MG: 1 INJECTION, SOLUTION INTRAMUSCULAR; INTRAVENOUS; SUBCUTANEOUS at 04:05

## 2023-01-01 RX ADMIN — LORAZEPAM 1 MG: 2 INJECTION INTRAMUSCULAR; INTRAVENOUS at 15:18

## 2023-01-01 RX ADMIN — HYDROMORPHONE HYDROCHLORIDE 1 MG: 1 INJECTION, SOLUTION INTRAMUSCULAR; INTRAVENOUS; SUBCUTANEOUS at 03:05

## 2023-01-01 RX ADMIN — GLYCOPYRROLATE 0.2 MG: 0.2 INJECTION INTRAMUSCULAR; INTRAVENOUS at 18:24

## 2023-01-01 RX ADMIN — LORAZEPAM 1 MG: 2 INJECTION INTRAMUSCULAR; INTRAVENOUS at 23:53

## 2023-01-01 RX ADMIN — SODIUM CHLORIDE, PRESERVATIVE FREE 5 ML: 5 INJECTION INTRAVENOUS at 15:06

## 2023-01-01 RX ADMIN — GLYCOPYRROLATE 0.4 MG: 0.4 INJECTION INTRAMUSCULAR; INTRAVENOUS at 22:36

## 2023-01-01 RX ADMIN — IPRATROPIUM BROMIDE AND ALBUTEROL SULFATE 3 ML: 2.5; .5 SOLUTION RESPIRATORY (INHALATION) at 15:49

## 2023-01-01 RX ADMIN — POLYETHYLENE GLYCOL 3350 17 G: 17 POWDER, FOR SOLUTION ORAL at 21:27

## 2023-01-01 RX ADMIN — DEXMEDETOMIDINE HYDROCHLORIDE 0.6 MCG/KG/HR: 400 INJECTION INTRAVENOUS at 23:06

## 2023-01-01 RX ADMIN — GLYCOPYRROLATE 0.2 MG: 0.2 INJECTION INTRAMUSCULAR; INTRAVENOUS at 11:51

## 2023-01-01 RX ADMIN — CEFEPIME HYDROCHLORIDE 2 G: 2 INJECTION, POWDER, FOR SOLUTION INTRAVENOUS at 05:21

## 2023-01-01 RX ADMIN — DEXMEDETOMIDINE HYDROCHLORIDE 0.6 MCG/KG/HR: 400 INJECTION INTRAVENOUS at 05:38

## 2023-01-01 RX ADMIN — POTASSIUM CHLORIDE 40 MEQ: 1.5 POWDER, FOR SOLUTION ORAL at 09:42

## 2023-01-01 RX ADMIN — LORAZEPAM 1 MG: 2 INJECTION INTRAMUSCULAR; INTRAVENOUS at 20:10

## 2023-01-01 RX ADMIN — VANCOMYCIN HYDROCHLORIDE 1250 MG: 10 INJECTION, POWDER, LYOPHILIZED, FOR SOLUTION INTRAVENOUS at 08:30

## 2023-01-01 RX ADMIN — HYDROMORPHONE HYDROCHLORIDE 2 MG: 1 INJECTION, SOLUTION INTRAMUSCULAR; INTRAVENOUS; SUBCUTANEOUS at 04:12

## 2023-01-01 RX ADMIN — SODIUM CHLORIDE, PRESERVATIVE FREE 5 ML: 5 INJECTION INTRAVENOUS at 14:39

## 2023-01-01 RX ADMIN — OXYCODONE HYDROCHLORIDE 10 MG: 5 TABLET ORAL at 22:53

## 2023-01-01 RX ADMIN — IPRATROPIUM BROMIDE AND ALBUTEROL SULFATE 3 ML: 2.5; .5 SOLUTION RESPIRATORY (INHALATION) at 13:03

## 2023-01-01 RX ADMIN — SODIUM CHLORIDE, PRESERVATIVE FREE 5 ML: 5 INJECTION INTRAVENOUS at 13:12

## 2023-01-01 RX ADMIN — ACYCLOVIR 400 MG: 400 TABLET ORAL at 08:29

## 2023-01-01 RX ADMIN — MICAFUNGIN 50 MG: 10 INJECTION, POWDER, LYOPHILIZED, FOR SOLUTION INTRAVENOUS at 20:13

## 2023-01-01 RX ADMIN — CEFEPIME HYDROCHLORIDE 2 G: 2 INJECTION, POWDER, FOR SOLUTION INTRAVENOUS at 13:45

## 2023-01-01 RX ADMIN — LORAZEPAM 0.5 MG: 2 INJECTION INTRAMUSCULAR; INTRAVENOUS at 01:39

## 2023-01-01 RX ADMIN — SODIUM CHLORIDE, PRESERVATIVE FREE 5 ML: 5 INJECTION INTRAVENOUS at 22:58

## 2023-01-01 RX ADMIN — SODIUM CHLORIDE, PRESERVATIVE FREE 5 ML: 5 INJECTION INTRAVENOUS at 18:01

## 2023-01-01 RX ADMIN — LIDOCAINE HYDROCHLORIDE ANHYDROUS 3 ML: 10 INJECTION, SOLUTION INFILTRATION at 15:29

## 2023-01-01 RX ADMIN — GLYCOPYRROLATE 0.2 MG: 0.2 INJECTION INTRAMUSCULAR; INTRAVENOUS at 05:04

## 2023-01-01 RX ADMIN — FENTANYL CITRATE 50 MCG: 50 INJECTION, SOLUTION INTRAMUSCULAR; INTRAVENOUS at 11:43

## 2023-01-01 RX ADMIN — LORAZEPAM 1 MG: 2 INJECTION INTRAMUSCULAR; INTRAVENOUS at 11:13

## 2023-01-01 RX ADMIN — GLYCOPYRROLATE 0.2 MG: 0.2 INJECTION INTRAMUSCULAR; INTRAVENOUS at 08:59

## 2023-01-01 RX ADMIN — CEFEPIME HYDROCHLORIDE 2 G: 2 INJECTION, POWDER, FOR SOLUTION INTRAVENOUS at 22:06

## 2023-01-01 RX ADMIN — LORAZEPAM 1 MG: 2 INJECTION INTRAMUSCULAR; INTRAVENOUS at 08:30

## 2023-01-01 RX ADMIN — ENOXAPARIN SODIUM 80 MG: 80 INJECTION SUBCUTANEOUS at 21:27

## 2023-01-01 RX ADMIN — CEFEPIME HYDROCHLORIDE 2 G: 2 INJECTION, POWDER, FOR SOLUTION INTRAVENOUS at 21:26

## 2023-01-01 RX ADMIN — GLYCOPYRROLATE 0.2 MG: 0.2 INJECTION INTRAMUSCULAR; INTRAVENOUS at 03:51

## 2023-01-01 RX ADMIN — HYDROMORPHONE HYDROCHLORIDE 1 MG: 1 INJECTION, SOLUTION INTRAMUSCULAR; INTRAVENOUS; SUBCUTANEOUS at 17:24

## 2023-01-01 RX ADMIN — GLYCOPYRROLATE 0.2 MG: 0.2 INJECTION INTRAMUSCULAR; INTRAVENOUS at 22:55

## 2023-01-01 RX ADMIN — OXYCODONE HYDROCHLORIDE 10 MG: 5 TABLET ORAL at 09:42

## 2023-01-01 RX ADMIN — SENNOSIDES AND DOCUSATE SODIUM 2 TABLET: 8.6; 5 TABLET ORAL at 21:21

## 2023-01-01 RX ADMIN — METHOCARBAMOL 500 MG: 500 TABLET ORAL at 13:57

## 2023-01-01 RX ADMIN — Medication: at 19:21

## 2023-01-01 RX ADMIN — BUDESONIDE 0.5 MG: 0.5 SUSPENSION RESPIRATORY (INHALATION) at 20:07

## 2023-01-01 RX ADMIN — Medication: at 21:25

## 2023-01-01 RX ADMIN — CEFEPIME HYDROCHLORIDE 2 G: 2 INJECTION, POWDER, FOR SOLUTION INTRAVENOUS at 05:48

## 2023-01-01 RX ADMIN — ENOXAPARIN SODIUM 135 MG: 150 INJECTION SUBCUTANEOUS at 19:14

## 2023-01-01 RX ADMIN — DEXMEDETOMIDINE HYDROCHLORIDE 0.5 MCG/KG/HR: 400 INJECTION INTRAVENOUS at 16:42

## 2023-01-01 RX ADMIN — HYDROMORPHONE HYDROCHLORIDE 0.5 MG: 1 INJECTION, SOLUTION INTRAMUSCULAR; INTRAVENOUS; SUBCUTANEOUS at 04:17

## 2023-01-01 RX ADMIN — ACETAMINOPHEN 650 MG: 325 TABLET, FILM COATED ORAL at 21:47

## 2023-01-01 RX ADMIN — Medication: at 11:56

## 2023-01-01 RX ADMIN — HYDROMORPHONE HYDROCHLORIDE 1 MG: 1 INJECTION, SOLUTION INTRAMUSCULAR; INTRAVENOUS; SUBCUTANEOUS at 14:16

## 2023-01-01 RX ADMIN — HALOPERIDOL LACTATE 2.5 MG: 5 INJECTION, SOLUTION INTRAMUSCULAR at 00:11

## 2023-01-01 RX ADMIN — MICAFUNGIN 50 MG: 10 INJECTION, POWDER, LYOPHILIZED, FOR SOLUTION INTRAVENOUS at 21:26

## 2023-01-01 RX ADMIN — ENOXAPARIN SODIUM 80 MG: 80 INJECTION SUBCUTANEOUS at 20:22

## 2023-01-01 RX ADMIN — CALCIUM ACETATE 1334 MG: 667 CAPSULE ORAL at 12:36

## 2023-01-01 RX ADMIN — HYDROMORPHONE HYDROCHLORIDE 0.5 MG: 1 INJECTION, SOLUTION INTRAMUSCULAR; INTRAVENOUS; SUBCUTANEOUS at 02:03

## 2023-01-01 RX ADMIN — DEXMEDETOMIDINE HYDROCHLORIDE 0.5 MCG/KG/HR: 400 INJECTION INTRAVENOUS at 15:48

## 2023-01-01 RX ADMIN — GLYCOPYRROLATE 0.4 MG: 0.4 INJECTION INTRAMUSCULAR; INTRAVENOUS at 23:39

## 2023-01-01 RX ADMIN — GLYCOPYRROLATE 0.4 MG: 0.4 INJECTION INTRAMUSCULAR; INTRAVENOUS at 15:17

## 2023-01-01 RX ADMIN — CYCLOBENZAPRINE 10 MG: 10 TABLET, FILM COATED ORAL at 12:47

## 2023-01-01 ASSESSMENT — ACTIVITIES OF DAILY LIVING (ADL)
ADLS_ACUITY_SCORE: 36
HEARING_DIFFICULTY_OR_DEAF: NO
ADLS_ACUITY_SCORE: 47
ADLS_ACUITY_SCORE: 47
WALKING_OR_CLIMBING_STAIRS: AMBULATION DIFFICULTY, ASSISTANCE 1 PERSON
ADLS_ACUITY_SCORE: 36
TRANSFERRING: 1-->ASSISTANCE (EQUIPMENT/PERSON) NEEDED
ADLS_ACUITY_SCORE: 35
ADLS_ACUITY_SCORE: 47
ADLS_ACUITY_SCORE: 43
ADLS_ACUITY_SCORE: 36
ADLS_ACUITY_SCORE: 35
VISION_MANAGEMENT: GLASSES
ADLS_ACUITY_SCORE: 47
ADLS_ACUITY_SCORE: 47
VISION_MANAGEMENT: GLASSES
ADLS_ACUITY_SCORE: 36
ADLS_ACUITY_SCORE: 47
ADLS_ACUITY_SCORE: 36
ADLS_ACUITY_SCORE: 47
ADLS_ACUITY_SCORE: 35
DRESSING/BATHING_MANAGEMENT: SHOWER CHAIR
ADLS_ACUITY_SCORE: 35
DIFFICULTY_EATING/SWALLOWING: NO
ADLS_ACUITY_SCORE: 37
DRESS: 1-->ASSISTANCE (EQUIPMENT/PERSON) NEEDED (NOT DEVELOPMENTALLY APPROPRIATE)
ADLS_ACUITY_SCORE: 47
ADLS_ACUITY_SCORE: 36
ADLS_ACUITY_SCORE: 32
ADLS_ACUITY_SCORE: 36
WALKING_OR_CLIMBING_STAIRS_DIFFICULTY: YES
ADLS_ACUITY_SCORE: 35
ADLS_ACUITY_SCORE: 36
ADLS_ACUITY_SCORE: 36
WEAR_GLASSES_OR_BLIND: YES
ADLS_ACUITY_SCORE: 36
ADLS_ACUITY_SCORE: 43
ADLS_ACUITY_SCORE: 39
DRESSING/BATHING_DIFFICULTY: YES
ADLS_ACUITY_SCORE: 39
DOING_ERRANDS_INDEPENDENTLY_DIFFICULTY: NO
ADLS_ACUITY_SCORE: 36
ADLS_ACUITY_SCORE: 47
ADLS_ACUITY_SCORE: 43
TRANSFERRING: 1-->ASSISTANCE (EQUIPMENT/PERSON) NEEDED
ADLS_ACUITY_SCORE: 36
DRESSING/BATHING_DIFFICULTY: YES
ADLS_ACUITY_SCORE: 36
ADLS_ACUITY_SCORE: 47
ADLS_ACUITY_SCORE: 47
CHANGE_IN_FUNCTIONAL_STATUS_SINCE_ONSET_OF_CURRENT_ILLNESS/INJURY: NO
ADLS_ACUITY_SCORE: 36
ADLS_ACUITY_SCORE: 47
ADLS_ACUITY_SCORE: 47
ADLS_ACUITY_SCORE: 36
ADLS_ACUITY_SCORE: 47
ADLS_ACUITY_SCORE: 36
DRESSING/BATHING_MANAGEMENT: SHOWER CHAIR
ADLS_ACUITY_SCORE: 36
ADLS_ACUITY_SCORE: 36
ADLS_ACUITY_SCORE: 47
TRANSFERRING: 1-->ASSISTANCE (EQUIPMENT/PERSON) NEEDED (NOT DEVELOPMENTALLY APPROPRIATE)
ADLS_ACUITY_SCORE: 36
TOILETING_ISSUES: NO
ADLS_ACUITY_SCORE: 36
CONCENTRATING,_REMEMBERING_OR_MAKING_DECISIONS_DIFFICULTY: NO
ADLS_ACUITY_SCORE: 37
ADLS_ACUITY_SCORE: 37
ADLS_ACUITY_SCORE: 47
ADLS_ACUITY_SCORE: 47
DOING_ERRANDS_INDEPENDENTLY_DIFFICULTY: NO
ADLS_ACUITY_SCORE: 36
FALL_HISTORY_WITHIN_LAST_SIX_MONTHS: NO
ADLS_ACUITY_SCORE: 36
ADLS_ACUITY_SCORE: 47
ADLS_ACUITY_SCORE: 37
ADLS_ACUITY_SCORE: 36
ADLS_ACUITY_SCORE: 47
TRANSFERRING: 1-->ASSISTANCE (EQUIPMENT/PERSON) NEEDED (NOT DEVELOPMENTALLY APPROPRIATE)
ADLS_ACUITY_SCORE: 36
ADLS_ACUITY_SCORE: 47
ADLS_ACUITY_SCORE: 36
ADLS_ACUITY_SCORE: 37
ADLS_ACUITY_SCORE: 43
ADLS_ACUITY_SCORE: 47
ADLS_ACUITY_SCORE: 36
ADLS_ACUITY_SCORE: 36
WALKING_OR_CLIMBING_STAIRS_DIFFICULTY: YES
DRESS: 1-->ASSISTANCE (EQUIPMENT/PERSON) NEEDED
ADLS_ACUITY_SCORE: 43
BATHING: 1-->ASSISTANCE NEEDED
ADLS_ACUITY_SCORE: 36
ADLS_ACUITY_SCORE: 36
ADLS_ACUITY_SCORE: 39
DIFFICULTY_COMMUNICATING: NO
CONCENTRATING,_REMEMBERING_OR_MAKING_DECISIONS_DIFFICULTY: NO
ADLS_ACUITY_SCORE: 37
ADLS_ACUITY_SCORE: 39
ADLS_ACUITY_SCORE: 47
ADLS_ACUITY_SCORE: 47
ADLS_ACUITY_SCORE: 36
ADLS_ACUITY_SCORE: 39
ADLS_ACUITY_SCORE: 37
ADLS_ACUITY_SCORE: 37
ADLS_ACUITY_SCORE: 43
DRESSING/BATHING: BATHING DIFFICULTY, REQUIRES EQUIPMENT
DRESS: 1-->ASSISTANCE (EQUIPMENT/PERSON) NEEDED (NOT DEVELOPMENTALLY APPROPRIATE)
ADLS_ACUITY_SCORE: 37
ADLS_ACUITY_SCORE: 37
ADLS_ACUITY_SCORE: 32
ADLS_ACUITY_SCORE: 37
ADLS_ACUITY_SCORE: 36
ADLS_ACUITY_SCORE: 35
ADLS_ACUITY_SCORE: 36
BATHING: 1-->ASSISTANCE NEEDED
ADLS_ACUITY_SCORE: 47
ADLS_ACUITY_SCORE: 36
ADLS_ACUITY_SCORE: 35
ADLS_ACUITY_SCORE: 36
ADLS_ACUITY_SCORE: 37
ADLS_ACUITY_SCORE: 36
ADLS_ACUITY_SCORE: 36
DEPENDENT_IADLS:: INDEPENDENT
ADLS_ACUITY_SCORE: 36
ADLS_ACUITY_SCORE: 35
DIFFICULTY_COMMUNICATING: NO
ADLS_ACUITY_SCORE: 47
ADLS_ACUITY_SCORE: 37
ADLS_ACUITY_SCORE: 36
HEARING_DIFFICULTY_OR_DEAF: NO
ADLS_ACUITY_SCORE: 36
ADLS_ACUITY_SCORE: 35
DIFFICULTY_EATING/SWALLOWING: NO
ADLS_ACUITY_SCORE: 39
ADLS_ACUITY_SCORE: 35
ADLS_ACUITY_SCORE: 36
ADLS_ACUITY_SCORE: 37
WEAR_GLASSES_OR_BLIND: YES
ADLS_ACUITY_SCORE: 37
ADLS_ACUITY_SCORE: 36
TOILETING_ISSUES: NO
DRESSING/BATHING: BATHING DIFFICULTY, REQUIRES EQUIPMENT
ADLS_ACUITY_SCORE: 47
ADLS_ACUITY_SCORE: 39
ADLS_ACUITY_SCORE: 37
ADLS_ACUITY_SCORE: 36
CHANGE_IN_FUNCTIONAL_STATUS_SINCE_ONSET_OF_CURRENT_ILLNESS/INJURY: YES
DRESS: 1-->ASSISTANCE (EQUIPMENT/PERSON) NEEDED
ADLS_ACUITY_SCORE: 36
ADLS_ACUITY_SCORE: 36
ADLS_ACUITY_SCORE: 35
ADLS_ACUITY_SCORE: 35
ADLS_ACUITY_SCORE: 36
ADLS_ACUITY_SCORE: 37
ADLS_ACUITY_SCORE: 32
ADLS_ACUITY_SCORE: 32
WALKING_OR_CLIMBING_STAIRS: AMBULATION DIFFICULTY, REQUIRES EQUIPMENT;AMBULATION DIFFICULTY, ASSISTANCE 1 PERSON

## 2023-01-01 ASSESSMENT — CHADS2 SCORE: AGE GREATER THAN OR EQUAL TO 75: NO

## 2023-01-01 ASSESSMENT — PAIN SCALES - GENERAL
PAINLEVEL: SEVERE PAIN (7)
PAINLEVEL: SEVERE PAIN (7)

## 2023-01-04 NOTE — PROGRESS NOTES
[facility]: Cancer Care Short Note                                                                                          RNCC asked by FILIPPO Ashwini Camacho to add labs to patient's current labs being drawn at local facility, [facility]. RNCC faxed mag, phos, LDH, and uric acid labs to [facility] lab at [phone] to be drawn for tomorrow.     MIKE MayoN, RN  RN Care Coordinator   [phone]

## 2023-01-04 NOTE — PROGRESS NOTES
[facility]  MEDICAL ONCOLOGY/HEMATOLOGY PROGRESS NOTE  Jan 4, 2023     CHIEF COMPLAINT: hospital follow-up on AMML    ONCOLOGY HPI:   Angel Cardenas is a 62 year old with past medical history of PE (previously on Xarelto), T2DM, COPD, HTN, and gout. He was admitted given concern for new acute leukemia after presenting to his PCP with generalized complaints (fatigue, subjective fevers, lack of appetite, weight loss, intermittent nose bleed over the last month, rib pain), pancytopenia (WBC 8.5, Hgb 8.8, plts 82) and circulating peripheral blasts (6%). 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 Diagnostic LP completed and flow was negative. He began treatment with Venetoclax + 10-day Decitabine (C9V8=2812/19/22) - non-intensive induction selected d/t his several pre-existing co-morbidities. 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 Pain largely improved following initiation of chemotherapy.     He presents today for hospital follow-up and to establish in clinic.     INTERVAL HISTORY:   -feeling well  -does not feel like he has SE from the chemo  -felt better after getting the RBCs. Does need to take some nap during the day, takes them a couple times a week. Opioids make him drowsy and he will need to nap with these. Overall his energy levels are better than they were a month ago  -No SOB with walking. No cough.   -has compression wraps that he uses every day. Legs are doing well, this is the smallest they have been in a while (minimal edema)  -headaches are rare now  -no f/c, though does not own a thermometer   -No GI symptoms  -No bleeding   -Has not been checking his temp at home    ROS: 10 point ROS neg other than the symptoms  noted above in the HPI.      Allergies   Allergen Reactions     Metformin Unknown       Current Outpatient Medications   Medication     acetaminophen (TYLENOL) 500 MG tablet     acyclovir (ZOVIRAX) 400 MG tablet     allopurinol (ZYLOPRIM) 300 MG tablet     ammonium lactate (AMLACTIN) 12 % external cream     atorvastatin (LIPITOR) 20 MG tablet     budesonide-formoterol (SYMBICORT) 160-4.5 MCG/ACT Inhaler     bumetanide (BUMEX) 1 MG tablet     calcium acetate (PHOSLO) 667 MG CAPS capsule     diclofenac (VOLTAREN) 1 % topical gel     enoxaparin ANTICOAGULANT (LOVENOX) 100 MG/ML syringe     levofloxacin (LEVAQUIN) 250 MG tablet     methocarbamol (ROBAXIN) 500 MG tablet     oxyCODONE (ROXICODONE) 5 MG tablet     pantoprazole (PROTONIX) 40 MG EC tablet     polyethylene glycol (MIRALAX) 17 GM/Dose powder     posaconazole (NOXAFIL) 100 MG EC tablet     semaglutide (OZEMPIC, 1 MG/DOSE,) 2 MG/1.5ML pen     senna-docusate (SENOKOT-S/PERICOLACE) 8.6-50 MG tablet     venetoclax (VENCLEXTA) 100 MG tablet     No current facility-administered medications for this visit.       EXAM:  There were no vitals taken for this visit.  Wt Readings from Last 4 Encounters:   12/29/22 (!) 168.1 kg (370 lb 11.2 oz)        Objective:  General: patient sounds in no audible acute distress, alert and oriented, speech clear and fluid  Resp: Speaking in full sentences, no audible respiratory distress, no cough, no audible wheeze  Psych: able to articulate logical thoughts, able to abstract reason, no tangential thoughts, no hallucinations or delusions.  Affect is normal    The rest of a comprehensive physical examination is deferred due to PHE (public health emergency) phone restrictions      LABS:   Personally reviewed all labs today, most recent one are in CareEverywhere from 1/2 and I reviewed those as well     Most Recent 3 CBC's:  Recent Labs   Lab Test 12/29/22  0550 12/28/22  0903 12/27/22  0619   WBC 2.1* 2.3* 2.8*   HGB 7.4* 8.2* 7.6*   MCV  93 96 96   PLT 33* 46* 44*     Most Recent 3 BMP's:  Recent Labs   Lab Test 12/29/22  0720 12/29/22  0550 12/29/22  0215 12/28/22  1255 12/28/22  0903 12/27/22  0758 12/27/22  0619   NA  --  127*  --   --  130*  --  128*   POTASSIUM  --  4.7  --   --  4.4  --  4.7   CHLORIDE  --  90*  --   --  92*  --  92*   CO2  --  26  --   --  24  --  24   BUN  --  34.1*  --   --  30.6*  --  29.3*   CR  --  1.15  --   --  1.04  --  1.02   ANIONGAP  --  11  --   --  14  --  12   ZEHRA  --  9.8  --   --  9.9  --  9.6   * 120* 131*   < > 162*   < > 115*    < > = values in this interval not displayed.     Most Recent 2 LFT's:  Recent Labs   Lab Test 12/29/22  0550 12/28/22  0903   AST 34 27   ALT 39 39   ALKPHOS 74 67   BILITOTAL 0.7 0.6     IMAGING:   No new imaging    IMPRESSION/PLAN:    #AMML  -Presents to PCP with fatigue, subjective fevers, lack of appetite, weight loss, intermittent nose bleed over the last month and rib pain. Found to have cytopenias (WBC 8.5 Hgb 8.8 and plts 82) with 6 % circulating blasts.   - OP BMBx 12/9/22. Arrived from [facility] on 12/13. 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   - NGS shows SRSF2 and TET2 mutations. In the absence of a favorable genetic finding, SRSF2 is associated with adverse risk. TET2 does not have prognostic significance. BCR/ABL undetectable.   - FISH negative for rearrangement.   - Of note, BMBx procedure was technically very difficult and OSH recommended IR consult for future biopsies.   -Initiated Venetoclax/Decitabine on 12/19/22. Tolerating well   -Plan for BMT consult on 1/20, will see if we can get this same day as Dr. Velasquez, though if not it is still important to come to  -Will plan for BMBX on 1/13 with CT guidance in IR  -Will establish care with Dr. Velasquez on 1/19  -Will look into if we can give Decitabine locally if he were to need additional cycles     Ppx: ACV, Levaquin, Posaconazole. 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 He most recently was diagnosed with bilateral PE with right heart strain on 1/19/22 after presenting to the ED with SOB.   - Repeat CTA 12/16 w/o evidence of PE  - Previously on Xarelto though started half-dose Lovenox (0.5 mg/kg) q12h x12/23. Now lovenox held as well as plt <30.  -If plt consistently >30k, then resume Lovenox at half dose. If plt >50k, would increase lovenox to to full dose (vs resumption of DOAC)  -Continue to hold at this point as counts are downtrending    #Hyperphosphatemia   -sent home with Phoslo TID, has not had any since Sunday. Did not check on his labs. Will have RNCC call to add this on   -if phos still high, may need to restart the phoslo    # Acute on chronic back pain  Patient endorses ongoing back pain prior to hospitalization though over the past week has been endorsing rib pain which radiates into his upper back and between his shoulder blades. No red flag sx. Of note he does have splenomegaly on recent CT imaging which could be contributing. EKG shows NSR. Lipase WNL. MRI thoracic spine 12/17 showed \"scattered subtle mottled enhancement of bones most pronounced on L 7th rib, could be 2/2 underlying disease process.\" It is possible that pt has some extramedullary infiltration of cortex causing pain.   - Continue PRN: Voltaren gel QID, Robaxin QID, Tylenol, heat packs, Flexeril    - Pain responded well to dex taper in the hospital. No longer on dex  - Palliative Care consulted IP. Continue oxycodone 5-10 mg q3h PRN      # Headaches, resolved  Patient has been experiencing nasal congestion, left sided headaches and eye pressure. No " other neurologic complaints. Brain MRI 12/5 with diffuse linear and slightly nodular dural thickening and enhancement. Diffuse marrow signal abnormality within calvarium and central skull base. Differential includes lymphoproliferative abnormalities and metastatic disease.  - S/p diagnostic LP 12/12; flow negative  - Tylenol PRN   - HA now rare      # Undifferentiated shock, resolved  # Possible pneumonia, s/p empiric abx   # H/o COPD, hypoxia  # Hypotension, resolved  # Possible adrenal insufficiency, resolved  Acute symptomatic hypotension on 12/16 from 123/61 ? 77/39 in the setting of acutely worsening upper back pain, diaphoresis, dizziness. Rapid response called. EKG NSR, trop 33 ? 30. VBG normal. Hgb stable. No e/o bleed, PE, dissection on CTA/CAP w/ contrast. CT did note RLL consolidation suggestion possible pneumonia. Lactic 2.2 ? 1.8. BCx NGTD. Initially pressures improved with bolus then became soft again to 92/46 around 1400. Recent echo with EF 65-70%.  - Suspect that hypotension was possibly from adrenal insufficiency with steroid taper as pressure improved after dexamethasone 10 mg was ordered 12/16 afternoon. AM cortisol 12/17 was WNL but likely skewed by recent steroid administration. Less likely septic shock in the absense of fever and tachycardia.   - Cefepime 12/16-12/18, discontinued as low suspicion for infection as etiology of hypotension. Completed azithromycin 12/16-12/18.  - CXR showed cardiomegaly, enlarged pulmonary vasculature and diffuse mild interstitial markings bilaterally likely representing pulmonary edema.   - Continue Breo Ellipta   -Currently no respiratory symptoms     # HTN   - HELD metoprolol ER 12.5 mg daily and lisinopril 5 mg daily in the setting of recent hypotension  -continue holding given ongoing soft/normotensive BP without antihypertensives. Last BP from OSH was 125/70.   -Need to continue checking, if they were to become elevated, would start one antihypertensive at  a time     # T2DM   Follows with [facility], last seen 11/15/22.   - Continue Ozempic and follow-up with deya Camacho PA-C  [facility]  [address] [place] [address] [phone]

## 2023-01-04 NOTE — LETTER
1/4/2023         RE: Angel Cardenas  [address] [facility] [address]        Dear Colleague,    Thank you for referring your patient, Angel Cardenas, to the [facility]. Please see a copy of my visit note below.    Angel is a 62 year old who is being evaluated via a billable video visit.      How would you like to obtain your AVS? MyChart  If the video visit is dropped, the invitation should be resent by: Text to cell phone: [phone]  Will anyone else be joining your video visit? No    Patient confirms medications and allergies are accurate and or denies any changes since last reviewed/verified.     Whit Samuel, Virtual Facilitator/LPN    Patient was unable to get video working.     Had to convert to phone call. Phone Call Duration: 39 minutes    90 minutes spent on the date of the encounter doing chart review, review of outside records, review of test results, interpretation of tests, patient visit and documentation     Transition Care Management Services  No data recorded  No data recorded  No data recorded  Interactive contact date:   Face-to-face visit date: 1/4/2023        Medical complexity decision making: High complexity ([id number])             [facility]  MEDICAL ONCOLOGY/HEMATOLOGY PROGRESS NOTE  Jan 4, 2023     CHIEF COMPLAINT: hospital follow-up on AMML    ONCOLOGY HPI:   Angel Cardenas is a 62 year old with past medical history of PE (previously on Xarelto), T2DM, COPD, HTN, and gout. He was admitted given concern for new acute leukemia after presenting to his PCP with generalized complaints (fatigue, subjective fevers, lack of appetite, weight loss, intermittent nose bleed over the last month, rib pain), pancytopenia (WBC 8.5, Hgb 8.8, plts 82) and circulating peripheral blasts (6%). 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 Diagnostic LP completed and flow was negative. He began treatment  with Venetoclax + 10-day Decitabine (I4D0=2412/19/22) - non-intensive induction selected d/t his several pre-existing co-morbidities. 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 Pain largely improved following initiation of chemotherapy.     He presents today for hospital follow-up and to establish in clinic.     INTERVAL HISTORY:   -feeling well  -does not feel like he has SE from the chemo  -felt better after getting the RBCs. Does need to take some nap during the day, takes them a couple times a week. Opioids make him drowsy and he will need to nap with these. Overall his energy levels are better than they were a month ago  -No SOB with walking. No cough.   -has compression wraps that he uses every day. Legs are doing well, this is the smallest they have been in a while (minimal edema)  -headaches are rare now  -no f/c, though does not own a thermometer   -No GI symptoms  -No bleeding   -Has not been checking his temp at home    ROS: 10 point ROS neg other than the symptoms noted above in the HPI.      Allergies   Allergen Reactions     Metformin Unknown       Current Outpatient Medications   Medication     acetaminophen (TYLENOL) 500 MG tablet     acyclovir (ZOVIRAX) 400 MG tablet     allopurinol (ZYLOPRIM) 300 MG tablet     ammonium lactate (AMLACTIN) 12 % external cream     atorvastatin (LIPITOR) 20 MG tablet     budesonide-formoterol (SYMBICORT) 160-4.5 MCG/ACT Inhaler     bumetanide (BUMEX) 1 MG tablet     calcium acetate (PHOSLO) 667 MG CAPS capsule     diclofenac (VOLTAREN) 1 % topical gel     enoxaparin ANTICOAGULANT (LOVENOX) 100 MG/ML syringe     levofloxacin (LEVAQUIN) 250 MG tablet     methocarbamol (ROBAXIN) 500 MG tablet     oxyCODONE (ROXICODONE) 5 MG tablet     pantoprazole  (PROTONIX) 40 MG EC tablet     polyethylene glycol (MIRALAX) 17 GM/Dose powder     posaconazole (NOXAFIL) 100 MG EC tablet     semaglutide (OZEMPIC, 1 MG/DOSE,) 2 MG/1.5ML pen     senna-docusate (SENOKOT-S/PERICOLACE) 8.6-50 MG tablet     venetoclax (VENCLEXTA) 100 MG tablet     No current facility-administered medications for this visit.       EXAM:  There were no vitals taken for this visit.  Wt Readings from Last 4 Encounters:   12/29/22 (!) 168.1 kg (370 lb 11.2 oz)        Objective:  General: patient sounds in no audible acute distress, alert and oriented, speech clear and fluid  Resp: Speaking in full sentences, no audible respiratory distress, no cough, no audible wheeze  Psych: able to articulate logical thoughts, able to abstract reason, no tangential thoughts, no hallucinations or delusions.  Affect is normal    The rest of a comprehensive physical examination is deferred due to PHE (public health emergency) phone restrictions      LABS:   Personally reviewed all labs today, most recent one are in CareEverywhere from 1/2 and I reviewed those as well     Most Recent 3 CBC's:  Recent Labs   Lab Test 12/29/22  0550 12/28/22  0903 12/27/22  0619   WBC 2.1* 2.3* 2.8*   HGB 7.4* 8.2* 7.6*   MCV 93 96 96   PLT 33* 46* 44*     Most Recent 3 BMP's:  Recent Labs   Lab Test 12/29/22  0720 12/29/22  0550 12/29/22  0215 12/28/22  1255 12/28/22  0903 12/27/22  0758 12/27/22  0619   NA  --  127*  --   --  130*  --  128*   POTASSIUM  --  4.7  --   --  4.4  --  4.7   CHLORIDE  --  90*  --   --  92*  --  92*   CO2  --  26  --   --  24  --  24   BUN  --  34.1*  --   --  30.6*  --  29.3*   CR  --  1.15  --   --  1.04  --  1.02   ANIONGAP  --  11  --   --  14  --  12   ZEHRA  --  9.8  --   --  9.9  --  9.6   * 120* 131*   < > 162*   < > 115*    < > = values in this interval not displayed.     Most Recent 2 LFT's:  Recent Labs   Lab Test 12/29/22  0550 12/28/22  0903   AST 34 27   ALT 39 39   ALKPHOS 74 67   BILITOTAL 0.7  0.6     IMAGING:   No new imaging    IMPRESSION/PLAN:    #AMML  -Presents to PCP with fatigue, subjective fevers, lack of appetite, weight loss, intermittent nose bleed over the last month and rib pain. Found to have cytopenias (WBC 8.5 Hgb 8.8 and plts 82) with 6 % circulating blasts.   - OP BMBx 12/9/22. Arrived from [facility] on 12/13. 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   - NGS shows SRSF2 and TET2 mutations. In the absence of a favorable genetic finding, SRSF2 is associated with adverse risk. TET2 does not have prognostic significance. BCR/ABL undetectable.   - FISH negative for rearrangement.   - Of note, BMBx procedure was technically very difficult and OSH recommended IR consult for future biopsies.   -Initiated Venetoclax/Decitabine on 12/19/22. Tolerating well   -Plan for BMT consult on 1/20, will see if we can get this same day as Dr. Velasquez, though if not it is still important to come to  -Will plan for BMBX on 1/13 with CT guidance in IR  -Will establish care with Dr. Velasquez on 1/19  -Will look into if we can give Decitabine locally if he were to need additional cycles     Ppx: ACV, Levaquin, Posaconazole. 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 He most recently was diagnosed with bilateral PE with right heart strain on 1/19/22 after presenting to the ED with SOB.   - Repeat CTA 12/16 w/o evidence of PE  - Previously on Xarelto though started half-dose Lovenox (0.5 mg/kg) q12h x12/23. Now lovenox held as well as plt <30.  -If plt consistently >30k, then resume Lovenox at half dose. If plt >50k, would increase lovenox  "to to full dose (vs resumption of DOAC)  -Continue to hold at this point as counts are downtrending    #Hyperphosphatemia   -sent home with Phoslo TID, has not had any since Sunday. Did not check on his labs. Will have RNCC call to add this on   -if phos still high, may need to restart the phoslo    # Acute on chronic back pain  Patient endorses ongoing back pain prior to hospitalization though over the past week has been endorsing rib pain which radiates into his upper back and between his shoulder blades. No red flag sx. Of note he does have splenomegaly on recent CT imaging which could be contributing. EKG shows NSR. Lipase WNL. MRI thoracic spine 12/17 showed \"scattered subtle mottled enhancement of bones most pronounced on L 7th rib, could be 2/2 underlying disease process.\" It is possible that pt has some extramedullary infiltration of cortex causing pain.   - Continue PRN: Voltaren gel QID, Robaxin QID, Tylenol, heat packs, Flexeril    - Pain responded well to dex taper in the hospital. No longer on dex  - Palliative Care consulted IP. Continue oxycodone 5-10 mg q3h PRN      # Headaches, resolved  Patient has been experiencing nasal congestion, left sided headaches and eye pressure. No other neurologic complaints. Brain MRI 12/5 with diffuse linear and slightly nodular dural thickening and enhancement. Diffuse marrow signal abnormality within calvarium and central skull base. Differential includes lymphoproliferative abnormalities and metastatic disease.  - S/p diagnostic LP 12/12; flow negative  - Tylenol PRN   - HA now rare      # Undifferentiated shock, resolved  # Possible pneumonia, s/p empiric abx   # H/o COPD, hypoxia  # Hypotension, resolved  # Possible adrenal insufficiency, resolved  Acute symptomatic hypotension on 12/16 from 123/61 ? 77/39 in the setting of acutely worsening upper back pain, diaphoresis, dizziness. Rapid response called. EKG NSR, trop 33 ? 30. VBG normal. Hgb stable. No e/o bleed, " PE, dissection on CTA/CAP w/ contrast. CT did note RLL consolidation suggestion possible pneumonia. Lactic 2.2 ? 1.8. BCx NGTD. Initially pressures improved with bolus then became soft again to 92/46 around 1400. Recent echo with EF 65-70%.  - Suspect that hypotension was possibly from adrenal insufficiency with steroid taper as pressure improved after dexamethasone 10 mg was ordered 12/16 afternoon. AM cortisol 12/17 was WNL but likely skewed by recent steroid administration. Less likely septic shock in the absense of fever and tachycardia.   - Cefepime 12/16-12/18, discontinued as low suspicion for infection as etiology of hypotension. Completed azithromycin 12/16-12/18.  - CXR showed cardiomegaly, enlarged pulmonary vasculature and diffuse mild interstitial markings bilaterally likely representing pulmonary edema.   - Continue Breo Ellipta   -Currently no respiratory symptoms     # HTN   - HELD metoprolol ER 12.5 mg daily and lisinopril 5 mg daily in the setting of recent hypotension  -continue holding given ongoing soft/normotensive BP without antihypertensives. Last BP from OSH was 125/70.   -Need to continue checking, if they were to become elevated, would start one antihypertensive at a time     # T2DM   Follows with [facility], last seen 11/15/22.   - Continue Ozempic and follow-up with deya Camacho PA-C  [facility]  [address] [place] [address] [phone]

## 2023-01-04 NOTE — PROGRESS NOTES
Angel is a 62 year old who is being evaluated via a billable video visit.      How would you like to obtain your AVS? MyChart  If the video visit is dropped, the invitation should be resent by: Text to cell phone: [phone]  Will anyone else be joining your video visit? No    Patient confirms medications and allergies are accurate and or denies any changes since last reviewed/verified.     Whit Samuel, Virtual Facilitator/LPN    Patient was unable to get video working.     Had to convert to phone call. Phone Call Duration: 39 minutes    90 minutes spent on the date of the encounter doing chart review, review of outside records, review of test results, interpretation of tests, patient visit and documentation     Transition Care Management Services  No data recorded  No data recorded  No data recorded  Interactive contact date:   Face-to-face visit date: 1/4/2023        Medical complexity decision making: High complexity ([id number])

## 2023-01-06 NOTE — TELEPHONE ENCOUNTER
"Oral Chemotherapy Monitoring Program    Subjective/Objective:  Angel Cardenas is a 62 year old male contacted by phone for a follow-up visit for oral chemotherapy.  Angel confirms taking the appropriate dose of Venclexta 100 mg ruff, he takes this at bedtime. He also confirms taking posaconazole as prescribed.. Denies new or worsening side effects, missed doses, and recent hospital or ED visits. Patient has not had any recent medication changes.     We reviewed the oral chemotherapy program and he agreed to call us with any questions or concerns he may have in the future.     ORAL CHEMOTHERAPY 12/29/2022 1/6/2023 1/6/2023   Assessment Type Other Left Voicemail Initial Follow up   Diagnosis Code Acute Myeloid Leukemia (AML) Acute Myeloid Leukemia (AML) Acute Myeloid Leukemia (AML)   Providers Dr. Ron Velasquez   [facility]   Drug Name Venclexta (venetoclax) Venclexta (venetoclax) Venclexta (venetoclax)   Dose 100 mg 100 mg 100 mg   Current Schedule Daily Daily Daily   Cycle Details Continuous Continuous Continuous   Start Date of Last Cycle 12/19/2022 - 12/19/2022   Planned next cycle start date 1/28/2023 - -   Doses missed in last 2 weeks - - 0   Adherence Assessment - - Adherent   Adverse Effects - - No AE identified during assessment   Any new drug interactions? Yes - No   Pharmacist Intervention? Yes - -   Intervention(s) Drug changed (non-chemo);Patient Education - -   Is the dose as ordered appropriate for the patient? - - Yes       Last PHQ-2 Score on record: No flowsheet data found.    Vitals:  BP:   BP Readings from Last 1 Encounters:   12/29/22 103/61     Wt Readings from Last 1 Encounters:   12/29/22 (!) 168.1 kg (370 lb 11.2 oz)     Estimated body surface area is 2.94 meters squared as calculated from the following:    Height as of 12/9/22: 1.854 m (6' 1\").    Weight as of 12/29/22: 168.1 kg (370 lb 11.2 oz).    Labs:  _  Result Component Current Result Ref " Range   Sodium 127 (L) (12/29/2022) 136 - 145 mmol/L     _  Result Component Current Result Ref Range   Potassium 4.7 (12/29/2022) 3.4 - 5.3 mmol/L     _  Result Component Current Result Ref Range   Calcium 9.8 (12/29/2022) 8.8 - 10.2 mg/dL     _  Result Component Current Result Ref Range   Magnesium 2.5 (H) (12/29/2022) 1.7 - 2.3 mg/dL     _  Result Component Current Result Ref Range   Phosphorus 6.0 (H) (12/29/2022) 2.5 - 4.5 mg/dL     _  Result Component Current Result Ref Range   Albumin 3.7 (12/29/2022) 3.5 - 5.2 g/dL     _  Result Component Current Result Ref Range   Urea Nitrogen 34.1 (H) (12/29/2022) 8.0 - 23.0 mg/dL     _  Result Component Current Result Ref Range   Creatinine 1.15 (12/29/2022) 0.67 - 1.17 mg/dL     _  Result Component Current Result Ref Range   AST 34 (12/29/2022) 10 - 50 U/L     _  Result Component Current Result Ref Range   ALT 39 (12/29/2022) 10 - 50 U/L     _  Result Component Current Result Ref Range   Bilirubin Total 0.7 (12/29/2022) <=1.2 mg/dL     _  Result Component Current Result Ref Range   WBC Count 2.1 (L) (12/29/2022) 4.0 - 11.0 10e3/uL     _  Result Component Current Result Ref Range   Hemoglobin 7.4 (L) (12/29/2022) 13.3 - 17.7 g/dL     _  Result Component Current Result Ref Range   Platelet Count 33 (LL) (12/29/2022) 150 - 450 10e3/uL     _  Result Component Current Result Ref Range   Absolute Neutrophils 1.5 (L) (12/29/2022) 1.6 - 8.3 10e3/uL     No results found for ANC within last 30 days.        Assessment/Plan:  Angel is tolerating Venclexta well.    Follow-Up:  1/13 BMBx  1/19 Labs and Dr. Ron Mason PharmD  Hematology/Oncology Clinical Pharmacist  [facility]  [phone]

## 2023-01-06 NOTE — PROGRESS NOTES
Social Work Note: Telephone Call  Oncology Clinic     Data/Intervention:  Patient Name:  Angel Cardenas  /Age:  1960 (62 year old)     Call From:  Oncology clinic  Reason for Call:  [place] reservation needed    Intervention/Assessment:  Per Patient's request,  completed and securely emailed [place] request for lodging dates 2023 - 2023.     Plan:  [employer] [place] will contact Patient for confirmation of reservation.  will continue to provide support as needed.    Mari Morales [facility]  Outpatient Specialty Clinics  Direct Phone: [phone]

## 2023-01-06 NOTE — PROGRESS NOTES
[facility]: Cancer Care                                                                                          I called Angel and informed him that REN Reynolds reviewed his labs and stated that since his phosphorus was normal he can stop Phos-Lo at this time.    He was at [facility] getting a transfusion at the time and confirms that he is scheduled twice weekly for transfusions with them.  He was also informed that if his Hgb remains in the 6s we will likely have to change the frequency to 3 times a week    Angel was concerned about his earlier appointments on 1/13/2022 stating he has to drive about 100 miles to come to the clinic.  I discussed the option of staying at [facility] the night before.  He is interested in this option and SW was notified to assist with coordinating a stay.  He also said his sister will be coming with him.    At this time Angel is scheduled for transfusions on Mondays and Thursdays with [facility] in [place].  He will come see Dr. Velasquez on Thursday 1/19/2023 and will need labs and transfusion appointment scheduled for that day.  A message will be sent to scheduling to add this on.    I informed Angel that Micaela is in [facility] and will be in contact with him next week.  I will send clinic and Micaela contact information to him via [employer].  He has not set up his [employer] account yet so I will ask Micaela to review that with him      Signature:  Elissa Kothari RN

## 2023-01-06 NOTE — ORAL ONC MGMT
Oral Chemotherapy Monitoring Program     Placed call to patient in follow up of oral chemotherapy. Calling for initial assessment and mini-teach/intro to oral chemotherapy program after hospital discharge. Left message requesting call back. No drug names were mentioned. Will update when response received.     Dena Miranda, PharmD, [facility]  Oral Chemotherapy Monitoring Program  [facility]  [phone]  January 6, 2023

## 2023-01-09 NOTE — TELEPHONE ENCOUNTER
RECORDS STATUS - ALL OTHER DIAGNOSIS      RECORDS RECEIVED FROM: [facility]   DATE RECEIVED: 01/09/23   NOTES STATUS DETAILS   OFFICE NOTE from referring provider Epic 12/09/22: Shannon Irving PA-C   OFFICE NOTE from medical oncologist [facility] 01/04/23: Ashwini Camacho PA-C   OFFICE NOTE from other specialist     DISCHARGE SUMMARY from hospital [facility] 12/09/22: [facility]   MEDICATION LIST [facility]    LABS     PATHOLOGY REPORTS Report in EPIC Path Consult:  12/13/22: [id number]  (12/09/22: [id number])   ANYTHING RELATED TO DIAGNOSIS CE- [facility] Most recent 01/09/23   IMAGING (NEED IMAGES & REPORT)     CT SCANS PACS 12/16/22: CTA Chest Abd Pel  12/07/22: CT Chest Abd Pel  12/05/22: CT Head   MRI PACS 12/05/22: MR head

## 2023-01-11 NOTE — TELEPHONE ENCOUNTER
Oral Chemotherapy Monitoring Program     Pt called to report missing his Venclexta dos last night, 1/10.  Advised pt to skip this dose entirely, and resume his regular dosing schedule tonight.  Discussed that if the Venclexta is more than 8 hours late, pt should always skip the dose completely.    Pt verbalized understanding, and will resume his regular dosing schedule of Venclexta 100mg daily as of tonight.    Grace Myhre, PharmD  Hematology/Oncology Clinical Pharmacist  [facility]  [phone]

## 2023-01-13 NOTE — PROGRESS NOTES
Pt arrives to 2a, with sister, for bone marrow biopsy. H&P is up to date. Consent is signed.   REN Delgado notified hgb 6.7, no new orders received.

## 2023-01-13 NOTE — PROGRESS NOTES
Patient Name: Angel Cardenas  Medical Record Number: [id number]  Today's Date: 1/13/2023    Procedure: CT guided bone Marrow Biopsy  Proceduralist: Dr. Vallejo  Pathology present: Yes, special heme present to collect samples    Procedure Start: 1143  Procedure end: 1159  Sedation medications administered: 4 mg versed & 200 mcg Fentanyl     Report given to: 2A RN  : N/A    Other Notes: Pt arrived to IR room CT 2 from . Consent reviewed. Pt denies any questions or concerns regarding procedure. Pt positioned Lateral Left side up and monitored per protocol. Pt tolerated procedure without any noted complications. Pt transferred back to .

## 2023-01-13 NOTE — BRIEF OP NOTE
[facility] Brief Operative Note      Pre-operative diagnosis: AML   Post-operative diagnosis Same   Procedure: CT guided bone biopsy and bone marrow aspiration   Surgeon(s): LEEANN Vallejo MD   Estimated blood loss: Minimal   Specimens: Single 14 gauge bone core   Findings: 10 gauge access cannula placed in right iliac bone under CT guidance.  Single 14 gauge core obtained through the cannula with a biopsy cannula and submitted to tissue processor.    Approximately 16 mL bone marrow aspirated and submitted in their appropriate syringes to the tissue processor.    Access cannula removed. Post procedural CT demonstrated no immediate complication. Sterile dressing applied.

## 2023-01-13 NOTE — DISCHARGE INSTRUCTIONS
[facility]    Interventional Radiology  Patient Instructions Following Biopsy    AFTER YOU GO HOME  If you were given sedation DO NOT drive or operate machinery at home or at work for at least 24 hours  DO relax and take it easy for 48 hours, no strenuous activity for 24 hours  DO drink plenty of fluids  DO resume your regular diet, unless otherwise instructed by your Primary Physician  Keep the dressing dry and in place for 24 hours.  DO NOT SMOKE FOR AT LEAST 24 HOURS, if you have been given any medications that were to help you relax or sedate you during your procedure  DO NOT drink alcoholic beverages the day of your procedure  DO NOT do any strenuous exercise or lifting (> 10 lbs) for at least 7 days following your procedure  DO NOT take a shower for at least 12 hours following your procedure, no tub/pool for 5 days  Remove dressing after shower the next day. Replace with Band aid for 2 days.  Never leave a wet dressing in place.  DO NOT make any important or legal decisions for 24 hours following your procedure  There should be minimum drainage from the biopsy site    CALL THE PHYSICIAN IF:  You start bleeding from the procedure site.  If you do start to bleed from that site, lie down flat and hold pressure on the site for a minimum of 10 minutes.  Your physician will tell you if you need to return to the hospital  You develop nausea or vomiting  You have excessive swelling, redness, or tenderness at the site  You have drainage that looks like it is infected.  You experience severe pain  You develop hives or a rash or unexplained itching  You develop shortness of breath  You develop a temperature of 101 degrees F or greater      [facility] INTERVENTIONAL RADIOLOGY DEPARTMENT  Procedure Physician: Dr Vallejo                                       Date of procedure: January 13, 2023  Telephone Numbers: [phone]      Monday-Friday 7:30 am to 4:00 pm  [phone] After 4:00 pm Monday-Friday,  Weekends & Holidays.   Ask for the Interventional Radiologist on call.  Someone is on call 24 hrs/day  [facility] toll free number: [phone] Monday-Friday 8:00 am to 4:30 pm  [facility] Emergency Dept: [phone]

## 2023-01-13 NOTE — PROGRESS NOTES
Pt returns to 2a s/p bone marrow biopsy. Sacrum site CDI, soft, flat, non tender. Small amount of bloody drainage on primapore, outlined. Pt alert, denies pain, tolerating PO. Sister at bedside.

## 2023-01-13 NOTE — PROGRESS NOTES
Pt has tolerated PO. Ambulates with steady gait, denies dizziness/lightheadedness. New dressing was placed at sacral site prior to discharge-site CDI, soft, flat, non tender. Discharge instructions have been reviewed with pt and pts sister (Courtney), both verbalize understanding. PIV discontinued.   1405 Pt's sister has arrived with vehicle. Pt transported to vehicle via wheelchair.

## 2023-01-16 NOTE — TELEPHONE ENCOUNTER
Narcotic Refill Request    Medication(s) requested:  Oxycodone and Methocarbamol   Person Requesting Refill:Angel   What pain is the medication treating: in back same as in hospital   How is the medication being taken?:Oxy takes 5mg 1 in AM and muscle relaxer   Does pt have enough for today? Oxy is out has 2 methocarbamol left   Is pain being adequately controlled on the current regimen?: yes when able to take medications   Experiencing any side effects from medication?: none     Date of most recent appointment:  1/4/23 Ashwini Camacho   Any No Show Visits: no   Next appointment:   1/19/23 Dr Velasquez   Last fill date and by whom:  12/29/22 Shannon Irving    Reviewed:  No access     Routed provider:  Dr Malloy

## 2023-01-17 NOTE — PROGRESS NOTES
Social Work Note: Telephone Call  Oncology Clinic     Data/Intervention:  Patient Name:  Angel Cardenas  /Age:  1960 (62 year old)     Call From:  Oncology Clinic  Reason for Call:  [place] reservation needed    Intervention/Assessment:  Per Patient's request,  completed and securely emailed [place] request for lodging dates 2023 - 2023.     Plan:  [employer] [place] will contact Patient for confirmation of reservation.  will continue to provide support as needed.    Mari Morales [facility]  Outpatient Specialty Clinics  Direct Phone: [phone]

## 2023-01-18 NOTE — TELEPHONE ENCOUNTER
Call attempt: 1  Message left: Yes   This is a f/u call from 1/13 biopsy.     Any additional concerns or questions: call Lisette ALEJANDRO at [phone]    Post call completed.   January 18, 2023 9:44 AM  Lisette Scott RN

## 2023-01-18 NOTE — TELEPHONE ENCOUNTER
Patient has severe pain, in back between shoulders.  Patient is having the same pain as he had in December.  Sister and patient called and stated they have an appointment for tomorrow but they are supposed to stay at [facility].  Patient is in severe pain and [facility].  He has taken the medications prescribed with no relief.    Plan is for patient to go to ED [facility] to be seen.  Sister will bring him there now.    Whit Ramsey RN   01/18/23 5:46 PM  [facility] Nurse Advisor    Reason for Disposition    [1] SEVERE back pain (e.g., excruciating) AND [2] sudden onset AND [3] age > 60 years    Additional Information    Negative: Passed out (i.e., lost consciousness, collapsed and was not responding)    Negative: Shock suspected (e.g., cold/pale/clammy skin, too weak to stand, low BP, rapid pulse)    Negative: Sounds like a life-threatening emergency to the triager    Negative: Major injury to the back (e.g., MVA, fall > 10 feet or 3 meters, penetrating injury, etc.)    Negative: Followed a tailbone injury    Negative: [1] Pain in the upper back over the ribs (rib cage) AND [2] radiates (travels, goes) into chest    Negative: [1] Pain in the upper back over the ribs (rib cage) AND [2] worsened by coughing (or clearly increases with breathing)    Negative: Back pain during pregnancy    Negative: Pain mainly in flank (i.e., in the side, over the lower ribs or just below the ribs)    Protocols used: BACK PAIN-A-

## 2023-01-18 NOTE — PROGRESS NOTES
[facility]: Cancer Care                                                                                          Received message from TonZof FILIPPO stating patient's hemoglobin was 6.7 and patient needed blood.     RN[facility] called patient to offer weekend infusion at the [facility]. Patient declined and stated he would get blood at the [facility]. Patient stated an understanding of concern for blood and urgency.     RNCC will follow up Monday to ensure patient received blood.     Micaela Hamlin RN, BSN  RN Care Coordinator   [phone]

## 2023-01-18 NOTE — PROGRESS NOTES
[facility]: Cancer Care                                                                                          RNCC called patient to discuss admission on 1/19/2022 after visit with Dr. Velasquez due to in increase of blasts in his marrow.     Patient stated an understanding of this information. RNCC also updated patient that he would not receive a transfusion until he was inpatient.     No other follow up needed at this time. Patient will be getting a COVID swab locally.     Micaela Hamlin, RN, BSN  RN Care Coordinator   [phone]

## 2023-01-19 PROBLEM — C92.50: Status: ACTIVE | Noted: 2023-01-01

## 2023-01-19 NOTE — H&P
[facility]    History and Physical  Hematology / Oncology     Date of Admission:  01/19/23  Date of Service (when I saw the patient): 01/19/23    Assessment & Plan   Angel Cardenas is a 62 year old male with a history of multiple prior DVTs and PE in 2022 (previously on Xarelto), DM2, COPD, HTN, gout and recently diagnosed AMML s/p Cycle 1 of 10-days of Decitabine/Venetoclax (D1=12/19/22) with subsequent persistent disease on post-therapy BMBx 1/13/23 demonstrating ongoing ~70% blasts. He presents from clinic for consideration of re-induction chemotherapy in the setting of fever to 100.5 and hypoxia requiring 2-4L O2.       HEME  # AMML with SRSF2, TET2 mutations  # Leukocytosis   Patient of Dr. Velasquez. In summary, presented to PCP with fatigue, subjective fevers, lack of appetite, weight loss, epistaxis and rib pain. Found to be pancytopenic with 6% circulating peripheral blasts (WBC 8.5 Hgb 8.8 and plts 82). Diagnostic BMBx 12/9 was consistent with AMML with 80 blasts, NGS: SRSF3 + TET2 mutations (on peripheral blood), Cyto: 50-52,XY,+4,+5,+8,+13,add(13)(p11.2)x2,+18,+20[cp17]/,idemx2,+16,+16[cp3]. Diagnostic LP negative. Due to comorbidities started on Decitabine 10 days + Venetoclax 28 (C1D1 12/19/22). Hospital course was complicated by TLS requiring rasburicase, possible PNA, possible adrenal insufficiency/hypotension related to steroid taper (not requiring pressors) and back/rib pain flares. He discharged to home following completion of chemotherapy and post-therapy BMBx was completed on 1/13 which is notable for persistent pancytopenia showing hypoercellular marrow (90%) with marked decrease hematopoesis and 70% blasts. NGS/FISH/cyto is pending. He had a visit with Dr. Velasquez in clinic prior to admission on 1/19 to discuss next steps and consideration of re-induction.  - Per conversation with Dr. Velasquez PTA, considering intensive regimen with 7+3 vs G-CLAC.  Continue with discussions and repeat pre-chemo work-up.   - PICC placement in the setting of multiple lab draws, IV products and anticipation of upcoming chemotherapy  - Repeat pre-chemo work-up - normal EKG, echo; pending  - Consider Hydrea if WBC continues to trend up    # H/o PE (most recently Jan 2022)   # H/o multiple DVTs (1990s)  Per chart review he was seen for evaluation of PE by Dr. Amari Cortes (4/15/22). Patient has history of multiple DVTs in the 1990s without known cause, unprovoked PE at age 43, DVT with PE in context of hip replacement. He most recently was diagnosed with bilateral PE with right heart strain on 1/19/22 after presenting to the ED with SOB. Previously was on Xarelto though discontinued during hospitalization for induction chemotherapy. Transitioned to Lovenox and discharged on half-dosing d/t down-trending counts. Platelets appear to have recovered though are in the 100s on admission. Has continued half-dosing Lovenox without increase to full-dose in the outpatient setting. Presented this admission with fever and hypoxia (77% on RA) requiring 2-4L O2.   - Increase Lovenox to therapeutic dosing -- 0.75 mg BID (dosed based on weight) as d/w pharmacy; would decrease to half-dosing if Plt <50K.   - Follow-up CT PE ordered as below     # Risk for TLS  # Risk for DIC  Leukocytosis noted on admission lab work with WBC 15.3 in the setting of known persistent leukemia. Phos mildly elevated to 4.9. Cr near baseline (0.-1.1) at 1.17.   - Uric acid and LDH ordered on admission  - PTA Allopurinol 300 mg BID (dose adjusted for weight)  - Start Phoslo 1,334 TID  - Monitor TLS labs q12h, DIC labs daily   - Would give Rasburicase if uric acid >8    # Anemia  # Thrombocytopenia   2/2 recent chemotherapy and underlying disease.   - Transfuse to maintain Hgb >7, Plt >10K  - Type & Screen MWF  - Blood consent signed on admission    ID  # Fever  # AHRF  # Abdominal bloating/discomfort   Febrile in  clinic to 100.5 on 1/19, also with associated hypoxia to 77% on admission. BP normotensive. Endorses progressive back pain as below along with SOB/DEAN worse over the past 1-2 days and abdominal bloating. He is having regular BMs. Denies other respiratory or infectious complaints. Lab work is notable for leukocytosis to 15 and mildly elevated LFTs (Alk 214 and AST 51). Differential includes infection vs COPD exacerbation vs leukemia-related.   - CT PE and A/P on admission  - Follow-up blood cultures  - RVP, MRSA nares, UA/UC ordered   - Start Cefepime/Vancomycin on admission x1/19    # Prophylaxis  ANC is 1.8 on admission though c/f functional neutropenia in the setting of persistent leukemia and leukocytosis.   - PTA Acyclovir 400 mg BID  - Hold PTA Levaquin 250 mg daily in the setting of empiric IV antibiotics above   - Hold PTA Posa 300 mg dialy, start Lynnette 50 mg daily in anticipation of upcoming chemotherapy    CV/PULM  # COPD  # CATHERINE  Ongoing mild hypoxia to high 80s, appears to be his baseline in the setting of his COPD. Oxygen requirement to 2-4L on admission with O2 sats in the 70s on RA. Reports SOB tends to flare with pain crises. Does desat at night.   - CT PE and antibiotics as above  - PTA Breo-Ellipta  - DuoNebs BID and Neb treatments q2hr PRN   - Incentive spirometry   - PTA CPAP at night    # HTN  # HLD  Home Metoprolol and Lisinopril held during previous admission in the setting of hypotension and possible adrenal insufficiency. BP normotensive to mildly HTN on admission. Statin discontinued prior to admission given anticipation of chemotherapy.   - Continue to hold prior Metoprolol/Lisinopril for now; consider resuming as appropriate  - Continue to hold statin, consider resumption prior to discharge    # Chronic LE edema  # Chronic venous insufficiency   Patient endorses increased LE swelling on admission. Has home compression stockings in place.  - PTA Bumex 1 mg daily, consider increasing dose  -  "Lymphedema consulted   - Repeat echo as above    MSK  # Acute on chronic back pain  Noted prior to hospitalization for induction chemotherapy. Presented with rib pain which radiates into his upper back and between his shoulder blades. MRI thoracic spine previously completed 12/17 and showed \"scattered subtle mottled enhancement of bones most pronounced on L 7th rib, could be 2/2 underlying disease process.\" It is possible that pt has some extramedullary infiltration of cortex causing pain. Endorses similar complaints this admission starting 1-2 days PTA in the setting of known persistent leukemia. No red flag sx. EKG on admission is normal.   - CT PE C/A/P as above  - PTA Voltaren gel QID, Robaxin QID  - PTA Oxycodone increased to 5-10 mg q4h prn. Add IV Dilaudid 0.5 mg q4h prn.   - PRN Tylenol and Flexeril   - Of note, pain previously responded well to Dexamethasone - could consider if persistent/worsening.   - Palliative care was previously involved last hospitalization. Consider consult as appropriate.     GI  # H/o constipation   Noted during previous admission for induction chemotherapy. Patient endorses regular and soft BM on admission.   - PTA Senna BID, Miralax daily   - Milk of magnesium daily PRN  - Prune juice was previously helpful for patient     ENDO  # T2DM   Follows with [facility], last seen 11/15/22.   - PTA Ozempic held on admission, resume on discharge  - Medium intensity sliding scale insulin, insulin discontinued on discharge  - Hypoglycemia protocol in place     MISC  # Dysphagia  Patient endorses difficulty swallowing on admission described as \"food getting stuck in throat\". Has had to modify diet at home and be careful with food selection/size. Occasionally finds himself coughing with water as well.   - SLP consulted    FEN   - IVF bolus prn   - PRN lyte replacement per standing protocol  - Regular diet as tolerated     Lines/Drains: PICC placed on admission, remove on " discharge  DVT ppx: therapeutic Lovenox; hold if Plt <50K  GI ppx: PTA PPI  Consults: PT/OT, lymphedema, SLP   CODE: FULL  DISPO: Anticipate at least 3-5 day stay and possible prolonged hospitalization for likely re-induction of persistent leukemia.   Follow-up/Referrals: Primary oncologist is Dr. Velasquez.   - Follow-up will need to be scheduled closer to discharge    Social  - Lives at home alone near [place], [place]  - Has two sisters who are supportive and like to be involved in big conversations via speaker phone    Coordination of Cares   - Will need to arrange local follow-up with [facility] in [place], [place] on discharge    Code Status   Full Code      Patient and plan of care was discussed with attending physician Dr. Amato.    >90 minutes spent on the date of the encounter. Over 50% of time was spent counseling the patient and/or coordinating care.     Shannon Irving PA-C  Hematology/Oncology  Pager: [phone]    Primary Care Physician   Gagan Wills    Chief Complaint   Persistent leukemia, admission for consideration of re-induction chemotherapy     History is obtained from the patient    History of Present Illness   Angel Cardenas is a 62 year old male with a history of multiple prior DVTs and PE in 2022 (previously on Xarelto), DM2, COPD, HTN, gout and recently diagnosed AMML s/p Cycle 1 of 10-days of Decitabine/Venetoclax (D1=12/19/22) with subsequent persistent disease on post-therapy BMBx 1/13/23 ongoing ~70% blasts. He presents from clinic for consideration of re-induction chemotherapy in the setting of fever to 100.5 and hypoxia requiring 2-4L.     On admission Angel endorses SOB, DEAN and progressive back pain over the past 1-2 days. Additionally endorses abdominal bloating and difficulty swallowing. Reports was initially doing well at home following prior discharge though symptoms began over the past week following his repeat BMBx. He had a clinic visit with Dr. Velasquez prior to admission to  discuss BMBx results which show persistent leukemia. They discussed next steps including re-induction chemotherapy regimens. Also had fever in clinic and noted to be hypoxia. We discussed plan for infectious/hypoxia work-up and pre-chemo work-up tomorrow and will continue to discuss next steps. Reports is a difficulty poke for blood and is agreeable to PICC placement. All questions answered. Sister present at bedside.     Past Medical History    I have reviewed this patient's medical history and updated it with pertinent information if needed.   Past Medical History:   Diagnosis Date     COPD (chronic obstructive pulmonary disease) (H)      Diabetes mellitus, type 2 (H)      Gout      History of pulmonary embolism      HLD (hyperlipidemia)      HTN (hypertension)        Past Surgical History   I have reviewed this patient's surgical history and updated it with pertinent information if needed.  Past Surgical History:   Procedure Laterality Date     PICC DOUBLE LUMEN PLACEMENT Right 12/16/2022    Right brachial-medial vein 48cm total 1cm external.Placement verified by [facility] 3CG.PICC okay to use.     PICC TRIPLE LUMEN PLACEMENT Right 01/19/2023    5FR TL PICC basilic vein. L-50cm 2cm out     CO REVISE TOTAL HIP REPLACEMENT Bilateral      TOTAL SHOULDER REPLACEMENT Right        Prior to Admission Medications   Prior to Admission Medications   Prescriptions Last Dose Informant Patient Reported? Taking?   acetaminophen (TYLENOL) 500 MG tablet 1/19/2023 at AM; 2000 mg Self Yes Yes   Sig: Take 500-1,000 mg by mouth every 6 hours as needed for mild pain   acyclovir (ZOVIRAX) 400 MG tablet 1/19/2023 at AM; 1 dose/2 Self No Yes   Sig: Take 1 tablet (400 mg) by mouth 2 times daily   allopurinol (ZYLOPRIM) 300 MG tablet 1/19/2023 at AM; 1 dose/2 Self No Yes   Sig: Take 1 tablet (300 mg) by mouth 2 times daily   ammonium lactate (AMLACTIN) 12 % external cream Not Taking Self No No   Sig: Apply topically daily   Patient not  taking: Reported on 1/19/2023   atorvastatin (LIPITOR) 20 MG tablet Unknown  Yes Yes   Sig: Take 20 mg by mouth daily   budesonide-formoterol (SYMBICORT) 160-4.5 MCG/ACT Inhaler 1/19/2023 at AM; 1 dose/2 Self Yes Yes   Sig: Inhale 2 puffs into the lungs 2 times daily   bumetanide (BUMEX) 1 MG tablet 1/19/2023 at AM Self Yes Yes   Sig: Take 1 mg by mouth daily   calcium acetate (PHOSLO) 667 MG CAPS capsule  Self No No   Sig: Take 3 capsules (2,001 mg) by mouth 3 times daily (with meals)   diclofenac (VOLTAREN) 1 % topical gel 1/18/2023 at PM Self No Yes   Sig: Apply 2 g topically 4 times daily as needed for moderate pain (4-6)   enoxaparin ANTICOAGULANT (LOVENOX) 100 MG/ML syringe Not Taking Self No No   Sig: Inject 0.9 mLs (90 mg) Subcutaneous every 12 hours HOLD if platelets are less than 50K   Patient not taking: Reported on 1/19/2023   levofloxacin (LEVAQUIN) 250 MG tablet 1/19/2023 at AM Self No Yes   Sig: Take 1 tablet (250 mg) by mouth daily   methocarbamol (ROBAXIN) 500 MG tablet 1/18/2023  No Yes   Sig: Take 1 tablet (500 mg) by mouth 4 times daily as needed for muscle spasms   oxyCODONE (ROXICODONE) 5 MG tablet 1/19/2023 at AM; 5 mg  No Yes   Sig: Take 1 tablet (5 mg) by mouth every 4 hours as needed for moderate pain (4-6)   pantoprazole (PROTONIX) 40 MG EC tablet Not Taking Self No No   Sig: Take 1 tablet (40 mg) by mouth every morning (before breakfast)   Patient not taking: Reported on 1/19/2023   polyethylene glycol (MIRALAX) 17 GM/Dose powder 1/18/2023 at AM Self No Yes   Sig: Take 17 g by mouth daily as needed for constipation   posaconazole (NOXAFIL) 100 MG EC tablet 1/19/2023 Self No Yes   Sig: Take 3 tablets (300 mg) by mouth every morning   semaglutide (OZEMPIC, 1 MG/DOSE,) 2 MG/1.5ML pen 1/11/2023 at Unknown Self Yes Yes   Sig: Inject 2 mg Subcutaneous every 7 days Every Wednesdays   senna-docusate (SENOKOT-S/PERICOLACE) 8.6-50 MG tablet Past Week at Unknown Self No Yes   Sig: Take 1 tablet by  mouth 2 times daily as needed for constipation   venetoclax (VENCLEXTA) 100 MG tablet 1/18/2023 at PM Self No Yes   Sig: Take 1 tablet (100 mg) by mouth daily      Facility-Administered Medications: None     Allergies   Allergies   Allergen Reactions     Metformin Unknown       Social History   I have reviewed this patient's social history and updated it with pertinent information if needed. Angel Cardenas  reports that he has quit smoking. His smoking use included cigarettes. He has never used smokeless tobacco. He reports that he does not currently use alcohol.    Family History   I have reviewed this patient's family history and updated it with pertinent information if needed.   Family History   Problem Relation Age of Onset     Neurodegenerative disease Father         Creutzfeldt-Osmin disease     Chronic Obstructive Pulmonary Disease Brother      Heart Disease Brother        Review of Systems   The comprehensive point Review of Systems is negative other than noted in the HPI or here.     Physical Exam   Temp: 98.1  F (36.7  C) Temp src: Oral BP: 134/77 Pulse: 86   Resp: 28 SpO2: 95 % O2 Device: Oxymask Oxygen Delivery: 8 LPM  Vital Signs with Ranges  Temp:  [98.1  F (36.7  C)-100.5  F (38.1  C)] 98.1  F (36.7  C)  Pulse:  [86-98] 86  Resp:  [22-28] 28  BP: (125-149)/(66-81) 134/77  SpO2:  [77 %-95 %] 95 %  393 lbs 9.6 oz    General: Awake and interactive. Chronically-ill appearing and fatigued. Uncomfortable and shifting frequently in bed.    Skin: Warm, dry, and intact without rashes or lesions. Chronic venous stasis changes to bilateral LE.  Scattered bruising to extremities.   Head: Normocephalic and atraumatic.  HEENT: PERRLA. Sclera clear. EOM intact. Oral mucosa is pink and moist with no lesions, thrush, or exudates.   Lymph: Neck supple. No significant adenopathy.   Cardiac: Regular rate and rhythm.   Respiratory: Increased work of breathing on RA. Course breath sounds.   Abd: Soft, symmetric, distended.  BS present and normoactive. Organomegaly not noted d/t body habitus.   Extremities: 2-3+ bilateral pitting edema, home compression stockings in place. Distal pulses palpable.   Neuro: A&Ox3 with normal speech. Memory and thought process preserved. No deficits grossly.  Psych: Appropriate mood and affect. Good judgment and insight. No visual/auditory hallucinations.

## 2023-01-19 NOTE — PROGRESS NOTES
"BRIEF CROSSCOVER NOTE:    Notified ~1645 that patient triggered sepsis code with lactate of 2.9, newly requiring oxymask for hypoxia to 88%.    Patient is A&Ox4 and answering questions appropriately. He had just come up from getting his CTs-- his saturations recovered on oxymask up to 8LPM, which was subsequently weaned down as he sat in bed and received an albuterol neb. Reports that this shortness of breath is not new. Noted to desat down to 88% despite supplemental oxygen when we got him to sit up so that we could listen to lung bases.    /77   Pulse 90   Temp 99.2  F (37.3  C) (Oral)   Resp 24   Ht 1.854 m (6' 1\")   Wt (!) 178.5 kg (393 lb 9.6 oz)   SpO2 91%   BMI 51.93 kg/m      GENERAL: The patient is awake and alert, appropriate, pleasant and cooperative.   LUNGS: Poor air movement in lung bases. Distant lung sounds-- lung exam limited by body habitus. Mild tachypnea and supraclavicular retractions. No nasal flaring or tripoding.  HEART: Regular rate and rhythm. No murmur, rubs, or gallops noted. No S3, S4 or rub is auscultated.   EXTREMITIES: Bilateral lower extremities wrapped; wrong venous stasis changes noted.   SKIN: No rashes. No jaundice. Pink and warm with good turgor.   PSYCHIATRIC: The patient is oriented x4. Mood and affect are appropriate.     Angel Cardenas is a 62-year-old M who was admitted earlier today from clinic for fever and acute hypoxic respiratory failure of unclear etiology. I suspect volume overload based on exam and based on CT chest showing mosaic attenuation in the lower lobes of the lungs. Additionally, it sounds like Angel has significant orthopnea, and he had just laid flat for the CT chest/abdomen/pelvis. Thankfully, no hemodynamically stable PE was noted on CT chest, and patient is already on therapeutic dose lovenox.    - Bumex 2mg IV x1; strict Is/Os  - Wean oxygen as tolerated; he's back down to 4L oxymask, which from notes is what he was needing in clinic today "   - Blood cultures and UA were already obtained on admission, and Angel is already on broad spectrum antibiotics; I wouldn't broaden further given that he appears stable at this time  - Repeat lactate in AM    Monika Yee MD  Internal Medicine-Pediatrics  [facility]  Pager: [phone]      ---------------    1812:    Notified that patient's uric acid is 8.6. From heme/onc notes, I gather that we should start rasburicase for uric acid >8. Hold allopurinol.

## 2023-01-19 NOTE — NURSING NOTE
Chief Complaint   Patient presents with     Labs Only     Venipuncture, vitals checked     Pt reports he had a bad night, back pain, denies fever, light headedness    Mone Quezada RN on 1/19/2023 at 10:32 AM

## 2023-01-19 NOTE — PLAN OF CARE
Goal Outcome Evaluation:      Plan of Care Reviewed With: patient    Overall Patient Progress: decliningOverall Patient Progress: declining    Nursing Focus: Admission    D: Arrived at 1306 from clinic via wheelchair. Patient accompanied by his sister Gala. Admitted for re-induction chemotherapy for AML. Complains of  back pain.      I: Admission process began.  Patient oriented to room, enviroment, call light.  Md. notified of patients arrival on unit.     A: Hypoxic to 77% on RA, up to 92% on 4 liters.  Patient stable at this time.  Complaining of back pain.     P: Implement plan of care when available. Continue to monitor patient. Nursing interventions as appropriate. Notify md with changes in pt status.

## 2023-01-19 NOTE — PROCEDURES
[facility]    Triple Lumen PICC Placement    Date/Time: 1/19/2023 3:05 PM  Performed by: Katina Huffman RN  Authorized by: Shannon Irving PA-C   Indications: chemotherapy.      UNIVERSAL PROTOCOL   Site Marked: Yes  Prior Images Obtained and Reviewed:  Yes  Required items: Required blood products, implants, devices and special equipment available    Patient identity confirmed:  Verbally with patient, arm band, provided demographic data and hospital-assigned identification number  Patient was reevaluated immediately before administering moderate or deep sedation or anesthesia  Confirmation Checklist:  Patient's identity using two indicators, relevant allergies, procedure was appropriate and matched the consent or emergent situation and correct equipment/implants were available  Time out: Immediately prior to the procedure a time out was called    Universal Protocol: the Joint Commission Universal Protocol was followed    Preparation: Patient was prepped and draped in usual sterile fashion       ANESTHESIA    Anesthesia: See MAR for details  Local Anesthetic:  Lidocaine 1% without epinephrine  Anesthetic Total (mL):  3      SEDATION    Patient Sedated: No        Preparation: skin prepped with ChloraPrep  Skin prep agent: skin prep agent completely dried prior to procedure  Sterile barriers: maximum sterile barriers were used: cap, mask, sterile gown, sterile gloves, and large sterile sheet  Hand hygiene: hand hygiene performed prior to central venous catheter insertion  Type of line used: PICC  Catheter type: triple lumen  Lumen type: non-valved and power PICC  Lumen Identification: Red, Gray and White  Catheter size: 5 Fr  Brand: Bard  Lot number: [id number]  Placement method: venipuncture, MST, ultrasound and tip navigation system  Number of attempts: 1  Difficulty threading catheter: no  Successful placement: yes  Orientation: right    Location: basilic vein (vein  diameter- 0.66cm)  Arm circumference: adults 10 cm  Extremity circumference: 44  Visible catheter length: 2  Total catheter length: 50  Dressing and securement: alcohol impregnated caps, blood cleaned with CHG, chlorhexidine disc applied, site cleansed, statlock, sterile dressing applied, glue and transparent dressing  Post procedure assessment: blood return through all ports, free fluid flow and placement verified by 3CG technology  PROCEDURE Describe Procedure: PICC placement verified by [employer] 3CG tip confirmation system. PICC okay to use.  Disposal: sharps and needle count correct at the end of procedure, needles and guidewire disposed in sharps container

## 2023-01-19 NOTE — PHARMACY-ADMISSION MEDICATION HISTORY
Admission Medication History Completed by Pharmacy    See [facility] Admission Navigator for allergy information, preferred outpatient pharmacy, prior to admission medications and immunization status.     Medication History Sources:     Patient, fill history    Changes made to PTA medication list (reason):    Added: None    Deleted: None    Changed:   o Ozempic 2 mg Q Wednesdays. Missed 1/18 dose.    Additional Information:    Patient is a poor historian overall - Has difficulty recalling some medication names when explaining indication/regimen. Does not recall taking atorvastatin. Found atorvastatin 20 mg fill history with 90-d supply on 11/8/22.    Took 2000 mg acetaminophen 1/19/23 before admission.    Prior to Admission medications    Medication Sig Last Dose Taking? Auth Provider Long Term End Date   acetaminophen (TYLENOL) 500 MG tablet Take 500-1,000 mg by mouth every 6 hours as needed for mild pain 1/19/2023 at AM; 2000 mg Yes Unknown, Entered By History     acyclovir (ZOVIRAX) 400 MG tablet Take 1 tablet (400 mg) by mouth 2 times daily 1/19/2023 at AM; 1 dose/2 Yes Shannon Irving PA-C Yes    allopurinol (ZYLOPRIM) 300 MG tablet Take 1 tablet (300 mg) by mouth 2 times daily 1/19/2023 at AM; 1 dose/2 Yes Shannon Irving PA-C No    atorvastatin (LIPITOR) 20 MG tablet Take 20 mg by mouth daily Unknown Yes Unknown, Entered By History     budesonide-formoterol (SYMBICORT) 160-4.5 MCG/ACT Inhaler Inhale 2 puffs into the lungs 2 times daily 1/19/2023 at AM; 1 dose/2 Yes Unknown, Entered By History Yes    bumetanide (BUMEX) 1 MG tablet Take 1 mg by mouth daily 1/19/2023 at AM Yes Unknown, Entered By History     diclofenac (VOLTAREN) 1 % topical gel Apply 2 g topically 4 times daily as needed for moderate pain (4-6) 1/18/2023 at PM Yes Shannon Irving PA-C     levofloxacin (LEVAQUIN) 250 MG tablet Take 1 tablet (250 mg) by mouth daily 1/19/2023 at AM Yes Shannon Irving PA-C     methocarbamol (ROBAXIN) 500 MG tablet  Take 1 tablet (500 mg) by mouth 4 times daily as needed for muscle spasms 1/18/2023 Yes Onesimo Velasquez MD     oxyCODONE (ROXICODONE) 5 MG tablet Take 1 tablet (5 mg) by mouth every 4 hours as needed for moderate pain (4-6) 1/19/2023 at AM; 5 mg Yes Onesimo Velasquez MD No    polyethylene glycol (MIRALAX) 17 GM/Dose powder Take 17 g by mouth daily as needed for constipation 1/18/2023 at AM Yes Shannon Irving PA-C     posaconazole (NOXAFIL) 100 MG EC tablet Take 3 tablets (300 mg) by mouth every morning 1/19/2023 Yes Shannon Irving PA-C No    semaglutide (OZEMPIC, 1 MG/DOSE,) 2 MG/1.5ML pen Inject 2 mg Subcutaneous every 7 days Every Wednesdays 1/11/2023 at Unknown Yes Unknown, Entered By History     senna-docusate (SENOKOT-S/PERICOLACE) 8.6-50 MG tablet Take 1 tablet by mouth 2 times daily as needed for constipation Past Week at Unknown Yes Shannon Irving PA-C     venetoclax (VENCLEXTA) 100 MG tablet Take 1 tablet (100 mg) by mouth daily 1/18/2023 at PM Yes Shannon Irving PA-C No    ammonium lactate (AMLACTIN) 12 % external cream Apply topically daily  Patient not taking: Reported on 1/19/2023 Not Taking  Shannon Irving PA-C     calcium acetate (PHOSLO) 667 MG CAPS capsule Take 3 capsules (2,001 mg) by mouth 3 times daily (with meals)   Shannon Irving PA-C     enoxaparin ANTICOAGULANT (LOVENOX) 100 MG/ML syringe Inject 0.9 mLs (90 mg) Subcutaneous every 12 hours HOLD if platelets are less than 50K  Patient not taking: Reported on 1/19/2023 Not Taking  Shannon Irving PA-C     pantoprazole (PROTONIX) 40 MG EC tablet Take 1 tablet (40 mg) by mouth every morning (before breakfast)  Patient not taking: Reported on 1/19/2023 Not Taking  Shannon Irving PA-C         Date completed: 01/19/23    Medication history completed by: Brandon Hilton

## 2023-01-19 NOTE — PHARMACY-VANCOMYCIN DOSING SERVICE
Pharmacy Vancomycin Initial Note  Date of Service 2023  Patient's  1960  62 year old, male  Actual Body Weight: 178.5 kg    Indication: Febrile Neutropenia    Current estimated CrCl = Estimated Creatinine Clearance: 113.3 mL/min (based on SCr of 1.17 mg/dL).    Creatinine for last 3 days  2023: 10:26 AM Creatinine 1.17 mg/dL    Recent Vancomycin Level(s) for last 3 days  No results found for requested labs within last 72 hours.      Vancomycin IV Administrations (past 72 hours)      No vancomycin orders with administrations in past 72 hours.                Nephrotoxins and other renal medications (From now, onward)    Start     Dose/Rate Route Frequency Ordered Stop    23 0800  bumetanide (BUMEX) tablet 1 mg            1 mg Oral DAILY 23 1308      23 2000  acyclovir (ZOVIRAX) tablet 400 mg              400 mg Oral 2 TIMES DAILY 23 1307            Contrast Orders - past 72 hours (72h ago, onward)    Start     Dose/Rate Route Frequency Stop    23 1500  iopamidol (ISOVUE-370) solution 135 mL         135 mL Intravenous ONCE 23 1507          [employer] Prediction of Planned Initial Vancomycin Regimen    Loading dose: 2000 mg at 16:00 2023.  Regimen: 1250 mg IV every 12 hours.  Start time: 06:00 on 2023  Exposure target: AUC24 (range) 400-600 mg/L.hr   AUC24,ss: 593 mg/L.hr  Probability of AUC24 > 400: 77 %  Ctrough,ss: 17 mg/L  Probability of Ctrough,ss > 20: 42 %  Probability of nephrotoxicity (Lodise ALESIA ): 13 %        Plan:  1. Start vancomycin 2000 mg load then 1250 mg IV q12h.   2. Vancomycin monitoring method: AUC  3. Vancomycin therapeutic monitoring goal: 400-600 mg*h/L  4. Pharmacy will check vancomycin levels as appropriate in 1-3 Days.    5. Serum creatinine levels will be ordered daily for the first week of therapy and at least twice weekly for subsequent weeks.      Alexia Henley [facility]

## 2023-01-19 NOTE — PROVIDER NOTIFICATION
Dr Yee paged [phone]: Pt had LA 2.9, code sepsis called.  Also RR is 28 on 5L to keep above 88%

## 2023-01-19 NOTE — PROGRESS NOTES
Vascular Access Services Notes:    Ultrasound-guided lab draw done without complication. Attempted x1 in the left upper forearm using a 20 gauge x 1.75 inch BB PIV needle. Lab RN was present to assist.    Time spent with pt - 30 minutes      JERROD Spears, RN [facility]

## 2023-01-19 NOTE — PROGRESS NOTES
Admitting patient from clinic for febrile neutropenia, progressive leukemia and AHRF    Pt also having bowel discomfort and persistent worsening back pain    Please obtain inpatient:    1) CT C/A/P to assess for back pain, and etiology of fevers / abdomen  2) repeat TTE  3) EKG  4) Start cefepime immediately during FN workup.    Needs chemo but will need ongoing discussion of what chemotherapy regimen is best given co-morbidities. Regimen TBD.    Full progress note to follow    Onesimo Velasquez MD     Division of Hematology, Oncology and Transplantation  [facility]  P: [phone]

## 2023-01-19 NOTE — PROGRESS NOTES
Responded to Rapid Response call. RRT was called due to respiratory failure. Patient was on Oxyplus Mask @ 10 LPM, RR 23/min, SpO2 95%. Respiratory interventions included albuterol nebulizer treatment that was finishing up when RT walked in the room. Patient was stabilized and remained on unit. SPO2 goal 88% since pt has COPD. Oxymask was weaned down to 4L O2 and pt has home CPAP for overnight. RT passed on to unit RT to bleed in O2 to pt CPAP for overnight.    RT will continue to monitor.    Valarie Knight, RT, RT  1/19/2023 5:10 PM

## 2023-01-19 NOTE — PROGRESS NOTES
[facility]    HEMATOLOGY & ONCOLOGY  NEW CONSULTATION    PATIENT NAME: Angel Cardenas MRN # [id number]  DATE OF VISIT: January 19, 2023 YOB: 1960    SUMMARY  Angel Cardenas is a 62 year old male with a history of prior PE (previously on Xarelto), DM2, COPD, HTN, and gout admitted on 12/9/2022     1. Presented to PCP with fatigue, fevers, anorexia, weight loss, nose bleeds, rib pain found to be pancytopenic  2. Admitted to [facility] 12/9/22 and found to have pancytopenia with circulating blasts  -- WBC 8.5, Hb 8.8, Plts 82, Peripheral blasts 6%  -- Diagnostic LP was negative  3. 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 Due to comorbidities started on Decitabine 10 days + Venetoclax 28 (C1D1 12/19/22)  -- course complicated by TLS requiring rasburicase, possible PNA, possible adrenal insufficiency / hypotension (not requiring pressors).   -- Discharged 12/29/22 to home   4. BMBx 1/13/2023 notable for persistent pancytopenia showing hypoercellular marrow (90%) with marked decrease hematopoesis and 70% blasts.  -- NGS / FISH / Cyto pending      SUBJECTIVE  Pt presents today for follow up. He notes ongoing fatigue and severe SOB. Noted to be desaturating during vitals checks so O2 was added. Denies fevers /chills/sweats but noted to be febrile as well. Appetite and energy level way down.     PAST MEDICAL HISTORY  Past Medical History:   Diagnosis Date     COPD (chronic obstructive pulmonary disease) (H)      Diabetes mellitus, type 2 (H)      Gout      History of pulmonary embolism      HLD (hyperlipidemia)      HTN (hypertension)        FAMILY HISTORY  Family History   Problem Relation Age of Onset     Neurodegenerative disease Father         Creutzfeldt-Osmin disease     Chronic Obstructive Pulmonary Disease Brother      Heart Disease Brother         SOCIAL HISTORY  Social History     Socioeconomic History     Marital status: Single     Spouse name: Not on file     Number of children: Not on file     Years of education: Not on file     Highest education level: Not on file   Occupational History     Not on file   Tobacco Use     Smoking status: Former     Years: 40.00     Types: Cigarettes     Smokeless tobacco: Never     Tobacco comments:     Quit around 2016   Substance and Sexual Activity     Alcohol use: Not Currently     Comment: daily, 3-4 drink liquor. last drink Dec 10 2022     Drug use: Not on file     Sexual activity: Not on file   Other Topics Concern     Not on file   Social History Narrative     Not on file     Social Determinants of Health     Financial Resource Strain: Not on file   Food Insecurity: Not on file   Transportation Needs: Not on file   Physical Activity: Not on file   Stress: Not on file   Social Connections: Not on file   Intimate Partner Violence: Not on file   Housing Stability: Not on file       CURRENT OUTPATIENT MEDICATIONS  Current Outpatient Medications   Medication Sig Dispense Refill     acetaminophen (TYLENOL) 500 MG tablet Take 500-1,000 mg by mouth every 6 hours as needed for mild pain       acyclovir (ZOVIRAX) 400 MG tablet Take 1 tablet (400 mg) by mouth 2 times daily 60 tablet 0     allopurinol (ZYLOPRIM) 300 MG tablet Take 1 tablet (300 mg) by mouth 2 times daily 60 tablet 0     ammonium lactate (AMLACTIN) 12 % external cream Apply topically daily 385 g 0     atorvastatin (LIPITOR) 20 MG tablet Take 20 mg by mouth daily       budesonide-formoterol (SYMBICORT) 160-4.5 MCG/ACT Inhaler Inhale 2 puffs into the lungs 2 times daily       bumetanide (BUMEX) 1 MG tablet Take 1 mg by mouth daily       calcium acetate (PHOSLO) 667 MG CAPS capsule Take 3 capsules (2,001 mg) by mouth 3 times daily (with meals) 30 capsule 0     diclofenac (VOLTAREN) 1 % topical gel Apply 2 g topically 4 times daily as needed for moderate pain  (4-6) 50 g 0     enoxaparin ANTICOAGULANT (LOVENOX) 100 MG/ML syringe Inject 0.9 mLs (90 mg) Subcutaneous every 12 hours HOLD if platelets are less than 50K 40 mL 0     levofloxacin (LEVAQUIN) 250 MG tablet Take 1 tablet (250 mg) by mouth daily 30 tablet 0     methocarbamol (ROBAXIN) 500 MG tablet Take 1 tablet (500 mg) by mouth 4 times daily as needed for muscle spasms 60 tablet 0     oxyCODONE (ROXICODONE) 5 MG tablet Take 1 tablet (5 mg) by mouth every 4 hours as needed for moderate pain (4-6) 20 tablet 0     pantoprazole (PROTONIX) 40 MG EC tablet Take 1 tablet (40 mg) by mouth every morning (before breakfast) 30 tablet 0     polyethylene glycol (MIRALAX) 17 GM/Dose powder Take 17 g by mouth daily as needed for constipation 225 g 0     posaconazole (NOXAFIL) 100 MG EC tablet Take 3 tablets (300 mg) by mouth every morning 90 tablet 0     semaglutide (OZEMPIC, 1 MG/DOSE,) 2 MG/1.5ML pen Inject 2 mg Subcutaneous every 7 days       senna-docusate (SENOKOT-S/PERICOLACE) 8.6-50 MG tablet Take 1 tablet by mouth 2 times daily as needed for constipation 30 tablet 0     venetoclax (VENCLEXTA) 100 MG tablet Take 1 tablet (100 mg) by mouth daily 30 tablet 0       ALLERGIES  Allergies   Allergen Reactions     Metformin Unknown       REVIEW OF SYSTEMS  A complete ROS was performed and was negative except as mentioned in HPI    PHYSICAL EXAM  BP (!) 149/81   Pulse 96   Temp (!) 100.5  F (38.1  C)   Resp 24   Wt (!) 177.8 kg (392 lb)   SpO2 (!) 87%   BMI 51.72 kg/m    @LASTSAO2(4)@  Wt Readings from Last 3 Encounters:   01/13/23 (!) 176.6 kg (389 lb 6.4 oz)   12/29/22 (!) 168.1 kg (370 lb 11.2 oz)       Gen: alert, conversational, speaking in short sentences d/t dyspnea  HEET: NC/AT, EOMI w/ PERRL, anicteric sclera. OP clear. MMM.   CV: normal S1,S2 with RRR no m/r/g  Resp: lungs CTA bilaterally with poor air movement to bases. No wheezes or crackles  Abd: soft obese, NTND no organomegaly or masses. BS normoactive.   Ext:  bilateral edema with compression stalkings  Skin: no concerning lesions or rashes  Neuro: A&Ox4, no lateralizing sx. Grossly nonfocal.      LABORATORY AND IMAGING STUDIES     Latest Reference Range & Units 01/19/23 10:26   Sodium 136 - 145 mmol/L 139   Potassium 3.4 - 5.3 mmol/L 3.6   Chloride 98 - 107 mmol/L 100   Carbon Dioxide (CO2) 22 - 29 mmol/L 23   Urea Nitrogen 8.0 - 23.0 mg/dL 17.7   Creatinine 0.67 - 1.17 mg/dL 1.17   GFR Estimate >60 mL/min/1.73m2 71   Calcium 8.8 - 10.2 mg/dL 9.6   Anion Gap 7 - 15 mmol/L 16 (H)   Magnesium 1.7 - 2.3 mg/dL 1.9   Phosphorus 2.5 - 4.5 mg/dL 4.9 (H)   Albumin 3.5 - 5.2 g/dL 3.4 (L)   Protein Total 6.4 - 8.3 g/dL 7.3   Alkaline Phosphatase 40 - 129 U/L 214 (H)   ALT 10 - 50 U/L 6 (L)   AST 10 - 50 U/L 51 (H)   Bilirubin Total <=1.2 mg/dL 0.9   Ferritin 31 - 409 ng/mL 7,289 (H)   Folate 4.6 - 34.8 ng/mL 2.4 (L)   Glucose 70 - 99 mg/dL 117 (H)   Iron 61 - 157 ug/dL 182 (H)   Iron Binding Capacity 240 - 430 ug/dL 204 (L)   Iron Sat Index 15 - 46 % 89 (H)   Lactate Dehydrogenase 0 - 250 U/L 2,380 (H)   Uric Acid 3.4 - 7.0 mg/dL 8.1 (H)   Vitamin B12 232 - 1,245 pg/mL 232   WBC 4.0 - 11.0 10e3/uL 15.1 (H)   Hemoglobin 13.3 - 17.7 g/dL 7.6 (L)   Hematocrit 40.0 - 53.0 % 25.2 (L)   Platelet Count 150 - 450 10e3/uL 119 (L)   RBC Count 4.40 - 5.90 10e6/uL 2.77 (L)   MCV 78 - 100 fL 91   MCH 26.5 - 33.0 pg 27.4   MCHC 31.5 - 36.5 g/dL 30.2 (L)   RDW 10.0 - 15.0 % 20.8 (H)   % Neutrophils % 12   % Lymphocytes % 19   % Monocytes % 66   % Eosinophils % 0   % Basophils % 0   % Metamyelocytes % 2   % Myelocytes % 1   Absolute Basophils 0.0 - 0.2 10e3/uL 0.0   NRBC/W <=0 % 12 (H)   Absolute Neutrophil 1.6 - 8.3 10e3/uL 1.8   Absolute Lymphocytes 0.8 - 5.3 10e3/uL 2.9   Absolute Monocytes 0.0 - 1.3 10e3/uL 10.0 (H)   Absolute Eosinophils 0.0 - 0.7 10e3/uL 0.0   Absolute Metamyelocytes <=0.0 10e3/uL 0.3 (H)   Absolute Myelocytes <=0.0 10e3/uL 0.2 (H)   Absolute NRBCs <=0.0 10e3/uL 1.8 (H)    RBC Morphology  Confirmed RBC Indices   Platelet Morphology Automated Count Confirmed. Platelet morphology is normal.  Automated Count Confirmed. Platelet morphology is normal.   RBC Fragments None Seen  Slight !   Teardrop Cells None Seen  Slight !   Elliptocytes None Seen  Slight !   Jose C Cells None Seen  Slight !   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 and the following features:  - Hypercellular marrow (cellularity estimated at ~90%) with marked crush and fragmentation artifact  - Markedly decreased hematopoiesis with nearly absent granulopoiesis, no definitive dysplasia, and ~70% blasts/blast equivalents  - Increased reticulin fibrosis  - Peripheral blood showing marked hypochromic normocytic anemia with circulating nucleated red cells; left shifted neutropenia; and 59% circulating blasts/blast equivalents  - See comment    Electronically signed by Fermin Weaver MD on 1/17/2023 at  3:59 PM   Comment       This sample demonstrates evidence of recurrent/persistent acute myeloid leukemia with monocytic differentiation. The blast percentage in the marrow is difficult to assess with certainty, but is estimated at 70% of marrow cellularity overall, taking into account the touch imprint differential, the H&E core, and the CD56 immunohistochemical stain.     Flow cytometry analysis on a concurrent specimen ([id number]) showed an atypical myelomonocytic population similar in immunophenotype to that originally  described on the peripheral blood.    Concurrent ancillary studies are in progress and will be reported separately. Correlation with the results of ancillary tests and clinical findings is recommended.              ASSESSMENT AND PLAN  Angel Cardenas is a 62 year old male with a history of prior PE (previously on Xarelto), DM2, COPD, HTN, and gout admitted on 2022 for Dec10+Jez now presents after his D28 marrow for consultation.     # AMML with SRSF2, TET2 mutations  - now s/p Dec10+Jez with persistent AML in marrow and rising peripheral blast count.     Today patient is hypoxic and in mild respiratory distress. I recommended immediate hospitalization. We discussed potential options and I told him that for any hope of disease control we will need to give stronger chemotherapy than what he received. Given his co-morbidities any therapy would be high risk for complications but that he would die of complications of AML without it. Possible options include 7+3 vs Clofarabine based regimens. I think he is much too tenuous for full strength chemo but might be able to tolerated a GCLAC vs Clofarabine + LDAC type regimen depending on clinical stability. No targetable mutations.    # Fevers - no clear localizing symptoms but given refractory AML need to do aggressive workup    # AHRF - stabilized on 2-3L facemask here in clinic. Likely fluids vs COPD exacerbation vs infection vs recurrent PE. Immediate hospitalization for workup including stat CT-PE protocol.      Total time spent on this encounter today including reviewing the EMR, documentation and direct patient care counselin minutes    Onesimo Vealsquez MD    Division of Hematology, Oncology and Transplantation  [facility]  P: [phone]

## 2023-01-19 NOTE — NURSING NOTE
"Oncology Rooming Note    January 19, 2023 11:06 AM   Angel Cardenas is a 62 year old male who presents for:    Chief Complaint   Patient presents with     Labs Only     Venipuncture, vitals checked     Oncology Clinic Visit     Acute myeloid leukemia not having achieved remission (H)     Initial Vitals: /74 (BP Location: Right arm, Patient Position: Sitting, Cuff Size: Adult Large)   Pulse 98   Temp 99.8  F (37.7  C) (Oral)   Resp 22   Wt (!) 177.8 kg (392 lb)   SpO2 (!) 88%   BMI 51.72 kg/m   Estimated body mass index is 51.72 kg/m  as calculated from the following:    Height as of 1/13/23: 1.854 m (6' 1\").    Weight as of this encounter: 177.8 kg (392 lb). Body surface area is 3.03 meters squared.  Severe Pain (7) Comment: Data Unavailable   No LMP for male patient.  Allergies reviewed: No  Medications reviewed: No    Medications: Medication refills not needed today.  Pharmacy name entered into [facility]: [id number] - [place], [place] - [address] [place] [address]    Clinical concerns: Medications and allergies not reconciled today. Patient arrived with unstable vitals-provider notified immediately ow low oxygenation and temperature-see flow sheets. Patient states that he has has sweats, low grade fevers, nausea and vomiting, diarrhea and constipation, along with severe back pain 7/10.Provider (LPN) started oxygen at 2 liters in clinic via mask.  Legs in compressions wraps bilaterally with swelling, patient using wheel chair in clinic.          Manuela Crawford LPN January 19, 2023 11:07 AM              "

## 2023-01-19 NOTE — CODE/RAPID RESPONSE
Rapid Response Team Note    Assessment   In assessment a rapid response was called on Angel Cardenas due to SIRS/Sepsis trigger and progressive AHRF.  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 PE ruled out on recent CTA    Plan   -  Bumex 2 mg IV x1, strict I&O and daily weights   -  Continue Cefepime/Vancomycin, micafungin and acyclovir  -  Repeat lactic in AM, or sooner if clinical changes.   -  The Hematology/Oncology primary team was able to be reached and they are in agreement with the above plan.  -  Disposition: The patient will remain on the current unit. We will continue to monitor this patient closely.  -  Reassessment and plan follow-up will be performed by the primary team      Hanna Lloyd PA-C  [facility] RRT [facility] Job Code Contact #[phone]  [facility] Paging/Directory    Hospital Course   Brief Summary of events leading to rapid response:   RRT was called for lactic acid 2.9, triggered from tachypnea, O2 needs and leukocytosis.     Please see H&P from earlier today for details on hospital course and HPI.     Patient reports feeling SOB, worse with exertion and lying flat. Denies any cough or sputum production. Reports having low temperatures at home and fevers in clinic earlier today. Denies any chest pain, abdominal pain or N/V. Patient does not require any O2 at home.     Admission Diagnosis:   AMML (acute myelomonocytic leukemia) (H) [C92.50]    Physical Exam   Temp: 99.2  F (37.3  C) Temp  Min: 98.1  F (36.7  C)  Max: 100.5  F (38.1  C)  Resp: 24 Resp  Min: 22  Max: 28  SpO2: 91 % SpO2  Min: 77 %  Max: 95 %  Pulse: 90 Pulse  Min: 86  Max: 98    No data recorded  BP: 125/77 Systolic (24hrs), Av , Min:125 , Max:149   Diastolic (24hrs), Av, Min:66, Max:81     I/Os: I/O last 3 completed  shifts:  In: 100 [I.V.:100]  Out: -      Exam:   General: chronically ill appearing  Mental Status: baseline mental status.  CV: RRR.   Resp: Moderate tachypnea on 4L Oxymask. Poor aeration on exhalation. No rhonchi or rales.   Abd: Soft. Non-tender in all quadrants.   Extremities: LE edema     Significant Results and Procedures   Lactic Acid:   Recent Labs   Lab Test 01/19/23  1612 12/16/22  1743 12/16/22  1044   LACT 2.9* 1.8 2.2*     CBC:   Recent Labs   Lab Test 01/19/23  1412 01/19/23  1026 01/13/23  1000   WBC 15.3* 15.1* 4.6   HGB 7.2* 7.6* 6.7*   HCT 24.5* 25.2* 22.2*   * 119* 310        Sepsis Evaluation   The patient is known to have an infection.  Angel Cardenas meets SIRS criteria AND has a lactate >2 or other evidence of acute organ damage.  These vital signs, lab and physical exam findings constitute a diagnosis of SEVERE SEPSIS, based on: Lactate resulted, and the level was > 2.0          Anti-infectives (From now, onward)    Start     Dose/Rate Route Frequency Ordered Stop    01/20/23 0600  vancomycin (VANCOCIN) 1,250 mg in 0.9% NaCl 250 mL intermittent infusion         1,250 mg  over 90 Minutes Intravenous EVERY 12 HOURS 01/19/23 1601      01/19/23 2000  acyclovir (ZOVIRAX) tablet 400 mg        Note to Pharmacy: PTA Sig:Take 1 tablet (400 mg) by mouth 2 times daily      400 mg Oral 2 TIMES DAILY 01/19/23 1307      01/19/23 2000  micafungin (MYCAMINE) 50 mg in sodium chloride 0.9 % 100 mL intermittent infusion         50 mg  100 mL/hr over 60 Minutes Intravenous EVERY 24 HOURS 01/19/23 1439      01/19/23 1630  vancomycin (VANCOCIN) 2,000 mg in sodium chloride 0.9 % 500 mL intermittent infusion         2,000 mg  over 120 Minutes Intravenous ONCE 01/19/23 1601      01/19/23 1330  ceFEPIme (MAXIPIME) 2 g vial to attach to  ml bag for ADULTS or 50 ml bag for PEDS         2 g  over 30 Minutes Intravenous EVERY 8 HOURS 01/19/23 1308          Current antibiotic coverage is appropriate for  source of infection.    3 Hour Severe Sepsis Bundle Completion:  1. Initial Lactic Acid result shown above. Repeat lactic acid ordered by reflex in AM.   2. Blood Cultures before Antibiotics: Yes  3. Broad Spectrum Antibiotics Administered: yes  4. Is hypotension present? No (IV fluid bolus NOT required). IV Fluid volume administered: None, with concern of pulmonary edema, bumex given.

## 2023-01-19 NOTE — LETTER
1/19/2023         RE: Angel Cardenas  [address] [place] [id number]        Dear Colleague,    Thank you for referring your patient, Angel Cardenas, to [facility]. Please see a copy of my visit note below.    [facility]    HEMATOLOGY & ONCOLOGY  NEW CONSULTATION    PATIENT NAME: Angel Cardenas MRN # [id number]  DATE OF VISIT: January 19, 2023 YOB: 1960    SUMMARY  Angel Cardenas is a 62 year old male with a history of prior PE (previously on Xarelto), DM2, COPD, HTN, and gout admitted on 12/9/2022     1. Presented to PCP with fatigue, fevers, anorexia, weight loss, nose bleeds, rib pain found to be pancytopenic  2. Admitted to [facility] 12/9/22 and found to have pancytopenia with circulating blasts  -- WBC 8.5, Hb 8.8, Plts 82, Peripheral blasts 6%  -- Diagnostic LP was negative  3. 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 Due to comorbidities started on Decitabine 10 days + Venetoclax 28 (C1D1 12/19/22)  -- course complicated by TLS requiring rasburicase, possible PNA, possible adrenal insufficiency / hypotension (not requiring pressors).   -- Discharged 12/29/22 to home   4. BMBx 1/13/2023 notable for persistent pancytopenia showing hypoercellular marrow (90%) with marked decrease hematopoesis and 70% blasts.  -- NGS / FISH / Cyto pending      SUBJECTIVE  Pt presents today for follow up. He notes ongoing fatigue and severe SOB. Noted to be desaturating during vitals checks so O2 was added. Denies fevers /chills/sweats but noted to be febrile as well. Appetite and energy level way down.     PAST MEDICAL HISTORY  Past Medical History:   Diagnosis Date     COPD (chronic obstructive pulmonary disease) (H)      Diabetes mellitus, type 2 (H)      Gout      History of pulmonary embolism      HLD  (hyperlipidemia)      HTN (hypertension)        FAMILY HISTORY  Family History   Problem Relation Age of Onset     Neurodegenerative disease Father         Creutzfeldt-Osmin disease     Chronic Obstructive Pulmonary Disease Brother      Heart Disease Brother        SOCIAL HISTORY  Social History     Socioeconomic History     Marital status: Single     Spouse name: Not on file     Number of children: Not on file     Years of education: Not on file     Highest education level: Not on file   Occupational History     Not on file   Tobacco Use     Smoking status: Former     Years: 40.00     Types: Cigarettes     Smokeless tobacco: Never     Tobacco comments:     Quit around 2016   Substance and Sexual Activity     Alcohol use: Not Currently     Comment: daily, 3-4 drink liquor. last drink Dec 10 2022     Drug use: Not on file     Sexual activity: Not on file   Other Topics Concern     Not on file   Social History Narrative     Not on file     Social Determinants of Health     Financial Resource Strain: Not on file   Food Insecurity: Not on file   Transportation Needs: Not on file   Physical Activity: Not on file   Stress: Not on file   Social Connections: Not on file   Intimate Partner Violence: Not on file   Housing Stability: Not on file       CURRENT OUTPATIENT MEDICATIONS  Current Outpatient Medications   Medication Sig Dispense Refill     acetaminophen (TYLENOL) 500 MG tablet Take 500-1,000 mg by mouth every 6 hours as needed for mild pain       acyclovir (ZOVIRAX) 400 MG tablet Take 1 tablet (400 mg) by mouth 2 times daily 60 tablet 0     allopurinol (ZYLOPRIM) 300 MG tablet Take 1 tablet (300 mg) by mouth 2 times daily 60 tablet 0     ammonium lactate (AMLACTIN) 12 % external cream Apply topically daily 385 g 0     atorvastatin (LIPITOR) 20 MG tablet Take 20 mg by mouth daily       budesonide-formoterol (SYMBICORT) 160-4.5 MCG/ACT Inhaler Inhale 2 puffs into the lungs 2 times daily       bumetanide (BUMEX) 1 MG  tablet Take 1 mg by mouth daily       calcium acetate (PHOSLO) 667 MG CAPS capsule Take 3 capsules (2,001 mg) by mouth 3 times daily (with meals) 30 capsule 0     diclofenac (VOLTAREN) 1 % topical gel Apply 2 g topically 4 times daily as needed for moderate pain (4-6) 50 g 0     enoxaparin ANTICOAGULANT (LOVENOX) 100 MG/ML syringe Inject 0.9 mLs (90 mg) Subcutaneous every 12 hours HOLD if platelets are less than 50K 40 mL 0     levofloxacin (LEVAQUIN) 250 MG tablet Take 1 tablet (250 mg) by mouth daily 30 tablet 0     methocarbamol (ROBAXIN) 500 MG tablet Take 1 tablet (500 mg) by mouth 4 times daily as needed for muscle spasms 60 tablet 0     oxyCODONE (ROXICODONE) 5 MG tablet Take 1 tablet (5 mg) by mouth every 4 hours as needed for moderate pain (4-6) 20 tablet 0     pantoprazole (PROTONIX) 40 MG EC tablet Take 1 tablet (40 mg) by mouth every morning (before breakfast) 30 tablet 0     polyethylene glycol (MIRALAX) 17 GM/Dose powder Take 17 g by mouth daily as needed for constipation 225 g 0     posaconazole (NOXAFIL) 100 MG EC tablet Take 3 tablets (300 mg) by mouth every morning 90 tablet 0     semaglutide (OZEMPIC, 1 MG/DOSE,) 2 MG/1.5ML pen Inject 2 mg Subcutaneous every 7 days       senna-docusate (SENOKOT-S/PERICOLACE) 8.6-50 MG tablet Take 1 tablet by mouth 2 times daily as needed for constipation 30 tablet 0     venetoclax (VENCLEXTA) 100 MG tablet Take 1 tablet (100 mg) by mouth daily 30 tablet 0       ALLERGIES  Allergies   Allergen Reactions     Metformin Unknown       REVIEW OF SYSTEMS  A complete ROS was performed and was negative except as mentioned in HPI    PHYSICAL EXAM  BP (!) 149/81   Pulse 96   Temp (!) 100.5  F (38.1  C)   Resp 24   Wt (!) 177.8 kg (392 lb)   SpO2 (!) 87%   BMI 51.72 kg/m    @LASTSAO2(4)@  Wt Readings from Last 3 Encounters:   01/13/23 (!) 176.6 kg (389 lb 6.4 oz)   12/29/22 (!) 168.1 kg (370 lb 11.2 oz)       Gen: alert, conversational, speaking in short sentences d/t  dyspnea  HEET: NC/AT, EOMI w/ PERRL, anicteric sclera. OP clear. MMM.   CV: normal S1,S2 with RRR no m/r/g  Resp: lungs CTA bilaterally with poor air movement to bases. No wheezes or crackles  Abd: soft obese, NTND no organomegaly or masses. BS normoactive.   Ext: bilateral edema with compression stalkings  Skin: no concerning lesions or rashes  Neuro: A&Ox4, no lateralizing sx. Grossly nonfocal.      LABORATORY AND IMAGING STUDIES     Latest Reference Range & Units 01/19/23 10:26   Sodium 136 - 145 mmol/L 139   Potassium 3.4 - 5.3 mmol/L 3.6   Chloride 98 - 107 mmol/L 100   Carbon Dioxide (CO2) 22 - 29 mmol/L 23   Urea Nitrogen 8.0 - 23.0 mg/dL 17.7   Creatinine 0.67 - 1.17 mg/dL 1.17   GFR Estimate >60 mL/min/1.73m2 71   Calcium 8.8 - 10.2 mg/dL 9.6   Anion Gap 7 - 15 mmol/L 16 (H)   Magnesium 1.7 - 2.3 mg/dL 1.9   Phosphorus 2.5 - 4.5 mg/dL 4.9 (H)   Albumin 3.5 - 5.2 g/dL 3.4 (L)   Protein Total 6.4 - 8.3 g/dL 7.3   Alkaline Phosphatase 40 - 129 U/L 214 (H)   ALT 10 - 50 U/L 6 (L)   AST 10 - 50 U/L 51 (H)   Bilirubin Total <=1.2 mg/dL 0.9   Ferritin 31 - 409 ng/mL 7,289 (H)   Folate 4.6 - 34.8 ng/mL 2.4 (L)   Glucose 70 - 99 mg/dL 117 (H)   Iron 61 - 157 ug/dL 182 (H)   Iron Binding Capacity 240 - 430 ug/dL 204 (L)   Iron Sat Index 15 - 46 % 89 (H)   Lactate Dehydrogenase 0 - 250 U/L 2,380 (H)   Uric Acid 3.4 - 7.0 mg/dL 8.1 (H)   Vitamin B12 232 - 1,245 pg/mL 232   WBC 4.0 - 11.0 10e3/uL 15.1 (H)   Hemoglobin 13.3 - 17.7 g/dL 7.6 (L)   Hematocrit 40.0 - 53.0 % 25.2 (L)   Platelet Count 150 - 450 10e3/uL 119 (L)   RBC Count 4.40 - 5.90 10e6/uL 2.77 (L)   MCV 78 - 100 fL 91   MCH 26.5 - 33.0 pg 27.4   MCHC 31.5 - 36.5 g/dL 30.2 (L)   RDW 10.0 - 15.0 % 20.8 (H)   % Neutrophils % 12   % Lymphocytes % 19   % Monocytes % 66   % Eosinophils % 0   % Basophils % 0   % Metamyelocytes % 2   % Myelocytes % 1   Absolute Basophils 0.0 - 0.2 10e3/uL 0.0   NRBC/W <=0 % 12 (H)   Absolute Neutrophil 1.6 - 8.3 10e3/uL 1.8    Absolute Lymphocytes 0.8 - 5.3 10e3/uL 2.9   Absolute Monocytes 0.0 - 1.3 10e3/uL 10.0 (H)   Absolute Eosinophils 0.0 - 0.7 10e3/uL 0.0   Absolute Metamyelocytes <=0.0 10e3/uL 0.3 (H)   Absolute Myelocytes <=0.0 10e3/uL 0.2 (H)   Absolute NRBCs <=0.0 10e3/uL 1.8 (H)   RBC Morphology  Confirmed RBC Indices   Platelet Morphology Automated Count Confirmed. Platelet morphology is normal.  Automated Count Confirmed. Platelet morphology is normal.   RBC Fragments None Seen  Slight !   Teardrop Cells None Seen  Slight !   Elliptocytes None Seen  Slight !   Jose C Cells None Seen  Slight !   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 and the following features:  - Hypercellular marrow (cellularity estimated at ~90%) with marked crush and fragmentation artifact  - Markedly decreased hematopoiesis with nearly absent granulopoiesis, no definitive dysplasia, and ~70% blasts/blast equivalents  - Increased reticulin fibrosis  - Peripheral blood showing marked hypochromic normocytic anemia with circulating nucleated red cells; left shifted neutropenia; and 59% circulating blasts/blast equivalents  - See comment    Electronically signed by Fermin Weaver MD on 1/17/2023 at  3:59 PM   Comment       This sample demonstrates evidence of recurrent/persistent acute myeloid leukemia with monocytic differentiation. The blast percentage in the marrow is difficult to assess with certainty, but is estimated at 70% of marrow cellularity  overall, taking into account the touch imprint differential, the H&E core, and the CD56 immunohistochemical stain.     Flow cytometry analysis on a concurrent specimen ([id number]) showed an atypical myelomonocytic population similar in immunophenotype to that originally described on the peripheral blood.    Concurrent ancillary studies are in progress and will be reported separately. Correlation with the results of ancillary tests and clinical findings is recommended.              ASSESSMENT AND PLAN  Angel Cardenas is a 62 year old male with a history of prior PE (previously on Xarelto), DM2, COPD, HTN, and gout admitted on 2022 for Dec10+Jez now presents after his D28 marrow for consultation.     # AMML with SRSF2, TET2 mutations  - now s/p Dec10+Jez with persistent AML in marrow and rising peripheral blast count.     Today patient is hypoxic and in mild respiratory distress. I recommended immediate hospitalization. We discussed potential options and I told him that for any hope of disease control we will need to give stronger chemotherapy than what he received. Given his co-morbidities any therapy would be high risk for complications but that he would die of complications of AML without it. Possible options include 7+3 vs Clofarabine based regimens. I think he is much too tenuous for full strength chemo but might be able to tolerated a GCLAC vs Clofarabine + LDAC type regimen depending on clinical stability. No targetable mutations.    # Fevers - no clear localizing symptoms but given refractory AML need to do aggressive workup    # AHRF - stabilized on 2-3L facemask here in clinic. Likely fluids vs COPD exacerbation vs infection vs recurrent PE. Immediate hospitalization for workup including stat CT-PE protocol.      Total time spent on this encounter today including reviewing the EMR, documentation and direct patient care counselin minutes    Onesimo Velasquez MD    Division of  Hematology, Oncology and Transplantation  [facility]  P: [phone]     hard copy, drawn during this pregnancy

## 2023-01-20 NOTE — PLAN OF CARE
Neuro: A&Ox4.   Cardiac: VSS.   Respiratory: Sating >92% on 5 LPM Oxymask   GI/: Adequate urine output. No BM today   Diet/appetite: Tolerating reg diet. Eating well.  Activity:  standby assist   Pain: At acceptable level on current regimen.   Skin: No new deficits noted.  LDA's:    Plan: Continue with POC. Notify primary team with changes.   Goal Outcome Evaluation:      Plan of Care Reviewed With: patient    Overall Patient Progress: decliningOverall Patient Progress: declining

## 2023-01-20 NOTE — PLAN OF CARE
Goal Outcome Evaluation:    [id number]    A/Ox4. Tmax 99.2. Intermittently hypertensive. Tachypneic with RR up to 32 with activity and SOB/DEAN. OVSS on 5L O2 via NC. Code sepsis called for lactic 2.9 - 2mg IV Bumex ordered with effect. During code sepsis pt required up to 8L O2 via Oxymask to maintain SpO2 >88%, but was weaned back down to 5L O2 via NC by end of shift. Repeat lactic acid ordered for 1/20 AM.    Back pain and L-hip pain managed with Dilaudid x2, Robaxin x2, Tylenol x2, Flexeril x1 and Oxycodone x1 with partial relief. Albuterol given PRN x1 for SOB during code sepsis with moderate relief. Respiratory PCR and MRSA swab collected and negative.    Poor appetite, denies N/V. Q4  and 102 - no insulin needed this shift. Continues on phoslo. Started rasburicase for uric acid 8.6.    Pt voiding spontaneously with adequate UOP. UA collected and WNL. LBM 1/17 - scheduled miralax and senna docusate given with no result by end of shift, continue to promote laxatives.    Double skin check completed with JAVON Campoverde and no significant findings noted other than generalized bruising, BX site on lower back and dryness/flakiness on BLE. Compression stockings on BLE for 4+ edema. R-PICC TKO. Up with SBA.

## 2023-01-20 NOTE — PROGRESS NOTES
"   01/20/23 1700   Appointment Info   Signing Clinician's Name / Credentials (OT) Sandi Villarreal, OTR/L  (Edema)   Living Environment   People in Home sibling(s)   Current Living Arrangements house   Home Accessibility stairs to enter home   Number of Stairs, Main Entrance 3   Stair Railings, Main Entrance railings safe and in good condition;railings on both sides of stairs   Transportation Anticipated family or friend will provide   Living Environment Comments Pt currently stays with his younger sister in a house with 3 LIZETT and bilat rails. All rooms on main floor although the house has a basement. Pt reports he has no plans to utilize basement.   Self-Care   Usual Activity Tolerance moderate   Current Activity Tolerance poor   Equipment Currently Used at Home walker, standard   Fall history within last six months no   General Information   Onset of Illness/Injury or Date of Surgery 01/19/23   Referring Physician Shannon Irving PA-C   Additional Occupational Profile Info/Pertinent History of Current Problem Per EMR, \"Angel Cardenas is a 62 year old male with a history of multiple prior DVTs and PE in 2022 (previously on Xarelto), DM2, COPD, HTN, gout and recently diagnosed AMML s/p Cycle 1 of 10-days of Decitabine/Venetoclax (D1=12/19/22) with subsequent persistent disease on post-therapy BMBx 1/13/23 demonstrating ongoing ~70% blasts. He presents from clinic for consideration of re-induction chemotherapy in the setting of fever to 100.5 and AHRF\"   Existing Precautions/Restrictions no known precautions/restrictions   Left Upper Extremity (Weight-bearing Status) full weight-bearing (FWB)   Right Upper Extremity (Weight-bearing Status) full weight-bearing (FWB)   Left Lower Extremity (Weight-bearing Status) full weight-bearing (FWB)   Right Lower Extremity (Weight-bearing Status) full weight-bearing (FWB)   General Observations and Info 5L NC   Cognitive Status Examination   Orientation Status orientation to person, " place and time   Visual Perception   Visual Impairment/Limitations corrective lenses for reading   Edema General Information   Onset of Edema   (chronic)   Affected Body Part(s) Left LE;Right LE   Edema Etiology Chronic Venous Insfficiency   Edema Precautions Active Cancer   Edema Examination/Assessment   Skin Condition Pitting;Non-pitting;Venous distention;Dryness   Skin Condition Comments Utilize medicated moisturizer to BLEs prior to wraps   Scar No   Ulcerations No   Skin Integrity Intact, aziza, dry   Pitting Assessment 2+   Edema Assessment Comments Bulbous yesica feet/ankles, taught/hard mid shin to knee creases   Range of Motion Comprehensive   General Range of Motion no range of motion deficits identified   Strength Comprehensive (MMT)   General Manual Muscle Testing (MMT) Assessment no strength deficits identified   Muscle Tone Assessment   Muscle Tone Quick Adds No deficits were identified   Coordination   Upper Extremity Coordination No deficits were identified   Bed Mobility   Comment (Bed Mobility) SBA   Transfers   Transfer Comments SBA   Activities of Daily Living   BADL Assessment/Intervention bathing;upper body dressing;lower body dressing;grooming;toileting   Bathing Assessment/Intervention   [place] Level (Bathing) contact guard assist;minimum assist (75% patient effort)   Comment, (Bathing) Per clinical judement   Upper Body Dressing Assessment/Training   Comment, (Upper Body Dressing) Per clinical judgement   [place] Level (Upper Body Dressing) independent   Lower Body Dressing Assessment/Training   Comment, (Lower Body Dressing) Per clinical judgement   [place] Level (Lower Body Dressing) modified independence   Grooming Assessment/Training   [place] Level (Grooming) supervision   Comment, (Grooming) Per clinical judgement   Toileting   Comment, (Toileting) Per clinical judgement   [place] Level (Toileting) supervision   Clinical Impression   Criteria for Skilled  Therapeutic Interventions Met (OT) Yes, treatment indicated   OT Diagnosis BLE edema and decreased ax tolerance leading to impaired funcitonal mobility and safety completing ADL/IADL INDly. Requiring increaesd O2 from no supplemental O2 utilized at baseline per pt   OT Problem List-Impairments impacting ADL problems related to;activity tolerance impaired   Assessment of Occupational Performance 1-3 Performance Deficits   Identified Performance Deficits bathing, LB dressing/edema management, funcitonal endurance   Planned Therapy Interventions (OT) ADL retraining;IADL retraining;progressive activity/exercise;home program guidelines   Edema: Planned Interventions Gradient compression bandaging;Edema exercises;Precautions to prevent infection/exacerbation;Education;Manual therapy   Clinical Decision Making Complexity (OT) low complexity   Anticipated Equipment Needs Upon Discharge (OT)   (TBD)   Risk & Benefits of therapy have been explained evaluation/treatment results reviewed;care plan/treatment goals reviewed;patient   Clinical Impression Comments Pt will benefit form skilled IP OT/edema to maximize safety and IND completing ADL prior to return home and to reduce fluid in BLes managing home stockings INDly.   OT Total Evaluation Time   OT Eval, Low Complexity Minutes (44602) 8   OT Goals   Therapy Frequency (OT) 4 times/wk   OT Predicted Duration/Target Date for Goal Attainment 02/10/23   OT Goals Upper Body Bathing;Lower Body Bathing;Lower Body Dressing;Edema;Hygiene/Grooming   OT: Hygiene/Grooming independent   OT: Lower Body Dressing Modified independent   OT: Upper Body Bathing Independent   OT: Lower Body Bathing Modified independent   OT: Edema education to increase ability to manage edema after discharge from the hospital Patient;Verbalize;[place];Skin care routine;signs/symptoms of intolerance;wear schedule;limb positioning;garrnet/bandage care;discharge recommendations   OT: Management of edema  bandages Patient;Verbalize;Demonstrate;Hood;garment(s)   OT: Functional edema exercise program to reduce limb volume, increase activity tolerance and improve independence with ADL Patient;Verbalize;Hood;HEP;walking program;Demonstrates   OT Discharge Planning   OT Plan Standing ax tolerance -sinkside ADLs, LB dressing, bathing   OT Discharge Recommendation (DC Rec) [facility] patient will benefit from intensive, interdisciplinary therapy.  Anticipate will be able to tolerate 3 hours of therapy per day   OT Rationale for DC Rec OT: Pt is currently well below baseline IND completing functional mobility and activity tolerance with ADL completion. He is on 5L of O2 and requires 24/7 assist with mobility, SBA-CGA completing toileting and requiring edu in adaptive equipment to complete LB dressing and assist with management of BLEs completing bed mobility. Likely will benefit from ARU stay to maximize safety and IND prior to return home.   OT Brief overview of current status CGA toileting and use of leg  with BLEs to complete bed mobility   Total Session Time   Total Session Time (sum of timed and untimed services) 8

## 2023-01-20 NOTE — PROGRESS NOTES
01/20/23 1315   Appointment Info   Signing Clinician's Name / Credentials (PT) Allie Muñoz, PT, DPT   Living Environment   People in Home sibling(s)   Current Living Arrangements house   Home Accessibility stairs to enter home   Number of Stairs, Main Entrance 3   Stair Railings, Main Entrance railings safe and in good condition;railings on both sides of stairs   Transportation Anticipated family or friend will provide   Living Environment Comments Pt currently stays with his younger sister in a house with 3 LIZETT and bilat rails. All rooms on main floor although the house has a basement. Pt reports he has no plans to utilize basement.   Self-Care   Usual Activity Tolerance moderate   Current Activity Tolerance poor   Regular Exercise No   Equipment Currently Used at Home walker, standard   Fall history within last six months no   General Information   Onset of Illness/Injury or Date of Surgery 01/19/23   Referring Physician Isai Amato MD   Patient/Family Therapy Goals Statement (PT) to go home   Pertinent History of Current Problem (include personal factors and/or comorbidities that impact the POC) Per EMR:Angel Cardenas is a 62 year old male with a history of multiple prior DVTs and PE in 2022 (previously on Xarelto), DM2, COPD, HTN, gout and recently diagnosed AMML s/p Cycle 1 of 10-days of Decitabine/Venetoclax (D1=12/19/22) with subsequent persistent disease on post-therapy BMBx 1/13/23 demonstrating ongoing ~70% blasts. He presents from clinic for consideration of re-induction chemotherapy in the setting of fever to 100.5 and AHRF   Existing Precautions/Restrictions fall   Cognition   Affect/Mental Status (Cognition) WNL   Orientation Status (Cognition) oriented x 4   Follows Commands (Cognition) WNL   Pain Assessment   Patient Currently in Pain No   Integumentary/Edema   Integumentary/Edema no deficits were identifed   Posture    Posture Forward head position;Protracted shoulders   Range of Motion  (ROM)   Range of Motion ROM is WFL   Strength (Manual Muscle Testing)   Strength (Manual Muscle Testing) strength is WFL   Bed Mobility   Bed Mobility no deficits identified   Transfers   Transfers no deficits identified   Gait/Stairs (Locomotion)   [place] Level (Gait) modified independence   Assistive Device (Gait) walker, standard   Clinical Impression   Criteria for Skilled Therapeutic Intervention Yes, treatment indicated   PT Diagnosis (PT) impaired functional mobility   Influenced by the following impairments weakness, decreased balance, decreased endurance   Functional limitations due to impairments gait   Clinical Presentation (PT Evaluation Complexity) Stable/Uncomplicated   Clinical Presentation Rationale clinical judgement   Clinical Decision Making (Complexity) low complexity   Planned Therapy Interventions (PT) balance training;gait training;home exercise program;neuromuscular re-education;postural re-education;patient/family education;strengthening;progressive activity/exercise;risk factor education;home program guidelines   Anticipated Equipment Needs at Discharge (PT) other (see comments)  (none; has DME at home)   Risk & Benefits of therapy have been explained evaluation/treatment results reviewed;care plan/treatment goals reviewed;risks/benefits reviewed;current/potential barriers reviewed;participants voiced agreement with care plan;participants included;patient   PT Total Evaluation Time   PT Eval, Low Complexity Minutes (97721) 8   Physical Therapy Goals   PT Frequency 5x/week   PT Goals Gait;Stairs   PT: Gait Independent;Greater than 200 feet   PT: Stairs Independent;3 stairs;Rail on both sides   Therapeutic Procedure/Exercise   Ther. Procedure: strength, endurance, ROM, flexibillity Minutes (41963) 8   Symptoms Noted During/After Treatment fatigue   Treatment Detail/Skilled Intervention Facilitated seated LE strengthening exercises; pt completed 10 reps of the following: ronn Cano,  heel raises, and 3 STS . On 6L of O2 during activity. Fatigued but tolerated.   Therapeutic Activity   Therapeutic Activities: dynamic activities to improve functional performance Minutes (00352) 12   Symptoms Noted During/After Treatment None   Treatment Detail/Skilled Intervention Pt greeted supine in bed; agreeable to session. Educated on plan of care and lengthy discussion after session on TCU vs ARU placement. During session, educated pt on importance of consistent activity to promote activity tolerance and challenge his aerobic capacity. Encouraged to take adequate rests but continue to participate in activity. Pt is motivated and verbalizes agreement. On 5 L of O2 in room; O2 sats >94% with bed mobility transfer and sit to stand. Pt SBA with all activities.   Gait Training   Gait Training Minutes (25463) 10   Symptoms Noted During/After Treatment (Gait Training) fatigue   Treatment Detail/Skilled Intervention Gait to promote functional mobility and activity tolerance. Pt ambulated SBA with IIV pole on 6L of O2 for a total of 80' before requiring prolnged seated rest break. Amb same distance back to room afterwards. Educated on taking standing rest breaks and attempting further distances, slowing pace, and breathing with pursed lip to facilitate further distances with more energy.   PT Discharge Planning   PT Plan progress gait with LRAD, LE strengthening exercises, stairs if able   PT Discharge Recommendation (DC Rec) [facility] patient will benefit from intensive, interdisciplinary therapy.  Anticipate will be able to tolerate 3 hours of therapy per day   PT Rationale for DC Rec Pt is currently well below baseline in terms of functional mobility and activity tolerance. He is on 5L of O2 and requires 24/7 assist with mobility. anticipate seeing improvements during course of stay but pt is motivated and agreeable to participate in intensive therapy to promote improvement in activity  tolerance.   PT Brief overview of current status SBA with amb with FWW or IV pole   Total Session Time   Timed Code Treatment Minutes 30   Total Session Time (sum of timed and untimed services) 38

## 2023-01-20 NOTE — PROGRESS NOTES
"[facility]    Hematology / Oncology Progress Note    Date of Service (when I saw the patient): 01/20/2023     Assessment & Plan   Angel Cardenas is a 62 year old male with a history of multiple prior DVTs and PE in 2022 (previously on Xarelto), DM2, COPD, HTN, gout and recently diagnosed AMML s/p Cycle 1 of 10-days of Decitabine/Venetoclax (D1=12/19/22) with subsequent persistent disease on post-therapy BMBx 1/13/23 demonstrating ongoing ~70% blasts. He presents from clinic for consideration of re-induction chemotherapy in the setting of fever to 100.5 and AHRF.     TODAY:   - Afebrile overnight and BCx NGTD, UCx pending. Continue Cefepime/Vanco for now though consider de-escalating in the coming days if infectious work-up remains negative. Sputume cx and PJP PCR ordered. Consult ID.   - WBC trending down today (15 ? 11) though of note, 62% \"other cells\" noted on diff from 1/19. Monitor CBC w/ diff daily.   - Monitor TLS labs BID, continue Allopurinol and Phoslo  - Weight is up ~20 lb from recent admission. Increase Bumex to 1 mg BID. Monitor daily weights, I/Os.   - Patient is requiring 5-6L at rest (up to 8-10L with positioning/activity). Continue to optimize respiratory status:   - PTA Breo-Ellipta, DuoNebs BID and Neb treatments q2hr PRN  - Pulm consulted   - Diuresis as above  - Echo completed, EF is 60-65%  - Cardiology consult in setting of possible pulmonary HTN on echo and CT imaging  - Transfuse 1 unit pRBC  - SLP, lymphedema, PT/OT to see  - Pain control - Tylenol/Flexeril prn. Voltaren gel QID, Robaxin QID, Oxy 5-10 mg q4h prn, IV Dilaudid 0.5 mg q4h prn.   - Continue therapeutic Lovenox dosing for now, consider half-dosing if Plt <50K. Monitor platelets closely (trending down).   - Continue with best supportive cares  - Will continue to discuss next steps and therapy options related to his persistent AMML      HEME  # AMML with SRSF2, TET2 mutations  # " "Leukocytosis   Patient of Dr. Velasquez. In summary, presented to PCP with fatigue, subjective fevers, lack of appetite, weight loss, epistaxis and rib pain. Found to be pancytopenic with 6% circulating peripheral blasts (WBC 8.5 Hgb 8.8 and plts 82). Diagnostic BMBx 12/9 was consistent with AMML with 80 blasts, NGS: SRSF3 + TET2 mutations (on peripheral blood) conferring adverse risk, Cyto: 50-52,XY,+4,+5,+8,+13,add(13)(p11.2)x2,+18,+20[cp17]/,idemx2,+16,+16[cp3]. Diagnostic LP negative. Due to comorbidities started on Decitabine 10 days + Venetoclax 28 (C1D1 12/19/22). Hospital course was complicated by TLS requiring rasburicase, possible PNA, possible adrenal insufficiency/hypotension related to steroid taper (not requiring pressors) and back/rib pain flares. He discharged to home following completion of chemotherapy and post-therapy BMBx was completed on 1/13 which is notable for persistent pancytopenia showing hypoercellular marrow (90%) with marked decrease hematopoesis and 70% blasts. NGS/FISH/cyto is pending. He had a visit with Dr. Velasquez in clinic prior to admission on 1/19 to discuss next steps and consideration of re-induction.  - Per conversation with Dr. Velasquez PTA, considering intensive regimen with 7+3 vs G-CLAC. Continue with discussions and repeat pre-chemo work-up.   - PICC placement in the setting of multiple lab draws, IV products and anticipation of upcoming chemotherapy  - Repeat pre-chemo work-up - normal EKG, echo with EF 60-65%  - CT chest PE/AP (1/19) - reveals splenomegaly, enlarged inguinal lymphadenopathy and hepatomegaly.   - Consider Hydrea if WBC continues to trend up. Of note, 62% \"other cells\" noted peripherally on 1/19.      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 Amari Cortes (4/15/22). Patient has history of multiple DVTs in the 1990s without known cause, unprovoked PE at age 43, DVT with " PE in context of hip replacement. He most recently was diagnosed with bilateral PE with right heart strain on 1/19/22 after presenting to the ED with SOB. Previously was on Xarelto though discontinued during hospitalization for induction chemotherapy. Transitioned to Lovenox and discharged on half-dosing d/t down-trending counts. Platelets appear to have recovered though are in the 100s on admission. Has continued half-dosing Lovenox without increase to full-dose in the outpatient setting. Presented this admission with fever and hypoxia (77% on RA) requiring 2-4L O2.   - Increase Lovenox to therapeutic dosing -- 0.75 mg BID (dosed based on weight) as d/w pharmacy; would decrease to half-dosing if Plt <50K.   - CT PE protocol (1/19) is negative for PE and no R heart strain. Does reveal findings c/w chronic PE and possible pulmonary HTN. 6mm solid pulmonary nodules which should be monitored at least yearly.      # Risk for TLS  # Risk for DIC  # H/o gout  Leukocytosis noted on admission lab work with WBC 15.3 in the setting of known persistent leukemia. Phos mildly elevated to 4.9. Cr near baseline (0.-1.1) at 1.17. Uric acid 8.6 and LDH 2,242. He is s/p Rasburicase x1 with improvement in uric acid to 6.6. Of note he does have h/o gout.   - PTA Allopurinol 300 mg BID (dose adjusted for weight)  - Start Phoslo 1,334 TID  - Monitor TLS labs q12h, DIC labs daily      # Anemia  # Thrombocytopenia   2/2 recent chemotherapy and underlying disease.   - Transfuse to maintain Hgb >7, Plt >10K  - Type & Screen MWF  - Blood consent signed on admission     ID  # Fever  # AHRF  # Abdominal bloating/discomfort   # Lactic acidosis, improved  Febrile in clinic to 100.5 on 1/19, also with associated AHRF with sats to 77% on admission. BP normotensive. Endorses progressive back pain as below along with SOB/DEAN worse over the past 1-2 days and abdominal bloating. He is having regular BMs. Denies other respiratory or infectious  complaints. Lab work is notable for leukocytosis to 15 and mildly elevated LFTs (Alk 214 and AST 51). Lactic acid elevated to 2.9, later improved to 2.0 - presume Type B 2/2 progressive leukemia. Differential includes infection vs COPD exacerbation vs leukemia-related.   - RVP, MRSA nares, UA negative. Urine and sputum cultures - pending  - Follow-up blood cultures; NGTD  - PJP PCR ordered - pending  - CT chest PE and A/P negative for infectious etiology in the CAP.   - Start Cefepime/Vancomycin on admission x1/19, continue for now though consider de-escalating 1/21 if infectious work-up remains negative.   - ID consult    ** Antibiotics  - Cefepime (1/19 - x)  - Vancomycin (1/19 - x)     # Prophylaxis  ANC is 1.8 on admission though c/f functional neutropenia in the setting of persistent leukemia and leukocytosis.   - PTA Acyclovir 400 mg BID  - Hold PTA Levaquin 250 mg daily in the setting of empiric IV antibiotics above   - Hold PTA Posa 300 mg dialy, start Lynnette 50 mg daily in anticipation of upcoming chemotherapy     CV/PULM  # COPD  # Orthopnea  # CATHERINE  Ongoing mild hypoxia to high 80s appears to be his baseline in the setting of his COPD PTA. Oxygen requirement to 4-5L on admission at rest (8-10L with activity or d/t positioning) with O2 sats in the 70s on RA. Reports SOB tends to flare with pain crises. Does desat at night.   - CT PE chest (negative) and antibiotics as above  - PTA Breo-Ellipta  - DuoNebs BID and Neb treatments q2hr PRN   - Incentive spirometry   - PTA CPAP at night  - Pulm consult     # HTN  # HLD  Home Metoprolol and Lisinopril held during previous admission in the setting of hypotension and possible adrenal insufficiency. BP normotensive to mildly HTN on admission. Statin discontinued prior to admission given anticipation of chemotherapy.   - Continue to hold prior Metoprolol/Lisinopril for now; consider resuming as appropriate  - Continue to hold statin, consider resumption prior to  "discharge     # Chronic LE edema  # Chronic venous insufficiency   Patient endorses increased LE swelling on admission. Has home compression stockings in place. Of note, weight is up 20+ lb from recent discharge on 12/29.   - PTA Bumex 1 mg daily, increase to BID dosing x1/20  - Lymphedema consulted   - Repeat echo as above with EF 60-65%, IVC dilated and suggestive of high RA pressure  - Cardiology consult in setting of possible pulmonary HTN on echo and CT imaging     MSK  # Acute on chronic back pain  Noted prior to hospitalization for induction chemotherapy. Presented with rib pain which radiates into his upper back and between his shoulder blades. MRI thoracic spine previously completed 12/17 and showed \"scattered subtle mottled enhancement of bones most pronounced on L 7th rib, could be 2/2 underlying disease process.\" It is possible that pt has some extramedullary infiltration of cortex causing pain. Endorses similar complaints this admission starting 1-2 days PTA in the setting of known persistent leukemia. No red flag sx. EKG on admission is normal.   - CT PE C/A/P as above; negative for source of acute pain   - PTA Voltaren gel QID, Robaxin QID  - PTA Oxycodone increased to 5-10 mg q4h prn. Add IV Dilaudid 0.5 mg q4h prn.   - PRN Tylenol and Flexeril   - Of note, pain previously responded well to Dexamethasone - could consider if persistent/worsening.   - Palliative care was previously involved last hospitalization. Consider consult as appropriate.      GI  # H/o constipation   Noted during previous admission for induction chemotherapy. Patient endorses regular and soft BM on admission.   - PTA Senna BID, Miralax daily   - Milk of magnesium daily PRN  - Prune juice was previously helpful for patient      ENDO  # T2DM   Follows with [facility], last seen 11/15/22.   - PTA Ozempic held on admission, resume on discharge  - Medium intensity sliding scale insulin, insulin discontinued on " "discharge  - Hypoglycemia protocol in place      MISC  # Dysphagia  Patient endorses difficulty swallowing on admission described as \"food getting stuck in throat\". Has had to modify diet at home and be careful with food selection/size. Occasionally finds himself coughing with water as well.   - SLP consulted     FEN   - IVF bolus prn   - PRN lyte replacement per standing protocol  - Regular diet as tolerated      Lines/Drains: PICC placed on admission, remove on discharge  DVT ppx: therapeutic Lovenox; hold if Plt <50K  GI ppx: PTA PPI  Consults: PT/OT, lymphedema, SLP, pulm  CODE: FULL  DISPO: Anticipate at least 3-5 day stay and possible prolonged hospitalization for likely re-induction of persistent leukemia.   Follow-up/Referrals: Primary oncologist is Dr. Velasquez.   - Follow-up will need to be scheduled closer to discharge     Social  - Lives at home alone near [place], [place]  - Has two sisters who are supportive and like to be involved in big conversations via speaker phone     Coordination of Cares   - Will need to arrange local follow-up with [facility] in [place], [place] on discharge       Patient and plan of care was discussed with attending physician Dr. Amato.    >50 minutes spent on the date of the encounter. Over 50% of time was spent counseling the patient and/or coordinating care.     Shannon Irving PA-C  Hematology/Oncology  Pager: [phone]    Interval History   Rapid response called overnight d/t lactic acidosis and increased oxygen requirements following CT. Requiring 4-5L O2 at rest and up to 8-10L d/t positioning and with activity. IV antibiotics were continued with improvement in lactic acid to 2 this morning (from 2.9). Additionally received increased dose of Bumex. This morning Angel reports his pain is a bit better than yesterday though still present. He endorses continued SOB with DEAN. Will need a unit of blood today. We discussed continued work-up of his leukemia and will also continue to " discuss next steps and treatment. Denies other specific complaints. Poor appetite and no BM overnight. All questions answered.     A comprehensive review of systems was obtained and is negative other than noted here or in the HPI.     Physical Exam   Temp: 98.4  F (36.9  C) Temp src: Oral BP: 121/54 (forearm) Pulse: 85   Resp: 24 SpO2: 96 % O2 Device: Oxymask Oxygen Delivery: 6 LPM  Vitals:    01/19/23 1306   Weight: (!) 178.5 kg (393 lb 9.6 oz)     Vital Signs with Ranges  Temp:  [97.6  F (36.4  C)-99.2  F (37.3  C)] 98.4  F (36.9  C)  Pulse:  [65-93] 85  Resp:  [22-32] 24  BP: (113-140)/(51-77) 121/54  SpO2:  [77 %-96 %] 96 %  I/O last 3 completed shifts:  In: 1340 [P.O.:1200; I.V.:140]  Out: 1875 [Urine:1875]    General: Awake and interactive. Chronically-ill appearing and fatigued. Uncomfortable and shifting frequently in bed.    Skin: Warm, dry, and intact without rashes or lesions. Chronic venous stasis changes to bilateral LE.  Scattered bruising to extremities.   Head: Normocephalic and atraumatic.  HEENT: PERRLA. Sclera clear. EOM intact. Oral mucosa is pink and moist with no lesions, thrush, or exudates.   Lymph: Neck supple. No significant adenopathy.   Cardiac: Regular rate and rhythm.   Respiratory: Increased work of breathing and tachypnea on 4-5L via oxymask. Course breath sounds.   Abd: Soft, symmetric, distended. BS present and normoactive. Organomegaly not noted d/t body habitus.   Extremities: 2-3+ bilateral pitting edema, home compression stockings in place. Distal pulses palpable.   Neuro: A&Ox3 with normal speech. Memory and thought process preserved. No deficits grossly.  Psych: Appropriate mood and affect. Good judgment and insight. No visual/auditory hallucinations.     Medications     - MEDICATION INSTRUCTIONS -         acyclovir  400 mg Oral BID     allopurinol  300 mg Oral BID     ammonium lactate   Topical Daily     bumetanide  1 mg Oral BID     calcium acetate (phos binder)  1,334 mg  Oral TID w/meals     ceFEPIme  2 g Intravenous Q8H     enoxaparin ANTICOAGULANT  0.75 mg/kg Subcutaneous Q12H     fluticasone-vilanterol  1 puff Inhalation Daily     heparin lock flush  5-20 mL Intracatheter Q24H     heparin lock flush  5-20 mL Intracatheter Q24H     insulin aspart  1-7 Units Subcutaneous TID AC     insulin aspart  1-5 Units Subcutaneous At Bedtime     ipratropium - albuterol 0.5 mg/2.5 mg/3 mL  3 mL Nebulization BID     micafungin  50 mg Intravenous Q24H     pantoprazole  40 mg Oral QAM AC     polyethylene glycol  17 g Oral Daily     senna-docusate  1 tablet Oral BID     sodium chloride (PF)  10-40 mL Intracatheter Q8H     sodium chloride (PF)  10-40 mL Intracatheter Q8H     vancomycin  1,250 mg Intravenous Q12H

## 2023-01-20 NOTE — CONSULTS
PULMONARY CONSULT  Date of service: 2023    Patient: Angel Cardenas      : 1960      MRN: [id number]    We were consulted by Shannon Irving PA-C for reccs for optimization of respiratory status prior to initation of chemo. Pt with refractory leukemia admitted for re-induction chemo in setting of h/o COPD, multiple PEs. Hypoxia on admission - CT PE negative, shows possible pulmonary HTN.       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 Decision was made at that time to be admitted and undergo evaluation for reinduciton therapy. On day of admission sepsis code called for elevated lactate 2.9, tachypnea and O2 needs with overlying leukocytosis. Improved on diuresis and continued antibiotics lactate correcting to 2.0 on recheck. 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 Echo negative for right heart strain.    Acute hypoxic respiratory failure  Elevated pulmonary artery diameter on CT, concern pulmonary hypertension   Mosaicism on chest imaging  Pulmonary team was consulted for optimization of respiratory status prior to re-induction therapy. Differential for his hypoxia include opportunistic infection (PJP, CMV, fungal), COPD exacerbation, anemia, and pulmonary hypertension.  We reviewed the chest CT scan, the patient had groundglass opacities which is diffuse and patchy bilaterally in addition to the mosaic attenuation.  This is concerning for opportunistic infectious pneumonitis, particularly elevated LDH is concerning for PJP, though interpretation of this lab is c/b his lukemia. Social history is not suggestive of exposure being an etiology. He has been on Symbicort for his COPD but denies any recent exacerbations, and denies  significant DEAN. He has a history of PE in the 1990's and last year but PE has been ruled out this hospitalization with CT imaging, though mosaicism with proximal PA webbing is highly suggestive of CTEPH. He will need a right heart cath for diagnosis. Given his immunocompromised state we would pursue a broad pulmonary infectious workup.    -Recommend ID consult to consider empiric treatment for infectious pneumonitis including PJP if felt appropriate by ID  -Broad-spectrum antibiotics per ID and primary team  -Recommend infectious work-up sputum cultures (bacterial, fungal). If patient is not brining up sputum can perform an induced sputum collection with RT. At this time he is not a candidate for bronchoscopy and BAL given his high oxygen requirements, he is at risk for further decompensation and intubation.  -Urine and serum histoplasma Ag, and blastomyces Ag  -Urine legionella antigen, and Streptococcus pneumonia antigen  -B-D-glucan, aspergillus galactomannin  -Recommend echocardiography with bubble study  -Recommend cardiology consultation for the pulmonary hypertension, might need right heart cath    Patient seen by me and Glenn Rodarte ([email or web address]), discussed w/  Dr. Catherine M.D., who is in agreement.     Chart documentation was completed, in part, with [employer] voice-recognition software. Even though reviewed, some grammatical, spelling, and word errors may remain.      Irma Neely MD  Pulmonary & Critical Care Fellow          History of Present Illness:   Angel Cardenas is a 62 year old male who presented to [facility] on 1/19/2023 with complaints of fever and dyspnea. He was recently diagnosed with AMML, and completed cycle 1/10 of decitabine/venetoclax with a subsequent BMBx showing persistent disease. He presented to clinic 1/19 with fever and hypoxia prompting admission for consideration for re-induction therapy. Pulmonary team was consulted for optimization of respiratory status before re-induction therapy,  especially given his acute change in oxygen requirements. Patient has had COPD treated with    Patient reports dyspnea, but able to speak, with no major dyspnea before his AMML diagnosis. Reports no more fevers and scant sputum production. Denies hemoptysis.    Patient is a  smoker w/ 40-year history. Reports quitting 6 years ago. Works as a  for [employer].           Review of Symptoms:   10-point ROS reviewed, & found negative w/ exceptions noted in the HPI.          Past Medical History:     Past Medical History:   Diagnosis Date     COPD (chronic obstructive pulmonary disease) (H)      Diabetes mellitus, type 2 (H)      Gout      History of pulmonary embolism      HLD (hyperlipidemia)      HTN (hypertension)        Past Surgical History:   Procedure Laterality Date     PICC DOUBLE LUMEN PLACEMENT Right 12/16/2022    Right brachial-medial vein 48cm total 1cm external.Placement verified by [facility] 3CG.PICC okay to use.     PICC TRIPLE LUMEN PLACEMENT Right 01/19/2023    5FR TL PICC basilic vein. L-50cm 2cm out     CT REVISE TOTAL HIP REPLACEMENT Bilateral      TOTAL SHOULDER REPLACEMENT Right             Allergies:     Allergies   Allergen Reactions     Metformin Unknown             Outpatient Medications:     No current facility-administered medications on file prior to encounter.  acetaminophen (TYLENOL) 500 MG tablet, Take 500-1,000 mg by mouth every 6 hours as needed for mild pain  acyclovir (ZOVIRAX) 400 MG tablet, Take 1 tablet (400 mg) by mouth 2 times daily  allopurinol (ZYLOPRIM) 300 MG tablet, Take 1 tablet (300 mg) by mouth 2 times daily  atorvastatin (LIPITOR) 20 MG tablet, Take 20 mg by mouth daily  budesonide-formoterol (SYMBICORT) 160-4.5 MCG/ACT Inhaler, Inhale 2 puffs into the lungs 2 times daily  bumetanide (BUMEX) 1 MG tablet, Take 1 mg by mouth daily  diclofenac (VOLTAREN) 1 % topical gel, Apply 2 g topically 4 times daily as needed for moderate pain (4-6)  levofloxacin (LEVAQUIN)  250 MG tablet, Take 1 tablet (250 mg) by mouth daily  methocarbamol (ROBAXIN) 500 MG tablet, Take 1 tablet (500 mg) by mouth 4 times daily as needed for muscle spasms  oxyCODONE (ROXICODONE) 5 MG tablet, Take 1 tablet (5 mg) by mouth every 4 hours as needed for moderate pain (4-6)  polyethylene glycol (MIRALAX) 17 GM/Dose powder, Take 17 g by mouth daily as needed for constipation  posaconazole (NOXAFIL) 100 MG EC tablet, Take 3 tablets (300 mg) by mouth every morning  semaglutide (OZEMPIC, 1 MG/DOSE,) 2 MG/1.5ML pen, Inject 2 mg Subcutaneous every 7 days Every Wednesdays  senna-docusate (SENOKOT-S/PERICOLACE) 8.6-50 MG tablet, Take 1 tablet by mouth 2 times daily as needed for constipation  venetoclax (VENCLEXTA) 100 MG tablet, Take 1 tablet (100 mg) by mouth daily  ammonium lactate (AMLACTIN) 12 % external cream, Apply topically daily (Patient not taking: Reported on 1/19/2023)  calcium acetate (PHOSLO) 667 MG CAPS capsule, Take 3 capsules (2,001 mg) by mouth 3 times daily (with meals)  enoxaparin ANTICOAGULANT (LOVENOX) 100 MG/ML syringe, Inject 0.9 mLs (90 mg) Subcutaneous every 12 hours HOLD if platelets are less than 50K (Patient not taking: Reported on 1/19/2023)  pantoprazole (PROTONIX) 40 MG EC tablet, Take 1 tablet (40 mg) by mouth every morning (before breakfast) (Patient not taking: Reported on 1/19/2023)              Family History:     Family History   Problem Relation Age of Onset     Neurodegenerative disease Father         Creutzfeldt-Osmin disease     Chronic Obstructive Pulmonary Disease Brother      Heart Disease Brother                Social History:     Social History     Tobacco Use     Smoking status: Former     Years: 40.00     Types: Cigarettes     Smokeless tobacco: Never     Tobacco comments:     Quit around 2016   Substance Use Topics     Alcohol use: Not Currently     Comment: daily, 3-4 drink liquor. last drink Dec 10 2022             Physical Exam:   /54 (BP Location: Left arm)   " Pulse 85   Temp 98.4  F (36.9  C) (Oral)   Resp 24   Ht 1.854 m (6' 1\")   Wt (!) 178.5 kg (393 lb 9.6 oz)   SpO2 96%   BMI 51.93 kg/m      General: NAD  HEENT: Anicteric sclera, EOMI, OP unobstructed, w/o erythema/discharge; no cervical LAD  CV: RRR, systolic murmur  Lungs: Clear to ascultation  Abd: Soft, NT, ND  Ext: Nonpitting edema in all extremities.  Skin: No rashes, cyanosis, or jaundice  Neuro: AAOx3, no focal deficits, flat affect.          Data:   Labs (all laboratory studies reviewed by me):   CMP:   Recent Labs   Lab 01/20/23 0528 01/19/23 2159 01/19/23 1837 01/19/23 1412 01/19/23  1026     --   --  138  137 139   POTASSIUM 3.4  --   --  3.7  3.7 3.6   CHLORIDE 100  --   --  100  99 100   CO2 20*  --   --  16*  17* 23   ANIONGAP 16*  --   --  22*  21* 16*   * 102* 101* 102*  100* 117*   BUN 17.6  --   --  17.9  18.6 17.7   CR 1.11  --   --  1.21*  1.13 1.17   GFRESTIMATED 76  --   --  69  74 71   ZEHRA 9.2  --   --  9.5  9.7 9.6   MAG 1.7  --   --  1.8 1.9   PHOS 4.9*  --   --  4.3 4.9*   PROTTOTAL 6.4  --   --  6.9 7.3   ALBUMIN 2.9*  --   --  3.2* 3.4*   BILITOTAL 0.7  --   --  0.8 0.9   ALKPHOS 169*  --   --  194* 214*   AST 43  --   --  54* 51*   ALT <5*  --   --  <5* 6*     CBC:   Recent Labs   Lab 01/20/23 0528 01/19/23  1412 01/19/23  1026   WBC 11.7* 15.3* 15.1*   RBC 2.43* 2.66* 2.77*   HGB 6.6* 7.2* 7.6*   HCT 22.3* 24.5* 25.2*   MCV 92 92 91   MCH 27.2 27.1 27.4   MCHC 29.6* 29.4* 30.2*   RDW 21.2* 20.9* 20.8*   PLT 79* 108* 119*       INR:   Recent Labs   Lab 01/20/23  0528 01/19/23  1412   INR 1.29* 1.28*       Glucose:   Recent Labs   Lab 01/20/23  0528 01/19/23  2159 01/19/23  1837 01/19/23  1412 01/19/23  1026   * 102* 101* 102*  100* 117*       Blood Gas: No lab results found in last 7 days.    Culture Data No results for input(s): CULT in the last 168 hours.    Virology Data:   Lab Results   Component Value Date    FLUAH1 Not Detected 01/19/2023 "    FLUAH3 Not Detected 01/19/2023    IH6746 Not Detected 01/19/2023    IFLUB Not Detected 01/19/2023    RSVA Not Detected 01/19/2023    RSVB Not Detected 01/19/2023    PIV1 Not Detected 01/19/2023    PIV2 Not Detected 01/19/2023    PIV3 Not Detected 01/19/2023    HMPV Not Detected 01/19/2023       Historical CMV results (last 3 of prior testing):   Latest Reference Range & Units 12/11/22 07:01   CMV [place] IgG Instrument Value <0.60 U/mL <0.20   CMV Antibody IgG No detectable antibody.   No detectable antibody.   CMV [place] IgM Instrument Value <30.0 AU/mL <8.0   CMV Antibody IgM Negative  Negative       Urine Studies    Recent Labs   Lab Test 01/19/23  1828   URINEPH 5.0   NITRITE Negative   LEUKEST Negative   WBCU 0     Imaging (all imaging studies reviewed by me):  1/19/23 CT-PE  1. Exam is negative for acute pulmonary embolism. No right heart  strain . 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   2. Pulmonary arteries enlarged and measures up to 3.7 cm comment this  is nonspecific but most often seen with pulmonary arterial  hypertension, possibly CTEPH   3. Mosaic attenuation in the lung bases is most often seen with air  trapping or perfusional abnormalities.   4. 6 mm solid pulmonary nodule in the left lung base cannot be  reimaged in one year's time with a noncontrast chest CT.   5. Splenomegaly.    12/10/2022 CXR  Cardiomegaly with engorged pulmonary vasculature and  diffuse mild interstitial markings bilaterally likely represent  pulmonary edema.

## 2023-01-20 NOTE — PROVIDER NOTIFICATION
01/19/23 1700   Call Information   Date of Call 01/19/23   Time of Call 1640   Name of person requesting the team Luis   Title of person requesting team RN   RRT Arrival time 1643   Time RRT ended 1700   Reason for call   Type of RRT Adult   Primary reason for call Sepsis suspected   Sepsis Suspected Elevated Lactate level   Was patient transferred from the ED, ICU, or PACU within last 24 hours prior to RRT call?   (recent admit from outside)   SBAR   Situation LA=2.9   Background 62 year old male with a history of multiple prior DVTs and PE in 2022 (previously on Xarelto), DM2, COPD, HTN, gout and recently diagnosed AMML s/p Cycle 1 of 10-days of Decitabine/Venetoclax (D1=12/19/22) with subsequent persistent disease on post-therapy BMBx 1/13/23 demonstrating ongoing ~70% blasts. He presents from clinic for consideration of re-induction chemotherapy in the setting of fever to 100.5 and hypoxia requiring 2-4L O2.   Notable History/Conditions Cancer;Diabetes;COPD;Hypertension   Assessment A/OX3, dyspneic, tachypneic,   Interventions Neb treatment;O2 per N/C or mask;Meds   Patient Outcome   Patient Outcome Stabilized on unit   RRT Team   Attending/Primary/Covering Physician Monika Yee   Date Attending Physician notified 01/19/23   Time Attending Physician notified 1640   Physician(s) Hanna Lloyd   Lead RN Joann Qureshi   Post RRT Intervention Assessment   Post RRT Assessment Stable/Improved   Date Follow Up Done 01/19/23   Time Follow Up Done 2050   Comments Patient up to bathroom and more dyspneic with activity RR 30 and SpO2 91 on 5L oxymask. Improved with rest and O2 able to wean to 4L oxymask with SpO2 93%. Patient does not report any increase WOB related to earlier. Adequate diuresis after diuretics.  Other vital signs stable.

## 2023-01-20 NOTE — CONSULTS
"[facility]  Transplant / Hematological Malignancy Infectious Disease Consult Note - New Patient     Patient:  Angel Cardenas, Date of birth 1960, Medical record number [id number]  Date of Visit:  01/20/2023  Consult requested by Dr. Isai Amato for evaluation of non-neutropenic fever and hypoxia in the setting of refractory AMML.         Assessment and Recommendations:   Recommendations:  - Send procalcitonin, NTpBNP and \"baseline\" serum CRP assays.  - Send blood HHV-6 quantitative PCR viral load assay, blood Quantiferon Gold assay, AFB blood culture, serum cryptococcal antigen assay, and serum Treponema antibody screening assay.  - Await the infection screening assays already ordered today (Fungitell, galactomannan antigen, sputum culture, urine Blastomyces / Histoplasma / Legionella antigens, sputum Pneumocystis jirovecii PCR, although could cancel the PJ PCR if not already sent).  - Send additional blood cultures if he spikes another fever.  - Repeat a thoracic / lumbar spine MRI scan to make certain he does not have any spinal infection there that was inapparrent on the prior 12/17/23 scan.  - At present, no other ID evaluations seen needed and he can proceed to chemotherapy from and ID standpoint before the results of the above studies are fully resulted (since they all are expected to be negative or treatable while on chemotherapy).    - Discontinue cefepime now -- he is not absolutely neutropenic (even if he is somewhat functionally neutropenic) and there is little indication so far of any specific bacterial infection.  - Discontinue IV vancomycin -- he is MRSA nares PCR screen negative and lacks any indication suggesting the presence of a Staph aureus pneumonia or other focal infection.  (Initiation of empiric IV vancomycin in non-neutropenic / non-severely toxic febrile Heme-Onc patients is often discouraged, in general.)  - Given his lack of absolute lymphopenia at " present, micafungin prophylaxis is not indicated until his re-induction chemotherapy takes hold.  - Send a serum HSV-1/2 IgG antibody assay -- if negative, discontinue the acyclovir prophylaxis (since he is CMV seronegative and the present acyclovir dose will have no EBV preventative efficacy).    Thanks for the consult on this complex patient. Transplant ID will follow with you.    Michael Zamora MD  Pager [phone]    Assessment:  A 62 year old gentleman with a past history of type 2 diabetes mellitus, COPD, multiple DVTs and a pulmonary embolus in 2022 (now off Xarelto), hypertension and gout, who was recently diagnosed in early 12/2022 with AMML for which he received a first ten day cycle of induction decitabine / Venetoclax starting 12/19/22 (complicated by tumor lysis syndrome, mild adrenal insufficiency, and possible pneumonia).  A post-therapy 1/13/23 bone marrow biopsy pathology / flow cytometry showed ~ 67 - 70% persistent blasts.  He was now admitted to the [facility] Hematology-Oncology service from clinic on 1/19/23 afternoon with a week of low-grade fever (to 100.5 degrees F on 1/19/23 AM), sweats, fatigue, exertional and resting dyspnea (with hypoxia of 77 - 87% oxygen saturation on room air at admission) markedly wosened low thoracic / upper lumber bilateral back pain over the preceding two days, nausea / emesis, abdominal bloating, diarrhea / constipation, some dysphagia, and increased bilateral legs edema.  He was admitted for evaluation of the symptoms in preparation for imminent re-induction chemotherapy.  Transplant ID is consulted regarding the admission non-neutropenic fever and hypoxia.    ID issues:    - Non-neutropenic fevers with other constitutional symptoms:  He has had low-grade sweats and episodes of feeing hot for a week with a fever of 100.5 degrees measured on presentation to clinic on 1/19/23 AM.   Although he may have some neutrophil dysfunction due to his marked blastemia, he has  "not been markedly neutropenic and presumably has a least partially intact neutrophil function.  Beyond back pain, he lacks much in the way of focal signs or symptoms that would suggest an infectious focus, and truncal CT imaging on 1/19 - 20/23 revealed no likely infectious foci.  Specifically, he has no evidence for a bacteremia, pneumonia, sinusitis, UTI, cellulitis or soft tissue infection, abdominal abscess, or other bacterial process.  The back was previously imaged with MRI on 12/17/23 and there was no suggestion of infection at that time, but that should probably be re-evaluated now.  A procalcitonin might help, but given the absence of any likely bacterial focus of infection at this time and his lack of profound neutropenia, empiric antibiotic therapy is unneeded.  He lacks MRSA nares carriage, so IV vancomycin is especially unneeded now.  It would make sense to monitor his temperatures off antibiotics.  There is also no indication of any atypical bacterial pneumonia (by symptoms or chest CT), mycobacteriosis (although screening for that is reasonable), likely endemic or mold fungal infection (in the absence of strong exposures, suggestive imaging, and profound neutropenia), viral infections (with HIV / infectious hepatitis screens negative on 12/10/23, CMV seronegativity on 12/11/23, and negative admission respiratory virus PCRs), or STDs (by history), although double-checking for some of those is reasonable (as per Recommendations above).  He does not have obvious medications that would a pyrogenic culprit.  The most likely source of his low-grade fever and \"B\" symptoms is his unchecked leukemia.    - Recent dyspnea / hypoxia:  This may be multifactorial, but there is little to suggest any infectious component is contributing.  The chest CT scan is not suggestive of infection.  This is not a setting for PJP.  Instead, his dyspnea / hypoxia may due to a combination of COPD, obesity hypoventilation, anemia, " "early pulmonary edema, malignancy-induced metabolic stress, and possibly some pulmonary hypertension -- will defer to Pulmonology Consult, but, at present, would not treat him for classic or atypical pneumonias.    - Acute-on-chronic lower thoracic / superior lumbar pain:  He reports that the pain he experienced on 1/18 - 19/23 was the most intensely painful episode of several (\"about four\") episodes of similar back pain he has had since his leukemia was diagnosed in early December.  A prior 12/17/22 thoracic spine MRI scan did not demonstrate any evidence of likely infection, but, now more than a month later -- given the acute exacerbation on 1/18/23 -- it should be re-imaged to make certain there is no incipient vertebral osteomyelitis.  Interestingly, it does not sound like the pain was strongly due to spinal nerve impingement, since he was able to ambulate despite the pain and ambulation did not necessarily increase his discomfort.    - Refractory AMML with need for re-induction chemotherapy:  Assuming there is no spinal osteomyelitis (or similar infection), since his fever and constitutional symptoms seem more likely malignant rather than infectious in origin, from an ID perspective there is no contraindication to proceeding with his re-induction therapy.    Other ID considerations:  - QTc interval:  435 msec on 1/19/23 EKG.  - Bacterial prophylaxis:  None indicated (but was on pre-admission levofloxacin prophylaxis).  Presently on empiric broad-spectrum treatment.  - Pneumocystis prophylaxis:  None indicated -- not markedly lymphopenic.  - Viral serostatus & prophylaxis:  CMV seronegative. EBV seropositive.  HSV?.  On acyclovir.  - Fungal prophylaxis:  None indicated, not markedly lymphopenic.  But has been on posaconazole / micafungin prophylaxis.  - Risk factors to suggest check of Toxoplasma, Strongy, or Schisto serology?:  None.  - Immunization status:  Not presently relevant.  - Gamma globulin status:  " "Unknown, not presently relevant.  - Isolation status: Routine.         History of Infectious Disease Illness:   Mr. Cardenas is a 62 year old gentleman with a past history of type 2 diabetes mellitus, COPD, multiple DVTs and a pulmonary embolus in 2022 (now off Xarelto), hypertension and gout, who was recently diagnosed in early 12/2022 with AMML for which he received a first ten day cycle of induction decitabine / Venetoclax starting 12/19/22 (complicated by tumor lysis syndrome, mild adrenal insufficiency, and possible pneumonia).  He was discharged from that initial hospitalization on 12/29/22 on levofloxacin, acyclovir, and posaconazole prophylaxis and initially felt well at home.  A post-therapy 1/13/23 bone marrow biopsy pathology / flow cytometry showed ~ 67 - 70% persistent blasts. He was seen for follow-up in the Hematology-Oncology Clinic on 1/19/23 where he was noted on check-in to be febrile to 100.5 degrees F and hypoxic (O2 saturations 77 - 87% on room air, requiring up to four liters of supplemental oxygen by nasal cannula).  In clinic, he complained of low-grade fevers over a week, sweats, exertional and resting dyspnea, markedly wosened back pain over the preceding two days, nausea / emesis, abdominal bloating, diarrhea / constipation, some dysphagia, and increased bilateral legs edema despite use of compression hose.  The back pain was bilateral across his back at the lower-thoracic / upper lumbar levels (at present, he is having trouble localizing it) and was the worst he has ever had (\"so bad I could not think\"), although in location and quality similar to about three prior episodes over the past two months.  He was admitted to the [facility] Hematology-Oncology service on 1/19/23 afternoon for evaluation of the fever and hypoxia and for planning for an attempt at re-induction chemotherapy.  A triple lumen PICC was placed upon admission in anticipation of new chemotherapy.  Admission (post antibiotic) " "blood cultures x 2 are without growth to date and admission urinalysis / urine culture, nares MRSA PCR screen, nasopharyngeal respiratory pathogen PCR panel and SARS CoV-2 PCR assays were all negative.  A 1/19/23 ferritin was 7,289 with an LDH of 2,380 (versus 409 on 12/29/22) and a uric acid of 8.1.  Upon admission, he was placed on wholly-empiric IV vancomycin (despite no history of MRSA carriage) and cefepime, as well as acyclovir and micafungin 50 mg per day prophylaxis.  His initial (1/19/23 AM) fever in clinic of 100.5 degrees resolved and he has been afebrile (T max 99.2 degrees F) over the past 24 hours since admission.  He had an admission leukocytosis of 15.3 (ANC 1.8, never below 1.5; ALC 1.6, never below 0.5) on 1/19/23 midday which decreased to 11.7 this AM (majority \"other cells\" on differentials), and an admission lactic acid of 2.9 which is down to 2.0 this AM.  A 1/19/23 chest CT angiogram and a 1/20/23 abdominal CT scan showed possible pulmonary hypertension, bilateral basilar lung air trapping, renal cysts, inguinal lymphadenopathy, and hepatosplenomegaly, but no acute pulmonary embolus, intra-abdominal collections or other evident new infectious foci.  A 1/20/23 TTE was basically normal except for possible high RA pressure.  His oxygenation has improved some today after bumetanide was given.  He has remained hemodynamically stable.  Serum Fungitell / serum galactomannan antigen / sputum culture / urine Blastomyces / urine Histoplasma / urine Legionella / sputum Pneumocystis jirovecii were all ordered this afternoon.  A pRBC transfusion was given this morning.  Re-induction 7 + 3 chemotherapy with low-dose cytarabine is planned, but he is undergoing preliminary evaluations -- including by Pulmonary and Cardiology Consult -- before beginning that.  He has some increased dyspnea over night last night (with a Rapid Response called), but that is improved and roughly back to his admission oxygenation " "status today.  He continues to complain of back / hip pain (although that is mostly resolved by evening today), resting dyspnea, fatigue, mild anorexia, mild malaise, and abdominal bloating today, but no recent chills or sweats, and no new complaints today including EENT symptoms, dysuria, rash or headache.  He has not had diarrhea since admission.  Transplant ID is consulted regarding the admission non-neutropenic fever and hypoxia.    Exposure History:  Lives alone, mostly has contact with his sister and an older woman caregiver who have been healthy, no recent sick contacts, no contact with young children (last saw his twelve year old grand-niece a number of weeks ago.  On disability from his job as a [employer]  since early 12/2022 so not out in public much since then.  No pets, no animal habitat exposures in the past couple of years. Lat out of [place] for a three day [employer] rally in [facility] about a year ago.  [place] in [place] in distant past years and in [facility] once.  Otherwise never outside of [place].  No known history of exposure to tuberculosis in his life. Very infrequently (last about three years ago) eats a semi-raw beef \"tiger burger\", but no other raw / unpasteurized ingestions.  Drinks filtered water purchased at grocery store.      Review of Systems:  CONSTITUTIONAL:  Admission fever of 100.5 on 1/19/23 AM, with recent low-grade fevers and sweats over a week.  They sweats (espeically back of neck and intertriginous areas) remain, but no fever or chills since admission.  He has never had rigors or profound drenching sweats.  Marked progressive fatigue and mild anorexia over the past week.  EYES: No eye pain, visual changes, or scleral icterus.  ENT:  Has dysphagia with eating.  No rhinorrhea, sinus pain, otalgia, hearing loss, tinnitus, sore throat, or oral pain.  LYMPH:  Chronic bilateral leg edema (with venous statis).  Inguinal lymphadenopathy by " CT scan.  RESPIRATORY:  Recent exertional / resting dyspnea.  COPD.  No recent increased cough or increased sputum.  History of PE / DVTs now on Lovenox.  CARDIOVASCULAR:  No chest pain, palpitations.  GASTROINTESTINAL:  Recent nausea, vomiting, diarrhea (not current) and constipation with abdominal bloating, presently controlled, but no overt abdominal pain..  GENITOURINARY:  No dysuria.  HEME:  Leukemia with blasts.  Significant persistent anemia and progressive moderate thrombocytopenia.  No easy bruising.  ENDOCRINE:  Type 2 diabetes mellitus.  Morbid obesity.  MUSCULOSKELETAL:  Severe lumbar back on 1/18 - 19/23 and left hip pain, now much better today.  No other new myalgias / arthralgias.  SKIN:  No rash or pruritus.  NEURO:  No headache.  PSYCH:  Negative.    Past Medical History:   Diagnosis Date     COPD (chronic obstructive pulmonary disease) (H)      Diabetes mellitus, type 2 (H)      Gout      History of pulmonary embolism      HLD (hyperlipidemia)      HTN (hypertension)      Past Surgical History:   Procedure Laterality Date     PICC DOUBLE LUMEN PLACEMENT Right 12/16/2022    Right brachial-medial vein 48cm total 1cm external.Placement verified by [facility].PICC okay to use.     PICC TRIPLE LUMEN PLACEMENT Right 01/19/2023    5FR TL PICC basilic vein. L-50cm 2cm out     [place] REVISE TOTAL HIP REPLACEMENT Bilateral      TOTAL SHOULDER REPLACEMENT Right      Family History   Problem Relation Age of Onset     Neurodegenerative disease Father         Creutzfeldt-Osmin disease     Chronic Obstructive Pulmonary Disease Brother      Heart Disease Brother      Social History     Tobacco Use     Smoking status: Former     Years: 40.00     Types: Cigarettes     Smokeless tobacco: Never     Tobacco comments:     Quit around 2016   Substance Use Topics     Alcohol use: Not Currently     Comment: daily, 3-4 drink liquor. last drink Dec 10 2022   Lives alone in his home near [place], [place].  Has two supportive  sisters.    There is no immunization history on file for this patient.    Patient Active Problem List   Diagnosis     Leukemia (H)     Acute myeloid leukemia not having achieved remission (H)     AMML (acute myelomonocytic leukemia) (H)          Current Medications & Allergies:       acyclovir  400 mg Oral BID     allopurinol  300 mg Oral BID     ammonium lactate   Topical Daily     bumetanide  1 mg Oral BID     calcium acetate (phos binder)  1,334 mg Oral TID w/meals     ceFEPIme  2 g Intravenous Q8H     enoxaparin ANTICOAGULANT  0.75 mg/kg Subcutaneous Q12H     fluticasone-vilanterol  1 puff Inhalation Daily     heparin lock flush  5-20 mL Intracatheter Q24H     heparin lock flush  5-20 mL Intracatheter Q24H     insulin aspart  1-7 Units Subcutaneous TID AC     insulin aspart  1-5 Units Subcutaneous At Bedtime     ipratropium - albuterol 0.5 mg/2.5 mg/3 mL  3 mL Nebulization BID     micafungin  50 mg Intravenous Q24H     pantoprazole  40 mg Oral QAM AC     polyethylene glycol  17 g Oral Daily     senna-docusate  1 tablet Oral BID     sodium chloride (PF)  10-40 mL Intracatheter Q8H     sodium chloride (PF)  10-40 mL Intracatheter Q8H     vancomycin  1,250 mg Intravenous Q12H     Infusions/Drips:      - MEDICATION INSTRUCTIONS -       Allergies   Allergen Reactions     Metformin Unknown          Physical Exam:     Patient Vitals for the past 24 hrs:   BP Temp Temp src Pulse Resp SpO2 Weight   01/20/23 1553 -- 98.2  F (36.8  C) Oral 80 22 96 % --   01/20/23 1515 112/64 98.7  F (37.1  C) Oral 83 22 99 % --   01/20/23 1400 -- 98.7  F (37.1  C) Oral -- -- -- --   01/20/23 1355 100/47 97.5  F (36.4  C) Axillary 86 22 96 % --   01/20/23 1348 -- -- -- -- -- -- (!) 178.5 kg (393 lb 8 oz)   01/20/23 1045 121/54 98.4  F (36.9  C) Oral 85 24 96 % --   01/20/23 0609 123/57 98.3  F (36.8  C) Oral 82 24 94 % --   01/20/23 0203 135/65 98.2  F (36.8  C) Oral 80 22 93 % --   01/20/23 0100 -- -- -- -- -- 95 % --   01/19/23 1369 --  -- -- 82 -- 94 % --   01/19/23 2344 -- -- -- 83 -- -- --   01/19/23 2342 113/53 -- -- 85 28 (!) 88 % --   01/19/23 2340 -- -- -- 83 26 (!) 85 % --   01/19/23 2338 -- -- -- -- 24 (!) 88 % --   01/19/23 2335 -- -- -- 80 -- (!) 85 % --   01/19/23 2149 -- -- -- 84 28 90 % --   01/19/23 2131 -- -- -- -- (!) 32 (!) 88 % --   01/19/23 2130 -- -- -- -- -- (!) 87 % --   01/19/23 2050 114/58 98.6  F (37  C) Oral 93 30 93 % --   01/19/23 2015 115/51 97.8  F (36.6  C) Oral 90 30 94 % --   01/19/23 1924 -- -- -- -- -- 91 % --   01/19/23 1901 119/64 97.6  F (36.4  C) Oral 65 24 91 % --   01/19/23 1839 -- -- -- -- -- (!) 89 % --   01/19/23 1838 -- -- -- -- -- (!) 87 % --   01/19/23 1752 -- -- -- -- -- 91 % --   01/19/23 1705 125/77 99.2  F (37.3  C) Oral 90 24 91 % --   01/19/23 1656 -- -- -- 86 -- 95 % --   01/19/23 1630 134/77 -- -- 91 -- (!) 89 % --   01/19/23 1602 (!) 140/66 98.1  F (36.7  C) Oral 93 28 93 % --     Ranges for vital signs over the past 24 hours:   Temp:  [97.5  F (36.4  C)-99.2  F (37.3  C)] 98.2  F (36.8  C)  Pulse:  [65-93] 80  Resp:  [22-32] 22  BP: (100-140)/(47-77) 112/64  SpO2:  [85 %-99 %] 96 %  Vitals:    01/19/23 1306 01/20/23 1348   Weight: (!) 178.5 kg (393 lb 9.6 oz) (!) 178.5 kg (393 lb 8 oz)     Intake/Output Summary (Last 24 hours) at 1/20/2023 1556  Last data filed at 1/20/2023 1322  Gross per 24 hour   Intake 1200 ml   Output 2875 ml   Net -1675 ml     Physical Examination:  GENERAL:  Pleasant, conversant, fatigued and chronically-ill appearing, 62 year old man in bed in no acute distress.  HEAD:  Normocephalic, atraumatic.  EYES:  EOMI, PERRL, anicteric sclerae without conjunctival injection.  ENT:  External auditory canals patent without discharge.  Nasal O2 cannula at 5 - 6 liters present.  Oropharynx is moist without exudates or ulcers.  NECK:  Supple.  LYMPH:  No cervical or supraclavicular lymphadenopathy  LUNGS:  Clear to auscultation bilaterally.  CARDIOVASCULAR:  Regular rate and  rhythm, normal S1, S2, without murmur, gallop, or rub.  ABDOMEN:  Normal bowel sounds, soft, nontender, no hepatosplenomegaly.  BACK:  Lumbar back tenderness.  No CVA tenderness.  :  No Black.  EXTREMITIES:  Distally warm, 2+ bipedal edema.  SKIN:  No acute rash or lesion.  Scattered healing ecchymoses; bilateral anterior calf venous stasis.  PICC line (placed 1/19/23) site lacks inflammation.  NEUROLOGIC:  Alert, oriented, moves extremities x 4.         Laboratory Data:     Inflammatory Markers    Recent Labs   Lab Test 12/17/22  0617 12/16/22  1044   CRP  --  18.40*   CHEN 0.7  --      Immune Globulin Studies   No lab results found.    Metabolic Studies       Recent Labs   Lab Test 01/20/23  0528 01/19/23  1837 01/19/23  1612 01/19/23  1412 12/16/22  1146 12/16/22  1044     --   --  138  137   < >  --    POTASSIUM 3.4  --   --  3.7  3.7   < >  --    CHLORIDE 100  --   --  100  99   < >  --    CO2 20*  --   --  16*  17*   < >  --    ANIONGAP 16*  --   --  22*  21*   < >  --    BUN 17.6  --   --  17.9  18.6   < >  --    CR 1.11  --   --  1.21*  1.13   < >  --    GFRESTIMATED 76  --   --  69  74   < >  --    *   < >  --  102*  100*   < >  --    ZEHRA 9.2  --   --  9.5  9.7   < >  --    PHOS 4.9*  --   --  4.3   < >  --    MAG 1.7  --   --  1.8   < >  --    LACT 2.0  --  2.9*  --    < > 2.2*   PCAL  --   --   --   --   --  0.62*    < > = values in this interval not displayed.     Hepatic Studies    Recent Labs   Lab Test 01/20/23  0528 01/19/23  1412 12/11/22  0701 12/09/22  2013   BILITOTAL 0.7 0.8   < > 0.6   DBIL 0.26  --   --   --    ALKPHOS 169* 194*   < > 96   PROTTOTAL 6.4 6.9   < > 7.7   ALBUMIN 2.9* 3.2*   < > 3.5   AST 43 54*   < > 72*   ALT <5* <5*   < > 5*   LDH 1,897* 2,242*   < > 2,475*   GGT  --   --   --  77*    < > = values in this interval not displayed.     Pancreatitis testing    Recent Labs   Lab Test 12/16/22  1044 12/12/22  0610 12/11/22  1222   LIPASE 36 37 70*     Gout  Labs      Recent Labs   Lab Test 01/20/23  0528 01/19/23  1412 01/19/23  1026 12/29/22  0550 12/28/22  0903   URIC 6.6 8.6* 8.1* 6.9 6.7     Hematology Studies   Recent Labs   Lab Test 01/20/23  0528 01/19/23  1412 01/19/23  1026 01/13/23  1000   WBC 11.7* 15.3* 15.1* 4.6   ANEU  --  1.8 1.8  --    ALYM  --  2.6 2.9  --    LEODAN  --  0.9 10.0*  --    AEOS  --  0.2 0.0  --    HGB 6.6* 7.2* 7.6* 6.7*   HCT 22.3* 24.5* 25.2* 22.2*   PLT 79* 108* 119* 310     Clotting Studies    Recent Labs   Lab Test 01/20/23  0528 01/19/23  1412 12/29/22  0550 12/28/22  0903   INR 1.29* 1.28* 1.05 1.06   PTT  --  40* 31 30     Iron Testing    Recent Labs   Lab Test 01/20/23  0528 01/19/23  1412 01/19/23  1026   IRON  --   --  182*   FEB  --   --  204*   IRONSAT  --   --  89*   REGI  --   --  7,289*   MCV 92   < > 91   FOLIC  --   --  2.4*   B12  --   --  232   HAPT  --   --  233*   RETP  --   --  1.8   RETICABSCT  --   --  0.049    < > = values in this interval not displayed.      Thyroid Studies   No lab results found.    Invalid input(s): FT4    Urine Studies     Recent Labs   Lab Test 01/19/23  1828   URINEPH 5.0   NITRITE Negative   LEUKEST Negative   WBCU 0     CSF testing     Recent Labs   Lab Test 12/12/22  1431   CWBC 0   CRBC 3*   CGLU 85*   CTP 61.4*     Body fluid stats  No lab results found.    Microbiology:    Fungal testing  No lab results found.    Invalid input(s): HIFUN, FUNGL    Last Culture results   Culture   Date Value Ref Range Status   01/19/2023 No growth, less than 1 day  Preliminary   01/19/2023 No growth after 12 hours  Preliminary   01/19/2023 No growth after 12 hours  Preliminary   12/16/2022 No Growth  Final   12/16/2022 No Growth  Final         Last checks of Clostridioides difficile testing  No lab results found.    Syphilis Testing    No results found for: TREPT, TREPAB, RPR, TPPAT, CVD, CVD    Quantiferon testing   Recent Labs   Lab Test 01/19/23  1412 01/19/23  1026   LYMPH 17 19     Infection Studies  to assess Diarrhea  No lab results found.    Invalid input(s): NNDMRESULT    Virology:    Coronavirus-19 testing    Recent Labs   Lab Test 22  2115 20  1529 20  0600   [id number] Negative  --   --    COVIDPCREXT  --  Positive* Detected*  Detected*     Respiratory virus (non-coronavirus-19) testing    Recent Labs   Lab Test 23  1622   IFLUA Not Detected   FLUAH1 Not Detected   YN3803 Not Detected   FLUAH3 Not Detected   IFLUB Not Detected   PIV1 Not Detected   PIV2 Not Detected   PIV3 Not Detected   PIV4 Not Detected   RSVA Not Detected   RSVB Not Detected   HMPV Not Detected   ADENOV Not Detected   FUNES Not Detected     CMV viral loads  No lab results found.    No results found for: H6RES    No results found for: EBVRESINST, 02605, EBQTC, EBRES, 63265, 28616    No results found for: 85381, BKRES    Parvovirus Testing  No lab results found.    Invalid input(s): PRVRES    Hepatitis B Testing     Recent Labs   Lab Test 12/10/22  0623   AUSAB 0.45   HBCAB Nonreactive   HEPBANG Nonreactive       Hepatitis C Antibody   Date Value Ref Range Status   12/10/2022 Nonreactive Nonreactive Final     CMV Antibody IgG   Date Value Ref Range Status   2022 No detectable antibody. No detectable antibody.  Final     CMV Antibody IgM   Date Value Ref Range Status   2022 Negative Negative Final     EBV Capsid Antibody IgG   Date Value Ref Range Status   2022 Positive (A) No detectable antibody. Final     Comment:     Suggests recent or past exposure.     EBV Capsid Antibody IgM   Date Value Ref Range Status   2022 No detectable antibody. No detectable antibody. Final     Imagin/20 TTE:  Left ventricular size, wall motion and function are normal. The ejection fraction is 60-65%.  Right ventricular function, chamber size, wall motion, and thickness are normal.  No significant valvular abnormalities present.  Dilation of the inferior vena cava is present with abnormal respiratory  variation in diameter suggests a high RA pressure estimated at 15 mmHg or greater.  This study was compared with the study from 12/10/2022. IVC is dilated today.  No other change.  1/20 Abdm CT:  1. Hepatosplenomegaly.  2. No intra-abdominal collection. No significant enlarged retroperitoneal, mesenteric lymph nodes.  3. Enlarged inguinal lymph nodes, possibly reactive.  4.  Bilateral renal cortical hypodensities, compatible with renal cyst and additional too small to characterize renal hypodensities, may consider attention on follow-up.  1/19 Chest CT angiogram:  1. Exam is negative for acute pulmonary embolism. No right heart strain.  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  2. Pulmonary arteries enlarged and measures up to 3.7 cm comment this is nonspecific but most often seen with pulmonary arterial hypertension, possibly CTEPH.  3. Mosaic attenuation in the lung bases is most often seen with air trapping or perfusional abnormalities.  4. 6 mm solid pulmonary nodule in the left lung base cannot be reimaged in one year's time with a noncontrast chest CT.  5. Splenomegaly.    12/17/22 Thoracic spine MRI:  Diffuse bone marrow reconversion can be seen in the setting of hematologic malignancy. Scattered subtle mottled enhancement of the bones most pronounced in the left seventh rib,could be secondary to underlying primary disease. No cord compression or myelopathic cord signal.

## 2023-01-20 NOTE — PROVIDER NOTIFICATION
"Dr. Yee paged at [phone]    \"7D RM 7511: Kulberg - FYI uric acid 8.6, on allopurinol. Do you want to start rasburicase? Also, still tachypneic/DEAN on 4L O2. Do you want ABGs?\"  "

## 2023-01-20 NOTE — PROVIDER NOTIFICATION
"Pg'd Shannon Irving PA-C    \"Pt will need RBCs today. In past he's had irradiated blood product. Can you adjust orders so I can get products running this morning? Thanks!\"  "

## 2023-01-20 NOTE — PLAN OF CARE
"0700- 1500    /67   Pulse 80   Temp 98.2  F (36.8  C) (Oral)   Resp 22   Ht 1.854 m (6' 1\")   Wt (!) 178.5 kg (393 lb 8 oz)   SpO2 96%   BMI 51.92 kg/m      Tachypneic OVSS on 5-8 LPM via Oxymask, Afebrile. Pt denies, N/V. Reports SOB/Dyspnea. Pt reports back pain and HA; PRN IV Dilaudid x1, Oxy x1, and Tylenol x1. Hgb- 6.6; replaced with 1 unit of RBCs. K+ 3.4; replaced; recheck due at 1800; delayed d/t blood product infusing. Vancomycin level check delayed until 1800 d/t blood product infusing; Will obtain level prior to evening dose. Lymph, PT, OT consults this morning. ID, Pulmonology, and cardiology consults ordered. Sputum cx added on, needs to be collected. Echo completed showing signs of pulmonary HTN. Pt very tired from busy morning. Continue POC.   "

## 2023-01-20 NOTE — PLAN OF CARE
"Deferral - SLP orders received for swallow eval; per consult, \"Patient endorses feeling of food 'getting stuck' when swallowing. Has to be careful what size of food he consumes.\" Chart reviewed and discussed with RN. Per RN, no concern for dysphagia, but previous nurse had stated pt had difficulty swallowing pills.     Upon meeting with pt, he denies any issues with swallowing. He denies choking/coughing when eating or drinking and also denies globus sensation with solid food. His primary complaint during our discussion was dry mouth. Pt observed drinking water, no overt s/sx of aspiration. Training was provided re: pill taking strategies and discussed option for Biotene for xerostomia.    SLP will defer evaluation at this time given lack of patient complaints and lack of s/sx of aspiration at bedside. SLP will complete orders and page MD. Please reconsult SLP with changes in swallow function or communication skills.       "

## 2023-01-20 NOTE — PROVIDER NOTIFICATION
"Dr. Yee paged at [phone]    \"7D RM 7511: Vadim PATRICIA RR 30 on 4L O2 via Oxymask. Still DEAN. OVSS. \"        "

## 2023-01-21 NOTE — PLAN OF CARE
Goal Outcome Evaluation:  0800-9759: Pt A&Ox4 with VSS, afebrile. Requiring between 4-7L of O2 from oxymask to sat above 88%. No complaints of difficulty breathing or SOB. No N/V noted. Pain continued in back and head. Started to desat and had to go up to 8L oxymask, RT came and put patient on high flow. Sating consistently in low 90s. Given PRN oxy 10mg x1 with some relief. Pt sleeping throughout shift. Good appetite at dinner. BG of 114 and 127, no insulin given. GUOP, no BM this shift. No sputum sample collected d/t patient not producing sputum. IV abx continued. Up with 1, calling appropriately. Continue with POC.

## 2023-01-21 NOTE — PROGRESS NOTES
BRIEF CROSSCOVER NOTE:    Notified that patient is at 90% spO2 on 8LPM oxymask.     - Placed order for HFNC; wean as tolerated, keep spO2>88%    Monika Yee MD  Internal Medicine-Pediatrics  [facility]  Pager: [phone]

## 2023-01-21 NOTE — PHARMACY-VANCOMYCIN DOSING SERVICE
Pharmacy Vancomycin Note  Date of Service 2023  Patient's  1960   62 year old, male    Indication: Febrile Neutropenia  Start Day of Therapy: 23  Current vancomycin regimen:  1250 mg IV q12h  Current vancomycin monitoring method: AUC  Current vancomycin therapeutic monitoring goal: 400-600 mg*h/L    InsightRX Prediction of Current Vancomycin Regimen    Regimen: 1250 mg IV every 12 hours.  Start time: 20:33 on 2023  Exposure target: AUC24 (range) 400-600 mg/L.hr   AUC24,ss: 548 mg/L.hr  Probability of AUC24 > 400: 100 %  Ctrough,ss: 15.2 mg/L  Probability of Ctrough,ss > 20: 13 %  Probability of nephrotoxicity (Lodise ALESIA ): 10 %    Current estimated CrCl = Estimated Creatinine Clearance: 119.4 mL/min (based on SCr of 1.11 mg/dL).    Creatinine for last 3 days  2023: 10:26 AM Creatinine 1.17 mg/dL;  2:12 PM Creatinine 1.21 mg/dL;  2:12 PM Creatinine 1.13 mg/dL  2023:  5:28 AM Creatinine 1.11 mg/dL    Recent Vancomycin Levels (past 3 days)  2023:  6:10 PM Vancomycin 14.4 ug/mL    Vancomycin IV Administrations (past 72 hours)                   vancomycin (VANCOCIN) 1,250 mg in 0.9% NaCl 250 mL intermittent infusion (mg) 1,250 mg New Bag 23 0647    vancomycin (VANCOCIN) 2,000 mg in sodium chloride 0.9 % 500 mL intermittent infusion (mg) 2,000 mg New Bag 23 1624                Nephrotoxins and other renal medications (From now, onward)    Start     Dose/Rate Route Frequency Ordered Stop    23 1600  bumetanide (BUMEX) tablet 1 mg              1 mg Oral 2 TIMES DAILY (Diuretics and Nitrates) 23 1012      23 0600  vancomycin (VANCOCIN) 1,250 mg in 0.9% NaCl 250 mL intermittent infusion         1,250 mg  over 90 Minutes Intravenous EVERY 12 HOURS 23 1601      23 2000  acyclovir (ZOVIRAX) tablet 400 mg            400 mg Oral 2 TIMES DAILY 23 1307               Contrast Orders - past 72 hours (72h ago, onward)    Start      Dose/Rate Route Frequency Stop    01/20/23 0930  perflutren diluted 1mL to 2mL with saline (OPTISON) diluted injection 6 mL         6 mL Intravenous ONCE 01/20/23 0911    01/19/23 1500  iopamidol (ISOVUE-370) solution 135 mL         135 mL Intravenous ONCE 01/19/23 1507          Interpretation of levels and current regimen:  Vancomycin level is reflective of -600    Has serum creatinine changed greater than 50% in last 72 hours: No  Renal Function: Stable      Plan:  1. Continue Current Dose if continued. ID rec to discontinue today, team to address.  2. Vancomycin monitoring method: AUC  3. Vancomycin therapeutic monitoring goal: 400-600 mg*h/L  4. Pharmacy will check vancomycin levels as appropriate in 1-3 Days.  5. Serum creatinine levels will be ordered daily for the first week of therapy and at least twice weekly for subsequent weeks.    Alexia Henley [facility]

## 2023-01-21 NOTE — PLAN OF CARE
"0342-8231    /59 (BP Location: Left arm)   Pulse 75   Temp 97.8  F (36.6  C) (Oral)   Resp 20   Ht 1.854 m (6' 1\")   Wt (!) 178.5 kg (393 lb 8 oz)   SpO2 92%   BMI 51.92 kg/m      Activity: stand by assist  Pain: Pt complained of pain, Oxy and Tylenol tabs given.  Neuro: A/O x 4  Cardiac: VSS, denies chest pain  Respiratory: High flow nasal cannula- 30 LMP  GI/: Voiding adequately, constipated  Diet: Regular diet  Lines: Triple lumen PICC   Labs/imaging: Reviewed      New changes this shift: Oxy and tylenol given for pain- Effective Pt was also able to sleep the rest of the night. . No acute event.      Continue to monitor and follow POC       "

## 2023-01-21 NOTE — CONSULTS
"    [facility]    Cardiology Consult Note-Cardiology      Date of Admission:  1/19/2023  Consult Requested by: Oncology  Reason for Consult: \"Patient with findings of possible pulmonary HTN on echo and CT images. Admitted with refractory leukemia and AHRF. Pulm consulted and reccs cards involvement for pulm HTN.\"    Assessment & Plan: S   Angel Cardenas is a 62 year old male with a medical history that is most notable for AML on chemotherapy (decitabine-venetoclax induction), CATHERINE on CPAP, prior DVT/PE (reportedly x 3),  COPD on spiriva, HTN, DM2, among others who was admitted to hospital on 1/19 with febrile neutropenia and hypoxia. On evaluation with CT PE protocol he was noted to have PA enlargement. Pulmonology was consulted and felt findings of webbing in the right PA and significantly decreased p.o. diameter on CT as well as history of DVT/PE was suspicious for CTEPH and has recommended cardiology evaluation for pHTN. Echocardiogram was performed showing normal RV size and function.     #Enlarged PA on CT  #History of PE  #CATHERINE on CPAP  #AML on Chemotherapy  #COPD  Given the increased size of pulmonary artery on imaging, we agree that it is reasonable to evaluate for pulmonary hypertension with right heart catheterization. Additionally, history of CATHERINE, obesity, and prior PE put the patient at increased risk for pHTN. Reassuringly, the Right Ventricle is normal in size and function and there is no evidence of clinical RV failure to suggested advanced pHTN. Currently, the patient is suffering from hypoxic respiratory failure and possibly pneumonia in the setting of AML and chemotherapy, as well as thrombocytopenia, and anemia. This is not the optimal time to perform hemodynamic measurements, which can be obtained on a non-urgent basis. Pulmonary hypertension would not be expected to contribute to the patient's respiratory failure.   - right heart catheterization on " "a non-urgent basis, once clinically improved, likely as outpatient  - please refer the patient to the pulmonary hypertension clinic upon hospital for further evaluation into potential pulmonary hypertension     The patient's care was discussed with the Attending Physician, Dr. Arce. Recommendations were discussed with the primary Oncology team.     Cornelio Burger MD   Cardiology Fellow  Pager: [phone]  [facility]    Clinically Significant Risk Factors Present on Admission            # Severe Obesity: Estimated body mass index is 49.95 kg/m  as calculated from the following:    Height as of this encounter: 1.854 m (6' 1\").    Weight as of this encounter: 171.7 kg (378 lb 9.6 oz).    Hematology/Oncology: Thrombocytopenia Including Purpuric, HIT, & Other Platelet Defects: Thrombocytopenia, unspecified      Pulmonology: Pulmonary Embolism: Other pulmonary embolism without acute cor pulmonale      Limitations of activities/Fatigue (optional): Chronic Fatigue and Other Debilities: Neoplastic (malignant) related fatigue         ______________________________________________________________________    Chief Complaint   Fever/Shortness of Breath    History is obtained from the patient    History of Present Illness   Angel Cardenas is a 62 year old male with a medical history that is most notable for AML on chemotherapy (decitabine-venetoclax induction), CATHERINE on CPAP, prior DVT/PE (reportedly x 3),  COPD on spiriva, HTN, DM2, among others who was admitted to hospital on 1/19 with febrile neutropenia and hypoxia. On evaluation with CT PE protocol he was noted to have PA enlargement. Pulmonology was consulted and felt findings of webbing in the right PA and significantly decreased p.o. diameter on CT as well as history of DVT/PE was suspicious for CTEPH and has recommended cardiology evaluation for pHTN. Echocardiogram was performed showing normal RV size and function.     Patient " subjectively continues to feel unwell, with periodic fever and ongoing shortness of breath.       Past Medical History    Past Medical History:   Diagnosis Date     COPD (chronic obstructive pulmonary disease) (H)      Diabetes mellitus, type 2 (H)      Gout      History of pulmonary embolism      HLD (hyperlipidemia)      HTN (hypertension)        Past Surgical History   Past Surgical History:   Procedure Laterality Date     PICC DOUBLE LUMEN PLACEMENT Right 12/16/2022    Right brachial-medial vein 48cm total 1cm external.Placement verified by [facility] 3CG.PICC okay to use.     PICC TRIPLE LUMEN PLACEMENT Right 01/19/2023    5FR TL PICC basilic vein. L-50cm 2cm out     DE REVISE TOTAL HIP REPLACEMENT Bilateral      TOTAL SHOULDER REPLACEMENT Right        Medications   I have reviewed this patient's current medications  Current Facility-Administered Medications   Medication     acetaminophen (TYLENOL) tablet 650 mg     acyclovir (ZOVIRAX) tablet 400 mg     albuterol (PROVENTIL) neb solution 2.5 mg     allopurinol (ZYLOPRIM) tablet 300 mg     ammonium lactate (AMLACTIN) 12 % cream     artificial saliva (BIOTENE MT) solution 1-2 spray     budesonide (PULMICORT) neb solution 0.5 mg     [START ON 1/22/2023] bumetanide (BUMEX) tablet 1 mg     calcium acetate (PHOSLO) capsule 1,334 mg     ceFEPIme (MAXIPIME) 2 g vial to attach to  ml bag for ADULTS or 50 ml bag for PEDS     cyclobenzaprine (FLEXERIL) tablet 10 mg     glucose gel 15-30 g    Or     dextrose 50 % injection 25-50 mL    Or     glucagon injection 1 mg     diclofenac (VOLTAREN) 1 % topical gel 2 g     enoxaparin ANTICOAGULANT (LOVENOX) injection 135 mg     heparin lock flush 10 UNIT/ML injection 5-20 mL     heparin lock flush 10 UNIT/ML injection 5-20 mL     heparin lock flush 10 UNIT/ML injection 5-20 mL     heparin lock flush 10 UNIT/ML injection 5-20 mL     HYDROmorphone (PF) (DILAUDID) injection 0.5 mg     insulin aspart (NovoLOG) injection (RAPID  ACTING)     insulin aspart (NovoLOG) injection (RAPID ACTING)     ipratropium - albuterol 0.5 mg/2.5 mg/3 mL (DUONEB) neb solution 3 mL     lidocaine (LMX4) cream     lidocaine 1 % 0.1-5 mL     magnesium hydroxide (MILK OF MAGNESIA) suspension 30 mL     methocarbamol (ROBAXIN) tablet 500 mg     micafungin (MYCAMINE) 50 mg in sodium chloride 0.9 % 100 mL intermittent infusion     naloxone (NARCAN) injection 0.2 mg    Or     naloxone (NARCAN) injection 0.4 mg    Or     naloxone (NARCAN) injection 0.2 mg    Or     naloxone (NARCAN) injection 0.4 mg     No rectal suppositories if WBC less than 1000/microliters or platelets less than 50,000/ L     ondansetron (ZOFRAN) injection 8 mg    Or     ondansetron (ZOFRAN ODT) ODT tab 8 mg    Or     ondansetron (ZOFRAN) tablet 8 mg     oxyCODONE (ROXICODONE) tablet 5-10 mg     pantoprazole (PROTONIX) EC tablet 40 mg     polyethylene glycol (MIRALAX) Packet 17 g     prochlorperazine (COMPAZINE) tablet 5-10 mg    Or     prochlorperazine (COMPAZINE) injection 5-10 mg     senna-docusate (SENOKOT-S/PERICOLACE) 8.6-50 MG per tablet 1 tablet     sodium chloride (PF) 0.9% PF flush 10-20 mL     sodium chloride (PF) 0.9% PF flush 10-20 mL     sodium chloride (PF) 0.9% PF flush 10-40 mL     sodium chloride (PF) 0.9% PF flush 10-40 mL       Physical Exam   Vital Signs: Temp: 98.6  F (37  C) Temp src: Oral BP: 121/78 Pulse: 82   Resp: 22 SpO2: 92 % O2 Device: High Flow Nasal Cannula (HFNC) Oxygen Delivery: 30 LPM  Weight: 378 lbs 9.6 oz    General Appearance: No acute distress, sitting upright in hospital bed on HFNC  Respiratory: On HFNC, inspiratory crackles at bases  Cardiovascular: RRR, no m/r/g  GI: soft, NT  Skin: stasis changes of the b/l LEs at the level of the tibia  Ext: wwp, radial pulses palpable and equal      Medical Decision Making             Data     I have personally reviewed the following data over the past 24 hrs:    10.5  \   6.7 (LL)   / 54 (L)     137 100 14.7 /  110 (H)    3.6 22 0.98 \       Trop: N/A BNP: 499       Procal: 1.09 (H) CRP: 242.00 (H) Lactic Acid: N/A       INR:  1.23 (H) PTT:  N/A   D-dimer:  N/A Fibrinogen:  671 (H)       Ferritin:  N/A % Retic:  N/A LDH:  1,497 (H)       Imaging results reviewed over the past 24 hrs:   No results found for this or any previous visit (from the past 24 hour(s)).     Echocardiogram 1/20/23  Left ventricular size, wall motion and function are normal. The ejection  fraction is 60-65%.  Right ventricular function, chamber size, wall motion, and thickness are  normal.  No significant valvular abnormalities present.  Dilation of the inferior vena cava is present with abnormal respiratory  variation in diameter suggests a high RA pressure estimated at 15 mmHg or  Greater.    CTA Chest 12/16/22  1. No evidence of acute aortic syndrome, specifically no evidence of  aortic dissection.  2. Suboptimal bolus timing for pulmonary arterial evaluation. No  central pulmonary embolus or evidence of right heart strain.  3. Tree-in-bud nodularity and associated consolidation within the  right lower lobe, concerning for an infectious or inflammatory  etiology.  4. Unchanged ectasia of the ascending thoracic aorta, measuring up to  4.5 cm.  5. The main pulmonary artery measures up to 3.9 cm, unchanged. This  may be seen with pulmonary arterial hypertension.   6. Unchanged splenomegaly and multiple prominent mediastinal lymph  Nodes.    CT PE Protocol 1/19/23  IMPRESSION:      1. Exam is negative for acute pulmonary embolism. No right heart  strain . 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     2. Pulmonary arteries enlarged and measures up to 3.7 cm comment this  is nonspecific but most often seen with pulmonary arterial  hypertension, possibly CTEPH     3. Mosaic attenuation in the lung bases is most often seen with air  trapping or perfusional abnormalities.     4. 6 mm solid  pulmonary nodule in the left lung base cannot be  reimaged in one year's time with a noncontrast chest CT.     5. Splenomegaly.

## 2023-01-21 NOTE — PROGRESS NOTES
[facility]    Hematology / Oncology Progress Note    Date of Service (when I saw the patient): 01/21/2023     Assessment & Plan   Angel Cardenas is a 62 year old male with a history of multiple prior DVTs and PE in 2022 (previously on Xarelto), DM2, COPD, HTN, gout and recently diagnosed AMML s/p Cycle 1 of 10-days of Decitabine/Venetoclax (D1=12/19/22) with subsequent persistent disease on post-therapy BMBx 1/13/23 demonstrating ongoing ~70% blasts. He presents from clinic for consideration of re-induction chemotherapy in the setting of fever to 100.5 and AHRF.     TODAY:   - Afebrile overnight and BCx NGTD, UCx negative. Continue Cefepime for now though consider de-escalating in the coming days if infectious work-up remains negative. ID consulted, appreciate recommendations. Multiple infectious studies are in process.   - Tentatively planning to start low-dose cytarabine 1/22 if stable overnight, will discuss with pharmacy in AM  - Monitor TLS labs BID, continue Allopurinol and Phoslo  - Weight is up ~20 lb from recent admission. BNP WNL. Increase Bumex to 1 mg AM at 0800, 2mg 1600. Monitor daily weights, I/Os.   - Patient is requiring HFNC 40L 35%FiO2. Continue to optimize respiratory status:   - PTA Breo-Ellipta discontinued, substituted for pulmicort 0.5mg BID and duoneb QID  - Pulm consulted - appreciate recs but will hold off on induced sputum while on HFNC. Defer bubble study per Cardiology suggestions.   - Diuresis as above  - Cardiology consult in setting of possible pulmonary HTN on echo and CT imaging - recommended no intervention or workup while inpatient as he doesn't have obvious pHTN on echo nor does he have RV dysfunction, and overall would not change our mgmt at this time. Cardiology also does not think an echo w/ bubble is necessary. Will refer to pulmonary HTN clinic outpatient for consideration of RHC and treatment.   - Transfuse 1 unit pRBC  -  Continue Lymphedema, PT/OT  - Pain control - Tylenol/Flexeril prn. Voltaren gel QID, Robaxin QID, Oxy 5-10 mg q4h prn, IV Dilaudid 0.5 mg q4h prn.   - Continue therapeutic Lovenox dosing for now, consider half-dosing if Plt <50K. Monitor platelets closely (trending down). Xa level pending.   - LBM 1/18. Passing flatus, active bowel sounds. Continue miralax daily, senna BID, add milk of mag. Will try to avoid suppositories or enemas as he will soon become neutropenic.       HEME  # AMML with SRSF2, TET2 mutations  # Leukocytosis   Patient of Dr. Velasquez. In summary, presented to PCP with fatigue, subjective fevers, lack of appetite, weight loss, epistaxis and rib pain. Found to be pancytopenic with 6% circulating peripheral blasts (WBC 8.5 Hgb 8.8 and plts 82). Diagnostic BMBx 12/9 was consistent with AMML with 80 blasts, NGS: SRSF3 + TET2 mutations (on peripheral blood) conferring adverse risk, Cyto: 50-52,XY,+4,+5,+8,+13,add(13)(p11.2)x2,+18,+20[cp17]/,idemx2,+16,+16[cp3]. Diagnostic LP negative. Due to comorbidities started on Decitabine 10 days + Venetoclax 28 (C1D1 12/19/22). Hospital course was complicated by TLS requiring rasburicase, possible PNA, possible adrenal insufficiency/hypotension related to steroid taper (not requiring pressors) and back/rib pain flares. He discharged to home following completion of chemotherapy and post-therapy BMBx was completed on 1/13 which is notable for persistent pancytopenia showing hypoercellular marrow (90%) with marked decrease hematopoesis and 70% blasts. NGS/FISH/cyto is pending. He had a visit with Dr. Velasquez in clinic prior to admission on 1/19 to discuss next steps and consideration of re-induction.  - Per conversation with Dr. Velasquez PTA, considered intensive regimen with 7+3 vs G-CLAC. However, in the setting of significant hypoxia and multiple comorbidities will likely proceed with low dose cytarabine instead, tentatively to start 1/22  - PICC placement in  "the setting of multiple lab draws, IV products and anticipation of upcoming chemotherapy  - Repeat pre-chemo work-up - normal EKG, echo with EF 60-65%  - CT chest PE/AP (1/19) - reveals splenomegaly, enlarged inguinal lymphadenopathy and hepatomegaly.   - Consider Hydrea if WBC continues to trend up. Of note, 62% \"other cells\" noted peripherally on 1/19.      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 Amari Cortes (4/15/22). Patient has history of multiple DVTs in the 1990s without known cause, unprovoked PE at age 43, DVT with PE in context of hip replacement. He most recently was diagnosed with bilateral PE with right heart strain on 1/19/22 after presenting to the ED with SOB. Previously was on Xarelto though discontinued during hospitalization for induction chemotherapy. Transitioned to Lovenox and discharged on half-dosing d/t down-trending counts. Platelets appear to have recovered though are in the 100s on admission. Has continued half-dosing Lovenox without increase to full-dose in the outpatient setting. Presented this admission with fever and hypoxia (77% on RA) requiring 2-4L O2.   - Increase Lovenox to therapeutic dosing -- 0.75 mg BID (dosed based on weight) as d/w pharmacy; would decrease to half-dosing if Plt <50K. Xa level 1/21- in process  - CT PE protocol (1/19) is negative for PE and no R heart strain. Does reveal findings c/w chronic PE and possible pulmonary HTN. 6mm solid pulmonary nodules which should be monitored at least yearly.      # Risk for TLS  # Risk for DIC  # H/o gout  Leukocytosis noted on admission lab work with WBC 15.3 in the setting of known persistent leukemia. Phos mildly elevated to 4.9. Cr near baseline (0.-1.1) at 1.17. Uric acid 8.6 and LDH 2,242. He is s/p Rasburicase x1 with improvement in uric acid to 6.6. Of note he does have h/o gout.   - PTA Allopurinol 300 mg BID (dose adjusted for " weight)  - Start Phoslo 1,334 TID  - Monitor TLS labs q12h, DIC labs daily      # Anemia  # Thrombocytopenia   2/2 recent chemotherapy and underlying disease.   - Transfuse to maintain Hgb >7, Plt >10K  - Type & Screen MWF  - Blood consent signed on admission     ID  # Fever  # AHRF  # Abdominal bloating/discomfort   # Lactic acidosis, improved  Febrile in clinic to 100.5 on 1/19, also with associated AHRF with sats to 77% on admission. BP normotensive. Endorses progressive back pain as below along with SOB/DEAN worse over the past 1-2 days and abdominal bloating. He is having regular BMs. Denies other respiratory or infectious complaints. Lab work is notable for leukocytosis to 15 and mildly elevated LFTs (Alk 214 and AST 51). Lactic acid elevated to 2.9, later improved to 2.0 - presume Type B 2/2 progressive leukemia. Differential includes infection vs COPD exacerbation vs leukemia-related.   - CT chest PE and A/P negative for infectious etiology in the CAP.   - Start Cefepime/Vancomycin on admission x1/19. Continue cefepime for now though consider de-escalating 1/21 if infectious work-up remains negative.   - ID consult - no overt infection, consider stopping cefepime (continued for now but will de-escalate if BCx remain negative 1/22)      Antibiotics   - Cefepime (1/19 - x)   - Vancomycin (1/19 - 1/21 AM)      Infectious studies (1/19-1/21)  - RVP - neg  - MRSA nares - neg  - UCx - neg  - BCx (1/19) - NGTD  - Cryptococcal serum ag - neg  -   - Procal 1.09  - PJP PCR- needs to be collected  - Sputum culture- pt unable to produce sputum, but too unstable for induced sputum  - Legionella urine - negative  - Histo/Blasto - in process   - Fungitell, galactomannan - in process  - AFB culture - in process  - HHV6, serum - in process  - Quant gold- in process  - Treponema abs, serum - in process    # Prophylaxis  ANC is 1.8 on admission though c/f functional neutropenia in the setting of persistent leukemia and  leukocytosis.   - PTA Acyclovir 400 mg BID (HSV in process)  - Hold PTA Levaquin 250 mg daily in the setting of empiric IV antibiotics above   - Hold PTA Posa 300 mg dialy, start Lynnette 50 mg daily in anticipation of upcoming chemotherapy     CV/PULM  # COPD  # Orthopnea  # CATHERINE  Ongoing mild hypoxia to high 80s appears to be his baseline in the setting of his COPD PTA. Oxygen requirement to 4-5L on admission at rest (8-10L with activity or d/t positioning) with O2 sats in the 70s on RA. Reports SOB tends to flare with pain crises. Does desat at night.   - CT PE chest (negative) and antibiotics as above  - PTA Breo-Ellipta, on hold and replaced with Pulmicort 0.5mg BID  - DuoNebs QID   - Incentive spirometry   - PTA CPAP at night - will try to interrogate his CPAP on 1/22  - Pulm consult - too unstable for bronchoscopy given high O2 requirements. Agree with infectious workup as above. We will hold off on an induced sputum on x1/21 as he is now on HFNC. Recommended ID and Cardiology consult. We will hold off on bubble study per Cardiology.      # HTN  # HLD  Home Metoprolol and Lisinopril held during previous admission in the setting of hypotension and possible adrenal insufficiency. BP normotensive to mildly HTN on admission. Statin discontinued prior to admission given anticipation of chemotherapy.   - Continue to hold prior Metoprolol/Lisinopril for now; consider resuming as appropriate  - Continue to hold statin, consider resumption prior to discharge     # Chronic LE edema  # Chronic venous insufficiency   # Concern for pHTN  Patient endorses increased LE swelling on admission. Has home compression stockings in place. Of note, weight is up 20+ lb from recent discharge on 12/29.   - PTA Bumex 1 mg daily, increase to BID dosing x1/20  - Lymphedema consulted   - Repeat echo as above with EF 60-65%, IVC dilated and suggestive of high RA pressure  - Cardiology consult in setting of possible pulmonary HTN on echo and CT  "imaging  - recommended no intervention or workup while inpatient as he does not have obvious pHTN on echo nor does he have RV dysfunction, and overall would not change our mgmt at this time. Cardiology also does not think an echo w/ bubble is necessary. Will refer to pulmonary HTN clinic outpatient for consideration of RHC and treatment.        MSK  # Acute on chronic back pain  Noted prior to hospitalization for induction chemotherapy. Presented with rib pain which radiates into his upper back and between his shoulder blades. MRI thoracic spine previously completed 12/17 and showed \"scattered subtle mottled enhancement of bones most pronounced on L 7th rib, could be 2/2 underlying disease process.\" It is possible that pt has some extramedullary infiltration of cortex causing pain. Endorses similar complaints this admission starting 1-2 days PTA in the setting of known persistent leukemia. No red flag sx. EKG on admission is normal.   - CT PE C/A/P as above; negative for source of acute pain   - PTA Voltaren gel QID, Robaxin QID  - PTA Oxycodone increased to 5-10 mg q4h prn. Add IV Dilaudid 0.5 mg q4h prn.   - PRN Tylenol and Flexeril   - Of note, pain previously responded well to Dexamethasone - could consider if persistent/worsening.   - Palliative care was previously involved last hospitalization. Consider consult as appropriate.      GI  # H/o constipation   Noted during previous admission for induction chemotherapy. Patient endorses regular and soft BM on admission.   - PTA Senna BID, Miralax daily   - Milk of magnesium daily PRN  - Prune juice was previously helpful for patient   - LBM 1/18 - trial milk of mag in addition to miralax and senna. Will try to avoid suppositories or enemas for anticipated neutropenia.      ENDO  # T2DM   Follows with [facility], last seen 11/15/22.   - PTA Ozempic held on admission, resume on discharge  - Medium intensity sliding scale insulin, insulin " "discontinued on discharge  - Hypoglycemia protocol in place      MISC  # Dysphagia  Patient endorses difficulty swallowing on admission described as \"food getting stuck in throat\". Has had to modify diet at home and be careful with food selection/size. Occasionally finds himself coughing with water as well.   - SLP consulted     FEN   - IVF bolus prn   - PRN lyte replacement per standing protocol  - Regular diet as tolerated      Lines/Drains: PICC placed on admission, remove on discharge  DVT ppx: therapeutic Lovenox; hold if Plt <50K  GI ppx: PTA PPI  Consults: PT/OT, lymphedema, SLP, pulm  CODE: FULL  DISPO: Anticipate at least 3-5 day stay and possible prolonged hospitalization for likely re-induction of persistent leukemia.   Follow-up/Referrals: Primary oncologist is Dr. Velasquez.   - Follow-up will need to be scheduled closer to discharge  - On discharge, please place a referral to the pulmonary HTN clinic outpatient for consideration of RHC and treatment.        Social  - Lives at home alone near [place], [place]  - Has two sisters who are supportive and like to be involved in big conversations via speaker phone     Coordination of Cares   - Will need to arrange local follow-up with [facility] in [place], [place] on discharge       Patient and plan of care was discussed with attending physician Dr. Amato.    90 minutes spent on the date of the encounter. Over 50% of time was spent counseling the patient and/or coordinating care.     Torrie Foy PA-C  Hematology/Oncology  Pager #[phone] or available on [employer]    Interval History   Transitioned to HFNC for desats on 10L oxymask at rest. This morning Angel reports overall feeling better, primarily because his headache is resolved. Yesterday he had a headache, which he typically does not have. No neurologic changes or vision changes. Denies a headache today. His breathing feels about the same. Urinating a lot. Ate half and omelette and some fruit this morning " "for breakfast. LBM 1/18, passing gas and has a \"gurgling\" stomach.  All questions answered.     A comprehensive review of systems was obtained and is negative other than noted here or in the HPI.     Physical Exam   Temp: 98.6  F (37  C) Temp src: Oral BP: 121/78 Pulse: 82   Resp: 22 SpO2: 94 % O2 Device: Oxi Plus Oxygen Delivery: 7 LPM  Vitals:    01/19/23 1306 01/20/23 1348 01/21/23 1111   Weight: (!) 178.5 kg (393 lb 9.6 oz) (!) 178.5 kg (393 lb 8 oz) (!) 171.7 kg (378 lb 9.6 oz)     Vital Signs with Ranges  Temp:  [97.5  F (36.4  C)-99.4  F (37.4  C)] 98.6  F (37  C)  Pulse:  [75-89] 82  Resp:  [20-24] 22  BP: (111-132)/(59-99) 121/78  FiO2 (%):  [45 %] 45 %  SpO2:  [90 %-97 %] 94 %  I/O last 3 completed shifts:  In: 2490 [P.O.:1010; I.V.:880]  Out: 200 [Urine:200]    General: Awake and interactive. Chronically-ill appearing and fatigued. Uncomfortable and shifting frequently in bed.    Skin: Warm, dry, and intact without rashes or lesions. Chronic venous stasis changes to bilateral LE.  Scattered bruising to extremities.   Head: Normocephalic and atraumatic.  HEENT: PERRLA. Sclera clear. EOM intact. Oral mucosa is pink and moist with no lesions, thrush, or exudates.   Lymph: Neck supple. No significant adenopathy.   Cardiac: Regular rate and rhythm, no murmurs appreciated   Respiratory: Increased work of breathing and tachypnea on 40L HFNC. Course breath sounds.   Abd: Soft, symmetric, distended. BS present and normoactive. Organomegaly not noted d/t body habitus.   Extremities: 2-3+ bilateral pitting edemae. Distal pulses palpable.   Neuro: A&Ox3 with normal speech. Memory and thought process preserved. No deficits grossly.  Psych: Appropriate mood and affect. Good judgment and insight. No visual/auditory hallucinations.     Medications     - MEDICATION INSTRUCTIONS -         acyclovir  400 mg Oral BID     allopurinol  300 mg Oral BID     ammonium lactate   Topical Daily     budesonide  0.5 mg Nebulization BID "     [START ON 1/22/2023] bumetanide  1 mg Oral BID     calcium acetate (phos binder)  1,334 mg Oral TID w/meals     ceFEPIme  2 g Intravenous Q8H     enoxaparin ANTICOAGULANT  0.75 mg/kg Subcutaneous Q12H     heparin lock flush  5-20 mL Intracatheter Q24H     heparin lock flush  5-20 mL Intracatheter Q24H     insulin aspart  1-7 Units Subcutaneous TID AC     insulin aspart  1-5 Units Subcutaneous At Bedtime     ipratropium - albuterol 0.5 mg/2.5 mg/3 mL  3 mL Nebulization 4x daily     micafungin  50 mg Intravenous Q24H     pantoprazole  40 mg Oral QAM AC     polyethylene glycol  17 g Oral Daily     senna-docusate  1 tablet Oral BID     sodium chloride (PF)  10-40 mL Intracatheter Q8H     sodium chloride (PF)  10-40 mL Intracatheter Q8H

## 2023-01-22 NOTE — PROGRESS NOTES
[facility]  Hematology / Oncology Progress Note  Date of Service (when I saw the patient): 01/22/2023     Assessment & Plan   Angel Cardenas is a 62 year old male with a history of multiple prior DVTs and PE in 2022 (previously on Xarelto), DM2, COPD, HTN, gout and recently diagnosed AMML s/p Cycle 1 of 10-days of Decitabine/Venetoclax (D1=12/19/22) with subsequent persistent disease on post-therapy BMBx 1/13/23 demonstrating ongoing ~70% blasts. He presents from clinic for consideration of re-induction chemotherapy in the setting of fever to 100.5 and AHRF.     TODAY:   - Start low dose cytarabine 20mg BID today, 1/22. Plan for 10 day course, if plt down-trending may need to switch to IV from subcutaneous administration.   - Afebrile overnight and cultures remain negative. Continue Cefepime for now though consider de-escalating 1/23 if infectious work-up remains negative. Multiple infectious studies are in process. Appreciate ID recommendations.   - Monitor TLS labs BID, continue Allopurinol and Phoslo  - Suspect pt is at dry weight s/p bumex. Holding further bumex in setting of up-trending sodium and weight down 13lb from admission. Monitor daily weights, I/Os.   - Transitioned from HFNC to 6L nasal cannula. Continue nebulizers and await infectious studies.   - Continue Lymphedema, PT/OT  - Pain control - Tylenol/Flexeril prn. Voltaren gel QID, Robaxin QID, Oxy 5-10 mg q4h prn, IV Dilaudid 0.5 mg q4h prn. Continue scheduled bowel regimen.   - Plt 40k, decrease lovenox to approximately half dose (80mg BID) and continue to follow platelet count daily. Per Dr. Amato, will plan to consult IR 1/23 for consideration of an IVC filter given extensive clot history.     HEME  # AMML with SRSF2, TET2 mutations  # Leukocytosis   Patient of Dr. Velasquez. In summary, presented to PCP with fatigue, subjective fevers, lack of appetite, weight loss, epistaxis and rib pain. Found to be  "pancytopenic with 6% circulating peripheral blasts (WBC 8.5 Hgb 8.8 and plts 82). Diagnostic BMBx 12/9 was consistent with AMML with 80 blasts, NGS: SRSF3 + TET2 mutations (on peripheral blood) conferring adverse risk, Cyto: 50-52,XY,+4,+5,+8,+13,add(13)(p11.2)x2,+18,+20[cp17]/,idemx2,+16,+16[cp3]. Diagnostic LP negative. Due to comorbidities started on Decitabine 10 days + Venetoclax 28 (C1D1 12/19/22). Hospital course was complicated by TLS requiring rasburicase, possible PNA, possible adrenal insufficiency/hypotension related to steroid taper (not requiring pressors) and back/rib pain flares. He discharged to home following completion of chemotherapy and post-therapy BMBx was completed on 1/13 which is notable for persistent pancytopenia showing hypoercellular marrow (90%) with marked decrease hematopoesis and 70% blasts. NGS/FISH/cyto is pending. He had a visit with Dr. Velasquez in clinic prior to admission on 1/19 to discuss next steps and consideration of re-induction.  - Per conversation with Dr. Velasquez PTA, considered intensive regimen with 7+3 vs G-CLAC. However, in the setting of significant hypoxia and multiple comorbidities will instead proceed with low dose cytarabine (C1D1=1/22/23), 20mg q12h and plan for a total 10-day course  - PICC placement in the setting of multiple lab draws, IV products and anticipation of upcoming chemotherapy  - Repeat pre-chemo work-up - normal EKG, echo with EF 60-65%  - CT chest PE/AP (1/19) - reveals splenomegaly, enlarged inguinal lymphadenopathy and hepatomegaly.   - Consider Hydrea if WBC continues to trend up. Of note, 62% \"other cells\" noted peripherally on 1/19.      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 Amari Cortes (4/15/22). Patient has history of multiple DVTs in the 1990s without known cause, unprovoked PE at age 43, DVT with PE in context of hip replacement. He most " recently was diagnosed with bilateral PE with right heart strain on 1/19/22 after presenting to the ED with SOB. Previously was on Xarelto though discontinued during hospitalization for induction chemotherapy. Transitioned to Lovenox and discharged on half-dosing d/t down-trending counts. Platelets appear to have recovered though are in the 100s on admission. Has continued half-dosing Lovenox without increase to full-dose in the outpatient setting. Presented this admission with fever and hypoxia (77% on RA) requiring 2-4L O2.   - Increase Lovenox to therapeutic dosing -- 0.75 mg BID (dosed based on weight) as d/w pharmacy. Xa level 1/21- slightly subtherapeutic at 0.47 (goal 0.5-1.0). Decrease to half-dosing if Plt <50K - plt 40k on 1/22, d/w pharmacy - decreased dose by 50% and rounded up to 80mg BID in setting of previously subtherapeutic Xa level. Per Dr. Amato, will consult IR x1/23 for consideration of an IVC filter.   - CT PE protocol (1/19) is negative for PE and no R heart strain. Does reveal findings c/w chronic PE and possible pulmonary HTN. 6mm solid pulmonary nodules which should be monitored at least yearly.      # Risk for TLS  # Risk for DIC  # H/o gout  Leukocytosis noted on admission lab work with WBC 15.3 in the setting of known persistent leukemia. Phos mildly elevated to 4.9. Cr near baseline (0.-1.1) at 1.17. Uric acid 8.6 and LDH 2,242. He is s/p Rasburicase x1 with improvement in uric acid to 6.6. Of note he does have h/o gout.   - PTA Allopurinol 300 mg BID (dose adjusted for weight)  - Start Phoslo 1,334 TID  - Monitor TLS labs q12h, DIC labs daily      # Anemia  # Thrombocytopenia   2/2 recent chemotherapy and underlying disease.   - Transfuse to maintain Hgb >7, Plt >10K  - Type & Screen MWF  - Blood consent signed on admission     ID  # Fever  # AHRF  # Abdominal bloating/discomfort   # Lactic acidosis, improved  Febrile in clinic to 100.5 on 1/19, also with associated AHRF with sats to  77% on admission. BP normotensive. Endorses progressive back pain as below along with SOB/DEAN worse over the past 1-2 days and abdominal bloating. He is having regular BMs. Denies other respiratory or infectious complaints. Lab work is notable for leukocytosis to 15 and mildly elevated LFTs (Alk 214 and AST 51). Lactic acid elevated to 2.9, later improved to 2.0 - presume Type B 2/2 progressive leukemia. Differential includes infection vs COPD exacerbation vs leukemia-related.   - CT chest PE and A/P negative for infectious etiology in the CAP.   - Start Cefepime/Vancomycin on admission x1/19. Continue cefepime for now though consider de-escalating 1/21 if infectious work-up remains negative.   - ID consult - no overt infection, consider stopping cefepime (continued for now but will de-escalate if BCx remain negative 1/23)      Antibiotics   - Cefepime (1/19 - x)   - Vancomycin (1/19 - 1/21 AM)      Infectious studies (1/19-1/21)  - RVP - neg  - MRSA nares - neg  - UCx - neg  - BCx (1/19) - NGTD  - Cryptococcal serum ag - neg  -   - Procal 1.09  - PJP PCR- needs to be collected  - Sputum culture- pt unable to produce sputum, but too unstable for induced sputum  - Legionella urine - negative  - Histo/Blasto - in process   - Fungitell, galactomannan - in process  - AFB culture - in process  - HHV6, serum - in process  - Quant gold- in process  - Treponema abs, serum - in process    # Prophylaxis  ANC is 1.8 on admission though c/f functional neutropenia in the setting of persistent leukemia and leukocytosis.   - PTA Acyclovir 400 mg BID (HSV in process)  - Hold PTA Levaquin 250 mg daily in the setting of empiric IV antibiotics above   - Hold PTA Posa 300 mg dialy, start Lynnette 50 mg daily in anticipation of upcoming chemotherapy     CV/PULM  # COPD  # Orthopnea  # CATHERINE  Ongoing mild hypoxia to high 80s appears to be his baseline in the setting of his COPD PTA. Oxygen requirement to 4-5L on admission at rest (8-10L  with activity or d/t positioning) with O2 sats in the 70s on RA. Reports SOB tends to flare with pain crises. Does desat at night.   - CT PE chest (negative) and antibiotics as above  - PTA Breo-Ellipta, on hold and replaced with Pulmicort 0.5mg BID  - DuoNebs QID   - Incentive spirometry   - PTA CPAP at night  - Pulm consult - too unstable for bronchoscopy given high O2 requirements. Agree with infectious workup as above. We will hold off on an induced sputum on x1/21 as he is now on HFNC. Recommended ID and Cardiology consult. We will hold off on bubble study per Cardiology.      # HTN  # HLD  Home Metoprolol and Lisinopril held during previous admission in the setting of hypotension and possible adrenal insufficiency. BP normotensive to mildly HTN on admission. Statin discontinued prior to admission given anticipation of chemotherapy.   - Continue to hold prior Metoprolol/Lisinopril for now; consider resuming as appropriate  - Continue to hold statin, consider resumption prior to discharge     # Chronic LE edema  # Chronic venous insufficiency   # Concern for pHTN  Patient endorses increased LE swelling on admission. Has home compression stockings in place. Of note, weight is up 20+ lb from recent discharge on 12/29.   - PTA Bumex 1 mg daily, increase to BID dosing x1/20 and 1mg AM/2mg in the afternoon x1/21. Held further doses of bumex given 13lb weight loss and down-trending Na. Follow weights daily to assess if he needs to resume.   - Lymphedema consulted   - Repeat echo as above with EF 60-65%, IVC dilated and suggestive of high RA pressure  - Cardiology consult in setting of possible pulmonary HTN on echo and CT imaging  - recommended no intervention or workup while inpatient as he does not have obvious pHTN on echo nor does he have RV dysfunction, and overall would not change our mgmt at this time. Cardiology also does not think an echo w/ bubble is necessary. Will refer to pulmonary HTN clinic outpatient  "for consideration of RHC and treatment.        MSK  # Acute on chronic back pain  Noted prior to hospitalization for induction chemotherapy. Presented with rib pain which radiates into his upper back and between his shoulder blades. MRI thoracic spine previously completed 12/17 and showed \"scattered subtle mottled enhancement of bones most pronounced on L 7th rib, could be 2/2 underlying disease process.\" It is possible that pt has some extramedullary infiltration of cortex causing pain. Endorses similar complaints this admission starting 1-2 days PTA in the setting of known persistent leukemia. No red flag sx. EKG on admission is normal.   - CT PE C/A/P as above; negative for source of acute pain   - PTA Voltaren gel QID, Robaxin QID  - PTA Oxycodone increased to 5-10 mg q4h prn. Add IV Dilaudid 0.5 mg q4h prn.   - PRN Tylenol and Flexeril   - Of note, pain previously responded well to Dexamethasone - could consider if persistent/worsening.   - Palliative care was previously involved last hospitalization. Consider consult as appropriate.      GI  # H/o constipation   Noted during previous admission for induction chemotherapy. Patient endorses regular and soft BM on admission.   - PTA Senna BID, Miralax daily   - Milk of magnesium daily PRN  - Prune juice was previously helpful for patient   - LBM 1/22      ENDO  # T2DM   Follows with [facility], last seen 11/15/22.   - PTA Ozempic held on admission, resume on discharge  - Medium intensity sliding scale insulin, insulin discontinued on discharge  - Hypoglycemia protocol in place      MISC  # Dysphagia  Patient endorses difficulty swallowing on admission described as \"food getting stuck in throat\". Has had to modify diet at home and be careful with food selection/size. Occasionally finds himself coughing with water as well.   - SLP consulted - no s/sx of aspiration, ok to continue regular diet. Educated on how to take pills and safe eating habits. " "     FEN   - IVF bolus prn   - PRN lyte replacement per standing protocol  - Regular diet as tolerated      Clinically Significant Risk Factors        # Hypokalemia: Lowest K = 3.3 mmol/L in last 2 days, will replace as needed     # Hypomagnesemia: Lowest Mg = 1.6 mg/dL in last 2 days, will replace as needed   # Hypoalbuminemia: Lowest albumin = 2.9 g/dL at 1/20/2023  5:28 AM, will monitor as appropriate   # Thrombocytopenia: Lowest platelets = 40 in last 2 days, will monitor for bleeding         # Severe Obesity: Estimated body mass index is 50.24 kg/m  as calculated from the following:    Height as of this encounter: 1.854 m (6' 1\").    Weight as of this encounter: 172.7 kg (380 lb 12.8 oz)., PRESENT ON ADMISSION          Lines/Drains: PICC placed on admission, remove on discharge  DVT ppx: therapeutic Lovenox; hold if Plt <50K  GI ppx: PTA PPI  Consults: PT/OT, lymphedema, SLP, pulm  CODE: FULL  DISPO: Anticipate at least 3-5 day stay and possible prolonged hospitalization for likely re-induction of persistent leukemia.   Follow-up/Referrals: Primary oncologist is Dr. Velasquez.   - Follow-up will need to be scheduled closer to discharge  - On discharge, please place a referral to the pulmonary HTN clinic outpatient for consideration of RHC and treatment.        Social  - Lives at home alone near [place], [place]  - Has two sisters who are supportive and like to be involved in big conversations via speaker phone     Coordination of Cares   - Will need to arrange local follow-up with [facility] in [place], [place] on discharge       Patient and plan of care was discussed with attending physician Dr. Amato.    90 minutes spent on the date of the encounter. Over 50% of time was spent counseling the patient and/or coordinating care.     Torrie Foy PA-C  Hematology/Oncology  Pager #[phone] or available on [employer]    Interval History   Transitioned to HFNC for desats on 10L oxymask at rest. This morning Angel reports " "overall feeling better, primarily because his headache is resolved. Yesterday he had a headache, which he typically does not have. No neurologic changes or vision changes. Denies a headache today. His breathing feels about the same. Urinating a lot. Ate half and omelette and some fruit this morning for breakfast. LBM 1/18, passing gas and has a \"gurgling\" stomach.  All questions answered.     A comprehensive review of systems was obtained and is negative other than noted here or in the HPI.     Physical Exam   Temp: 98.3  F (36.8  C) Temp src: Oral BP: 130/62 Pulse: 83   Resp: 24 SpO2: 92 % O2 Device: Nasal cannula Oxygen Delivery: 6 LPM  Vitals:    01/20/23 1348 01/21/23 1111 01/22/23 1045   Weight: (!) 178.5 kg (393 lb 8 oz) (!) 171.7 kg (378 lb 9.6 oz) (!) 172.7 kg (380 lb 12.8 oz)     Vital Signs with Ranges  Temp:  [97.6  F (36.4  C)-98.4  F (36.9  C)] 98.3  F (36.8  C)  Pulse:  [74-88] 83  Resp:  [20-24] 24  BP: ()/() 130/62  SpO2:  [92 %-96 %] 92 %  I/O last 3 completed shifts:  In: 1960 [P.O.:1320; I.V.:340]  Out: 795 [Urine:795]    General: Awake and interactive. Chronically-ill appearing and fatigued. Uncomfortable and shifting frequently in bed.    Skin: Warm, dry, and intact without rashes or lesions. Chronic venous stasis changes to bilateral LE.  Scattered bruising to extremities.   Head: Normocephalic and atraumatic.  HEENT: PERRLA. Sclera clear. EOM intact. Oral mucosa is pink and moist with no lesions, thrush, or exudates.   Lymph: Neck supple. No significant adenopathy.   Cardiac: Regular rate and rhythm, no murmurs appreciated   Respiratory: Increased work of breathing and tachypnea on 6L NC. Coarse breath sounds.   Abd: Soft, symmetric, distended. BS present and normoactive. Organomegaly not noted d/t body habitus.   Extremities: 2-3+ bilateral pitting edemae. Distal pulses palpable.   Neuro: A&Ox3 with normal speech. Memory and thought process preserved. No deficits grossly.  Psych: " Appropriate mood and affect. Good judgment and insight. No visual/auditory hallucinations.     Medications     - MEDICATION INSTRUCTIONS -         acyclovir  400 mg Oral BID     allopurinol  300 mg Oral BID     ammonium lactate   Topical Daily     budesonide  0.5 mg Nebulization BID     [Held by provider] bumetanide  1 mg Oral Daily     calcium acetate (phos binder)  1,334 mg Oral TID w/meals     ceFEPIme  2 g Intravenous Q8H     cytarabine (CYTOSAR) infusion  20 mg Subcutaneous Q12H     enoxaparin ANTICOAGULANT  0.75 mg/kg Subcutaneous Q12H     heparin lock flush  5-20 mL Intracatheter Q24H     heparin lock flush  5-20 mL Intracatheter Q24H     insulin aspart  1-7 Units Subcutaneous TID AC     insulin aspart  1-5 Units Subcutaneous At Bedtime     ipratropium - albuterol 0.5 mg/2.5 mg/3 mL  3 mL Nebulization 4x daily     micafungin  50 mg Intravenous Q24H     pantoprazole  40 mg Oral QAM AC     polyethylene glycol  17 g Oral Daily     senna-docusate  1 tablet Oral BID     sodium chloride (PF)  10-40 mL Intracatheter Q8H     sodium chloride (PF)  10-40 mL Intracatheter Q8H

## 2023-01-22 NOTE — PROVIDER NOTIFICATION
Provider Notification    Re: FYI DBP- 120, Patient was talking on the phone with sisters from [place] at this time. Plan to recheck in 15m    Action: Notified MARIKA Brownlee at #[id number]

## 2023-01-22 NOTE — PLAN OF CARE
"0700- 1900    BP 98/54 (BP Location: Left arm)   Pulse 81   Temp 98.3  F (36.8  C) (Oral)   Resp 22   Ht 1.854 m (6' 1\")   Wt (!) 171.7 kg (378 lb 9.6 oz)   SpO2 94%   BMI 49.95 kg/m      VSS; titrated from High flow NC 25 LPM to 6 L NC Sating well above 88%, Afebrile. Denies N/V. PRN Oxy x2 and Tylenol x1 for pain. Reports dyspnea on exertion. Hgb- 6.7 replaced with 1 unit of RBCS. Bumex dose increased today; pt up and voiding multiple times throughout shift; having hard time voiding in urinal so tricky to measure output. Senna and Milk of Mag given for constipation- last BM 1/18. Lymph wraps fell off prior to shift; pt refused legs to be elevated. BS- 110, 122. Pt notably having more energy and mobility towards end of shift likely due to fluid coming off. Continue POC.   "

## 2023-01-23 NOTE — PROGRESS NOTES
[facility]  Hematology / Oncology Progress Note  Date of Service (when I saw the patient): 01/23/2023     Assessment & Plan   Angel Cardenas is a 62 year old male with a history of multiple prior DVTs and PE in 2022 (previously on Xarelto), DM2, COPD, HTN, gout and recently diagnosed AMML s/p Cycle 1 of 10-days of Decitabine/Venetoclax (D1=12/19/22) with subsequent persistent disease on post-therapy BMBx 1/13/23 demonstrating ongoing ~70% blasts. He presents from clinic for consideration of re-induction chemotherapy in the setting of fever to 100.5 and AHRF.     TODAY:   - Day 2 low dose cytarabine 20mg BID today; plan for 10 day course. If plt down-trending may need to switch to IV from subcutaneous administration. Monitor TLS labs BID, continue Allopurinol and Phoslo.  - Unfortunately, recurrent fever to 101.6 early PM with increasing O2 needs (HFNC), worsening back pain and new abdominal rash (confirmed with nursing staff present over weekend).    - Restart vancomycin. Continue cefepime.   - Repeat CXR. Repeat BCx.   - Give Bumex IV x 1 (previously responded well, only net negative ~450 ml 1/22).   - O2 goals for sats >88% (hx COPD); titrate O2 appropriately.    - Continue scheduled nebs   - Appreciate ongoing ID assistance.   - Regarding pain control, reviewed hx as below and upon diagnosis, patient noted to have severe mid-back pain with concern for possible extramedullary infiltration of cortex - will trial dexamethasone 10 mg x 1 today. If improved, will taper slowly.   - IR consulted to consider IVF filter placement given extensive clot history. Plt 40k, decrease lovenox to approximately half dose (80mg BID) and continue to follow platelet count daily.  - Monitor daily weights, I/Os.   - Continue Lymphedema, PT/OT  - Pain control - Tylenol/Flexeril prn. Voltaren gel QID, Robaxin QID, Oxy 5-10 mg q4h prn, IV Dilaudid 0.5 mg q4h prn. Continue scheduled bowel  "regimen.     HEME  # AMML with SRSF2, TET2 mutations  # Leukocytosis   Patient of Dr. Velasquez. In summary, presented to PCP with fatigue, subjective fevers, lack of appetite, weight loss, epistaxis and rib pain. Found to be pancytopenic with 6% circulating peripheral blasts (WBC 8.5 Hgb 8.8 and plts 82). Diagnostic BMBx 12/9 was consistent with AMML with 80 blasts, NGS: SRSF3 + TET2 mutations (on peripheral blood) conferring adverse risk, Cyto: 50-52,XY,+4,+5,+8,+13,add(13)(p11.2)x2,+18,+20[cp17]/,idemx2,+16,+16[cp3]. Diagnostic LP negative. Due to comorbidities started on Decitabine 10 days + Venetoclax 28 (C1D1 12/19/22). Hospital course was complicated by TLS requiring rasburicase, possible PNA, possible adrenal insufficiency/hypotension related to steroid taper (not requiring pressors) and back/rib pain flares. He discharged to home following completion of chemotherapy and post-therapy BMBx was completed on 1/13 which is notable for persistent pancytopenia showing hypoercellular marrow (90%) with marked decrease hematopoesis and 70% blasts. NGS/FISH/cyto is pending. He had a visit with Dr. Velasquez in clinic prior to admission on 1/19 to discuss next steps and consideration of re-induction.  - Per conversation with Dr. Velasquez PTA, considered intensive regimen with 7+3 vs G-CLAC. However, in the setting of significant hypoxia and multiple comorbidities will instead proceed with low dose cytarabine (C1D1=1/22/23), 20mg q12h and plan for a total 10-day course  - PICC placement in the setting of multiple lab draws, IV products and anticipation of upcoming chemotherapy  - Repeat pre-chemo work-up - normal EKG, echo with EF 60-65%  - CT chest PE/AP (1/19) - reveals splenomegaly, enlarged inguinal lymphadenopathy and hepatomegaly.   - Consider Hydrea if WBC continues to trend up. Of note, 62% \"other cells\" noted peripherally on 1/19.      # H/o PE (most recently Jan 2022)   # H/o multiple DVTs (1990s)  Per chart " review he was seen for evaluation of PE by Dr. Amari Cortes (4/15/22). Patient has history of multiple DVTs in the 1990s without known cause, unprovoked PE at age 43, DVT with PE in context of hip replacement. He most recently was diagnosed with bilateral PE with right heart strain on 1/19/22 after presenting to the ED with SOB. Previously was on Xarelto though discontinued during hospitalization for induction chemotherapy. Transitioned to Lovenox and discharged on half-dosing d/t down-trending counts. Platelets appear to have recovered though are in the 100s on admission. Has continued half-dosing Lovenox without increase to full-dose in the outpatient setting. Presented this admission with fever and hypoxia (77% on RA) requiring 2-4L O2.   - Increase Lovenox to therapeutic dosing -- 0.75 mg BID (dosed based on weight) as d/w pharmacy. Xa level 1/21- slightly subtherapeutic at 0.47 (goal 0.5-1.0). Decrease to half-dosing if Plt <50K - plt 40k on 1/22, d/w pharmacy - decreased dose by 50% and rounded up to 80mg BID in setting of previously subtherapeutic Xa level. Per discussion with Kelsey Amato and Ron, IR consulted on /23 for consideration of an IVC filter.   - CT PE protocol (1/19) is negative for PE and no R heart strain. Does reveal findings c/w chronic PE and possible pulmonary HTN. 6mm solid pulmonary nodules which should be monitored at least yearly.      # Risk for TLS  # Risk for DIC  # H/o gout  Leukocytosis noted on admission lab work with WBC 15.3 in the setting of known persistent leukemia. Phos mildly elevated to 4.9. Cr near baseline (0.-1.1) at 1.17. Uric acid 8.6 and LDH 2,242. He is s/p Rasburicase x1 with improvement in uric acid to 6.6. Of note he does have h/o gout.   - PTA Allopurinol 300 mg BID (dose adjusted for weight)  - Start Phoslo 1,334 TID; held 1/23.  - Monitor TLS labs q12h, DIC labs daily      # Anemia  # Thrombocytopenia   2/2 recent chemotherapy and underlying disease.    - Transfuse to maintain Hgb >7, Plt >10K  - Type & Screen MWF  - Blood consent signed on admission     ID  # Fever  # AHRF  # Abdominal bloating/discomfort   # Lactic acidosis, improved  Febrile in clinic to 100.5 on 1/19, also with associated AHRF with sats to 77% on admission. BP normotensive. Endorses progressive back pain as below along with SOB/DEAN worse over the past 1-2 days and abdominal bloating. He is having regular BMs. Denies other respiratory or infectious complaints. Lab work is notable for leukocytosis to 15 and mildly elevated LFTs (Alk 214 and AST 51). Lactic acid elevated to 2.9, later improved to 2.0 - presume Type B 2/2 progressive leukemia. Differential includes infection vs COPD exacerbation vs leukemia-related. CT chest PE and A/P (1/19) negative for infectious etiology. Cefepime/Vancomycin started on admission 1/19; transitioned to cefepime alone on 1/21.   - ID consult 1/21 with no evidence of overt infection (see workup below) and recommendation to consider stopping cefepime  - Unfortunately, recurrent fever to 101.6 on 1/23 early PM with increasing O2 needs (HFNC), worsening back pain and new abdominal rash (confirmed with nursing staff present over weekend).    - Restart vancomycin. Continue cefepime.   - Repeat CXR. Repeat BCx.   - Give Bumex IV x 1 (previously responded well, only net negative ~450 ml 1/22).   - O2 goals for sats >88% (hx COPD); titrate O2 appropriately.    - Continue scheduled nebs   - Appreciate ongoing ID assistance.       Antibiotics   - Cefepime (1/19 - x)   - Vancomycin (1/19 - 1/21 AM)      Infectious studies (1/19-1/21)  - RVP - neg  - MRSA nares - neg  - UCx - neg  - BCx (1/19, 1/23) - NGTD  - Cryptococcal serum ag - neg  - CRP (1/19) 242 ? (1/23) 151  - Procal (1/19) 1.09 ? (1/23) 0.67  - PJP PCR- needs to be collected  - Sputum culture- pt unable to produce sputum, but too unstable for induced sputum  - Legionella urine - negative  - Histo/Blasto - in  process   - Fungitell, galactomannan - neg  - AFB culture - in process  - HHV6, serum - in process  - Quant gold- neg  - Treponema abs, serum - in process    # Prophylaxis  ANC is 1.8 on admission though c/f functional neutropenia in the setting of persistent leukemia and leukocytosis.   - PTA Acyclovir 400 mg BID (HSV in process)  - Hold PTA Levaquin 250 mg daily in the setting of empiric IV antibiotics above   - Hold PTA Posa 300 mg dialy, start Lynnette 50 mg daily in anticipation of upcoming chemotherapy     CV/PULM  # COPD  # Orthopnea  # CATHERINE  Ongoing mild hypoxia to high 80s appears to be his baseline in the setting of his COPD PTA. Oxygen requirement to 4-5L on admission at rest (8-10L with activity or d/t positioning) with O2 sats in the 70s on RA. Reports SOB tends to flare with pain crises. Does desat at night.   - CT PE chest (negative) and antibiotics as above  - PTA Breo-Ellipta, on hold and replaced with Pulmicort 0.5mg BID  - DuoNebs QID   - Incentive spirometry   - PTA CPAP at night  - Pulm consult - too unstable for bronchoscopy given high O2 requirements. Agree with infectious workup as above. We will hold off on an induced sputum on x1/21 as he is now on HFNC. Recommended ID and Cardiology consult. We will hold off on bubble study per Cardiology.      # HTN  # HLD  Home Metoprolol and Lisinopril held during previous admission in the setting of hypotension and possible adrenal insufficiency. BP normotensive to mildly HTN on admission. Statin discontinued prior to admission given anticipation of chemotherapy.   - Continue to hold prior Metoprolol/Lisinopril for now; consider resuming as appropriate  - Continue to hold statin, consider resumption prior to discharge     # Chronic LE edema  # Chronic venous insufficiency   # Concern for pHTN  Patient endorses increased LE swelling on admission. Has home compression stockings in place. Of note, weight is up 20+ lb from recent discharge on 12/29.   - PTA  "Bumex 1 mg daily, increase to BID dosing x1/20 and 1mg AM/2mg in the afternoon x1/21. Held further doses of bumex given 13lb weight loss and down-trending Na. Follow weights daily to assess if he needs to resume.   - Lymphedema consulted   - Repeat echo as above with EF 60-65%, IVC dilated and suggestive of high RA pressure  - Cardiology consult in setting of possible pulmonary HTN on echo and CT imaging  - recommended no intervention or workup while inpatient as he does not have obvious pHTN on echo nor does he have RV dysfunction, and overall would not change our mgmt at this time. Cardiology also does not think an echo w/ bubble is necessary. Will refer to pulmonary HTN clinic outpatient for consideration of RHC and treatment.        MSK  # Acute on chronic back pain  Noted prior to hospitalization for induction chemotherapy. Presented with rib pain which radiates into his upper back and between his shoulder blades. MRI thoracic spine previously completed 12/17 and showed \"scattered subtle mottled enhancement of bones most pronounced on L 7th rib, could be 2/2 underlying disease process.\" It is possible that pt has some extramedullary infiltration of cortex causing pain. Endorses similar complaints this admission starting 1-2 days PTA in the setting of known persistent leukemia. No red flag sx. EKG on admission is normal.   - CT PE C/A/P as above; negative for source of acute pain   - PTA Voltaren gel QID, Robaxin QID  - PTA Oxycodone increased to 5-10 mg q4h prn. Add IV Dilaudid 0.5 mg q4h prn.   - PRN Tylenol and Flexeril   -  Per notes, pain previously responded well to Dexamethasone and with pain flair on 1/23, will trial dexamethasone 10 mg x1. If improvement, will taper over coming days.    - Palliative care was previously involved last hospitalization. Consider consult as appropriate.      GI  # H/o constipation   Noted during previous admission for induction chemotherapy. Patient endorses regular and soft " "BM on admission.   - PTA Senna BID, Miralax daily   - Milk of magnesium daily PRN  - Prune juice was previously helpful for patient   - LBM 1/22      ENDO  # T2DM   Follows with [facility] [place], last seen 11/15/22.   - PTA Ozempic held on admission, resume on discharge  - Medium intensity sliding scale insulin  - Hypoglycemia protocol in place   - Monitor with steroids per protocol     MISC  # Dysphagia  Patient endorses difficulty swallowing on admission described as \"food getting stuck in throat\". Has had to modify diet at home and be careful with food selection/size. Occasionally finds himself coughing with water as well.   - SLP consulted - no s/sx of aspiration, ok to continue regular diet. Educated on how to take pills and safe eating habits.      FEN   - IVF bolus prn   - PRN lyte replacement per standing protocol  - Regular diet as tolerated      Clinically Significant Risk Factors        # Hypokalemia: Lowest K = 3.3 mmol/L in last 2 days, will replace as needed    # Hypercalcemia: corrected calcium is >10.1, will monitor as appropriate  # Hypomagnesemia: Lowest Mg = 1.6 mg/dL in last 2 days, will replace as needed   # Hypoalbuminemia: Lowest albumin = 2.9 g/dL at 1/22/2023  6:05 PM, will monitor as appropriate   # Thrombocytopenia: Lowest platelets = 40 in last 2 days, will monitor for bleeding         # Severe Obesity: Estimated body mass index is 51.9 kg/m  as calculated from the following:    Height as of this encounter: 1.854 m (6' 1\").    Weight as of this encounter: 178.4 kg (393 lb 6.4 oz).           Lines/Drains: PICC placed on admission, remove on discharge  DVT ppx: therapeutic Lovenox; hold if Plt <50K  GI ppx: PTA PPI  Consults: PT/OT, lymphedema, SLP, pulm  CODE: FULL  DISPO: Anticipate at least 3-5 day stay and possible prolonged hospitalization for likely re-induction of persistent leukemia.   Follow-up/Referrals: Primary oncologist is Dr. Velasquez.   - Follow-up will need to be " scheduled closer to discharge  - On discharge, please place a referral to the pulmonary HTN clinic outpatient for consideration of RHC and treatment.      Social  - Lives at home alone near [place], [place]  - Has two sisters who are supportive and like to be involved in big conversations via speaker phone     Coordination of Cares   - Will need to arrange local follow-up with [facility] in [place], [place] on discharge     Patient and plan of care was discussed with attending physician Dr. Amato.    90 minutes spent on the date of the encounter. Over 50% of time was spent counseling the patient and/or coordinating care.     Kaylan Michael PA-C ([place])  Hematology/Oncology  #[id number]    Interval History   Seen early this morning walking around with nursing going to shower. Later seen after fever and notably placed back on HFNC (sats ~92%) and BP stable. He does feel like he is having a more difficult time breathing. He is fatigued. He denies headaches, chest pain. His middle back pain is the biggest thing bothering him. New rash on abdomen which is demarcated. He is able to answer orientation questions (name, date, location). Not urinating very much this morning as he has been sleeping. All questions answered.     A comprehensive review of systems was obtained and is negative other than noted here or in the HPI.     Physical Exam   Temp: (!) 101.6  F (38.7  C) Temp src: Oral BP: 108/49 Pulse: 80   Resp: 22 SpO2: 94 % O2 Device: High Flow Nasal Cannula (HFNC) Oxygen Delivery: 40 LPM  Vitals:    01/21/23 1111 01/22/23 1045 01/23/23 0940   Weight: (!) 171.7 kg (378 lb 9.6 oz) (!) 172.7 kg (380 lb 12.8 oz) (!) 178.4 kg (393 lb 6.4 oz)     Vital Signs with Ranges  Temp:  [98  F (36.7  C)-101.6  F (38.7  C)] 101.6  F (38.7  C)  Pulse:  [79-94] 80  Resp:  [20-24] 22  BP: (103-129)/(47-77) 108/49  FiO2 (%):  [60 %] 60 %  SpO2:  [90 %-96 %] 94 %  I/O last 3 completed shifts:  In: 1260 [P.O.:960]  Out: 1400  [Urine:1400]    General: Fatigued. Chronically-ill appearing and fatigued. Uncomfortable and shifting frequently in bed.    Skin: Warm, dry, and intact without rashes or lesions. Chronic venous stasis changes to bilateral LE.  Scattered bruising to extremities.   Head: Normocephalic and atraumatic.  HEENT: Sclera clear. EOM intact. Oral mucosa is pink and moist with no lesions, thrush, or exudates.   Lymph: Neck supple.   Cardiac: Regular rate and rhythm, no murmurs appreciated   Respiratory: Increased work of breathing and tachypnea on HFNC (40L 60% FiO2). Coarse breath sounds although auscultation difficult d/t body habitus/pain.  Abd: Soft, symmetric, distended. BS present and normoactive. Organomegaly not noted d/t body habitus. Erythematous rash on lower abdomen (genevieve borders), no palpable fluid collection, no pain  Extremities: 2-3+ bilateral pitting edemae. Distal pulses palpable.   Neuro: Lethargic but A&Ox3 and awakes appropriately to speech. Memory and thought process preserved. No deficits grossly.    Medications     - MEDICATION INSTRUCTIONS -         acyclovir  400 mg Oral BID     allopurinol  300 mg Oral BID     ammonium lactate   Topical Daily     budesonide  0.5 mg Nebulization BID     bumetanide  1 mg Intravenous Once     [Held by provider] bumetanide  1 mg Oral Daily     calcium acetate (phos binder)  1,334 mg Oral TID w/meals     ceFEPIme  2 g Intravenous Q8H     cytarabine (CYTOSAR) infusion  20 mg Subcutaneous Q12H     enoxaparin ANTICOAGULANT  80 mg Subcutaneous Q12H     heparin lock flush  5-20 mL Intracatheter Q24H     heparin lock flush  5-20 mL Intracatheter Q24H     insulin aspart  1-7 Units Subcutaneous TID AC     insulin aspart  1-5 Units Subcutaneous At Bedtime     ipratropium - albuterol 0.5 mg/2.5 mg/3 mL  3 mL Nebulization 4x daily     micafungin  50 mg Intravenous Q24H     pantoprazole  40 mg Oral QAM AC     polyethylene glycol  17 g Oral BID     senna-docusate  2 tablet Oral BID      sodium chloride (PF)  10-40 mL Intracatheter Q8H     sodium chloride (PF)  10-40 mL Intracatheter Q8H     vancomycin  2,000 mg Intravenous Once

## 2023-01-23 NOTE — PLAN OF CARE
[id number]    VSS, on 4-5 L via NC. Denies pain or nausea. Dyspnea on exertion. Poor appetite, refused dinner. BG at 2200 was 100. Small BM this evening. Dose 1 sub-q cytarabine given. Up SBA to bathroom. Continue w/ POC.

## 2023-01-23 NOTE — CODE/RAPID RESPONSE
Rapid Response Team Note    Assessment   In assessment a rapid response was called on Angel Cardenas due to SIRS/Sepsis trigger. This presentation could be due to sepsis in setting of new fever T 101.6 w/ worsening leukocytosis (WBC 13.6-->17.4) and lactic acidosis (2-->2.3). CRP elevated 150 but actually down trending from 242 on 1/21; procal 1.09-->0.67 during this time. He has AML and received chemotherapy yesterday; not absolutely neutropenic. On exam he appears to have a (new?) rash across his lower abdomen concerning for infectious source (borders marked) or reaction. He was also placed on HFNC 40L/60% 1-2 hours ago after having a shower (prior 6L); could have new pulmonary infectious source vs. pulmonary edema (Bumex held so far today) and poor reserve in setting of COPD. Also consider spinal infection given chief complaint of back pain w/ ID recommendation for thoracic/lumbar MRI on 1/20 not yet done. He has been on cefepime.     Plan   - Blood cultures are pending.   - Stat vancomycin to be added to existing cefepime given skin findings.   - Stat CXR ordered.   - Stat Bumex 1 mg IV ordered for worsening hypoxic respiratory failure previously responsive to diuresis. Is hemodynamically stable and not appropriate for IV fluid bolus currently despite possible sepsis given tenuous respiratory status.   - Primary team to consider spine imaging but would not be appropriate at this moment to lie down for MRI given high O2 requirements.   - Wean O2 as able keeping sats > 88% in setting of COPD.   - Close monitoring of abdominal rash and discussion with ID by primary team.   -  The Hematology/Oncology primary team was at the bedside and plan was developed jointly.  -  Disposition: The patient will remain on the current unit. We will continue to monitor this patient closely.  -  Reassessment and plan follow-up will be performed by the primary team    Angel is critically ill with acute hypoxic respiratory failure. These  features are alarming and indicate that the patient is at imminent risk of life-threatening organ failure. Acute deterioration is being managed by the following critical interventions: diuretics and oxygen by high flow    Total Critical Care time spent by me, excluding procedures, was 35 minutes.  REN Dominguez  [facility] [place] RRT [facility] Job Code Contact #[phone]  [facility] Paging/Directory    Hospital Course   Brief Summary of events leading to rapid response:   New fever and triggered sepsis protocol when combined with morning leukocytosis. Started chemo yesterday. Complains of back pain which is not new. Noted abdominal rash - nursing was unsure of acuity. Felt more short of breath after shower; placed on HFNC.     Admission Diagnosis:   AMML (acute myelomonocytic leukemia) (H) [C92.50]    Physical Exam   Temp: (!) 101.6  F (38.7  C) Temp  Min: 97.6  F (36.4  C)  Max: 101.6  F (38.7  C)  Resp: 20 Resp  Min: 20  Max: 24  SpO2: 93 % SpO2  Min: 90 %  Max: 96 %  Pulse: 89 Pulse  Min: 79  Max: 94    No data recorded  BP: 125/59 Systolic (24hrs), Av , Min:103 , Max:130   Diastolic (24hrs), Av, Min:47, Max:77     I/Os: I/O last 3 completed shifts:  In: 1260 [P.O.:960]  Out: 1400 [Urine:1400]     Exam:   General: chronically ill appearing  Mental Status: alert, oriented.  Resp: On HFNC. Scattered coarseness in lungs.   Ext: WWP. Chronic edema and venous stasis changes.   Skin: Erythema across lower abdomen and extending towards right flank. Non tender.     Significant Results and Procedures   Lactic Acid:   Recent Labs   Lab Test 23  1207 23  0528 23  1612   LACT 2.3* 2.0 2.9*     CBC:   Recent Labs   Lab Test 23  0557 23  1805 23  0550   WBC 17.4* 13.6* 11.6*   HGB 7.7* 7.8* 6.9*   HCT 25.8* 26.1* 23.8*   PLT 40* 41* 40*        Sepsis Evaluation   The patient is not known to have an infection.  NO EVIDENCE OF SEPSIS at this time.  Vital sign, physical exam, and lab findings  are due to possible sepsis, chemotherapy - further evaluation for sepsis ongoing.

## 2023-01-23 NOTE — PLAN OF CARE
"Goal Outcome Evaluation: Progressing       Plan of Care Reviewed With: patient    Blood pressure 121/66, pulse 83, temperature 99.7  F (37.6  C), temperature source Oral, resp. rate 20, height 1.854 m (6' 1\"), weight (!) 172.7 kg (380 lb 12.8 oz), SpO2 93 %.    Vitals: WDL on 4-5L O2 sating mid 90's  Neuros: A/O x 4  IV: PICC HP locked   Labs/Electrolytes: Reviewed. BS at 2:00 am 102.  Resp/trach: Dyspnea on exertion.  Diet: Regular, denies nausea/vomitting    Bowel status: +BS, No BM during the shift.  : Voiding enough output  Skin: No new skin deficit noted.  Pain: Pain managed by PRN Oxycodone and Dilaudid.    Activity: Stand by assist    Plan: Continue to monitor per POC. Tolerating subcutaneous chemo treatment.                  "

## 2023-01-23 NOTE — PLAN OF CARE
"0700- 1900    /77 (BP Location: Left arm)   Pulse 79   Temp 99  F (37.2  C) (Oral)   Resp 22   Ht 1.854 m (6' 1\")   Wt (!) 172.7 kg (380 lb 12.8 oz)   SpO2 95%   BMI 50.24 kg/m      VSS on 5-6 L NC, Afebrile. Denies N/V. Reports dyspnea on exertion. PRN Oxy x2, Tylenol x1, and Flexeril x1 for pain. Hgb-m 6.9, replaced with 1 unit. K+ 3.4- replaced and recheck was 3.7. Pt did not eat breakfast or dinner. BS 95 prior to eating a big lunch. Pt is down 13 lbs since 1/20. Lymph velcro wraps on throughout shift. BM this morning but pt is still feeling very constipated/discomfort. Increased bowel regimen, plan to give milk of mag once on the unit. Pt's energy notably improved since Friday. Lose dose Cytarabine subcutaneous BID initiated tonight. Continue POC.     "

## 2023-01-23 NOTE — PHARMACY-VANCOMYCIN DOSING SERVICE
Pharmacy Vancomycin Initial Note  Date of Service 2023  Patient's  1960  62 year old, male    Indication: Skin and Soft Tissue Infection    Current estimated CrCl = Estimated Creatinine Clearance: 155.9 mL/min (based on SCr of 0.85 mg/dL).    Creatinine for last 3 days  2023:  6:10 PM Creatinine 1.04 mg/dL  2023:  6:16 AM Creatinine 0.98 mg/dL;  6:11 PM Creatinine 0.95 mg/dL  2023:  5:50 AM Creatinine 1.01 mg/dL;  6:05 PM Creatinine 0.89 mg/dL;  6:05 PM Creatinine 0.89 mg/dL  2023:  5:57 AM Creatinine 0.85 mg/dL    Recent Vancomycin Level(s) for last 3 days  2023:  6:10 PM Vancomycin 14.4 ug/mL      Vancomycin IV Administrations (past 72 hours)                   vancomycin (VANCOCIN) 2,000 mg in sodium chloride 0.9 % 500 mL intermittent infusion (mg) 2,000 mg New Bag 23 1412    vancomycin (VANCOCIN) 1,250 mg in 0.9% NaCl 250 mL intermittent infusion (mg) 1,250 mg New Bag 23 0620     1,250 mg New Bag 23                Nephrotoxins and other renal medications (From now, onward)    Start     Dose/Rate Route Frequency Ordered Stop    23 1300  vancomycin (VANCOCIN) 2,000 mg in sodium chloride 0.9 % 500 mL intermittent infusion         2,000 mg  over 120 Minutes Intravenous ONCE 23 1251      23 0800  [Held by provider]  bumetanide (BUMEX) tablet 1 mg        (Held by provider since Sun 2023 at 0827 by Torrie Foy PA-C.Hold Reason: Abnormal Electrolytes)   Note to Pharmacy: PTA Sig:Take 1 mg by mouth daily      1 mg Oral DAILY 23 0826      23  acyclovir (ZOVIRAX) tablet 400 mg        Note to Pharmacy: PTA Sig:Take 1 tablet (400 mg) by mouth 2 times daily      400 mg Oral 2 TIMES DAILY 23 1307            Contrast Orders - past 72 hours (72h ago, onward)    None          [employer] Prediction of Planned Initial Vancomycin Regimen  Loading dose: N/A  Regimen: 1250 mg IV every 12 hours.  Start time: 16:15  on 01/23/2023  Exposure target: AUC24 (range)400-600 mg/L.hr   AUC24,ss: 446 mg/L.hr  Probability of AUC24 > 400: 92 %  Ctrough,ss: 11.2 mg/L  Probability of Ctrough,ss > 20: 2 %  Probability of nephrotoxicity (Lodise ALESIA 2009): 7 %        Plan:  1. Start vancomycin  2000 mg IV once followed by 1250 mg IV every 12 hours  2. Vancomycin monitoring method: AUC  3. Vancomycin therapeutic monitoring goal: 400-600 mg*h/L  4. Pharmacy will check vancomycin levels as appropriate in 1-3 Days.    5. Serum creatinine levels will be ordered daily for the first week of therapy and at least twice weekly for subsequent weeks.      Bismark Flores, LisaD

## 2023-01-23 NOTE — CONSULTS
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 Also, should he develop VTE while on anticoagulation, most recent guidelines recommend against filter placement and optimizing anticoagulation is recommended.   Closely watch for symptoms, continue anticoagulation lovenox 80 mg BID.    Discussed with Jhonatan and Jarocho

## 2023-01-23 NOTE — PROGRESS NOTES
[facility]  Transplant / Hematological Malignancy Infectious Disease Progress Note     Patient:  Angel Cardenas, Date of birth 1960, Medical record number [id number]  Date of Visit:  01/23/2023          Assessment and Recommendations:   Recommendations:  - Would not re-start IV vancomycin -- there is very scant likelihood he has any MRSA or cefepime-resistant Staph process and the right lower abdominal erythema does not appear obviously cellulitic.  - Continue cefepime for today, but if new cultures remain unremarkable, would discontinue it tomorrow (since he will have received a five day course without evident benefit seen to date.  - Would still prefer obtaining a repeat thoracic / lumbar spine MRI scan to make certain he does not have any vertebral infection that was inapparrent on the prior 12/17/23 scan.    - There is no ID contraindication to giving steroids or pursuing as aggressive treatment of his leukemia as is warranted -- the most likely etiology of his fevers (and leukocytosis) at this point is his malignancy.  - Given his lack of absolute lymphopenia at present, micafungin prophylaxis is not indicated until his re-induction chemotherapy takes hold.  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    The case was discussed with Hematology-Oncology service today.  Transplant ID will follow with you.    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  A post-chemotherapy 1/13/23 bone marrow biopsy pathology / flow cytometry showed ~ 67 - 70%  "persistent blasts.  He was now admitted to the [facility] Hematology-Oncology service from clinic on 1/19/23 afternoon with a week of low-grade fever (to 100.5 degrees F on 1/19/23 AM), sweats, fatigue, exertional and resting dyspnea (with hypoxia of 77 - 87% oxygen saturation on room air at admission) markedly wosened low thoracic / upper lumber bilateral back pain over the preceding two days, nausea / emesis, abdominal bloating, diarrhea / constipation, some dysphagia, and increased bilateral legs edema.  He was admitted for evaluation of these symptoms in preparation for imminent re-induction chemotherapy.  Transplant ID was consulted on 1/20/23 regarding the admission non-neutropenic fever and hypoxia.    ID issues:    - Intermittent non-neutropenic fevers with other constitutional symptoms:  He had low-grade sweats and episodes of feeing hot for a week prior to admission with a fever of 100.5 degrees measured on presentation to clinic on 1/19/23 AM and has now had another fever of 101.6 degrees on 1/23/23.   Although he may have some neutrophil dysfunction due to his marked \"\"blastemia\", he has not been markedly neutropenic and presumably has at least partially-intact neutrophil function.  Beyond back pain and hypoxia, he lacks much in the way of focal signs or symptoms that would suggest an infectious focus, and truncal CT imaging on 1/19 - 20/23 revealed no likely infectious foci.  Specifically, he has no evidence for a bacteremia, focal pneumonia, sinusitis, UTI, cellulitis or soft tissue infection, abdominal abscess, or other bacterial process.  His back was previously imaged with MRI on 12/17/23 and there was no suggestion of infection at that time, but that site should probably be re-evaluated now.  He had a high procalcitonin of 1.09 on 1/21/23 but it is probably confounded by his leukemia and there is still no likely bacterial focus of infection at this time.  With his lack of profound neutropenia, empiric " "antibiotic therapy is unneeded (but he has nevertheless received empiric cefepime since admission).  He lacks MRSA nares carriage, so IV vancomycin is especially unneeded now.  There is also no indication of any atypical bacterial pneumonia (by symptoms or admission chest CT), mycobacteriosis (with a negative 1/21/23 Quantiferon gold and AFB blood culture), likely endemic or mold fungal infection (in the absence of strong exposures, suggestive imaging, and profound neutropenia and with all fungal serologies from 1/20 - 21/23 negative), viral infections (with HIV / infectious hepatitis screens negative on 12/10/23, CMV seronegativity on 12/11/23, and negative admission respiratory virus PCRs), or STDs (by history, with a negative Treponema antibody screen).  He does not have obvious medications that would a pyrogenic culprit.  The most likely source of his low-grade fever and \"B\" symptoms is his unchecked leukemia.    - Recent dyspnea / hypoxia -- pulmonary edema:  The hypoxia improved 1/20 - 22/23 with diuresis, but has now worsened again on 1/23/23.  Chest x-ray strongly suggests the presence of pulmonary edema.  This may be multifactorial, but there is little to suggest any infectious component is contributing.  The chest CT scan was not suggestive of infection.  This is not a setting for PJP.  Instead, his dyspnea / hypoxia are likely due to a combination of COPD, obesity hypoventilation, anemia, increased pulmonary edema, malignancy-induced metabolic stress, and possibly some pulmonary hypertension -- at present, would not treat him for classic or atypical pneumonias.    - New 1/23/23 right inferior abdominal erythematous, nontender rash:  This appears to be a focal contact dermatitis or localizing reaction, not a likely cellulitis.  Since this exanthem arose within 24 hours following his first subcutaneous cytarabine dose, that may be the culprit.  Would monitor for now.    - Acute-on-chronic lower thoracic / " "superior lumbar pain:  He reports that the pain he experienced on 1/18 - 19/23 was the most intensely painful episode of several (\"about four\") episodes of similar back pain he has had since his leukemia was diagnosed in early December.  A prior 12/17/22 thoracic spine MRI scan did not demonstrate any evidence of likely infection, but, now more than a month later -- given the acute exacerbation on 1/18/23 -- it should be re-imaged to make certain there is no incipient vertebral osteomyelitis.  Interestingly, it does not sound like the pain was strongly due to spinal nerve impingement, since he was able to ambulate despite the pain and ambulation did not necessarily increase his discomfort.    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    Other ID considerations:  - QTc interval:  435 msec on 1/19/23 EKG.  - Bacterial prophylaxis:  None indicated (but was on pre-admission levofloxacin prophylaxis).  Presently on empiric broad-spectrum treatment.  - Pneumocystis prophylaxis:  None indicated -- not markedly lymphopenic.  - Viral serostatus & prophylaxis:  CMV seronegative. HSV-1 / EBV seropositive.  On acyclovir.  - Fungal prophylaxis:  None indicated, not markedly lymphopenic.  But has been on posaconazole / micafungin prophylaxis.  - Risk factors to suggest check of Toxoplasma, Strongy, or Schisto serology?:  None.  - Immunization status:  Not presently relevant.  - Gamma globulin status:  Unknown, not presently relevant.  - Isolation status: Routine.        Interval History:   Mr. Cardenas was afebrile (T max 99.4 degrees F) from 1/19/23 until midday today when he spiked a new fever to 101.6 degrees F, despite ongoing empiric cefepime " "(since 1/19/23, for non-neutropenic febrility), acyclovir prophylaxis (with a positive HSV-1 serology on 1/20/23), and micafungin 50 mg prophylaxis (both since admission).  With today's fever and a new inferior right abdominal (nontender) erythematous rash, empiric IV vancomycin was started this midday.  He has also required increased supplemental oxygen today, now up to ten iters by Ox[employer] with increased dyspnea, so is being transferred to the [facility].  Previously, over the weekend, his oxygenation improved with diuresis.  He received day two of low-dose cytarabine today (given subcutaneous into the abdomen) which was started on 1/22/23.  He continues to complain of mid-to-lower back and now also has shoulder pain.  He continues to be fatigued, generally weak (but is ambulatory) and has a weak appetite.  He lacks new EENT symptoms, cough, chest pain, nausea, abdominal pain, other new myalgias / arthralgias, rash elsewhere, headache, or additional new complaints.  A 1/21/23 procalcitonin was moderately high at 1.09 on 1/21/23 but is down to 0.67 today (difficult to interpret in the setting of blasting leukemia).  A 1/21/23 NTpBNP was normal at 499.  An initial 1/21/23 serum CRP was 242.00 but that is decreased to 151.00 today and his lactic acid is normal at 1.7.  His peripheral WBC has increased to 17.4 with a higher peripheral blast (\"other cells\") count of 10.6.  The serum LDH is up to 1,706 today.  Blood cultures from 1/19/23 x 2 and 1/23/23 x 2 are without growth to date.  Histoplasma serum antigen, Legionella urine antigen, Fungitell, galactomannan antigen, serum CRAG, serum Treponema antibody, HHV-6 PCR, and Quantiferon Gold assays from 1/20 - 21/23 are all negative.  A Blastomyces antigen assay is pending.  A 1/21/23 AFB blood culture is without growth to date.  A 1/22/23 sputum culture was orally contaminated.  A 1/19//23 urine culture was negative.  Today's 1/23 23 chest x-ray shows " "cardiomegaly, diffuse mixed infiltrates, and small pleural effusions suggestive of pulmonary edema.  He has not yet had a repeat thoracic / lumbar spine MRI scan (althoug no infection was apparent on the prior 12/17/23 scan).        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  He was discharged from that initial hospitalization on 12/29/22 on levofloxacin, acyclovir, and posaconazole prophylaxis and initially felt well at home.  A post-therapy 1/13/23 bone marrow biopsy pathology / flow cytometry showed ~ 67 - 70% persistent blasts. He was seen for follow-up in the Hematology-Oncology Clinic on 1/19/23 where he was noted on check-in to be febrile to 100.5 degrees F and hypoxic (O2 saturations 77 - 87% on room air, requiring up to four liters of supplemental oxygen by nasal cannula).  In clinic, he complained of low-grade fevers over a week, sweats, exertional and resting dyspnea, markedly wosened back pain over the preceding two days, nausea / emesis, abdominal bloating, diarrhea / constipation, some dysphagia, and increased bilateral legs edema despite use of compression hose.  The back pain was bilateral across his back at the lower-thoracic / upper lumbar levels (at present, he is having trouble localizing it) and was the worst he has ever had (\"so bad I could not think\"), although in location and quality similar to about three prior episodes over the past two months.  He was admitted to the [facility] Hematology-Oncology service on 1/19/23 afternoon for evaluation " "of the fever and hypoxia and for planning for an attempt at re-induction chemotherapy.  A triple lumen PICC was placed upon admission in anticipation of new chemotherapy.  Admission (post antibiotic) blood cultures x 2 are without growth to date and admission urinalysis / urine culture, nares MRSA PCR screen, nasopharyngeal respiratory pathogen PCR panel and SARS CoV-2 PCR assays were all negative.  A 1/19/23 ferritin was 7,289 with an LDH of 2,380 (versus 409 on 12/29/22) and a uric acid of 8.1.  Upon admission, he was placed on wholly-empiric IV vancomycin (despite no history of MRSA carriage) and cefepime, as well as acyclovir and micafungin 50 mg per day prophylaxis.  His initial (1/19/23 AM) fever in clinic of 100.5 degrees resolved and he has been afebrile (T max 99.2 degrees F) over the past 24 hours since admission.  He had an admission leukocytosis of 15.3 (ANC 1.8, never below 1.5; ALC 1.6, never below 0.5) on 1/19/23 midday which decreased to 11.7 this AM (majority \"other cells\" on differentials), and an admission lactic acid of 2.9 which is down to 2.0 this AM.  A 1/19/23 chest CT angiogram and a 1/20/23 abdominal CT scan showed possible pulmonary hypertension, bilateral basilar lung air trapping, renal cysts, inguinal lymphadenopathy, and hepatosplenomegaly, but no acute pulmonary embolus, intra-abdominal collections or other evident new infectious foci.  A 1/20/23 TTE was basically normal except for possible high RA pressure.  His oxygenation has improved some today after bumetanide was given.  He has remained hemodynamically stable.  Serum Fungitell / serum galactomannan antigen / sputum culture / urine Blastomyces / urine Histoplasma / urine Legionella / sputum Pneumocystis jirovecii were all ordered this afternoon.  A pRBC transfusion was given this morning.  Re-induction 7 + 3 chemotherapy with low-dose cytarabine is planned, but he is undergoing preliminary evaluations -- including by Pulmonary " "and Cardiology Consult -- before beginning that.  He has some increased dyspnea over night last night (with a Rapid Response called), but that is improved and roughly back to his admission oxygenation status today.  He continues to complain of back / hip pain (although that is mostly resolved by evening today), resting dyspnea, fatigue, mild anorexia, mild malaise, and abdominal bloating today, but no recent chills or sweats, and no new complaints today including EENT symptoms, dysuria, rash or headache.  He has not had diarrhea since admission.  Transplant ID is consulted regarding the admission non-neutropenic fever and hypoxia.    Exposure History:  Lives alone, mostly has contact with his sister and an older woman caregiver who have been healthy, no recent sick contacts, no contact with young children (last saw his twelve year old grand-niece a number of weeks ago.  On disability from his job as a [employer]  since early 12/2022 so not out in public much since then.  No pets, no animal habitat exposures in the past couple of years. Lat out of [place] for a three day [employer] rally in [facility] about a year ago.  [place] in [place] in [place] past years and in [facility] [place] once.  Otherwise never outside of [place].  No known history of exposure to tuberculosis in his life. Very infrequently (last about three years ago) eats a semi-raw beef \"tiger burger\", but no other raw / unpasteurized ingestions.  Drinks filtered water purchased at grocery store.      Review of Systems:  CONSTITUTIONAL:  Admission fever of 100.5 on 1/19/23 AM, with recent low-grade fevers and sweats over a week.  They sweats (espeically back of neck and intertriginous areas) remain, but no fever or chills since admission.  He has never had rigors or profound drenching sweats.  Marked progressive fatigue and mild anorexia over the past week.  EYES: No eye pain, visual changes, or scleral icterus.  ENT:  Has " dysphagia with eating.  No rhinorrhea, sinus pain, otalgia, hearing loss, tinnitus, sore throat, or oral pain.  LYMPH:  Chronic bilateral leg edema (with venous statis).  Inguinal lymphadenopathy by CT scan.  RESPIRATORY:  Recent exertional / resting dyspnea.  COPD.  No recent increased cough or increased sputum.  History of PE / DVTs now on Lovenox.  CARDIOVASCULAR:  No chest pain, palpitations.  GASTROINTESTINAL:  Recent nausea, vomiting, diarrhea (not current) and constipation with abdominal bloating, presently controlled, but no overt abdominal pain..  GENITOURINARY:  No dysuria.  HEME:  Leukemia with blasts.  Significant persistent anemia and progressive moderate thrombocytopenia.  No easy bruising.  ENDOCRINE:  Type 2 diabetes mellitus.  Morbid obesity.  MUSCULOSKELETAL:  Severe lumbar back on 1/18 - 19/23 and left hip pain, now much better today.  No other new myalgias / arthralgias.  SKIN:  New right lower abdominal erythematous nontender rash on 1/23/23.  No rash or pruritus.  NEURO:  No headache.  PSYCH:  Negative.    Past Medical History:   Diagnosis Date     COPD (chronic obstructive pulmonary disease) (H)      Diabetes mellitus, type 2 (H)      Gout      History of pulmonary embolism      HLD (hyperlipidemia)      HTN (hypertension)      Past Surgical History:   Procedure Laterality Date     PICC DOUBLE LUMEN PLACEMENT Right 12/16/2022    Right brachial-medial vein 48cm total 1cm external.Placement verified by [facility].PICC okay to use.     PICC TRIPLE LUMEN PLACEMENT Right 01/19/2023    5FR TL PICC basilic vein. L-50cm 2cm out     VT REVISE TOTAL HIP REPLACEMENT Bilateral      TOTAL SHOULDER REPLACEMENT Right        Social History     Tobacco Use     Smoking status: Former     Years: 40.00     Types: Cigarettes     Smokeless tobacco: Never     Tobacco comments:     Quit around 2016   Substance Use Topics     Alcohol use: Not Currently     Comment: daily, 3-4 drink liquor. last drink Dec 10 2022    Lives alone in his home near [place], [place].  Has two supportive sisters.         Current Medications & Allergies:       acyclovir  400 mg Oral BID     allopurinol  300 mg Oral BID     ammonium lactate   Topical Daily     budesonide  0.5 mg Nebulization BID     bumetanide  1 mg Intravenous Once     [Held by provider] bumetanide  1 mg Oral Daily     calcium acetate (phos binder)  1,334 mg Oral TID w/meals     ceFEPIme  2 g Intravenous Q8H     cytarabine (CYTOSAR) infusion  20 mg Subcutaneous Q12H     enoxaparin ANTICOAGULANT  80 mg Subcutaneous Q12H     heparin lock flush  5-20 mL Intracatheter Q24H     heparin lock flush  5-20 mL Intracatheter Q24H     insulin aspart  1-7 Units Subcutaneous TID AC     insulin aspart  1-5 Units Subcutaneous At Bedtime     ipratropium - albuterol 0.5 mg/2.5 mg/3 mL  3 mL Nebulization 4x daily     micafungin  50 mg Intravenous Q24H     pantoprazole  40 mg Oral QAM AC     polyethylene glycol  17 g Oral BID     senna-docusate  2 tablet Oral BID     sodium chloride (PF)  10-40 mL Intracatheter Q8H     sodium chloride (PF)  10-40 mL Intracatheter Q8H     vancomycin  2,000 mg Intravenous Once     Infusions/Drips:      - MEDICATION INSTRUCTIONS -       Allergies   Allergen Reactions     Metformin Unknown          Physical Exam:     Patient Vitals for the past 24 hrs:   BP Temp Temp src Pulse Resp SpO2 Weight   01/23/23 1303 -- -- -- -- -- 94 % --   01/23/23 1300 108/49 -- -- 80 22 94 % --   01/23/23 1241 125/59 -- -- 89 -- 93 % --   01/23/23 1204 129/58 (!) 101.6  F (38.7  C) Oral 89 20 93 % --   01/23/23 1110 123/54 99.9  F (37.7  C) Oral 94 20 95 % --   01/23/23 1030 -- -- -- -- -- 91 % --   01/23/23 0940 -- -- -- -- -- -- (!) 178.4 kg (393 lb 6.4 oz)   01/23/23 0901 -- -- -- -- -- 96 % --   01/23/23 0550 121/66 99.7  F (37.6  C) Oral 83 20 93 % --   01/23/23 0430 -- -- -- -- -- 91 % --   01/23/23 0204 123/68 98.6  F (37  C) Oral 85 22 90 % --   01/22/23 2227 125/67 98  F (36.7  C) Oral 79  24 90 % --   01/22/23 1800 -- -- -- -- -- 95 % --   01/22/23 1738 103/77 99  F (37.2  C) Oral 79 22 93 % --   01/22/23 1507 105/47 98.2  F (36.8  C) Oral 79 24 93 % --     Ranges for vital signs over the past 24 hours:   Temp:  [98  F (36.7  C)-101.6  F (38.7  C)] 101.6  F (38.7  C)  Pulse:  [79-94] 80  Resp:  [20-24] 22  BP: (103-129)/(47-77) 108/49  FiO2 (%):  [60 %] 60 %  SpO2:  [90 %-96 %] 94 %  Vitals:    01/21/23 1111 01/22/23 1045 01/23/23 0940   Weight: (!) 171.7 kg (378 lb 9.6 oz) (!) 172.7 kg (380 lb 12.8 oz) (!) 178.4 kg (393 lb 6.4 oz)     Intake/Output Summary (Last 24 hours) at 1/23/2023 1356  Last data filed at 1/23/2023 1015  Gross per 24 hour   Intake 1260 ml   Output 825 ml   Net 435 ml     Physical Examination:  GENERAL:  Awake, conversant, fatigued and chronically-ill appearing, 62 year old man in bed in [facility].  HEAD:  NCAT.  EYES:  EOMI, PERRL, anicteric sclerae without conjunctival injection.  ENT:  External auditory canals patent without discharge.  Nasal O2 cannula at 5 - 6 liters present.  Oropharynx is moist without exudates or ulcers.  NECK:  Supple.  LYMPH:  No cervical lymphadenopathy  LUNGS:  Clear to auscultation bilaterally.  CARDIOVASCULAR:  Regular rate and rhythm, normal S1, S2, without murmur, gallop, or rub.  ABDOMEN:  Normal bowel sounds, soft, nontender.  BACK:  Lumbar back tenderness.  No CVA tenderness.  :  No Black.  EXTREMITIES:  Distally warm, 2+ bipedal edema.  SKIN:  New right inferior nontender mildly warm abdominal rash without peripheral cellulitic blanching.  No acute rash or lesion elsewhere.  Scattered healing ecchymoses; bilateral anterior calf venous stasis.  PICC line (placed 1/19/23) site lacks inflammation.  NEUROLOGIC:  Alert, oriented, moves extremities x 4.         Laboratory Data:     Inflammatory Markers    Recent Labs   Lab Test 01/23/23  0557 01/21/23  0616 12/17/22  0617   .00* 242.00*  --    CHEN  --   --  0.7     Immune Globulin Studies   No  lab results found.    Metabolic Studies       Recent Labs   Lab Test 01/23/23  1207 01/23/23  1124 01/23/23  0557 01/22/23  2204 01/22/23 1805 01/20/23 2121 01/20/23 1810 01/20/23 1746 01/20/23 0528   NA  --   --  137  --  133*  133*   < > 136  --  136   POTASSIUM  --   --  3.8  --  3.5  3.5   < > 3.6  3.6  --  3.4   CHLORIDE  --   --  100  --  97*  97*   < > 100  --  100   CO2  --   --  23  --  22  22   < > 22  --  20*   ANIONGAP  --   --  14  --  14  14   < > 14  --  16*   BUN  --   --  10.3  --  11.4  11.4   < > 16.2  --  17.6   CR  --   --  0.85  --  0.89  0.89   < > 1.04  --  1.11   GFRESTIMATED  --   --  >90  --  >90  >90   < > 82  --  76   GLC  --  105* 97   < > 115*  115*   < > 110*   < > 105*   ZEHRA  --   --  10.0  --  9.8  9.8   < > 9.4  --  9.2   PHOS  --   --  3.4  --  3.3   < > 4.5  --  4.9*   MAG  --   --  1.6*  --   --    < >  --   --  1.7   LACT 2.3*  --   --   --   --   --   --   --  2.0   PCAL  --   --  0.67*  --   --    < >  --   --   --    FGTL  --   --   --   --   --   --  <31  --   --     < > = values in this interval not displayed.     Hepatic Studies    Recent Labs   Lab Test 01/23/23  0557 01/22/23 1805 01/20/23 1810 01/20/23 0528 12/11/22  0701 12/09/22 2013   BILITOTAL  --  0.5  --  0.7   < > 0.6   DBIL  --   --   --  0.26  --   --    ALKPHOS  --  216*  --  169*   < > 96   PROTTOTAL  --  6.5  --  6.4   < > 7.7   ALBUMIN  --  2.9*  --  2.9*   < > 3.5   AST  --  38  --  43   < > 72*   ALT  --  <5*  --  <5*   < > 5*   LDH 1,706* 1,407*   < > 1,897*   < > 2,475*   GGT  --   --   --   --   --  77*    < > = values in this interval not displayed.     Pancreatitis testing    Recent Labs   Lab Test 12/16/22  1044 12/12/22  0610 12/11/22  1222   LIPASE 36 37 70*     Gout Labs      Recent Labs   Lab Test 01/23/23  0557 01/22/23  1805 01/22/23  0550 01/21/23  1811 01/21/23  0616   URIC 6.0 6.3 6.7 6.1 6.1     Hematology Studies   Recent Labs   Lab Test 01/23/23  0557 01/22/23  1805  01/22/23  0550 01/21/23  0616   WBC 17.4* 13.6* 11.6* 10.5   ANEU 1.9 2.4 1.5* 1.7   ALYM 0.7* 1.9 2.4 2.0   LEODAN 3.5* 2.9* 0.9 1.2   AEOS 0.0 0.0 0.0 0.0   HGB 7.7* 7.8* 6.9* 6.7*   HCT 25.8* 26.1* 23.8* 22.7*   PLT 40* 41* 40* 54*     Clotting Studies    Recent Labs   Lab Test 01/23/23  0557 01/22/23  0550 01/21/23  0616 01/20/23  0528 01/19/23  1412   INR 1.23* 1.19* 1.23* 1.29* 1.28*   PTT  --   --   --   --  40*     Iron Testing    Recent Labs   Lab Test 01/23/23  0557 01/19/23  1412 01/19/23  1026   IRON  --   --  182*   FEB  --   --  204*   IRONSAT  --   --  89*   REGI  --   --  7,289*   MCV 92   < > 91   FOLIC  --   --  2.4*   B12  --   --  232   HAPT  --   --  233*   RETP  --   --  1.8   RETICABSCT  --   --  0.049    < > = values in this interval not displayed.      Thyroid Studies   No lab results found.    Invalid input(s): FT4    Urine Studies     Recent Labs   Lab Test 01/19/23  1828   URINEPH 5.0   NITRITE Negative   LEUKEST Negative   WBCU 0     CSF testing     Recent Labs   Lab Test 12/12/22  1431   CWBC 0   CRBC 3*   CGLU 85*   CTP 61.4*     Body fluid stats  No lab results found.    Microbiology:    Fungal testing  Recent Labs   Lab Test 01/20/23  1810   FGTL <31   FGTLI Negative   ASPGAI 0.05   ASPGAA Negative       Last Culture results   Culture   Date Value Ref Range Status   01/22/2023   Final    >10 Squamous epithelial cells/low power field indicates oral contamination. Please recollect.   01/21/2023 No growth after 1 day  Preliminary   01/19/2023 No Growth  Final   01/19/2023 No growth after 3 days  Preliminary   01/19/2023 No growth after 3 days  Preliminary   12/16/2022 No Growth  Final   12/16/2022 No Growth  Final         Last checks of Clostridioides difficile testing  No lab results found.    Syphilis Testing    Treponema Antibody Total   Date Value Ref Range Status   01/21/2023 Nonreactive Nonreactive Final       Quantiferon testing   Recent Labs   Lab Test 01/23/23  0557 01/22/23  1809  01/22/23  0550 01/21/23  0952   TBRES  --   --   --  Negative   LYMPH 4 14   < >  --     < > = values in this interval not displayed.     Infection Studies to assess Diarrhea  No lab results found.    Invalid input(s): NNDMRESULT    Virology:    Coronavirus-19 testing    Recent Labs   Lab Test 12/09/22  2115 11/29/20  1529 11/29/20  0600   [id number] Negative  --   --    COVIDPCREXT  --  Positive* Detected*  Detected*     Respiratory virus (non-coronavirus-19) testing    Recent Labs   Lab Test 01/19/23  1622   IFLUA Not Detected   FLUAH1 Not Detected   IM6785 Not Detected   FLUAH3 Not Detected   IFLUB Not Detected   PIV1 Not Detected   PIV2 Not Detected   PIV3 Not Detected   PIV4 Not Detected   RSVA Not Detected   RSVB Not Detected   HMPV Not Detected   ADENOV Not Detected   FUNES Not Detected     CMV viral loads  No lab results found.    HHV-6 DNA copies/mL   Date Value Ref Range Status   01/21/2023 Not Detected Not Detected copies/mL Final       No results found for: EBVRESINST, 70141, EBQTC, EBRES, 28961, 70882    No results found for: 04734, BKRES    Parvovirus Testing  No lab results found.    Invalid input(s): PRVRES    Hepatitis B Testing     Recent Labs   Lab Test 12/10/22  0623   AUSAB 0.45   HBCAB Nonreactive   HEPBANG Nonreactive       Hepatitis C Antibody   Date Value Ref Range Status   12/10/2022 Nonreactive Nonreactive Final     CMV Antibody IgG   Date Value Ref Range Status   12/11/2022 No detectable antibody. No detectable antibody.  Final     CMV Antibody IgM   Date Value Ref Range Status   12/11/2022 Negative Negative Final     EBV Capsid Antibody IgG   Date Value Ref Range Status   12/11/2022 Positive (A) No detectable antibody. Final     Comment:     Suggests recent or past exposure.     Herpes Simplex Virus Type 1 IgG Antibody   Date Value Ref Range Status   01/20/2023 Positive.  IgG antibody to HSV-1 detected. (A) No HSV-1 IgG antibodies detected Final     Herpes Simplex Virus Type 2 IgG  Antibody   Date Value Ref Range Status   2023 No HSV-2 IgG antibodies detected. No HSV-2 IgG antibodies detected Final     EBV Capsid Antibody IgM   Date Value Ref Range Status   2022 No detectable antibody. No detectable antibody. Final     Imagin/23 CXR:  Cardiomegaly with diffuse mixed pulmonary opacities. Findings most suggestive of pulmonary edema.  Small bilateral pleural effusions.   TTE:  Left ventricular size, wall motion and function are normal. The ejection fraction is 60-65%.  Right ventricular function, chamber size, wall motion, and thickness are normal.  No significant valvular abnormalities present.  Dilation of the inferior vena cava is present with abnormal respiratory variation in diameter suggests a high RA pressure estimated at 15 mmHg or greater.  This study was compared with the study from 12/10/2022. IVC is dilated today.  No other change.   Abdm CT:  1. Hepatosplenomegaly.  2. No intra-abdominal collection. No significant enlarged retroperitoneal, mesenteric lymph nodes.  3. Enlarged inguinal lymph nodes, possibly reactive.  4.  Bilateral renal cortical hypodensities, compatible with renal cyst and additional too small to characterize renal hypodensities, may consider attention on follow-up.   Chest CT angiogram:  1. Exam is negative for acute pulmonary embolism. No right heart strain.  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  2. Pulmonary arteries enlarged and measures up to 3.7 cm comment this is nonspecific but most often seen with pulmonary arterial hypertension, possibly CTEPH.  3. Mosaic attenuation in the lung bases is most often seen with air trapping or perfusional abnormalities.  4. 6 mm solid pulmonary nodule in the left lung base cannot be reimaged in one year's time with a noncontrast chest CT.  5. Splenomegaly.    22 Thoracic spine MRI:  Diffuse bone marrow  reconversion can be seen in the setting of hematologic malignancy. Scattered subtle mottled enhancement of the bones most pronounced in the left seventh rib,could be secondary to underlying primary disease. No cord compression or myelopathic cord signal.

## 2023-01-23 NOTE — PROGRESS NOTES
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 Significant oxygen requirements on admission 40L HFNC now down to 5-7L NC. 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 Echo negative for right heart strain, NT Pro BNP not significantly elevated at 499. Broad infectious workup negative for opportunistic bacterial or fungal source. ID following.    The source of his hypoxia is likely multifactorial to include anemia secondary to his AMML, as well as some component of fluid overload. PH is a possible component be he will need outpatient evaluation to confirm. Absence of wheezing, productive cough makes a COPD exacerbation unlikely. Patient with significant dependent pitting edema, and slight crackles on ascultation suggestive of fluid overload. Patient would benefit from diuresis.         Plan d/w Dr. Yoni M.D., who is in agreement.    Chart documentation was completed, in part, with [employer] voice-recognition software. Even though reviewed, some grammatical, spelling, and word errors may remain.    Irma Neely MD  Pulmonary & Critical Care Fellow      Interval History    - Nursing notes  reviewed.   - Nursing noted decreased appetite (missing 2 meals yesterday)   - His respiratory symptoms are waking and waning    - Denied worsening cough or sputum production    Physical Exam Temp:  [97.6  F (36.4  C)-99.7  F (37.6  C)] 99.7  F (37.6  C)  Pulse:  [79-88] 83  Resp:  [20-24] 20  BP: (102-159)/() 121/66  SpO2:  [90 %-96 %] 91 %  I/O last 3 completed shifts:  In: 1260 [P.O.:960]  Out: 1400 [Urine:1400]  Wt Readings from Last 1 Encounters:   01/23/23 (!) 178.4 kg (393 lb 6.4 oz)    Body mass index is 51.9 kg/m . Resp: 20      Exam:  General: NAD  HEENT: MMM  CV: RRR, no murmur, click, rub  Resp: diminished b/l air entry, no wheezing. Slight crackles noted. No cough  Abd: Soft, ND.  Extremities: +2 LE pitting edema.  Neuro: AAOx3, no focal deficits, blunted affect    Data   Labs: reviewed in EMR and notable labs listed below.  CMP:   Recent Labs   Lab 01/23/23  0557 01/23/23  0204 01/22/23  2204 01/22/23  1805 01/22/23  1415 01/22/23  1212 01/22/23  0616 01/22/23  0550 01/21/23  2208 01/21/23  1811 01/21/23  0844 01/21/23  0616 01/20/23  1746 01/20/23  0528 01/19/23  1837 01/19/23  1412 01/19/23  1026     --   --  133*  133*  --   --   --  132*  --  134*  --  137   < > 136  --  138  137 139   POTASSIUM 3.8  --   --  3.5  3.5  --  3.7  --  3.4  --  3.3*  --  3.6   < > 3.4  --  3.7  3.7 3.6   CHLORIDE 100  --   --  97*  97*  --   --   --  96*  --  97*  --  100   < > 100  --  100  99 100   CO2 23  --   --  22  22  --   --   --  21*  --  22  --  22   < > 20*  --  16*  17* 23   ANIONGAP 14  --   --  14  14  --   --   --  15  --  15  --  15   < > 16*  --  22*  21* 16*   GLC 97 102* 100* 115*  115*   < >  --    < > 99   < > 114*   < > 115*   < > 105*   < > 102*  100* 117*   BUN 10.3  --   --  11.4  11.4  --   --   --  12.4  --  12.5  --  14.7   < > 17.6  --  17.9  18.6 17.7   CR 0.85  --   --  0.89  0.89  --   --   --  1.01  --  0.95  --  0.98   < > 1.11  --  1.21*  1.13 1.17    GFRESTIMATED >90  --   --  >90  >90  --   --   --  85  --  >90  --  88   < > 76  --  69  74 71   ZEHRA 10.0  --   --  9.8  9.8  --   --   --  9.4  --  9.7  --  9.4   < > 9.2  --  9.5  9.7 9.6   MAG 1.6*  --   --   --   --   --   --  1.8  1.6*  --   --   --  1.7  --  1.7  --  1.8 1.9   PHOS 3.4  --   --  3.3  --   --   --  4.1  4.0  --  3.5  --  4.3   < > 4.9*  --  4.3 4.9*   PROTTOTAL  --   --   --  6.5  --   --   --   --   --   --   --   --   --  6.4  --  6.9 7.3   ALBUMIN  --   --   --  2.9*  --   --   --   --   --   --   --   --   --  2.9*  --  3.2* 3.4*   BILITOTAL  --   --   --  0.5  --   --   --   --   --   --   --   --   --  0.7  --  0.8 0.9   ALKPHOS  --   --   --  216*  --   --   --   --   --   --   --   --   --  169*  --  194* 214*   AST  --   --   --  38  --   --   --   --   --   --   --   --   --  43  --  54* 51*   ALT  --   --   --  <5*  --   --   --   --   --   --   --   --   --  <5*  --  <5* 6*    < > = values in this interval not displayed.     CBC:   Recent Labs   Lab 01/23/23  0557 01/22/23  1805 01/22/23  0550 01/21/23  0616   WBC 17.4* 13.6* 11.6* 10.5   RBC 2.82* 2.85* 2.55* 2.48*   HGB 7.7* 7.8* 6.9* 6.7*   HCT 25.8* 26.1* 23.8* 22.7*   MCV 92 92 93 92   MCH 27.3 27.4 27.1 27.0   MCHC 29.8* 29.9* 29.0* 29.5*   RDW 20.2* 20.1* 20.8* 21.3*   PLT 40* 41* 40* 54*       INR:   Recent Labs   Lab 01/23/23  0557 01/22/23  0550 01/21/23  0616 01/20/23  0528   INR 1.23* 1.19* 1.23* 1.29*       Glucose:   Recent Labs   Lab 01/23/23  0557 01/23/23  0204 01/22/23  2204 01/22/23  1805 01/22/23  1415 01/22/23  0616   GLC 97 102* 100* 115*  115* 93 95       Blood Gas: No lab results found in last 7 days.    Culture Data   7-Day Micro Results     Collected Updated Procedure Result Status      01/22/2023 1133 01/22/2023 1516 Respiratory Aerobic Bacterial Culture with Gram Stain [[id number]]   Sputum from Expectorate    Final result Component Value   Culture >10 Squamous epithelial cells/low power field  indicates oral contamination. Please recollect.   Gram Stain Result >10 Squamous epithelial cells/low power field    <25 PMNs/low power field    2+ Mixed juany               01/21/2023 0952 01/22/2023 1403 Acid-fast Bacilli (AFB) Blood Culture [[id number]]   Blood from Line, venous    Preliminary result Component Value   Culture No growth after 1 day  [P]                01/21/2023 0952 01/21/2023 1242 Cryptococcus antigen [[id number]]   Blood from Red Lumen    Final result Component Value   Cryptococcal Antigen Negative            01/21/2023 0952 01/22/2023 1206 Quantiferon TB Gold Plus Grey Tube [[id number]]   Blood from Red Lumen    Final result Component Value Units   Quantiferon Nil Tube 0.02 IU/mL            01/21/2023 0952 01/22/2023 1207 Quantiferon TB Gold Plus Green Tube [[id number]]   Blood from Red Lumen    Final result Component Value Units   Quantiferon TB1 Tube 0.03 IU/mL            01/21/2023 0952 01/22/2023 1226 Quantiferon TB Gold Plus Yellow Tube [[id number]]   Blood from Red Lumen    Final result Component Value   Quantiferon TB2 Tube 0.02            01/21/2023 0952 01/22/2023 1303 Quantiferon TB Gold Plus Purple Tube [[id number]]   Blood from Red Lumen    Final result Component Value Units   Quantiferon Mitogen 2.44 IU/mL            01/21/2023 0952 01/22/2023 1358 Quantiferon TB Gold Plus [[id number]]   Blood from Red Lumen    Final result Component Value Units   Quantiferon-TB Gold Plus Negative    No interferon gamma response to M.tuberculosis antigens was detected. Infection with M.tuberculosis is unlikely, however a single negative result does not exclude infection. In patients at high risk for infection, a second test should be considered in accordance with the 2017 ATS/IDSA/CDC Clinical Pract  ice Guidelines for Diagnosis of Tuberculosis in Adults and Children    TB1 Ag minus Nil Value 0.01 IU/mL   TB2 Ag minus Nil Value 0.00 IU/mL   Mitogen minus Nil Result 2.42 IU/mL   Nil  Result 0.02 IU/mL            01/20/2023 1810 01/23/2023 0154 1,3 Beta D glucan fungitell [[id number]]   Blood from Line, venous    Final result Component Value Units   (1,3)-Beta-D-Glucan <31 pg/mL   B-D GLUCAN INTERPRETATION (1,3) Negative    INTERPRETIVE INFORMATION: (1,3)-beta-D-glucan (Fungitell)      Less than 31 pg/mL ................... Negative    31-59 pg/mL .......................... Negative    60-79 pg/mL .......................... Indeterminate    Greater than or equal to 80 pg/mL .... Positive    The Fungitell test is indicated for presumptive diagnosis   of fungal infection and should be used in conjunction with   other diagnostic procedures. This test does not detect   certain fungal species such as Cryptococcus, which produce   very low levels of (1,3)-beta-D-glucan. This test will not   detect the zygomycetes, such as Absidia, Mucor, and   Rhizopus, which are not known to produce   (1,3)-beta-D-glucan. In addition, the yeast phase of   Blastomyces dermatitidis produces little   (1,3)-beta-D-glucan and may not be detected by the assay.  Performed By: [facility]  [address] [place] [id number]  : Allen Falk MD, PhD            01/20/2023 1810 01/23/2023 0545 Aspergillus Galactomannan Antigen [[id number]]   Blood from Line, venous    Final result Component Value   Aspergillus Galactomannan Index 0.05   Aspergillus Galact AG Negative   INTERPRETIVE INFORMATION: Aspergillus Galactomannan Antigen   by EIA  Negative results do not exclude the diagnosis of invasive  aspergillosis. A single positive test result (index equal  to or greater than 0.5) should be clinically correlated  by testing a separate serum specimen because many agents  (e.g. foods, antibiotics) may cross-react with the test.  If invasive aspergillosis is suspected in high-risk  patients, serial sampling is recommended.  Performed By: [facility]  [address] [place]  [address]  : Allen Falk MD, PhD            01/20/2023 1736 01/20/2023 1812 Histoplasma Galactomannan Antigen Quant by EIA, Urine [[id number]]   Urine, Clean Catch    In process Component Value   No component results            01/20/2023 1736 01/20/2023 1813 Blastomyces Agn Quant EIA Non Blood [[id number]]   Urine, Clean Catch    In process Component Value   No component results            01/20/2023 1736 01/20/2023 2102 Legionella pneumophila antigen urine [[id number]]   Urine, NOS    Final result Component Value   Legionella pneumophila serogroup 1 urinary antigen Negative   Suggests no recent or current infection. Infection due to Legionella cannot be ruled out, since other serogroups and species may cause disease, antigen may not be present in urine in early infection, and the level of antigen present in the urine may be below detectable limits of the test.            01/19/2023 1828 01/21/2023 1134 Urine Culture Aerobic Bacterial [[id number]]   Urine, Midstream    Final result Component Value   Culture No Growth               01/19/2023 1622 01/19/2023 1915 Respiratory Panel PCR [[id number]]    Swab from Nasopharyngeal    Final result Component Value   Adenovirus Not Detected   Coronavirus Not Detected   This test detects Coronavirus 229E, HKU1, NL63 and OC43 but does not distinguish between them. It does not detect MERS ( Respiratory Syndrome), SARS (Severe Acute Respiratory Syndrome) or 2019-nCoV (Novel 2019) Coronavirus.   Human Metapneumovirus Not Detected   Human Rhin/Enterovirus Not Detected   Influenza A Not Detected   Influenza A, H1 Not Detected   Influenza A 2009 H1N1 Not Detected   Influenza A, H3 Not Detected   Influenza B Not Detected   Parainfluenza Virus 1 Not Detected   Parainfluenza Virus 2 Not Detected   Parainfluenza Virus 3 Not Detected   Parainfluenza Virus 4 Not Detected   Respiratory Syncytial Virus A Not Detected   Respiratory Syncytial  Virus B Not Detected   Chlamydia Pneumoniae Not Detected   Mycoplasma Pneumoniae Not Detected            01/19/2023 1622 01/19/2023 1841 MRSA MSSA PCR, Nasal Swab [[id number]]    Swab from Nose    Final result Component Value   MRSA Target DNA Negative   SA Target DNA Negative            01/19/2023 1425 01/22/2023 1603 Blood Culture Arm, Left [[id number]]   Blood from Arm, Left    Preliminary result Component Value   Culture No growth after 3 days  [P]                01/19/2023 1412 01/22/2023 1603 Blood Culture Arm, Right [[id number]]   Blood from Arm, Right    Preliminary result Component Value   Culture No growth after 3 days  [P]                01/18/2023 1009 01/19/2023 1011 COVID-19 VIRUS (CORONAVIRUS) BY PCR (EXTERNAL RESULT) Final result Component Value   No component results                  Virology Data:   Lab Results   Component Value Date    FLUAH1 Not Detected 01/19/2023    FLUAH3 Not Detected 01/19/2023    OX6941 Not Detected 01/19/2023    IFLUB Not Detected 01/19/2023    RSVA Not Detected 01/19/2023    RSVB Not Detected 01/19/2023    PIV1 Not Detected 01/19/2023    PIV2 Not Detected 01/19/2023    PIV3 Not Detected 01/19/2023    HMPV Not Detected 01/19/2023       Urine Studies    Recent Labs   Lab Test 01/19/23  1828   URINEPH 5.0   NITRITE Negative   LEUKEST Negative   WBCU 0       Imaging: reviewed in EMR and notable imaging listed below.  1. Exam is negative for acute pulmonary embolism. No right heart  strain . 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     2. Pulmonary arteries enlarged and measures up to 3.7 cm comment this  is nonspecific but most often seen with pulmonary arterial  hypertension, possibly CTEPH     3. Mosaic attenuation in the lung bases is most often seen with air  trapping or perfusional abnormalities.     4. 6 mm solid pulmonary nodule in the left lung base cannot be  reimaged in one year's  time with a noncontrast chest CT.     5. Splenomegaly.      Medications     acyclovir  400 mg Oral BID     allopurinol  300 mg Oral BID     ammonium lactate   Topical Daily     budesonide  0.5 mg Nebulization BID     [Held by provider] bumetanide  1 mg Oral Daily     calcium acetate (phos binder)  1,334 mg Oral TID w/meals     ceFEPIme  2 g Intravenous Q8H     cytarabine (CYTOSAR) infusion  20 mg Subcutaneous Q12H     enoxaparin ANTICOAGULANT  80 mg Subcutaneous Q12H     heparin lock flush  5-20 mL Intracatheter Q24H     heparin lock flush  5-20 mL Intracatheter Q24H     insulin aspart  1-7 Units Subcutaneous TID AC     insulin aspart  1-5 Units Subcutaneous At Bedtime     ipratropium - albuterol 0.5 mg/2.5 mg/3 mL  3 mL Nebulization 4x daily     micafungin  50 mg Intravenous Q24H     pantoprazole  40 mg Oral QAM AC     polyethylene glycol  17 g Oral BID     senna-docusate  2 tablet Oral BID     sodium chloride (PF)  10-40 mL Intracatheter Q8H     sodium chloride (PF)  10-40 mL Intracatheter Q8H

## 2023-01-23 NOTE — PLAN OF CARE
Goal Outcome Evaluation:      Plan of Care Reviewed With: patient    Overall Patient Progress: no changeOverall Patient Progress: no change      5939-2624:  A&O x4.  Tmax 101.6 oral, gave PRN Tylenol and blood cultures collected, results pending temp decreased to 98.9 oral.  DEAN and SOB.  On High Flow Nasal Cannula FiO2 60% 40 LPM to maintain SpO2 88% and above.  OVSS.  Triggered SIRS.  Lactic acid 2.3, Code Sepsis called.  Stat Port CXR, 1 mg IV Bumex and vancomycin ordered.  New rash on abdomen, marked and PA-C aware.  C/o back and shoulder pain, given 10 mg PRN PO oxycodone x 1.  Given one time dose 10 mg IV dexamethasone for pain control.  Up with SBA, showered this morning prior to code sepsis.  Had a BM, assisted with nika cares, skin tear between buttocks cheeks/coccyx area and hemorrhoids noted.  Poor appetite, did not eat breakfast and lunch.  IR Consult placed for IVC filter placement.  Continues with IV ABX cefepime and neb treatments.  Continue to monitor and with POC.

## 2023-01-23 NOTE — PLAN OF CARE
Nursing Focus: Chemotherapy  D: Abdominal skin is clean/dry/intact. Urine output is recorded in intake in Doc Flowsheet.    I: Premedications given per order (see electronic medical administration record). Dose #2 of subcutaneous cytarabine given. Reviewed pt teaching on chemotherapy side effects.  Pt denies need for further teaching. Chemotherapy double checked per protocol by two chemotherapy competent RN's.   A: Tolerating procedure well. Denies nausea and or pain.   P: Continue to monitor urine output and symptoms of nausea. Screen for symptoms of toxicity.

## 2023-01-24 NOTE — PROGRESS NOTES
Nursing Focus: Chemotherapy    D:  Insertion site is clean/dry/intact, with no complaints of pain. Right side redness outlined by provider earlier and avoided.  Urine output is recorded in intake in Doc Flowsheet.      I: Dose #3 of Sub-Q Cytarabine injected in the left lateral side of abdomen (no skin discoloration at inj site). Reviewed pt teaching on chemotherapy side effects.  Pt denies need for further teaching. Chemotherapy double checked per protocol by two chemotherapy competent RN's.     A: Tolerated chemotherapy well. Denies nausea and or pain.     P: Continue to monitor urine output and symptoms of nausea. Screen for symptoms of toxicity.

## 2023-01-24 NOTE — PROGRESS NOTES
[facility]  Hematology / Oncology Progress Note  Date of Service (when I saw the patient): 01/24/2023     Assessment & Plan   Angel Cardenas is a 62 year old male with a history of multiple prior DVTs and PE in 2022 (previously on Xarelto), DM2, COPD, HTN, gout and recently diagnosed AMML s/p Cycle 1 of 10-days of Decitabine/Venetoclax (D1=12/19/22) with subsequent persistent disease on post-therapy BMBx 1/13/23 demonstrating ongoing ~70% blasts. He presents from clinic for consideration of re-induction chemotherapy in the setting of fever and AHRF. Due to poor clinical status and multiple co-morbidities, initiated 10-day low-dose Cherie-C (C1D1=1/22/23).     TODAY:   - Day 3 low dose Cytarabine 20mg BID today; plan for 10 day course. If plt down-trending further may need to switch to IV from subcutaneous administration. TLS labs stable, monitor daily.   - Last febrile 1/23 - BCx NGTD and infectious work-up remains largely negative, rash stable-improving. ID following.   - Discontinue Vanco. Continue Cefepime for now.   - Continue to monitor rash, stable to improving today  - Continue to follow blood cultures  - Back pain with mild improvement s/p Dex on 1/23, proceed with taper: 4 mg BID (1/24-1/25), 4 mg daily (1/26-1/27), done  - Also continue with pain control - Tylenol/Flexeril prn. Voltaren gel QID, Robaxin QID, Oxy 5-10 mg q4h prn, IV Dilaudid 0.5 mg q4h prn.   - Continue with bowel regimen - Senna and Miralax BID, MOM prn   - Currently requiring 5-10L O2, continue with pulm cares and appreciate pulm reccs  - Diuresis - Bumex 1 mg IV daily, may repeat dose PM for goal of net-negative 1-2L  - Continue scheduled nebs and Pulmicort BID  - O2 goals for sats >88% (hx COPD); titrate O2 appropriately  - Monitor daily weights, I/Os.   - Continue half-dose Lovenox (80 mg BID) for now. Monitor Plt count q12h and hold if Plt <30K.   - Continue with best supportive  "cares      HEME  # AMML with SRSF2, TET2 mutations  # Leukocytosis   Patient of Dr. Velasquez. In summary, presented to PCP with fatigue, subjective fevers, lack of appetite, weight loss, epistaxis and rib pain. Found to be pancytopenic with 6% circulating peripheral blasts (WBC 8.5 Hgb 8.8 and plts 82). Diagnostic BMBx 12/9 was consistent with AMML with 80 blasts, NGS: SRSF3 + TET2 mutations (on peripheral blood) conferring adverse risk, Cyto: 50-52,XY,+4,+5,+8,+13,add(13)(p11.2)x2,+18,+20[cp17]/,idemx2,+16,+16[cp3]. Diagnostic LP negative. Due to comorbidities started on Decitabine 10 days + Venetoclax 28 (C1D1 12/19/22). Hospital course was complicated by TLS requiring rasburicase, possible PNA, possible adrenal insufficiency/hypotension related to steroid taper (not requiring pressors) and back/rib pain flares. He discharged to home following completion of chemotherapy and post-therapy BMBx was completed on 1/13 which is notable for persistent pancytopenia showing hypoercellular marrow (90%) with marked decrease hematopoesis and 70% blasts. NGS/FISH/cyto is pending. He had a visit with Dr. Velasquez in clinic prior to admission on 1/19 to discuss next steps and consideration of re-induction.  - Per conversation with Dr. Velasquez PTA, considered intensive regimen with 7+3 vs G-CLAC. However, in the setting of significant hypoxia and multiple comorbidities will instead proceed with low dose cytarabine (C1D1=1/22/23), 20mg q12h and plan for a total 10-day course  - PICC placement in the setting of multiple lab draws, IV products and anticipation of upcoming chemotherapy  - Repeat pre-chemo work-up - normal EKG, echo with EF 60-65%  - CT chest PE/AP (1/19) - reveals splenomegaly, enlarged inguinal lymphadenopathy and hepatomegaly.   - Consider Hydrea if WBC continues to trend up. Of note, 62% \"other cells\" noted peripherally on 1/19.    - Dex taper as below: 10 mg IV x1 on 1/23 ? 4 mg BID (1/24-1/25) ? 4 mg daily " (1/26-1/27), then done.     Treatment Plan: low-dose Cherie-C (C1D1=1/22/23)  - Cytarabine 20 mg subcutaneous q12h - D1-10    # H/o PE (most recently Jan 2022)   # H/o multiple DVTs (1990s)  Per chart review he was seen for evaluation of PE by Dr. Amari Cortes (4/15/22). Patient has history of multiple DVTs in the 1990s without known cause, unprovoked PE at age 43, DVT with PE in context of hip replacement. He most recently was diagnosed with bilateral PE with right heart strain on 1/19/22 after presenting to the ED with SOB. Previously was on Xarelto though discontinued during hospitalization for induction chemotherapy. Transitioned to Lovenox and discharged on half-dosing d/t down-trending counts. Platelets appear to have recovered though are in the 100s on admission. Has continued half-dosing Lovenox without increase to full-dose in the outpatient setting. Presented this admission with fever and hypoxia (77% on RA) requiring 2-4L O2.   - Increase Lovenox to therapeutic dosing -- 0.75 mg BID (dosed based on weight) as d/w pharmacy. Xa level 1/21- slightly subtherapeutic at 0.47 (goal 0.5-1.0). Now decreased to half-dosing given down-trending platelets. Decreased dose by 50% and rounded up to 80mg BID in setting of previously subtherapeutic Xa level. Per discussion with Kelsey Amato and Ron, IR consulted on 1/23 for consideration of an IVC filter - not indicated unless active DVT/PE. Hold Lovenox if Plt <30K.   - CT PE protocol (1/19) is negative for PE and no R heart strain. Does reveal findings c/w chronic PE and possible pulmonary HTN. 6mm solid pulmonary nodules which should be monitored at least yearly.      # Risk for TLS  # Risk for DIC  # H/o gout  Leukocytosis noted on admission lab work with WBC 15.3 in the setting of known persistent leukemia. Phos mildly elevated to 4.9. Cr near baseline (0.-1.1) at 1.17. Uric acid 8.6 and LDH 2,242. He is s/p Rasburicase x1 with improvement in uric acid to 6.6. Of  note he does have h/o gout.   - PTA Allopurinol 300 mg BID (dose adjusted for weight)  - Start Phoslo 1,334 TID; held 1/23.  - Monitor TLS/DIC labs daily       # Anemia  # Thrombocytopenia   2/2 recent chemotherapy and underlying disease.   - Transfuse to maintain Hgb >7, Plt >10K  - Type & Screen MWF  - Blood consent signed on admission     ID  # Fever  # AHRF  # Abdominal bloating/discomfort   # Lactic acidosis, improved  Febrile in clinic to 100.5 on 1/19, also with associated AHRF with sats to 77% on admission. BP normotensive. Endorses progressive back pain as below along with SOB/DEAN worse over the past 1-2 days and abdominal bloating. He is having regular BMs. Denies other respiratory or infectious complaints. Lab work is notable for leukocytosis to 15 and mildly elevated LFTs (Alk 214 and AST 51). Lactic acid elevated to 2.9, later improved to 2.0 - presume Type B 2/2 progressive leukemia. Differential includes infection vs COPD exacerbation vs leukemia-related. CT chest PE and A/P (1/19) negative for infectious etiology. Infectious work-up has remained largely negative. ID is on board. Had repeat fever to 101.6 on 1/23 and continues IV antibiotics - repeat infectious work-up remains negative. CXR shows pulmonary edema. New rash to abdomen which is improving as of 1/24 and does not appear infectious per ID.   - ID consult 1/21 with no evidence of overt infection (see workup below) and recommendation to consider stopping IV antibiotics  - Continue to follow BCx; NGTD  - Continue Cefepime for now. Vancomycin now discontinued, monitor rash - improving on 1/24.       Antibiotics   - Cefepime (1/19 - x)   - Vancomycin (1/19 - 1/21 AM) (1/23-1/24)      Infectious studies (1/19-1/21)  - RVP - neg  - MRSA nares - neg  - UCx - neg  - BCx (1/19, 1/23) - NGTD  - Cryptococcal serum ag - neg  - CRP (1/19) 242 ? (1/23) 151  - Procal (1/19) 1.09 ? (1/23) 0.67  - PJP PCR- needs to be collected  - Sputum culture- pt unable  to produce sputum, but too unstable for induced sputum  - Legionella urine - negative  - Histo/Blasto - in process   - Fungitell, galactomannan - neg  - AFB culture - NGTD  - HHV6, serum - neg  - Quant gold- neg  - Treponema abs, serum - in process    # Prophylaxis  ANC is 1.8 on admission though c/f functional neutropenia in the setting of persistent leukemia and leukocytosis.   - PTA Acyclovir 400 mg BID (HSV1+2-)  - Hold PTA Levaquin 250 mg daily in the setting of empiric IV antibiotics above   - Hold PTA Posa 300 mg dialy, start Lynnette 50 mg daily with concurrent chemotherapy      CV/PULM  # COPD  # Orthopnea  # CATHERINE  Ongoing mild hypoxia to high 80s appears to be his baseline in the setting of his COPD PTA. Oxygen requirement to 4-5L on admission at rest (8-10L with activity or d/t positioning) with O2 sats in the 70s on RA. Reports SOB tends to flare with pain crises. Does desat at night.   - CT PE chest (negative) and antibiotics as above  - PTA Breo-Ellipta, on hold and replaced with Pulmicort 0.5mg BID  - DuoNebs QID   - Incentive spirometry   - PTA CPAP at night  - Pulm consult - too unstable for bronchoscopy given high O2 requirements. Agree with infectious workup as above and continued diuresis. We will hold off on an induced sputum on x1/21 as he is now on HFNC. Recommended ID and Cardiology consult - ?consider d/w cards RHC this admission if unable to wean O2.    - Cardiology consulted, hold off on bubble study this hospitalization. Would consider RHC on a non-urgent basis in outpatient setting.       # Chronic LE edema  # Pulmonary edema  # Small bilateral pleural effusions  # Chronic venous insufficiency   # Concern for pHTN  Patient endorses increased LE swelling on admission. Has home compression stockings in place. Of note, weight is up 20+ lb from recent discharge on 12/29. Repeat CXR on 1/23 demonstrates pulmonary edema and small bilateral pleural effusions.   - Continue with increased diuresis - 1  "mg IV daily (home dosing Bumex 1 mg PO daily). Consider repeat dosing to achieve goal of net- 1-2L per pulm.   - Lymphedema consulted   - Repeat echo as above with EF 60-65%, IVC dilated and suggestive of high RA pressure  - Cardiology consult in setting of possible pulmonary HTN on echo and CT imaging  - recommended no intervention or workup while inpatient as he does not have obvious pHTN on echo nor does he have RV dysfunction, and overall would not change our mgmt at this time. Cardiology also does not think an echo w/ bubble is necessary. Will refer to pulmonary HTN clinic outpatient for consideration of RHC and treatment.  - Consider thora if worsening effusions    # HTN  # HLD  Home Metoprolol and Lisinopril held during previous admission in the setting of hypotension and possible adrenal insufficiency. BP normotensive to mildly HTN on admission. Statin discontinued prior to admission given anticipation of chemotherapy.   - Continue to hold prior Metoprolol/Lisinopril for now; consider resuming as appropriate  - Continue to hold statin, consider resumption prior to discharge      MSK  # Acute on chronic back pain  Noted prior to hospitalization for induction chemotherapy. Presented with rib pain which radiates into his upper back and between his shoulder blades. MRI thoracic spine previously completed 12/17 and showed \"scattered subtle mottled enhancement of bones most pronounced on L 7th rib, could be 2/2 underlying disease process.\" It is possible that pt has some extramedullary infiltration of cortex causing pain. Endorses similar complaints this admission starting 1-2 days PTA in the setting of known persistent leukemia. No red flag sx. EKG on admission is normal.   - CT PE C/A/P as above; negative for source of acute pain   - PTA Voltaren gel QID, Robaxin QID  - PTA Oxycodone increased to 5-10 mg q4h prn. Add IV Dilaudid 0.5 mg q4h prn.   - PRN Tylenol and Flexeril   -  Per notes, pain previously " "responded well to Dexamethasone and with pain flair on 1/23, will trial dexamethasone 10 mg x1. Continue Dex taper: 4 mg BID (1/24-1/25), 4 mg daily (1/26-1/27), done  - Palliative care was previously involved last hospitalization. Consider consult as appropriate.      GI  # Constipation   Noted during previous admission for induction chemotherapy. Patient endorses regular and soft BM on admission though feeling more constipated in setting of chemo and increased pain control.   - PTA Senna BID, Miralax BID  - Milk of magnesium daily PRN  - Prune juice was previously helpful for patient   - LBM 1/22      ENDO  # T2DM   Follows with [facility], last seen 11/15/22.   - PTA Ozempic held on admission, resume on discharge  - Medium intensity sliding scale insulin  - Hypoglycemia protocol in place   - Monitor with steroids per protocol     MISC  # Dysphagia  Patient endorses difficulty swallowing on admission described as \"food getting stuck in throat\". Has had to modify diet at home and be careful with food selection/size. Occasionally finds himself coughing with water as well.   - SLP consulted - no s/sx of aspiration, ok to continue regular diet. Educated on how to take pills and safe eating habits.      FEN   - IVF bolus prn   - PRN lyte replacement per standing protocol  - Regular diet as tolerated      Clinically Significant Risk Factors           # Hypercalcemia: Highest Ca = 10.4 mg/dL in last 2 days, will monitor as appropriate  # Hypomagnesemia: Lowest Mg = 1.6 mg/dL in last 2 days, will replace as needed   # Hypoalbuminemia: Lowest albumin = 2.9 g/dL at 1/22/2023  6:05 PM, will monitor as appropriate   # Thrombocytopenia: Lowest platelets = 36 in last 2 days, will monitor for bleeding         # Severe Obesity: Estimated body mass index is 51.9 kg/m  as calculated from the following:    Height as of this encounter: 1.854 m (6' 1\").    Weight as of this encounter: 178.4 kg (393 lb 6.4 oz).       "     Lines/Drains: PICC placed on admission, remove on discharge  DVT ppx: therapeutic Lovenox; hold if Plt <30K  GI ppx: PTA PPI  Consults: PT/OT, lymphedema, SLP, pulm, cardiology, IR  CODE: FULL  DISPO: Anticipate will remain inpatient into next week while awaiting completion of chemotherapy and improvement in clinical status (currently requiring O2, pain control).     Follow-up/Referrals: Primary oncologist is Dr. Velasquez.   - Follow-up will need to be scheduled closer to discharge  - On discharge, please place a referral to the pulmonary HTN clinic outpatient for consideration of RHC and treatment.      Social  - Lives at home alone near [place], [place]  - Has two sisters who are supportive and like to be involved in big conversations via speaker phone     Coordination of Cares   - Will need to arrange local follow-up with [facility] in [place], [place] on discharge  -  PT/OT reccs ARU vs TCU. Favor TCU in setting of ongoing need for chemotherapy.       Patient and plan of care was discussed with attending physician Dr. Amato.    >55 minutes spent on the date of the encounter. Over 50% of time was spent counseling the patient and/or coordinating care.     Shannon Irving PA-C  Hematology/Oncology  Ph: [phone], Pager: [phone]    Interval History   Nursing notes reviewed. Patent was transferred to [facility] floor last night due to oxygen requirements. Requiring 5-11L O2 via oxymask/NC. He remains afebrile overnight and VS otherwise stable. ID work-up remains negative. This morning [place] endorses feeling about he same. States back pain is maybe slightly better and continues to deny significant SOB despite oxygen requirements. Last BM Sunday and he is using laxatives. No abdominal pain or nausea. Rash to abdomen is stable to improved from prior markings. Did have some epistaxis last night with nasal canula, now resolved. No bleeding elsewhere. No new complaints today. Continues chemo. Sisters are planning to visit  today. All questions answered.     A comprehensive review of systems was obtained and is negative other than noted here or in the HPI.     Physical Exam   Temp: 97.6  F (36.4  C) Temp src: Oral BP: 127/70 Pulse: 61   Resp: 20 SpO2: 91 % O2 Device: Oxymask Oxygen Delivery: 12 LPM (Increased with activity)  Vitals:    01/21/23 1111 01/22/23 1045 01/23/23 0940   Weight: (!) 171.7 kg (378 lb 9.6 oz) (!) 172.7 kg (380 lb 12.8 oz) (!) 178.4 kg (393 lb 6.4 oz)     Vital Signs with Ranges  Temp:  [97.5  F (36.4  C)-101.6  F (38.7  C)] 97.6  F (36.4  C)  Pulse:  [60-94] 61  Resp:  [17-24] 20  BP: (108-129)/(47-73) 127/70  FiO2 (%):  [60 %] 60 %  SpO2:  [70 %-96 %] 91 %  I/O last 3 completed shifts:  In: 970 [P.O.:960; I.V.:10]  Out: 2030 [Urine:2030]    General: Chronically-ill appearing and fatigued. No acute distress.    Skin: Macular erythema and petechiae noted to lower abdomen, well-within prior markings (improving) - no palpable fluid collection or pain. Skin is otherwise warm, dry and intact without other rashes or lesions. Chronic venous stasis changes to bilateral LE.  Scattered bruising to extremities.   Head: Normocephalic and atraumatic.  HEENT: Sclera clear. EOM intact. Oral mucosa is pink and moist with no lesions, thrush, or exudates.   Lymph: Neck supple.   Cardiac: Regular rate and rhythm, no murmurs appreciated   Respiratory: Comfortable breathing on 5-7L via oxymask. Coarse breath sounds anteriorly although auscultation difficult d/t body habitus/pain/inability to position.   Abd: Soft, symmetric, distended. BS present and normoactive. Organomegaly not noted d/t body habitus.   Extremities: 2-3+ bilateral pitting edemae. Distal pulses palpable.   Neuro: Lethargic but A&Ox3 and awakes appropriately to speech. Memory and thought process preserved. No deficits grossly.    Medications     - MEDICATION INSTRUCTIONS -         acyclovir  400 mg Oral BID     allopurinol  300 mg Oral BID     ammonium lactate    Topical Daily     budesonide  0.5 mg Nebulization BID     [Held by provider] bumetanide  1 mg Oral Daily     [Held by provider] calcium acetate (phos binder)  1,334 mg Oral TID w/meals     ceFEPIme  2 g Intravenous Q8H     cytarabine (CYTOSAR) infusion  20 mg Subcutaneous Q12H     enoxaparin ANTICOAGULANT  80 mg Subcutaneous Q12H     heparin lock flush  5-20 mL Intracatheter Q24H     heparin lock flush  5-20 mL Intracatheter Q24H     insulin aspart  1-7 Units Subcutaneous TID AC     insulin aspart  1-5 Units Subcutaneous At Bedtime     ipratropium - albuterol 0.5 mg/2.5 mg/3 mL  3 mL Nebulization 4x daily     micafungin  50 mg Intravenous Q24H     pantoprazole  40 mg Oral QAM AC     polyethylene glycol  17 g Oral BID     senna-docusate  2 tablet Oral BID     sodium chloride (PF)  10-40 mL Intracatheter Q8H     sodium chloride (PF)  10-40 mL Intracatheter Q8H     vancomycin  1,250 mg Intravenous Q12H

## 2023-01-24 NOTE — PLAN OF CARE
"Goal Outcome Evaluation:      Plan of Care Reviewed With: patient    Overall Patient Progress: improvingOverall Patient Progress: improving    Outcome Evaluation: Up walking in the rom[place]. Washed up at sink. states feeling less SOB. On 4-6 L oxymizer NC.      Problem: Plan of Care - These are the overarching goals to be used throughout the patient stay.    Goal: Plan of Care Review  Description: The Plan of Care Review/Shift note should be completed every shift.  The Outcome Evaluation is a brief statement about your assessment that the patient is improving, declining, or no change.  This information will be displayed automatically on your shift note.  Outcome: Progressing  Flowsheets (Taken 1/24/2023 1145)  Outcome Evaluation: Up walking in the romero. Washed up at sink. states feeling less SOB. On 4-6 L oxymizer NC.  Plan of Care Reviewed With: patient  Overall Patient Progress: improving  Goal: Patient-Specific Goal (Individualized)  Description: You can add care plan individualizations to a care plan. Examples of Individualization might be:  \"Parent requests to be called daily at 9am for status\", \"I have a hard time hearing out of my right ear\", or \"Do not touch me to wake me up as it startles me\".  Outcome: Progressing  Flowsheets  Taken 1/24/2023 1145  Anxieties, Fears or Concerns: none  Patient/Family-Specific Goals (Include Timeframe): none  Taken 1/24/2023 1000  Individualized Care Needs: please put compression socks on early AM  Goal: Absence of Hospital-Acquired Illness or Injury  Outcome: Progressing  Intervention: Identify and Manage Fall Risk  Recent Flowsheet Documentation  Taken 1/24/2023 1000 by Snehal Lawrence RN  Safety Promotion/Fall Prevention:   activity supervised   assistive device/personal items within reach   chemotherapeutic precautions   clutter free environment maintained   fall prevention program maintained   increase visualization of patient   nonskid shoes/slippers when out of bed   patient " and family education   room organization consistent   room near nurse's station   safety round/check completed   supervised activity   treat reversible contributory factors   treat underlying cause  Taken 1/24/2023 0800 by Snehal Lawrence RN  Safety Promotion/Fall Prevention:   activity supervised   assistive device/personal items within reach   chemotherapeutic precautions   clutter free environment maintained   fall prevention program maintained   increase visualization of patient   nonskid shoes/slippers when out of bed   patient and family education   room organization consistent   room near nurse's station   safety round/check completed   supervised activity   treat reversible contributory factors   treat underlying cause  Intervention: Prevent Skin Injury  Recent Flowsheet Documentation  Taken 1/24/2023 0800 by Snehal Lawrence RN  Body Position: position changed independently  Intervention: Prevent and Manage VTE (Venous Thromboembolism) Risk  Recent Flowsheet Documentation  Taken 1/24/2023 1000 by Snehal Lawrence RN  VTE Prevention/Management: (pts own socks) compression stockings on  Taken 1/24/2023 0800 by Snehal Lawrence RN  VTE Prevention/Management: (pts own socks) compression stockings on  Intervention: Prevent Infection  Recent Flowsheet Documentation  Taken 1/24/2023 1000 by Snehal Lawrence RN  Infection Prevention: equipment surfaces disinfected  Taken 1/24/2023 0800 by Snehal Lawrence RN  Infection Prevention: equipment surfaces disinfected  Goal: Optimal Comfort and Wellbeing  Outcome: Progressing  Intervention: Monitor Pain and Promote Comfort  Recent Flowsheet Documentation  Taken 1/24/2023 0800 by Snehal Lawrence RN  Pain Management Interventions: repositioned  Intervention: Provide Person-Centered Care  Recent Flowsheet Documentation  Taken 1/24/2023 1000 by Snehal Lawrence RN  Trust Relationship/Rapport:   care explained   choices provided   empathic listening provided   questions answered   thoughts/feelings  acknowledged  Taken 1/24/2023 0800 by Snehal Lawrence RN  Trust Relationship/Rapport:   care explained   choices provided   empathic listening provided   questions answered   thoughts/feelings acknowledged  Goal: Readiness for Transition of Care  Outcome: Progressing  Flowsheets (Taken 1/24/2023 1145)  Anticipated Changes Related to Illness:   inability to care for self   none  Transportation Anticipated: family or friend will provide  Concerns to be Addressed: discharge planning  Barriers to Discharge: Patient still requires higher levels of oxygen and needs additional strengthening to tolerate ADL's  Intervention: Mutually Develop Transition Plan  Recent Flowsheet Documentation  Taken 1/24/2023 1145 by Snehal Lawrence RN  Anticipated Changes Related to Illness:   inability to care for self   none  Transportation Anticipated: family or friend will provide  Concerns to be Addressed: discharge planning     Problem: Coping Ineffective (Oncology Care)  Goal: Effective Coping  Outcome: Progressing     Problem: Fatigue (Oncology Care)  Goal: Improved Activity Tolerance  Outcome: Progressing  Intervention: Promote Improved Energy  Recent Flowsheet Documentation  Taken 1/24/2023 1000 by Snehal Lawrence RN  Activity Management: ambulated to bathroom     Problem: Oral Intake Altered (Oncology Care)  Goal: Optimal Oral Intake  Outcome: Progressing     Problem: Oral Mucositis (Oncology Care)  Goal: Improved Oral Mucous Membrane Integrity  Outcome: Progressing     Problem: Pain Acute (Oncology Care)  Goal: Optimal Pain Control  Outcome: Progressing  Intervention: Develop Pain Management Plan  Recent Flowsheet Documentation  Taken 1/24/2023 0800 by Snehal Lawrence RN  Pain Management Interventions: repositioned  Intervention: Prevent or Manage Pain  Recent Flowsheet Documentation  Taken 1/24/2023 1000 by Snehal Lawrence RN  Medication Review/Management: medications reviewed  Taken 1/24/2023 0800 by Snehal Lawrence RN  Medication  Review/Management: medications reviewed     Problem: Pain Acute  Goal: Optimal Pain Control and Function  Outcome: Progressing  Intervention: Develop Pain Management Plan  Recent Flowsheet Documentation  Taken 1/24/2023 0800 by Snehal Lawrence RN  Pain Management Interventions: repositioned  Intervention: Prevent or Manage Pain  Recent Flowsheet Documentation  Taken 1/24/2023 1000 by Snehal Lawrence RN  Medication Review/Management: medications reviewed  Taken 1/24/2023 0800 by Snehal Lawrence RN  Medication Review/Management: medications reviewed     Problem: Activity Intolerance  Goal: Enhanced Capacity and Energy  Outcome: Progressing  Intervention: Optimize Activity Tolerance  Recent Flowsheet Documentation  Taken 1/24/2023 1000 by Snehal Lawrence RN  Activity Management: ambulated to bathroom     Problem: Anemia (Chemotherapy Effects)  Goal: Anemia Symptom Improvement  Outcome: Progressing  Intervention: Monitor and Manage Anemia  Recent Flowsheet Documentation  Taken 1/24/2023 1000 by Snehal Lawrence RN  Safety Promotion/Fall Prevention:   activity supervised   assistive device/personal items within reach   chemotherapeutic precautions   clutter free environment maintained   fall prevention program maintained   increase visualization of patient   nonskid shoes/slippers when out of bed   patient and family education   room organization consistent   room near nurse's station   safety round/check completed   supervised activity   treat reversible contributory factors   treat underlying cause  Taken 1/24/2023 0800 by Snehal Lawrence RN  Safety Promotion/Fall Prevention:   activity supervised   assistive device/personal items within reach   chemotherapeutic precautions   clutter free environment maintained   fall prevention program maintained   increase visualization of patient   nonskid shoes/slippers when out of bed   patient and family education   room organization consistent   room near nurse's station   safety round/check  completed   supervised activity   treat reversible contributory factors   treat underlying cause     Problem: Chemotherapy Environmental Safety  Goal: Safety Maintained While Receiving Chemotherapy  Outcome: Progressing  Intervention: Promote Safe Chemotherapy Delivery  Recent Flowsheet Documentation  Taken 1/24/2023 1000 by Snehal Lawrence RN  Infection Prevention: equipment surfaces disinfected  Taken 1/24/2023 0800 by Snehal Lawrence RN  Infection Prevention: equipment surfaces disinfected     Problem: Urinary Bleeding Risk or Actual (Chemotherapy Effects)  Goal: Absence of Hematuria  Outcome: Progressing  Intervention: Prevent or Manage Hemorrhagic Cystitis  Recent Flowsheet Documentation  Taken 1/24/2023 0800 by Snehal Lawrence RN  Pain Management Interventions: repositioned     Problem: Nausea and Vomiting (Chemotherapy Effects)  Goal: Fluid and Electrolyte Balance  Outcome: Progressing     Problem: Neurotoxicity (Chemotherapy Effects)  Goal: Neurotoxicity Symptom Control  Outcome: Progressing     Problem: Neutropenia (Chemotherapy Effects)  Goal: Absence of Infection  Outcome: Progressing  Intervention: Prevent Infection and Maximize Resistance  Recent Flowsheet Documentation  Taken 1/24/2023 1000 by Snehal Lawrence RN  Infection Prevention: equipment surfaces disinfected  Taken 1/24/2023 0800 by Snehal Lawrence RN  Infection Prevention: equipment surfaces disinfected     Problem: Oral Mucositis (Chemotherapy Effects)  Goal: Improved Oral Mucous Membrane Integrity  Outcome: Progressing     Problem: Thrombocytopenia Bleeding Risk (Chemotherapy Effects)  Goal: Absence of Bleeding  Outcome: Progressing     Problem: Gas Exchange Impaired  Goal: Optimal Gas Exchange  Outcome: Progressing  Intervention: Optimize Oxygenation and Ventilation  Recent Flowsheet Documentation  Taken 1/24/2023 0800 by Snehal Lawrence RN  Head of Bed (HOB) Positioning: HOB at 20-30 degrees

## 2023-01-24 NOTE — PLAN OF CARE
1730: Pt requiring increased oxygen needs. Desatting to the 70s/80s and taking a bit to recover. No longer was able to use High flow nasal cannula due to nose bleed. Provider notified about increase in oxygen needs.     1945:Pt transferred to  for closer monitoring of O2 status. Report given to RN. Pt belongings remained with the patient.

## 2023-01-24 NOTE — CONSULTS
"[facility]  Palliative Care Consultation Note    Patient: Angel Cardenas  Date of Admission:  1/19/2023    Requesting Clinician / Team: Shannon Irving PA-C  Reason for consult: Pain management > \"Pain control, goals in setting of refractory leukemia, involved during previous admission and helpful for patient. Admitted for persistent leukemia, fevers, AHRF. Started chemo. Continue with pain flares with his leukemia)    Recommendations:  Encounter for palliative care  Psychosocial support    Psychosocial support was provided to patient and/or family.    Prognosis: Poor to fair? > Pending cancer treatment response    Palliative performance scale (PPS): at least 50%, PTA 80%?    CODE status: FULL    Goals of Care: Restorative  o Currently receiving treatment for his AML  o Surrogate: sister Courtney, Alternate: sister Mulu    Disposition: Inpatient per primary.    Symptoms    Pain related to neoplasm  o MRI thoracic spine previously completed 12/17 and showed \"scattered subtle mottled enhancement of bones most pronounced on L 7th rib, could be 2/2 underlying disease process.\"  o Concern for extramedullary cortex infiltration causing pain  o Angel states that his pain is more generalized today and other than mid to lower back pain, reports pain in his left elbow    o Unsure which medications he's received (oxycodone, Dilaudid, Robaxin, Flexeril, Tylenol, etc) have helped though sisters at bedside note that he has been sleepier after most recent dose of Dilaudid  o Angel and his sisters also believe he may vae \"gotten behind on the pain\" as he reportedly takes his pain medication somewhat regularly at home.  o SCHEDULE Robaxin 500 mg po 4 times daily (done for you, notes a component of muscle spasm and states Robaxin helps him. If he becomes overly sedated, this would be the first medication I would stop)  o SCHEDULE and INCREASE oxycodone 5-10 mg to 10-20 mg po q4h (done for " you, recommend using 10 mg first, then 15 mg, then 20 mg to avoid oversedation and to test each dose level)  o Continue Dilaudid 0.5 mg IV q4h prn for breakthrough pain  o STOP Flexeril (done for you, redundant with Robaxin)    Dyspnea  o Wean O2 as tolerated  o Inhalers per primary  o Defer to primary team, cardiology, and pulm    At risk for Constipation  o LBM 1/24 x1  o Senna-docusate 2 tabs po BID scheduled    ANausea  o Ativan, Compazine, and Zofran prn    Total time spent was 80 minutes regarding pain control and support. These recommendations were given to the primary team via this note..    Conrado Flores DO / Palliative Medicine / Text me via [facility].     Team Consult Pager [phone] (answered 8am-430pm M-F) - ok to text page via [employer] / After-Hours Answering Service [phone] / Palliative Clinic in the [facility] [place] - [phone] (scheduling); [phone] (triage).      Assessments:  Angel Cardenas is a 62 year old male with a history of multiple prior DVTs and PE in 2022 (previously on Xarelto), DM2, COPD, HTN, gout and recently diagnosed AMML s/p Cycle 1 of 10-days of Decitabine/Venetoclax (D1=12/19/22) with subsequent persistent disease on post-therapy BMBx 1/13/23 demonstrating ongoing ~70% blasts. He presents from clinic on 1/19/2023 for consideration of re-induction chemotherapy in the setting of fever and AHRF (multifactorial from COPD, edema, pulm HTN?). Due to poor clinical status and multiple co-morbidities, oncology initiated 10-day low-dose Cherie-C (C1D1=1/22/23). Palliative was consulted for pain control.    Today, the patient was seen for:  Pain related to neoplasm  Goals of care  Support  Encounter for palliative care    Prognosis, Goals, & Planning:      Functional Status just prior to hospitalization: 2 (Ambulatory and capable of all selfcare but unable to carry out any work activities; may need help with IADLs up and about > 50% of waking hours)      Prognosis, Goals,  and/or Advance Care Planning were addressed today: Yes        Summary/Comments: as above      Patient's decision making preferences: independently          Patient has decision-making capacity today for complex decisions: Yes            I have concerns about the patient/family's health literacy today: No           Patient has a completed Health Care Directive: Unknown or unable to assess.      Code status: Full Code    Coping, Meaning, & Spirituality:   Mood, coping, and/or meaning in the context of serious illness were addressed today: No  Summary/Comments: will continue to do so    Social:     Key family / caregivers: sisters Courtney and Mulu    Occupational history: [employer]     History of Present Illness:  History gathered today from: patient, family/loved ones, medical chart, medical team members    Angel Cardenas is a 62 year old male with a history of multiple prior DVTs and PE in 2022 (previously on Xarelto), DM2, COPD, HTN, gout and recently diagnosed AMML s/p Cycle 1 of 10-days of Decitabine/Venetoclax (D1=12/19/22) with subsequent persistent disease on post-therapy BMBx 1/13/23 demonstrating ongoing ~70% blasts. He presents from clinic on 1/19/2023 for consideration of re-induction chemotherapy in the setting of fever and AHRF (multifactorial from COPD, edema, pulm HTN?). Due to poor clinical status and multiple co-morbidities, oncology initiated 10-day low-dose Cherie-C (C1D1=1/22/23). Palliative was consulted for pain control.    Seen and examined at bedside. Conversation detailed above. Sisters Courtney and Mulu also present. All questions answered. No unmet palliative need.    Key Palliative Symptom Data:  # Pain severity the last 12 hours: severe  We are not managing dyspnea in this patient  We are not managing nausea in this patient  We are not managing anxiety in this patient    Patient is on opioids: assessed and bowels ok/no needed changes to plan of care today.    ROS:  A complete 14  point ROS was negative unless stated otherwise above in HPI     Past Medical History:  Past Medical History:   Diagnosis Date     COPD (chronic obstructive pulmonary disease) (H)      Diabetes mellitus, type 2 (H)      Gout      History of pulmonary embolism      HLD (hyperlipidemia)      HTN (hypertension)         Past Surgical History:  Past Surgical History:   Procedure Laterality Date     PICC DOUBLE LUMEN PLACEMENT Right 12/16/2022    Right brachial-medial vein 48cm total 1cm external.Placement verified by Sherlock 3CG.PICC okay to use.     PICC TRIPLE LUMEN PLACEMENT Right 01/19/2023    5FR TL PICC basilic vein. L-50cm 2cm out     WV REVISE TOTAL HIP REPLACEMENT Bilateral      TOTAL SHOULDER REPLACEMENT Right          Family History:  Family History   Problem Relation Age of Onset     Neurodegenerative disease Father         Creutzfeldt-Osmin disease     Chronic Obstructive Pulmonary Disease Brother      Heart Disease Brother          Allergies:  Allergies   Allergen Reactions     Metformin Unknown        Medications:  I have reviewed this patient's medication profile and medications from this hospitalization.   Noted:  Dexamethasone 10 mg x1 > 4 mg BID > 54 mg daily  Robaxin 500 mg po QID prn > changed to QID scheduled 1/24  Oxycodone 5-10 mg q4h prn (PTA) > changed to 10-20 mg q4h scheduled on 1/24  Senna-docusate 2 tabs po BID    APAP 650 mg po q4h prn   Dilaudid 0.5 mg IV q4h prn  Diclofenac prn  Ativan 0.5-1 mg IV q6h prn nausea  Zofran prn  Compazine prn    Flexeril - stopped 1/24    Physical Exam:  Vital Signs: Temp: 98.2  F (36.8  C) Temp src: Oral BP: (!) 153/72 Pulse: 74   Resp: (!) 9 SpO2: 92 % O2 Device: Oxymizer cannula Oxygen Delivery: 4 LPM  Weight: 393 lbs 0 oz  General: Not in acute distress.  Head: Atraumatic. Normocephalic.  Eyes: Anicteric without injection. Eyes conjugate. Extraocular movements intact.  Ear, Nose, and Throat: Mouth pink and moist without lesions. Neck without overt  masses.  Pulmonary: Unlabored. Speaking in full sentences NC O2.   Cardiovascular: No overt jugular venous distension. Bilateral LE edema. Perfusing.  Abdomen: Non-distended. Large abdomen.   Skin: Warm. Dry. Lower abdominal bruising  Musculoskeletal: Joints of hand normal. Muscle bulk and tone normal in UE and LE.  Neuro: Speech fluent but slowed. Face symmetric. Alert and oriented x4.  Psych: Normal mood and affect. Fund of knowledge intact. Thought processes somewhat tangential needing to be redirected sometimes.    Data reviewed:  Recent imaging reviewed, my comments on pertinents:   XR Chest Port 1 View [[id number]] Resulted: 01/23/23 1356   IMPRESSION:   1. Cardiomegaly with diffuse mixed pulmonary opacities. Findings most   suggestive of pulmonary edema.   2. Small bilateral pleural effusions.     Echo Limited [[id number]] Collected: 01/20/23 0905   Interpretation Summary   Left ventricular size, wall motion and function are normal. The ejection   fraction is 60-65%.   Right ventricular function, chamber size, wall motion, and thickness are   normal.   No significant valvular abnormalities present.   Dilation of the inferior vena cava is present with abnormal respiratory   variation in diameter suggests a high RA pressure estimated at 15 mmHg or   greater.     CT Abdomen Pelvis w Contrast [[id number]] Resulted: 01/20/23 1006   IMPRESSION:   1. Hepatosplenomegaly.   2. No intra-abdominal collection. No significant enlarged   retroperitoneal, mesenteric lymph nodes   3. Enlarged inguinal lymph nodes, possibly reactive.   4.  Bilateral renal cortical hypodensities, compatible with renal cyst   and additional too  small to characterize renal hypodensities, may   consider attention on follow-up.     CT Chest Pulmonary Embolism w Contrast [[id number]] Resulted: 01/19/23 1615   IMPRESSION:   1. Exam is negative for acute pulmonary embolism. No right heart   strain . Pulmonary arterial web in the proximal right upper  lobe   pulmonary artery, consistent with chronic pulmonary embolism.   2. Pulmonary arteries enlarged and measures up to 3.7 cm comment this   is nonspecific but most often seen with pulmonary arterial   hypertension, possibly CTEPH   3. Mosaic attenuation in the lung bases is most often seen with air   trapping or perfusional abnormalities.   4. 6 mm solid pulmonary nodule in the left lung base cannot be   reimaged in one year's time with a noncontrast chest CT.   5. Splenomegaly.       Recent lab data reviewed, my comments on pertinents:   ROUTINE ICU LABS (Last four results)  CMP  Recent Labs   Lab 01/24/23  1442 01/24/23  0854 01/24/23  0546 01/23/23  2130 01/23/23  1716 01/23/23  1659 01/23/23  1124 01/23/23  0557 01/22/23  2204 01/22/23  1805 01/22/23  0616 01/22/23  0550 01/21/23  0844 01/21/23  0616 01/20/23  1746 01/20/23  0528 01/19/23  1837 01/19/23  1412   NA  --   --  139  --   --  138  --  137  --  133*  133*  --  132*   < > 137   < > 136  --  138  137   POTASSIUM  --   --  4.2  --   --  3.9  --  3.8  --  3.5  3.5   < > 3.4   < > 3.6   < > 3.4  --  3.7  3.7   CHLORIDE  --   --  102  --   --  101  --  100  --  97*  97*  --  96*   < > 100   < > 100  --  100  99   CO2  --   --  23  --   --  21*  --  23  --  22  22  --  21*   < > 22   < > 20*  --  16*  17*   ANIONGAP  --   --  14  --   --  16*  --  14  --  14  14  --  15   < > 15   < > 16*  --  22*  21*   * 160* 174* 193*   < > 130*   < > 97   < > 115*  115*   < > 99   < > 115*   < > 105*   < > 102*  100*   BUN  --   --  15.6  --   --  12.7  --  10.3  --  11.4  11.4  --  12.4   < > 14.7   < > 17.6  --  17.9  18.6   CR  --   --  0.86  --   --  0.96  --  0.85  --  0.89  0.89  --  1.01   < > 0.98   < > 1.11  --  1.21*  1.13   GFRESTIMATED  --   --  >90  --   --  90  --  >90  --  >90  >90  --  85   < > 88   < > 76  --  69  74   ZEHRA  --   --  10.4*  --   --  9.8  --  10.0  --  9.8  9.8  --  9.4   < > 9.4   < > 9.2  --  9.5  9.7   MAG   --   --  1.9  --   --   --   --  1.6*  --   --   --  1.8  1.6*  --  1.7  --  1.7  --  1.8   PHOS  --   --  4.4  --   --  4.2  --  3.4  --  3.3  --  4.1  4.0   < > 4.3   < > 4.9*  --  4.3   PROTTOTAL  --   --  6.5  --   --   --   --   --   --  6.5  --   --   --   --   --  6.4  --  6.9   ALBUMIN  --   --  2.8*  --   --   --   --   --   --  2.9*  --   --   --   --   --  2.9*  --  3.2*   BILITOTAL  --   --  0.5  --   --   --   --   --   --  0.5  --   --   --   --   --  0.7  --  0.8   ALKPHOS  --   --  185*  --   --   --   --   --   --  216*  --   --   --   --   --  169*  --  194*   AST  --   --  37  --   --   --   --   --   --  38  --   --   --   --   --  43  --  54*   ALT  --   --  <5*  --   --   --   --   --   --  <5*  --   --   --   --   --  <5*  --  <5*    < > = values in this interval not displayed.     CBC  Recent Labs   Lab 01/24/23  0546 01/23/23  0557 01/22/23  1805 01/22/23  0550   WBC 17.4* 17.4* 13.6* 11.6*   RBC 2.77* 2.82* 2.85* 2.55*   HGB 7.5* 7.7* 7.8* 6.9*   HCT 25.3* 25.8* 26.1* 23.8*   MCV 91 92 92 93   MCH 27.1 27.3 27.4 27.1   MCHC 29.6* 29.8* 29.9* 29.0*   RDW 20.5* 20.2* 20.1* 20.8*   PLT 36* 40* 41* 40*     INR  Recent Labs   Lab 01/24/23  0546 01/23/23  0557 01/22/23  0550 01/21/23  0616   INR 1.25* 1.23* 1.19* 1.23*     Arterial Blood GasNo lab results found in last 7 days.

## 2023-01-24 NOTE — CONSULTS
Care Management Initial Consult    General Information  Assessment completed with: Family,    Type of CM/SW Visit: Initial Assessment    Primary Care Provider verified and updated as needed:     Readmission within the last 30 days:           Advance Care Planning:            Communication Assessment  Patient's communication style: spoken language (English or Bilingual)    Hearing Difficulty or Deaf: no   Wear Glasses or Blind: yes    Cognitive  Cognitive/Neuro/Behavioral: WDL  Level of Consciousness: alert  Arousal Level: opens eyes spontaneously  Orientation: oriented x 4  Mood/Behavior: behavior appropriate to situation  Best Language: 0 - No aphasia  Speech: clear, spontaneous, logical    Living Environment:   People in home: other (see comments) (temporarily adult family members)     Current living Arrangements: house      Able to return to prior arrangements: yes       Family/Social Support:  Care provided by: other (see comments), self  Provides care for:    Marital Status: Single  Sibling(s), Other (specify) (cousins)          Description of Support System:           Current Resources:   Patient receiving home care services:       Community Resources:    Equipment currently used at home: walker, standard  Supplies currently used at home:      Employment/Financial:  Employment Status: other (see comments) (FMLA)        Financial Concerns:             Lifestyle & Psychosocial Needs:  Social Determinants of Health     Tobacco Use: Medium Risk     Smoking Tobacco Use: Former     Smokeless Tobacco Use: Never     Passive Exposure: Not on file   Alcohol Use: Not on file   Financial Resource Strain: Not on file   Food Insecurity: Not on file   Transportation Needs: Not on file   Physical Activity: Not on file   Stress: Not on file   Social Connections: Not on file   Intimate Partner Violence: Not on file   Depression: Not on file   Housing Stability: Not on file       Functional Status:  Prior to admission patient  needed assistance:   Dependent ADLs:: Independent  Dependent IADLs:: Independent       Mental Health Status:          Chemical Dependency Status:                Values/Beliefs:  Spiritual, Cultural Beliefs, Judaism Practices, Values that affect care:                 Additional Information:  Pt lives in a one story house that has a basement which he does not access on a regular basis. Pt is currently on FMLA from his job with [employer].     Pt has two sister's Gala and Mulu were at bedside but live in [place] who are very supportive and are requesting Twice a week updates from Medical Providers on Pt's status/progression and then additional update if there is a change in Pt's medical status.     Pt's sisters identify that family can provide 24/7 support post discharge or post ARU discharge for a few weeks to a few months but are not certain about providing support for an extended period of time.     Family identifies that Pt has a two wheeled walker at home and identified that rather than grab bars by the toilet a toilet seat with handles would be helpful given the bathroom layout.     SW provide Pt's Sisters with ARU and TCU list from Medicare.gov for Pt's home address and identified ARU was the current rec. SW provided [facility] pamphlet. SW will continue to follow Pt for safe discharge planning and placement.     ________________    MARLI Bolanos, [facility] HospitalSW  6D   [facility]  Phone: [phone]  Pager: [phone]  Fax: [phone]

## 2023-01-24 NOTE — PROGRESS NOTES
2 RN Skin assessment complete by Meghan HUSSEIN and myself. Patient had cracking on heels and shin, generalized bruising, abdominal rash, and a skin tear on coccyx.     Blake Wooten RN on 1/23/2023 at 21:37 PM

## 2023-01-24 NOTE — PLAN OF CARE
Goal Outcome Evaluation:         Resp: Patient came down to 6D on 10L Oxymask. Patient is currently on 4.5L Oxymask with SpO2 91%. Patient Desats with activity and needs more O2 when moving to the bathroom.   Cardiac: SB to NSR  Neuro: intact; forgetful at times  GI: No BM overnight  : virginia output; bathroom privileges 375ml UO  Skin: Skin tear on coccyx, large RLQ bruise, gen bruising, cracked heels

## 2023-01-24 NOTE — PROGRESS NOTES
Xcover:  Alerted by bedside RN that patient now requiring 10L Oxymask which is higher than what can be managed on 7th floor per protocol, requiring IMC transfer. On chart review, pt on 6L oxymask during the day and HFNC 40L 60% on and off, but now unable to tolerate HFNC due to epistaxis. Tolerating 10L oxymask >90%.     - Transfer to [facility]       Cate Flores MD MPH  Med/Peds  [phone]

## 2023-01-25 NOTE — PROGRESS NOTES
"[facility] Medicine Cross Cover Note  2023 - 5:41 AM    Paged by RN - patient's behavior worsening again.  Is on maximum Precedex.    BP (!) 171/99 (BP Location: Left arm)   Pulse 74   Temp 97.5  F (36.4  C) (Axillary)   Resp 19   Ht 1.854 m (6' 1\")   Wt (!) 178.3 kg (393 lb)   SpO2 94%   BMI 51.85 kg/m    Not otherwise examined    Assessment / Plan:    I am loath to add any more lorazepam (Ativan ) at this time - his body habitus and h/o COPD / emphysema CATHERINE / obstructive sleep apnea  makes any respiratory depression very risky for CO2 retention and need for intubation.  A primary neurologic process is possible but difficult to assess given his agitation.  Underlying sepsis is a possible cause for altered mental status - will order blood culture(s) and add consider adding vancomycin.  I've ordered more haloperidol (Haldol ) and hydralazine (Apresoline ) PRN for elevated BP.  I would think that his worsening hypoxia is related to this altered mental status process also.    Patient is at high risk for worsening and needing ICU.    Anthony Bianchi MD  [phone]   "

## 2023-01-25 NOTE — PHARMACY-VANCOMYCIN DOSING SERVICE
Pharmacy Vancomycin Initial Note  Date of Service 2023  Patient's  1960  62 year old, male    Indication: Febrile Neutropenia, AMS    Current estimated CrCl = Estimated Creatinine Clearance: 154.1 mL/min (based on SCr of 0.86 mg/dL).    Creatinine for last 3 days  2023:  6:05 PM Creatinine 0.89 mg/dL;  6:05 PM Creatinine 0.89 mg/dL  2023:  5:57 AM Creatinine 0.85 mg/dL;  4:59 PM Creatinine 0.96 mg/dL  2023:  5:46 AM Creatinine 0.86 mg/dL    Recent Vancomycin Level(s) for last 3 days  No results found for requested labs within last 72 hours.      Vancomycin IV Administrations (past 72 hours)                   vancomycin (VANCOCIN) 1,250 mg in 0.9% NaCl 250 mL intermittent infusion (mg) 1,250 mg New Bag 23 0232    vancomycin (VANCOCIN) 2,000 mg in sodium chloride 0.9 % 500 mL intermittent infusion (mg) 2,000 mg New Bag 23 1412                Nephrotoxins and other renal medications (From now, onward)    Start     Dose/Rate Route Frequency Ordered Stop    23 0800  [Held by provider]  bumetanide (BUMEX) tablet 1 mg        (Held by provider since Sun 2023 at 0827 by Torrie Foy PA-C.Hold Reason: Abnormal Electrolytes)   Note to Pharmacy: PTA Sig:Take 1 mg by mouth daily      1 mg Oral DAILY 23 0826      23  acyclovir (ZOVIRAX) tablet 400 mg        Note to Pharmacy: PTA Sig:Take 1 tablet (400 mg) by mouth 2 times daily      400 mg Oral 2 TIMES DAILY 23 1307            Contrast Orders - past 72 hours (72h ago, onward)    None          [employer] Prediction of Planned Initial Vancomycin Regimen  Loading dose: N/A  Regimen: 1250 mg IV every 12 hours.  Start time: 08:38 on 2023  Exposure target: AUC24 (range)400-600 mg/L.hr   AUC24,ss: 446 mg/L.hr  Probability of AUC24 > 400: 91 %  Ctrough,ss: 11.2 mg/L  Probability of Ctrough,ss > 20: 2 %  Probability of nephrotoxicity (Lodise ALESIA ): 7 %          Plan:  1. Start vancomycin  1250 mg (7mg/kg) IV q12h. No load as patient did receive vanco 1250 on 1/24 @ 0232.   2. Vancomycin monitoring method: AUC  3. Vancomycin therapeutic monitoring goal: 400-600 mg*h/L  4. Pharmacy will check vancomycin levels as appropriate in 1-3 Days.    5. Serum creatinine levels will be ordered daily for the first week of therapy and at least twice weekly for subsequent weeks.      Alexandru Chau RPH

## 2023-01-25 NOTE — CODE/RAPID RESPONSE
Rapid Response Team Note    Assessment   In assessment a rapid response was called on Angel Cardenas due to SIRS/Sepsis trigger. 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   - defer repeat lactic acid to primary team in setting of possible transition to comfort focused cares.   -patient remains severely agitated. Added wrist restraints   -  The Hematology/Oncology primary team was able to be reached and they are in agreement with the above plan.  -  Disposition: The patient will remain on the current unit. We will continue to monitor this patient closely.  -  Reassessment and plan follow-up will be performed by the primary team      Tran Arceo PA-C  [facility] RRT [facility] Job Code Contact #[phone]  [facility] Paging/Directory    Hospital Course   Brief Summary of events leading to rapid response:   Rapid response for lactic acid of 2.6.  Patient remains agitated, requiring bipap for oxygen needs.  On max dose precedex for current floor.  Added wrist restraints as patient pulling off bipap and trying to rip out sarabia catheter.     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   HEENT: Anicteric sclera. Mucous membranes moist. NC. AT.  CV: RRR. S1, S2. No murmurs appreciated.   RESPIRATORY: Effort normal on bipap.  EXTREMITIES: No peripheral edema.   SKIN: No jaundice. No rashes.        Significant Results and Procedures    Lactic Acid:   Recent Labs   Lab Test 01/23/23  1659 01/23/23  1207 01/20/23  0528   LACT 1.7 2.3* 2.0     CBC:   Recent Labs   Lab Test 01/25/23  1023 01/24/23  1859 01/24/23  0546 01/23/23  0557   WBC 31.1*  --  17.4* 17.4*   HGB 8.5*  --  7.5* 7.7*   HCT 28.8*  --  25.3* 25.8*   PLT 58* 50* 36* 40*        Sepsis Evaluation   The patient is known to have an infection.  Angel Cardenas meets SIRS criteria AND has a lactate >2 or other evidence of acute organ damage.  These vital signs, lab and physical exam findings constitute a diagnosis of SEVERE SEPSIS, based on: Lactate resulted, and the level was > 2.0          Anti-infectives (From now, onward)    Start     Dose/Rate Route Frequency Ordered Stop    01/25/23 0800  vancomycin (VANCOCIN) 1,250 mg in 0.9% NaCl 250 mL intermittent infusion         1,250 mg  over 90 Minutes Intravenous EVERY 12 HOURS 01/25/23 0743      01/19/23 2000  acyclovir (ZOVIRAX) tablet 400 mg        Note to Pharmacy: PTA Sig:Take 1 tablet (400 mg) by mouth 2 times daily      400 mg Oral 2 TIMES DAILY 01/19/23 1307      01/19/23 2000  micafungin (MYCAMINE) 50 mg in sodium chloride 0.9 % 100 mL intermittent infusion         50 mg  100 mL/hr over 60 Minutes Intravenous EVERY 24 HOURS 01/19/23 1439      01/19/23 1330  ceFEPIme (MAXIPIME) 2 g vial to attach to  ml bag for ADULTS or 50 ml bag for PEDS         2 g  over 30 Minutes Intravenous EVERY 8 HOURS 01/19/23 1308          Current antibiotic coverage is appropriate for source of infection.    3 Hour Severe Sepsis Bundle Completion:  1. Initial Lactic Acid result shown above. Repeat lactic acid is not indicated.  2. Blood Cultures before Antibiotics: Yes  3. Broad Spectrum Antibiotics Administered: yes  4. Is hypotension present? No (IV fluid bolus NOT required). IV Fluid volume administered: no fluids, receiving diuresis.

## 2023-01-25 NOTE — PROGRESS NOTES
[facility]  Hematology / Oncology Progress Note  Date of Service (when I saw the patient): 01/25/2023     Assessment & Plan   Angel Cardenas is a 62 year old male with a history of multiple prior DVTs and PE in 2022 (previously on Xarelto), DM2, COPD, HTN, gout and recently diagnosed AMML s/p Cycle 1 of 10-days of Decitabine/Venetoclax (D1=12/19/22) with subsequent persistent disease on post-therapy BMBx 1/13/23 demonstrating ongoing ~70% blasts. He presents from clinic for consideration of re-induction chemotherapy in the setting of fever and AHRF. Due to poor clinical status and multiple co-morbidities, initiated 10-day low-dose Cherie-C (C1D1=1/22/23) with no improvement and continued progression of his leukemia. Clinical decompensation overnight 1/24 with progressive AHRF and AMS prompted GOC meeting with sisters on 1/25 with decision for DNR/DNI and transition to comfort cares.      TODAY:   - GOC meeting held with sisters (Gala and Mulu) and Dr. Flores with palliative care, patient unable to participate d/t AMS. Discussed further clinic decompensation overnight and our concern for ongoing progressive leukemia despite chemotherapy thus far. Unfortunately given his co-morbidities and poor clinical status any further chemotherapy would likely cause more harm than benefit. Given his clinical picture would anticipate patient may have only hours-days left. Confirmed with sisters their decision for DNR/DNI and transition to comfort cares inpatient.   - Comfort measures ordered - discontinue routine VS, lab-work and unnecessary medications. Appreciate palliative care involvement for assistance with symptom management (see their note from today).   - Appreciate  involvement for assistance with [facility] for sisters      HEME  # Comfort cares  # AMML with SRSF2, TET2 mutations  # Leukocytosis   Patient of Dr. Velasquez. In summary, presented to PCP with fatigue, subjective fevers,  lack of appetite, weight loss, epistaxis and rib pain. Found to be pancytopenic with 6% circulating peripheral blasts (WBC 8.5 Hgb 8.8 and plts 82). Diagnostic BMBx 12/9 was consistent with AMML with 80 blasts, NGS: SRSF3 + TET2 mutations (on peripheral blood) conferring adverse risk, Cyto: 50-52,XY,+4,+5,+8,+13,add(13)(p11.2)x2,+18,+20[cp17]/,idemx2,+16,+16[cp3]. Diagnostic LP negative. Due to comorbidities started on Decitabine 10 days + Venetoclax 28 (C1D1 12/19/22). Hospital course was complicated by TLS requiring rasburicase, possible PNA, possible adrenal insufficiency/hypotension related to steroid taper (not requiring pressors) and back/rib pain flares. He discharged to home following completion of chemotherapy and post-therapy BMBx was completed on 1/13 which is notable for persistent pancytopenia showing hypoercellular marrow (90%) with marked decrease hematopoesis and 70% blasts. NGS/FISH/cyto is pending. He had a visit with Dr. Velasquez in clinic prior to admission on 1/19 to discuss next steps and consideration of re-induction.  - Per conversation with Dr. Velasquez PTA, considered intensive regimen with 7+3 vs G-CLAC. However, in the setting of significant hypoxia and multiple comorbidities will instead proceed with low dose cytarabine (C1D1=1/22/23), 20mg q12h and plan for a total 10-day course - discontinued on 1/25 (D4) following transition to comfort cares.   - PICC placement in the setting of multiple lab draws, IV products and chemotherapy  - Repeat pre-chemo work-up - normal EKG, echo with EF 60-65%  - CT chest PE/AP (1/19) - reveals splenomegaly, enlarged inguinal lymphadenopathy and hepatomegaly.   - Progressive leukemia as demonstrated by progressive leukocytosis to 30K, up-trending LDH, worsening lung infiltrates and AHRF, progressive pain flares, AMS.  - Dex taper discontinued 1/25 in light of comfort cares and alternative pain medications  - 1/25: GOC meeting held with sisters (Gala and  Mulu) and Dr. Flores with palliative care, patient unable to participate d/t AMS. Discussed further clinic decompensation overnight and our concern for ongoing progressive leukemia despite chemotherapy thus far. Unfortunately given his co-morbidities and poor clinical status any further chemotherapy would likely cause more harm than benefit. Given his clinical picture would anticipate patient may have only hours-days left. Confirmed with sisters their decision for DNR/DNI and transition to comfort cares inpatient.   - Palliative care following as below    # H/o PE (most recently Jan 2022)   # H/o multiple DVTs (1990s)  Per chart review he was seen for evaluation of PE by Dr. Amari Cortes (4/15/22). Patient has history of multiple DVTs in the 1990s without known cause, unprovoked PE at age 43, DVT with PE in context of hip replacement. He most recently was diagnosed with bilateral PE with right heart strain on 1/19/22 after presenting to the ED with SOB. Previously was on Xarelto though discontinued during hospitalization for induction chemotherapy. Transitioned to Lovenox and discharged on half-dosing d/t down-trending counts. Platelets appear to have recovered though are in the 100s on admission. Has continued half-dosing Lovenox without increase to full-dose in the outpatient setting. Presented this admission with fever and hypoxia (77% on RA) requiring 2-4L O2.   - CT PE protocol (1/19) is negative for PE and no R heart strain. Does reveal findings c/w chronic PE and possible pulmonary HTN. 6mm solid pulmonary nodules which should be monitored at least yearly.   - Lovenox now discontinued with transition to comfort cares 1/25      # Risk for TLS  # Risk for DIC  # H/o gout  Leukocytosis noted on admission lab work with WBC 15.3 in the setting of known persistent leukemia. Phos mildly elevated to 4.9. Cr near baseline (0.-1.1) at 1.17. Uric acid 8.6 and LDH 2,242. He is s/p Rasburicase x1 with improvement in  uric acid to 6.6. Of note he does have h/o gout.   - PTA Allopurinol 300 mg BID (dose adjusted for weight); discontinue to align with goals  - Start Phoslo 1,334 TID; held 1/23.  - Labs discontinued to align with goals     # Anemia  # Thrombocytopenia   2/2 recent chemotherapy and underlying disease.   - Discontinue lab checks and blood product transfusions to align with goals     ID  # Fever  # AHRF  # Abdominal bloating/discomfort   # Lactic acidosis, improved  Febrile in clinic to 100.5 on 1/19, also with associated AHRF with sats to 77% on admission. BP normotensive. Endorses progressive back pain as below along with SOB/DEAN worse over the past 1-2 days and abdominal bloating. He is having regular BMs. Denies other respiratory or infectious complaints. Lab work is notable for leukocytosis to 15 and mildly elevated LFTs (Alk 214 and AST 51). Lactic acid elevated to 2.9, later improved to 2.0 - presume Type B 2/2 progressive leukemia. Differential includes infection vs COPD exacerbation vs leukemia-related. CT chest PE and A/P (1/19) negative for infectious etiology. Infectious work-up has remained largely negative. ID is on board. Had repeat fever to 101.6 on 1/23 and continues IV antibiotics - repeat infectious work-up remains negative. CXR shows pulmonary edema. New rash to abdomen which is improving as of 1/24 and does not appear infectious per ID.   - ID consult 1/21 with no evidence of overt infection (see workup below) and recommendation to consider stopping IV antibiotics  - Continue to follow BCx; NGTD  - Discontinue IV antibiotics 1/25 to align with goals and transition to comfort cares      Antibiotics   - Cefepime (1/19 - 1/25)   - Vancomycin (1/19 - 1/21 AM) (1/23-1/25)      Infectious studies (1/19-1/21)  - RVP - neg  - MRSA nares - neg  - UCx - neg  - BCx (1/19, 1/23) - NGTD  - Cryptococcal serum ag - neg  - CRP (1/19) 242 ? (1/23) 151  - Procal (1/19) 1.09 ? (1/23) 0.67  - PJP PCR- needs to be  collected  - Sputum culture- pt unable to produce sputum, but too unstable for induced sputum  - Legionella urine - negative  - Histo/Blasto - in process   - Fungitell, galactomannan - neg  - AFB culture - NGTD  - HHV6, serum - neg  - Quant gold- neg  - Treponema abs, serum - in process    # Prophylaxis  ANC is 1.8 on admission though c/f functional neutropenia in the setting of persistent leukemia and leukocytosis.   - Discontinue prophylaxis to align with goals     CV/PULM  # COPD  # Orthopnea  # CATHERINE  Ongoing mild hypoxia to high 80s appears to be his baseline in the setting of his COPD PTA. Oxygen requirement to 4-5L on admission at rest (8-10L with activity or d/t positioning) with O2 sats in the 70s on RA. Reports SOB tends to flare with pain crises. Does desat at night.   - CT PE chest (negative) and antibiotics as above  - PTA Breo-Ellipta, on hold and replaced with Pulmicort 0.5mg BID, can continue BID prn with transition to comfort cares  - DuoNebs QID, can continue BID prn with transition to comfort cares  - Incentive spirometry, discontinued to align with goals  - PTA CPAP at night per pt preference  - Pulm consult - too unstable for bronchoscopy given high O2 requirements. Agree with infectious workup as above and continued diuresis. We will hold off on an induced sputum on x1/21 as he is now on HFNC. Recommended ID and Cardiology consult - ?consider d/w cards RHC this admission if unable to wean O2. Will now sign off given change in goals.   - Cardiology consulted, hold off on bubble study this hospitalization. Would consider RHC on a non-urgent basis in outpatient setting if in line with goals.   - PRN comfort meds available for symptom control -- Atropine, Robinul, IV Dilaudid, Ativan, Haldol, Precedex (weaning), Zyprexa     # Chronic LE edema  # Pulmonary edema  # Small bilateral pleural effusions  # Chronic venous insufficiency   # Concern for pHTN  Patient endorses increased LE swelling on  "admission. Has home compression stockings in place. Of note, weight is up 20+ lb from recent discharge on 12/29. Repeat CXR on 1/23 demonstrates pulmonary edema and small bilateral pleural effusions.   - Discontinue scheduled diuresis given transition to comfort cares, can consider prn dosing for comfort  - Lymphedema consulted   - Repeat echo as above with EF 60-65%, IVC dilated and suggestive of high RA pressure  - Cardiology consult in setting of possible pulmonary HTN on echo and CT imaging  - recommended no intervention or workup while inpatient as he does not have obvious pHTN on echo nor does he have RV dysfunction, and overall would not change our mgmt at this time. Cardiology also does not think an echo w/ bubble is necessary. Consider referral to pulmonary HTN clinic outpatient for consideration of RHC and treatment pending goals.   - Consider thora if worsening effusions, no plan to pursue further invasive procedures given transition to comfort cares    # HTN  # HLD  Home Metoprolol and Lisinopril held during previous admission in the setting of hypotension and possible adrenal insufficiency. BP normotensive to mildly HTN on admission. Statin discontinued prior to admission given anticipation of chemotherapy.   - Continue to hold prior Metoprolol/Lisinopril for now; consider resuming as appropriate  - Continue to hold statin, consider resumption prior to discharge      MSK  # Acute on chronic back pain  Noted prior to hospitalization for induction chemotherapy. Presented with rib pain which radiates into his upper back and between his shoulder blades. MRI thoracic spine previously completed 12/17 and showed \"scattered subtle mottled enhancement of bones most pronounced on L 7th rib, could be 2/2 underlying disease process.\" It is possible that pt has some extramedullary infiltration of cortex causing pain. Endorses similar complaints this admission starting 1-2 days PTA in the setting of known persistent " leukemia. No red flag sx. EKG on admission is normal.   - CT PE C/A/P as above; negative for source of acute pain   - Dex taper now discontinued in setting of comfort cares and alternative pain medications  - Palliative care consulted for assistance with pain control, appreciate comfort care reccs (see note from 1/25)  - PRN comfort meds available for symptom control -- Atropine, Robinul, IV Dilaudid, Ativan, Haldol, Precedex (weaning), Zyprexa     NEURO  # AMS  # Agitation  Progressive AMS and agitation noted overnight 1/24. Per nursing patient attempting to get out of bed, remove BiPAP and pull out IV lines. Also with illogical and incomprehensible speech. Sedation with Haldol, Ativan and ultimately Precedex pursued. Vancomycin added to currently Cefepime and repeat BCx drawn.  Unable to obtain CT head due to inability to tolerate. Also with ongoing fluctuating oxygen needs with positioning/activity (5L-12L and intermittent BiPAP). Otherwise he has remained afebrile and with mild HTN. UA bland. CXR showed worsening pulmonary edema. Ammonia and BS wnl. Presume clinical decompensation to be largely related to progressive and refractory leukemia with possible contribution from opioid-induced delirium with increase in opioid medication on 1/24 causing worsened AMS. GOC held as above with transition to DNR/DNI and comfort cares.   - Defer further imaging to align with goals  - Black placed  - Sitter as appropriate  - PRN comfort meds available for symptom control -- Atropine, Robinul, IV Dilaudid, Ativan, Haldol, Precedex (weaning), Zyprexa    GI  # Constipation   Noted during previous admission for induction chemotherapy. Patient endorses regular and soft BM on admission though feeling more constipated in setting of chemo and increased pain control.   - Senna and Miralax PRN  - Milk of magnesium daily PRN  - Prune juice was previously helpful for patient   - LBM 1/22      ENDO  # T2DM   [facility], last seen 11/15/22.   - PTA Ozempic held on admission  - Discontinue BS checks and sliding scale insulin to align with goals     MISC  # Dysphagia  Patient endorses difficulty swallowing on admission described as \"food getting stuck in throat\". Has had to modify diet at home and be careful with food selection/size. Occasionally finds himself coughing with water as well.   - SLP consulted - no s/sx of aspiration, ok to continue regular diet. Educated on how to take pills and safe eating habits.      FEN   - IVF bolus prn   - Discontinue lytes replacement to align with goals  - Regular diet as tolerated      Clinically Significant Risk Factors           # Hypercalcemia: Highest Ca = 10.6 mg/dL in last 2 days, will monitor as appropriate    # Hypoalbuminemia: Lowest albumin = 2.8 g/dL at 1/24/2023  5:46 AM, will monitor as appropriate   # Thrombocytopenia: Lowest platelets = 36 in last 2 days, will monitor for bleeding         # Severe Obesity: Estimated body mass index is 51.85 kg/m  as calculated from the following:    Height as of this encounter: 1.854 m (6' 1\").    Weight as of this encounter: 178.3 kg (393 lb).           Lines/Drains: PICC placed on admission, remove on discharge  DVT ppx: discontinued to align with goals  GI ppx: PTA PPI  Consults: PT/OT, lymphedema, SLP, pulm, cardiology, IR, palliative care  CODE: FULL    DISPO: Transitioned to comfort cares on 1/25, anticipate imminent death within hours to days. Patient not able to pursue home hospice and will remain inpatient.      Follow-up/Referrals: Primary oncologist is Dr. Velasquez.   - Will update as to course     Social  - Lives at home alone near [place], [place]  - Has two sisters who are supportive and like to be involved in big conversations   - Appreciate SW involvement for assistance with [facility] for sisters     Coordination of Cares   - Patient follows locally in [place], [place] at [facility]      Patient and plan of care " was discussed with attending physician Dr. Dominguez.    >120 minutes spent on the date of the encounter. Over 50% of time was spent counseling the patient and/or coordinating care.     Shannon Irving PA-C  Hematology/Oncology  Ph: [phone], Pager: [phone]    Interval History   Notable clinical decompensation overnight as evidenced by progressive AMS, confusion, agitation and AHRF. Per nursing patient attempting to get out of bed, remove BiPAP and pull out IV lines. Also with illogical and incomprehensible speech. Sedation with Haldol, Ativan and ultimately Precedex pursued. Vancomycin added to currently Cefepime and repeat BCx drawn.  Unable to obtain CT head due to inability to tolerate. Also with ongoing fluctuating oxygen needs with positioning/activity (5L-12L and intermittent BiPAP). Otherwise he has remained afebrile and with mild HTN. This morning Angel is intermittently able to respond to questioning though became less responsive as the day went on. He denied pain in AM. Called sisters to discuss clinical status and ongoing plans. GOC meeting held this afternoon and plan for transition to DNR/DNI and comfort cares. All questions answered.     A comprehensive review of systems was obtained and is negative other than noted here or in the HPI.     Physical Exam   Temp: 97.7  F (36.5  C) Temp src: Axillary BP: (!) 157/103 Pulse: 72   Resp: 24 SpO2: 96 % O2 Device: Oxymizer cannula Oxygen Delivery: 10 LPM  Vitals:    01/22/23 1045 01/23/23 0940 01/24/23 1100   Weight: (!) 172.7 kg (380 lb 12.8 oz) (!) 178.4 kg (393 lb 6.4 oz) (!) 178.3 kg (393 lb)     Vital Signs with Ranges  Temp:  [96.7  F (35.9  C)-97.8  F (36.6  C)] 97.7  F (36.5  C)  Pulse:  [72-90] 72  Resp:  [19-29] 24  BP: (140-182)/() 157/103  SpO2:  [89 %-96 %] 96 %  I/O last 3 completed shifts:  In: 271.77 [I.V.:271.77]  Out: 4050 [Urine:4050]    General: Chronically-ill appearing, fatigued and in acute discomfort  Skin: Macular erythema and  petechiae noted to lower abdomen, well-within prior markings (improving) - no palpable fluid collection or pain. Skin is otherwise warm, dry and intact without other rashes or lesions. Chronic venous stasis changes to bilateral LE.  Scattered bruising to extremities.   Head: Normocephalic and atraumatic.  HEENT: Sclera clear. EOM intact. Oral mucosa is pink and moist with no lesions, thrush, or exudates.   Lymph: Neck supple.   Cardiac: Regular rate and rhythm, no murmurs appreciated   Respiratory: Increased work of breathing, tachypnea on 9-11L NC. Coarse breath sounds anteriorly although auscultation difficult d/t body habitus/pain/inability to position.   Abd: Soft, symmetric, distended. BS present and normoactive. Organomegaly not noted d/t body habitus.   Extremities: 2-3+ bilateral pitting edema. Distal pulses palpable.   Neuro: Lethargic, A&O x2 (date and place). Opens eyes to speech and gentle touch. Not following commands.     Medications     dexmedetomidine 0.7 mcg/kg/hr (01/25/23 1327)       OLANZapine zydis  10 mg Oral BID

## 2023-01-25 NOTE — PROGRESS NOTES
[facility] ph: [phone], staff are checking with supervisor to see if they can support Pt's sister's with a stay while Pt transitions to comfort cares. Staff will follow up with [facility] ISAAC. [facility] faxed referral to [facility] and is waiting to hear back. [facility] has connected with Pt's sister/sisters and provided information. They are welcome to stay at [facility] if they adhere to [facility] hospital's policies/procuedures.     ________________    MARLI Bolanos, LICSW  6D   [facility]  Phone: [phone]  Pager: [phone]  Fax: [phone]

## 2023-01-25 NOTE — PROGRESS NOTES
[facility]  Transplant / Hematological Malignancy Infectious Disease Progress Note -- Sign Off     Patient:  Angel Cardenas, Date of birth 1960, Medical record number [id number]  Date of Visit:  01/25/2023          Assessment and Recommendations:   Recommendations:  - Discontinue IV vancomycin -- there is scant likelihood he has any MRSA or cefepime-resistant Staph process and the right lower abdominal erythema does not appear obviously cellulitic.  - Can continue cefepime for febrile neutropenia for now, but he has received a seven day empiric course without evident benefit and his 1/23/23 fever and rising leukocytosis appear to be due to malignancy rather than infection.  - There is no ID contraindication to additional oncologic therapy.  - Given his lack of absolute lymphopenia, micafungin prophylaxis is unneeded.  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    Addendum:  In light of the transition to Comfort Care status and discontinuation of all antimicrobials later this afternoon, Transplant ID will sign off now.  Thanks for allowing me to participate in the care of this gentleman.  Please page if additional ID questions or concerns arise.    Transplant ID will follow with you.    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  A post-chemotherapy 1/13/23 bone marrow biopsy pathology / flow cytometry showed ~ 67 - 70% persistent blasts.  He was now admitted to the [facility] Hematology-Oncology service from clinic on 1/19/23 afternoon with a week of  "low-grade fever (to 100.5 degrees F on 1/19/23 AM), sweats, fatigue, exertional and resting dyspnea (with hypoxia of 77 - 87% oxygen saturation on room air at admission) markedly wosened low thoracic / upper lumber bilateral back pain over the preceding two days, nausea / emesis, abdominal bloating, diarrhea / constipation, some dysphagia, and increased bilateral legs edema.  He was admitted for evaluation of these symptoms in preparation for imminent re-induction chemotherapy.  Transplant ID was consulted on 1/20/23 regarding the admission non-neutropenic fever and hypoxia.  His mental status has worsened over the past 24 hours and his peripheral WBC with malignant cells continues to rise despite treatment with cytarabine.    Addendum:  Due to his refractory malignancy and deteriorating clinical status, Mr. Cardenas was transitioned to Comfort Care after a family conference this mid-afternoon.  All antimicrobials have been discontinued.    ID issues:    - Intermittent non-neutropenic fevers with other constitutional symptoms:  He had low-grade sweats and episodes of feeing hot for a week prior to admission with a fever of 100.5 degrees measured on presentation to clinic on 1/19/23 AM and has now had another fever of 101.6 degrees on 1/23/23.   Although he may have some neutrophil dysfunction due to his marked \"\"blastemia\", he has not been markedly neutropenic and presumably has at least partially-intact neutrophil function.  Beyond back pain and hypoxia, he lacks much in the way of focal signs or symptoms that would suggest an infectious focus, and truncal CT imaging on 1/19 - 20/23 revealed no likely infectious foci.  Specifically, he has no evidence for a bacteremia, focal pneumonia, sinusitis, UTI, cellulitis or soft tissue infection, abdominal abscess, or other bacterial process.  His back was previously imaged with MRI on 12/17/23 and there was no suggestion of infection at that time, but that site should " "probably be re-evaluated now.  He had a high procalcitonin of 1.09 on 1/21/23 but it is probably confounded by his leukemia and there is still no likely bacterial focus of infection at this time.  With his lack of profound neutropenia, empiric antibiotic therapy is unneeded (but he has nevertheless received empiric cefepime since admission).  He lacks MRSA nares carriage, so IV vancomycin is especially unneeded now.  There is also no indication of any atypical bacterial pneumonia (by symptoms or admission chest CT), mycobacteriosis (with a negative 1/21/23 Quantiferon gold and AFB blood culture), likely endemic or mold fungal infection (in the absence of strong exposures, suggestive imaging, and profound neutropenia and with all fungal serologies from 1/20 - 21/23 negative), viral infections (with HIV / infectious hepatitis screens negative on 12/10/23, CMV seronegativity on 12/11/23, and negative admission respiratory virus PCRs), or STDs (by history, with a negative Treponema antibody screen).  He does not have obvious medications that would a pyrogenic culprit.  The most likely source of his low-grade fever and \"B\" symptoms is his unchecked leukemia.    - Recent dyspnea / hypoxia -- pulmonary edema:  The hypoxia improved 1/20 - 22/23 with diuresis, but has now worsened again on 1/23/23.  Chest x-ray strongly suggests the presence of pulmonary edema.  This may be multifactorial, but there is little to suggest any infectious component is contributing.  The chest CT scan was not suggestive of infection.  This is not a setting for PJP.  Instead, his dyspnea / hypoxia are likely due to a combination of COPD, obesity hypoventilation, anemia, increased pulmonary edema, malignancy-induced metabolic stress, and possibly some pulmonary hypertension -- at present, would not treat him for classic or atypical pneumonias.    - New 1/23/23 right inferior abdominal erythematous, nontender rash:  This appears to be a focal " "contact dermatitis or localizing reaction, not a likely cellulitis.  Since this exanthem arose within 24 hours following his first subcutaneous cytarabine dose, that may be the culprit.  Would monitor for now.    - Acute-on-chronic lower thoracic / superior lumbar pain:  He reports that the pain he experienced on 1/18 - 19/23 was the most intensely painful episode of several (\"about four\") episodes of similar back pain he has had since his leukemia was diagnosed in early December.  A prior 12/17/22 thoracic spine MRI scan did not demonstrate any evidence of likely infection, but, now more than a month later -- given the acute exacerbation on 1/18/23 -- it should be re-imaged to make certain there is no incipient vertebral osteomyelitis.  Interestingly, it does not sound like the pain was strongly due to spinal nerve impingement, since he was able to ambulate despite the pain and ambulation did not necessarily increase his discomfort.    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    Other ID considerations:  - QTc interval:  435 msec on 1/19/23 EKG.  - Bacterial prophylaxis:  None indicated (but was on pre-admission levofloxacin prophylaxis).  Presently on empiric broad-spectrum treatment.  - Pneumocystis prophylaxis:  None indicated -- not markedly lymphopenic.  - Viral serostatus & prophylaxis:  CMV seronegative. HSV-1 / EBV seropositive.  On acyclovir.  - Fungal prophylaxis:  None indicated, not markedly lymphopenic.  But has been on posaconazole / micafungin prophylaxis.  - Risk factors to suggest check of Toxoplasma, Strongy, or Schisto serology?:  None.  - Immunization status:  Not presently relevant.  - Gamma globulin status:  " "Unknown, not presently relevant.  - Isolation status: Routine.        Interval History:   Mr. Cardenas has again been afebrile (T max 98.2 degrees F) over the past 36+ hours without recollected chills or sweats, after having experienced an acute fever to 101.6 degrees F on 1/23/23 midday despite ongoing (and still continued) empiric cefepime (since 1/19/23, for non-neutropenic febrility), acyclovir prophylaxis (with a positive HSV-1 serology on 1/20/23), and micafungin 50 mg prophylaxis (both since admission and still continuing).  He also now remains on IV vancomycin (added on 1/23/23 PM, for that fever spike).  His peripheral WBC continues to rise, now up to 31.1 today, with a higher peripheral blast (\"other cells\") count of 13/1 today, despite being on day four (since 1/22/23) today of a ten day  low-dose subcutaneous cytarabine course.  His oxygenation remains worse since 1/23/23 and he is still requiring 6 - 10 liters of supplemental oxygen by Oxymask (or BiPap overnight last night).  He is hemodynamically stable, but remains on the 6D Intermediate Care mena since 1/23/23 PM.  His weight is up about six+ kg today and yesterday versus 1/21/23 but diuresis has been given today.  He became more confused and agitated last night and is quite somnolent this midday and only inconsistently can answer questions monosyllabically -- a Review of Systems is unobtainable.  His right lower abdominal zone of erythema is fading and receding.  Today's chest x-ray shows increased pulmonary edema.      His lactic acid is up to 1.6 this midday (versus 1.7 on 1/23/23.  A 1/21/23 procalcitonin was moderately high at 1.09 on 1/21/23 but had decreased to 0.67 on 1/23/23 and has not been repeated (since it is difficult to interpret in the setting of a blasting leukemia).  A 1/21/23 NTpBNP was normal at 499 and has not been repeated.  An initial 1/21/23 serum CRP was 242.00 but that decreased to 151.00 on 1/23/23.  The serum LDH has risen " to >2,500 today.  Blood cultures from 1/19/23 x 2, 1/23/23 x 2, and 1/25/23 x 2 are without growth to date.  Histoplasma serum antigen, Legionella urine antigen, Fungitell, galactomannan antigen, serum CRAG, serum Treponema antibody, HHV-6 PCR, and Quantiferon Gold assays from 1/20 - 21/23 are all negative.  A Blastomyces antigen assay is pending.  The 1/22/23 sputum Pneumocystis jirovecii PCR was negative.  A 1/21/23 AFB blood culture is without growth to date.  A 1/22/23 sputum culture was orally contaminated and another has not been successfully obtained.  A 1/19//23 urine culture was negative.  No infection was apparent on the 12/17/23  thoracic / lumbar spine MRI scan.    Addendum:  Due to his refractory malignancy and deteriorating clinical status, Mr. Cardenas was transitioned to Comfort Care after a family conference this afternoon.        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  He was discharged from that initial hospitalization on 12/29/22 on levofloxacin, acyclovir, and posaconazole prophylaxis and initially felt well at home.  A post-therapy 1/13/23 bone marrow biopsy pathology / flow cytometry showed ~ 67 - 70% persistent blasts. He was seen for follow-up in the Hematology-Oncology Clinic on 1/19/23 where he was noted on check-in to be febrile to 100.5 degrees F and hypoxic (O2 saturations 77 - 87% on room air, requiring up to four liters of supplemental oxygen by nasal cannula).  In clinic, he complained of low-grade fevers over a week, sweats,  "exertional and resting dyspnea, markedly wosened back pain over the preceding two days, nausea / emesis, abdominal bloating, diarrhea / constipation, some dysphagia, and increased bilateral legs edema despite use of compression hose.  The back pain was bilateral across his back at the lower-thoracic / upper lumbar levels (at present, he is having trouble localizing it) and was the worst he has ever had (\"so bad I could not think\"), although in location and quality similar to about three prior episodes over the past two months.  He was admitted to the [facility] Hematology[facility]Oncology service on 1/19/23 afternoon for evaluation of the fever and hypoxia and for planning for an attempt at re-induction chemotherapy.  A triple lumen PICC was placed upon admission in anticipation of new chemotherapy.  Admission (post antibiotic) blood cultures x 2 are without growth to date and admission urinalysis / urine culture, nares MRSA PCR screen, nasopharyngeal respiratory pathogen PCR panel and SARS CoV-2 PCR assays were all negative.  A 1/19/23 ferritin was 7,289 with an LDH of 2,380 (versus 409 on 12/29/22) and a uric acid of 8.1.  Upon admission, he was placed on wholly-empiric IV vancomycin (despite no history of MRSA carriage) and cefepime, as well as acyclovir and micafungin 50 mg per day prophylaxis.  His initial (1/19/23 AM) fever in clinic of 100.5 degrees resolved and he has been afebrile (T max 99.2 degrees F) over the past 24 hours since admission.  He had an admission leukocytosis of 15.3 (ANC 1.8, never below 1.5; ALC 1.6, never below 0.5) on 1/19/23 midday which decreased to 11.7 this AM (majority \"other cells\" on differentials), and an admission lactic acid of 2.9 which is down to 2.0 this AM.  A 1/19/23 chest CT angiogram and a 1/20/23 abdominal CT scan showed possible pulmonary hypertension, bilateral basilar lung air trapping, renal cysts, inguinal lymphadenopathy, and hepatosplenomegaly, but no acute pulmonary embolus, " intra-abdominal collections or other evident new infectious foci.  A 1/20/23 TTE was basically normal except for possible high RA pressure.  His oxygenation has improved some today after bumetanide was given.  He has remained hemodynamically stable.  Serum Fungitell / serum galactomannan antigen / sputum culture / urine Blastomyces / urine Histoplasma / urine Legionella / sputum Pneumocystis jirovecii were all ordered this afternoon.  A pRBC transfusion was given this morning.  Re-induction 7 + 3 chemotherapy with low-dose cytarabine is planned, but he is undergoing preliminary evaluations -- including by Pulmonary and Cardiology Consult -- before beginning that.  He has some increased dyspnea over night last night (with a Rapid Response called), but that is improved and roughly back to his admission oxygenation status today.  He continues to complain of back / hip pain (although that is mostly resolved by evening today), resting dyspnea, fatigue, mild anorexia, mild malaise, and abdominal bloating today, but no recent chills or sweats, and no new complaints today including EENT symptoms, dysuria, rash or headache.  He has not had diarrhea since admission.  Transplant ID is consulted regarding the admission non-neutropenic fever and hypoxia.    Exposure History:  Lives alone, mostly has contact with his sister and an older woman caregiver who have been healthy, no recent sick contacts, no contact with young children (last saw his twelve year old grand-niece a number of weeks ago.  On disability from his job as a [employer]  since early 12/2022 so not out in public much since then.  No pets, no animal habitat exposures in the past couple of years. Lat out of [place] for a three day motocycle rally in [facility] about a year ago.  [place] been in [place] in distant past years and in [facility] once.  Otherwise never outside of [place].  No known history of exposure to tuberculosis in  "his life. Very infrequently (last about three years ago) eats a semi-raw beef \"tiger burger\", but no other raw / unpasteurized ingestions.  Drinks filtered water purchased at grocery store.      Review of Systems:  A Review of Systems is unobtainable today due to somnolence and confusion with quite minimal verbal responsiveness.    Past Medical History:   Diagnosis Date     COPD (chronic obstructive pulmonary disease) (H)      Diabetes mellitus, type 2 (H)      Gout      History of pulmonary embolism      HLD (hyperlipidemia)      HTN (hypertension)      Past Surgical History:   Procedure Laterality Date     PICC DOUBLE LUMEN PLACEMENT Right 12/16/2022    Right brachial-medial vein 48cm total 1cm external.Placement verified by [facility] 3[facility].PICC okay to use.     PICC TRIPLE LUMEN PLACEMENT Right 01/19/2023    5FR TL PICC basilic vein. L-50cm 2cm out     ME REVISE TOTAL HIP REPLACEMENT Bilateral      TOTAL SHOULDER REPLACEMENT Right      Social History     Tobacco Use     Smoking status: Former     Years: 40.00     Types: Cigarettes     Smokeless tobacco: Never     Tobacco comments:     Quit around 2016   Substance Use Topics     Alcohol use: Not Currently     Comment: daily, 3-4 drink liquor. last drink Dec 10 2022   Lives alone in his home near [place], [place].  Has two supportive sisters.         Current Medications & Allergies:       acyclovir  400 mg Oral BID     allopurinol  300 mg Oral BID     ammonium lactate   Topical Daily     budesonide  0.5 mg Nebulization BID     [Held by provider] bumetanide  1 mg Oral Daily     [Held by provider] calcium acetate (phos binder)  1,334 mg Oral TID w/meals     ceFEPIme  2 g Intravenous Q8H     cytarabine (CYTOSAR) infusion  20 mg Subcutaneous Q12H     dexamethasone  4 mg Oral BID    Followed by     [START ON 1/26/2023] dexamethasone  4 mg Oral Daily     enoxaparin ANTICOAGULANT  80 mg Subcutaneous Q12H     heparin lock flush  5-20 mL Intracatheter Q24H     heparin lock flush  " 5-20 mL Intracatheter Q24H     insulin aspart  1-7 Units Subcutaneous TID AC     insulin aspart  1-5 Units Subcutaneous At Bedtime     ipratropium - albuterol 0.5 mg/2.5 mg/3 mL  3 mL Nebulization 4x daily     [Held by provider] methocarbamol  500 mg Oral 4x Daily     micafungin  50 mg Intravenous Q24H     pantoprazole  40 mg Oral QAM AC     polyethylene glycol  17 g Oral BID     senna-docusate  2 tablet Oral BID     sodium chloride (PF)  10-40 mL Intracatheter Q8H     sodium chloride (PF)  10-40 mL Intracatheter Q8H     vancomycin  1,250 mg Intravenous Q12H     Infusions/Drips:      dexmedetomidine 0.7 mcg/kg/hr (01/25/23 1100)     - MEDICATION INSTRUCTIONS -       Allergies   Allergen Reactions     Metformin Unknown          Physical Exam:     Patient Vitals for the past 24 hrs:   BP Temp Temp src Pulse Resp SpO2   01/25/23 1134 (!) 157/103 97.7  F (36.5  C) Axillary 81 22 94 %   01/25/23 1100 -- -- -- 80 29 --   01/25/23 1000 -- -- -- 75 21 --   01/25/23 0921 -- -- -- -- -- 91 %   01/25/23 0900 -- -- -- 78 27 --   01/25/23 0800 -- -- -- 89 26 93 %   01/25/23 0750 (!) 141/82 97.5  F (36.4  C) Axillary 79 20 --   01/25/23 0701 -- -- -- 84 -- --   01/25/23 0657 -- -- -- 83 -- --   01/25/23 0425 (!) 171/99 97.5  F (36.4  C) Axillary 74 19 94 %   01/25/23 0421 -- -- -- 73 -- --   01/25/23 0355 -- -- -- 84 -- --   01/25/23 0314 -- -- -- 89 -- --   01/25/23 0300 -- -- -- 90 -- --   01/25/23 0251 (!) 182/93 -- -- 90 -- --   01/25/23 0026 (!) 142/78 (!) 96.7  F (35.9  C) Axillary 85 19 (!) 89 %   01/24/23 2017 (!) 140/77 97.8  F (36.6  C) Oral 79 26 93 %   01/24/23 1605 (!) 147/87 97.7  F (36.5  C) Oral 73 21 93 %   01/24/23 1300 -- -- -- 74 (!) 9 92 %     Ranges for vital signs over the past 24 hours:   Temp:  [96.7  F (35.9  C)-97.8  F (36.6  C)] 97.7  F (36.5  C)  Pulse:  [73-90] 81  Resp:  [9-29] 22  BP: (140-182)/() 157/103  SpO2:  [89 %-94 %] 94 %  Vitals:    01/22/23 1045 01/23/23 0940 01/24/23 1100   Weight:  (!) 172.7 kg (380 lb 12.8 oz) (!) 178.4 kg (393 lb 6.4 oz) (!) 178.3 kg (393 lb)     Intake/Output Summary (Last 24 hours) at 1/25/2023 1238  Last data filed at 1/25/2023 1216  Gross per 24 hour   Intake 340.57 ml   Output 3350 ml   Net -3009.43 ml     Physical Examination:  GENERAL:  Very sleepy, occasionally muttering, very inconsistently responding to questions, chronically-ill appearing, 62 year old man in bed in [facility].  HEAD:  NCAT.  EYES:  EOMI, anicteric sclerae.  ENT:  No otorrhea.  Oxymask present at 10 liters O2.  NECK:  Supple.  LYMPH:  No lymphadenopathy  LUNGS:  Bibasilar posterior rales; anterior clear to auscultation bilaterally with shallow breath sounds.  CARDIOVASCULAR:  RRR, no murmur.  ABDOMEN:  Normal bowel sounds, soft, nontender.  :  Black with virginia urine (placed this AM).  EXTREMITIES:  Distally warm, 2+ bipedal edema.  SKIN:  Fading, receding right inferior nontender mildly warm abdominal rash without peripheral cellulitic blanching and smaller than on 1/23/23.  No acute rash or lesion elsewhere.  Scattered healing ecchymoses; bilateral anterior calf venous stasis.  Right PICC line (placed 1/19/23) site lacks inflammation.  NEUROLOGIC:  Somnolent, mostly nonverbal, moves extremities x 4 but not to request.         Laboratory Data:     Inflammatory Markers    Recent Labs   Lab Test 12/17/22  0617   CHEN 0.7     Immune Globulin Studies   No lab results found.    Metabolic Studies       Recent Labs   Lab Test 01/25/23  1204 01/25/23  1023 01/24/23  0854 01/24/23  0546 01/23/23  1716 01/23/23  1659 01/23/23  1207 01/23/23  1124 01/23/23  0557 01/20/23  2121 01/20/23  1810   NA  --  141  --  139  --  138  --   --  137   < > 136   POTASSIUM  --  4.4  --  4.2  --  3.9  --   --  3.8   < > 3.6  3.6   CHLORIDE  --  101  --  102  --  101  --   --  100   < > 100   CO2  --  25  --  23  --  21*  --   --  23   < > 22   ANIONGAP  --  15  --  14  --  16*  --   --  14   < > 14   BUN  --  23.1*  --  15.6  --   12.7  --   --  10.3   < > 16.2   CR  --  0.85  --  0.86  --  0.96  --   --  0.85   < > 1.04   GFRESTIMATED  --  >90  --  >90  --  90  --   --  >90   < > 82   * 170*   < > 174*   < > 130*  --    < > 97   < > 110*   ZEHRA  --  10.6*  --  10.4*  --  9.8  --   --  10.0   < > 9.4   PHOS  --  4.2  --  4.4  --  4.2  --   --  3.4   < > 4.5   MAG  --  2.3  --  1.9  --   --   --   --  1.6*   < >  --    LACT  --   --   --   --   --  1.7 2.3*  --   --   --   --    PCAL  --   --   --   --   --   --   --   --  0.67*   < >  --    FGTL  --   --   --   --   --   --   --   --   --   --  <31    < > = values in this interval not displayed.     Hepatic Studies    Recent Labs   Lab Test 01/25/23  1023 01/24/23  0546 12/11/22  0701 12/09/22 2013   BILITOTAL 0.5 0.5   < > 0.6   DBIL <0.20 <0.20   < >  --    ALKPHOS 254* 185*   < > 96   PROTTOTAL 7.2 6.5   < > 7.7   ALBUMIN 3.4* 2.8*   < > 3.5   * 37   < > 72*   ALT 10 <5*   < > 5*   LDH >2,500* 1,580*   < > 2,475*   GGT  --   --   --  77*    < > = values in this interval not displayed.     Pancreatitis testing    Recent Labs   Lab Test 12/16/22  1044 12/12/22  0610 12/11/22  1222   LIPASE 36 37 70*     Gout Labs      Recent Labs   Lab Test 01/25/23  1023 01/24/23  0546 01/23/23  1659 01/23/23  0557 01/22/23  1805   URIC 6.2 6.0 6.4 6.0 6.3     Hematology Studies   Recent Labs   Lab Test 01/25/23  1023 01/24/23  1859 01/24/23  0546 01/23/23  0557 01/22/23  1805   WBC 31.1*  --  17.4* 17.4* 13.6*   ANEU  --   --  3.5 1.9 2.4   ALYM  --   --  3.3 0.7* 1.9   LEODAN  --   --  0.7 3.5* 2.9*   AEOS  --   --  0.0 0.0 0.0   HGB 8.5*  --  7.5* 7.7* 7.8*   HCT 28.8*  --  25.3* 25.8* 26.1*   PLT 58* 50* 36* 40* 41*     Clotting Studies    Recent Labs   Lab Test 01/25/23  1023 01/24/23  0546 01/23/23  0557 01/22/23  0550 01/20/23  0528 01/19/23  1412   INR 1.31* 1.25* 1.23* 1.19*   < > 1.28*   PTT  --   --   --   --   --  40*    < > = values in this interval not displayed.     Iron Testing     Recent Labs   Lab Test 01/25/23  1023 01/19/23  1412 01/19/23  1026   IRON  --   --  182*   FEB  --   --  204*   IRONSAT  --   --  89*   REGI  --   --  7,289*   MCV 91   < > 91   FOLIC  --   --  2.4*   B12  --   --  232   HAPT  --   --  233*   RETP  --   --  1.8   RETICABSCT  --   --  0.049    < > = values in this interval not displayed.      Thyroid Studies     Recent Labs   Lab Test 01/24/23  0546   TSH 0.87       Urine Studies     Recent Labs   Lab Test 01/25/23  0849 01/19/23  1828   URINEPH 6.0 5.0   NITRITE Negative Negative   LEUKEST Negative Negative   WBCU 0 0     CSF testing     Recent Labs   Lab Test 12/12/22  1431   CWBC 0   CRBC 3*   CGLU 85*   CTP 61.4*     Body fluid stats  No lab results found.    Microbiology:    Fungal testing  Recent Labs   Lab Test 01/20/23  1810   FGTL <31   FGTLI Negative   ASPGAI 0.05   ASPGAA Negative       Last Culture results   Culture   Date Value Ref Range Status   01/23/2023 No growth after 1 day  Preliminary   01/23/2023 No growth after 1 day  Preliminary   01/22/2023   Final    >10 Squamous epithelial cells/low power field indicates oral contamination. Please recollect.   01/21/2023 No growth after 3 days  Preliminary   01/19/2023 No Growth  Final   01/19/2023 No Growth  Final   01/19/2023 No Growth  Final   12/16/2022 No Growth  Final   12/16/2022 No Growth  Final         Last checks of Clostridioides difficile testing  No lab results found.    Syphilis Testing    Treponema Antibody Total   Date Value Ref Range Status   01/21/2023 Nonreactive Nonreactive Final       Quantiferon testing   Recent Labs   Lab Test 01/24/23  0546 01/23/23  0557 01/22/23  0550 01/21/23  0952   TBRES  --   --   --  Negative   LYMPH 19 4   < >  --     < > = values in this interval not displayed.     Infection Studies to assess Diarrhea  No lab results found.    Invalid input(s): NNDMRESULT    Virology:    Coronavirus-19 testing    Recent Labs   Lab Test 12/09/22  2115 11/29/20  1529  20  0600   HUSRL95CJD Negative  --   --    COVIDPCREXT  --  Positive* Detected*  Detected*     Respiratory virus (non-coronavirus-19) testing    Recent Labs   Lab Test 23  1622   IFLUA Not Detected   FLUAH1 Not Detected   KQ0424 Not Detected   FLUAH3 Not Detected   IFLUB Not Detected   PIV1 Not Detected   PIV2 Not Detected   PIV3 Not Detected   PIV4 Not Detected   RSVA Not Detected   RSVB Not Detected   HMPV Not Detected   ADENOV Not Detected   UFNES Not Detected     CMV viral loads  No lab results found.    HHV-6 DNA copies/mL   Date Value Ref Range Status   2023 Not Detected Not Detected copies/mL Final       No results found for: EBVRESINST, 48451, EBQTC, EBRES, 87387, 24608    No results found for: 40825, BKRES    Parvovirus Testing  No lab results found.    Invalid input(s): PRVRES    Hepatitis B Testing     Recent Labs   Lab Test 12/10/22  0623   AUSAB 0.45   HBCAB Nonreactive   HEPBANG Nonreactive       Hepatitis C Antibody   Date Value Ref Range Status   12/10/2022 Nonreactive Nonreactive Final     CMV Antibody IgG   Date Value Ref Range Status   2022 No detectable antibody. No detectable antibody.  Final     CMV Antibody IgM   Date Value Ref Range Status   2022 Negative Negative Final     EBV Capsid Antibody IgG   Date Value Ref Range Status   2022 Positive (A) No detectable antibody. Final     Comment:     Suggests recent or past exposure.     Herpes Simplex Virus Type 1 IgG Antibody   Date Value Ref Range Status   2023 Positive.  IgG antibody to HSV-1 detected. (A) No HSV-1 IgG antibodies detected Final     Herpes Simplex Virus Type 2 IgG Antibody   Date Value Ref Range Status   2023 No HSV-2 IgG antibodies detected. No HSV-2 IgG antibodies detected Final     EBV Capsid Antibody IgM   Date Value Ref Range Status   2022 No detectable antibody. No detectable antibody. Final     Imagin/25 CXR:  Stable cardiomegaly with worsening pulmonary  edema.  1/23 CXR:  Cardiomegaly with diffuse mixed pulmonary opacities. Findings most suggestive of pulmonary edema.  Small bilateral pleural effusions.  1/20 TTE:  Left ventricular size, wall motion and function are normal. The ejection fraction is 60-65%.  Right ventricular function, chamber size, wall motion, and thickness are normal.  No significant valvular abnormalities present.  Dilation of the inferior vena cava is present with abnormal respiratory variation in diameter suggests a high RA pressure estimated at 15 mmHg or greater.  This study was compared with the study from 12/10/2022. IVC is dilated today.  No other change.  1/20 Abdm CT:  1. Hepatosplenomegaly.  2. No intra-abdominal collection. No significant enlarged retroperitoneal, mesenteric lymph nodes.  3. Enlarged inguinal lymph nodes, possibly reactive.  4.  Bilateral renal cortical hypodensities, compatible with renal cyst and additional too small to characterize renal hypodensities, may consider attention on follow-up.  1/19 Chest CT angiogram:  1. Exam is negative for acute pulmonary embolism. No right heart strain.  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  2. Pulmonary arteries enlarged and measures up to 3.7 cm comment this is nonspecific but most often seen with pulmonary arterial hypertension, possibly CTEPH.  3. Mosaic attenuation in the lung bases is most often seen with air trapping or perfusional abnormalities.  4. 6 mm solid pulmonary nodule in the left lung base cannot be reimaged in one year's time with a noncontrast chest CT.  5. Splenomegaly.    12/17/22 Thoracic spine MRI:  Diffuse bone marrow reconversion can be seen in the setting of hematologic malignancy. Scattered subtle mottled enhancement of the bones most pronounced in the left seventh rib,could be secondary to underlying primary disease. No cord compression or myelopathic cord signal.

## 2023-01-25 NOTE — PLAN OF CARE
"Goal Outcome Evaluation:       Patient started the night A&O x 4 with some forgetfulness. Then began to get delirious, restless and agitated. He knew he was at the hospital, but said he was \"here to pick a mariah up\". Confusion worsened and patient now required a sitter as he attempted to pull PICC out. Patient insisted that he needs to go home and attempted to get out of bed when heavily medicated. Patient received 0.5 mg dilaudid, 40 mg oxy, 1.5 mg IV ativan, and 7.5 mg haldol and continued to try to get up. After countless reorientation and no relief from these PRNs we started a Precedex drip. Patient maxed out on the drip and was able to rest for a short period of time before he began trying to get up again. He ripped off all cords and attempted to rip out PICC line multiple times. Additional 5 mg Haldol and 0.5 mg dilaudid given. Patient still agitated. Continue to monitor mentation. O2 needs increased from 4.5 to 10 L Oxymizer due to rolling around in bed and refusing reposition with head of the bed. Finally Nurse told that patient was going to void earlier but nothing is charted. RN has not seen any output but bladder scanned for 40ml. Monitor urine output.    Blake Wooten RN on 1/25/2023 at 6:47 AM                     "

## 2023-01-25 NOTE — PLAN OF CARE
Occupational Therapy Discharge Summary    Reason for therapy discharge:    Transitioned to comfort cares    Progress towards therapy goal(s). See goals on Care Plan in [facility] electronic health record for goal details.  Goals partially met.  Barriers to achieving goals:   none.    Therapy recommendation(s):    No further therapy is recommended.

## 2023-01-25 NOTE — PROVIDER NOTIFICATION
Called Dr Bianchi on mobile phone at [phone] [place]- New delirium and restlessness. Asked to give the order for PRN ativan from the oncology treatment plan. Provider indicated he would look into chart.     Paged Dr Bianchi at [phone] at [phone] [place]- Restlessness and delirium worsening. Have not seen orders for Ativan. Okay to give the PRN that is in for Nausea under oncology treatment?    Blake ALEJANDRO     Response-  Ativan 1mg IV, Haldol 2.5mg, and ABGs ordered

## 2023-01-25 NOTE — PROGRESS NOTES
[facility]  Palliative Care Daily Progress Note       Recommendations & Counseling     Encounter for palliative care  Psychosocial support    Psychosocial support was provided to patient and/or family.    Prognosis: Poor to fair? > Pending cancer treatment response    Palliative performance scale (PPS): at least 50%, PTA 80%?    CODE status: DNR/DNI (change as of 1/25 per sisters Mulu and Courtney)    Goals of Care: Comfort measures only  ? Unfortunately, Angel took a turn for the worse overnight and developed agitation and was started on a Precedex drip after receiving multiple doses of Haldol. 1:1 Sitter was started as well  ? Onc team reached out to family Courtney and Mulu and informed them of his decline. CODE status changed to DNR/DNI.   ? I assessed him in the morning and upon reassessment in the PM her looked much worse compared to a few hours prior.  ? Mulu and Courtney arrived in the hospital this afternoon and were informed that Angel's AML is likely progressive at a fairly rapid pace and that no further treatment was recommended given how poor his clinical condition is.   ? Courtney and Mulu were in agreement to start comfort measures.  ? I explained to them what the dying process may look like (increasingly comatose state, oliguria/anuria, weaker pulses, and mottling) and they were informed that he may have only days left to live.  ? I broached the topic of hospice and Courtney states she has some experience with hospice. I informed them that hospice could add an extra layer of support for symptom management and family/friends support in addition to bereavement services if desired. Both sisters declined hospice referral at this time.  ? Notified Charge RN and bedside RN of conversation and they confirmed a liberalized visitor policy. Mulu and Courtney noted that Angel likely has 5 other family members who would like to see him.   ? Plan to start comfort measures today 1/25.    ? Surrogate: sister Courtney, Alternate: sister Mulu    Disposition: Inpatient per primary.     Initiate Comfort Care Measures    Start comfort care orders    Turn off all monitors    Stop or at most, take vitals qShift    Stop all labs, ordered studies, and procedures    Discontinue all lines, tubes, drains, and restraints. Can consider continuing urinary catheters and fecal management systems with patient's comfort in mind.    Stop all medications not directed at symptom control including:    Pain/Dyspnea    Dilaudid 1 mg IV q1h prn dyspnea/agitation/distress    Can increase to 2 mg IV if 1 mg ineffective    If 2 mg still ineffective, would start Dilaudid PCA with 1 mg continuous and continue 2 mg IV pushes q1h prn    Lorazepam 1 mg IV q1h prn anxiety/agitation (ONLY use if opioid is ineffective)    Can increase to 2 mg IV if 1 mg ineffective    Wean Precedex as tolerated    Haldol 5 mg IV q6h prn    Zyprexa 10 mg po ODT BID     Secretions    Glycopyrrolate 0.2 mg IV q1h prn    Can increase to 0.4 mg IV if 0.2 mg ineffective    Can start scopolamine patch if still ineffective     Total time spent was 55 minutes regarding symptom control (pain and dyspnea nad agitation) and support and end of life care. These recommendations were given to the primary team via this note/in-person.     Conrado Flores DO / Palliative Medicine / Text me via [facility].     Team Consult Pager [phone] (answered 8am-430pm M-F) - ok to text page via [facility] / After-Hours Answering Service [phone] / Palliative Clinic in [place] [facility] [place] [facility] [place] [phone] (scheduling); [phone] (triage).      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 He presents from clinic on 1/19/2023 for consideration of re-induction chemotherapy in the setting of fever and AHRF (multifactorial from COPD, edema, pulm HTN?). Due to poor clinical status and multiple co-morbidities, oncology started 10-day low-dose Cherie-C (C1D1=1/22/23). Palliative was consulted for pain control 1/24. Unfortunately, Angel decompensated and became agitated and dyspneic overnight. After speaking with his sisters Courtney and Mluu, decision was made to do comfort care without GIP which was started 1/25.     Today, the patient was seen for:  Pain related to neoplasm  End of life care  Terminal delirium  Goals of care  Support  Encounter for palliative care            Interval History:     Chart review/discussion with unit or clinical team members:   Received Haldol, Ativan, Dilaudid, and on Precedex drip due to agitation today. Worsening mental status and respiratory status throughout the day    Per patient or family/caregivers today:  Seen and examined at bedside. Sisters Mulu and Courtney present later in the day. Conversation detailed above. Opting for comfort measures, no GIP evaluation.     Key Palliative Symptoms:  # Pain severity the last 12 hours: severe  # Dyspnea severity the last 12 hours: severe  We are not managing nausea in this patient  # Anxiety severity the last 12 hours: severe    Patient is on opioids: assessed and bowels ok/no needed changes to plan of care today.           Review of Systems:     Besides above, >4 ROS was reviewed and is unremarkable          Medications:     I have reviewed this patient's medication profile and medications during this hospitalization.    Noted meds:    Zyprexa 10 mg ODT BID    Precedex gtt    Albuterol prn  Tylenol prn  Dilaudid 1 mg IV q1h prn  Ativan 1 mg IV q1h prn  Haldol 5 mg IV q6h prn  Zofran prn  Compazine prn           Physical Exam:   Vitals were reviewed  Temp: 97.7  F (36.5  C) Temp src: Axillary BP: (!) 157/103 Pulse: 72    Resp: 24 SpO2: 96 % O2 Device: Oxymizer cannula Oxygen Delivery: 10 LPM  General: Distress  Head: Atraumatic. Normocephalic.   Eyes: Eyes closed.  Ear, Nose, and Throat: Mouth pink and dry but without lesions. Neck without overt masses.  Pulmonary: Labored. On NC O2   Cardiovascular: No overt jugular venous distension. Non-edematous.  Abdomen: Large abdomen   Skin: Warm. Dry. LE brawny edema  Musculoskeletal: Joints of hand normal. Muscle bulk and tone normal in UE and LE.  Neuro: Barely arouseable but not oriented, mumbling incoherently  Psych: Unable to assess, reportedly agitated.           Data Reviewed:     Reviewed recent pertinent imaging, comments:   XR Chest Port 1 View [[id number]] Resulted: 01/25/23 0919   Impression: Stable cardiomegaly with worsening pulmonary edema.       Reviewed recent labs, comments:   ROUTINE ICU LABS (Last four results)  CMP  Recent Labs   Lab 01/25/23  1204 01/25/23  1023 01/25/23  0741 01/25/23  0017 01/24/23  0854 01/24/23  0546 01/23/23  1716 01/23/23  1659 01/23/23  1124 01/23/23  0557 01/22/23  2204 01/22/23  1805 01/22/23  0616 01/22/23  0550 01/20/23  1746 01/20/23  0528   NA  --  141  --   --   --  139  --  138  --  137  --  133*  133*  --  132*   < > 136   POTASSIUM  --  4.4  --   --   --  4.2  --  3.9  --  3.8  --  3.5  3.5   < > 3.4   < > 3.4   CHLORIDE  --  101  --   --   --  102  --  101  --  100  --  97*  97*  --  96*   < > 100   CO2  --  25  --   --   --  23  --  21*  --  23  --  22  22  --  21*   < > 20*   ANIONGAP  --  15  --   --   --  14  --  16*  --  14  --  14  14  --  15   < > 16*   * 170* 163* 182*   < > 174*   < > 130*   < > 97   < > 115*  115*   < > 99   < > 105*   BUN  --  23.1*  --   --   --  15.6  --  12.7  --  10.3  --  11.4  11.4  --  12.4   < > 17.6   CR  --  0.85  --   --   --  0.86  --  0.96  --  0.85  --  0.89  0.89  --  1.01   < > 1.11   GFRESTIMATED  --  >90  --   --   --  >90  --  90  --  >90  --  >90  >90  --  85   < > 76   ZEHRA   --  10.6*  --   --   --  10.4*  --  9.8  --  10.0  --  9.8  9.8  --  9.4   < > 9.2   MAG  --  2.3  --   --   --  1.9  --   --   --  1.6*  --   --   --  1.8  1.6*   < > 1.7   PHOS  --  4.2  --   --   --  4.4  --  4.2  --  3.4  --  3.3  --  4.1  4.0   < > 4.9*   PROTTOTAL  --  7.2  --   --   --  6.5  --   --   --   --   --  6.5  --   --   --  6.4   ALBUMIN  --  3.4*  --   --   --  2.8*  --   --   --   --   --  2.9*  --   --   --  2.9*   BILITOTAL  --  0.5  --   --   --  0.5  --   --   --   --   --  0.5  --   --   --  0.7   ALKPHOS  --  254*  --   --   --  185*  --   --   --   --   --  216*  --   --   --  169*   AST  --  105*  --   --   --  37  --   --   --   --   --  38  --   --   --  43   ALT  --  10  --   --   --  <5*  --   --   --   --   --  <5*  --   --   --  <5*    < > = values in this interval not displayed.     CBC  Recent Labs   Lab 01/25/23  1023 01/24/23  1859 01/24/23  0546 01/23/23  0557 01/22/23  1805   WBC 31.1*  --  17.4* 17.4* 13.6*   RBC 3.16*  --  2.77* 2.82* 2.85*   HGB 8.5*  --  7.5* 7.7* 7.8*   HCT 28.8*  --  25.3* 25.8* 26.1*   MCV 91  --  91 92 92   MCH 26.9  --  27.1 27.3 27.4   MCHC 29.5*  --  29.6* 29.8* 29.9*   RDW 20.7*  --  20.5* 20.2* 20.1*   PLT 58* 50* 36* 40* 41*     INR  Recent Labs   Lab 01/25/23  1023 01/24/23  0546 01/23/23  0557 01/22/23  0550   INR 1.31* 1.25* 1.23* 1.19*     Arterial Blood Gas  Recent Labs   Lab 01/25/23  1023 01/25/23  0021   O2PER 57 4

## 2023-01-25 NOTE — PROGRESS NOTES
Patient's change in health condition. Patient restless.  met and prayed with patient and family as requested. Family appreciated the prayer and spiritual support. Unit  to follow up as needed.

## 2023-01-25 NOTE — PROVIDER NOTIFICATION
Provider Notification:    Re: [id number]  Pt with severe AMS, agitation, requiring sitter. Has gotten 12.5mg IV haldol, 1.5mg IV ativan, 0.5mg IV dilaudid, & is on precedex gtt. Do we give scheduled subq Cytarabine?  7D RN [phone]    Action: Notified Heme/onc Fellow via [employer]    Response: Fellow instructed RN to hold AM dose of Cytarabine until day team is here to assess pt.

## 2023-01-25 NOTE — PROGRESS NOTES
Heme/Onc Fellow on Call Brief Progress Note    Mr Cardenas noted to be with severe Agitation and AMS overnight requiring a sitter. He has received 12.5mg IV Haldol, 1.5mg IV ativan, 0.5mg IV dilaudid and in on Precedex gttes. RN asking if she should give scheduled subcutaneous  Cytarabine this AM.    I spoke with nurse and recommended that Cytarabine be held for now until patient can be clinically assessed by Heme malignancy team this AM to determine if he should be given the medication. Its 6:50 Am and Heme Malignancy team should be there within an hour. I informed her to reach out to the Heme malignancy team in regards to this. She voiced understanding.     Sharri Barrow MD,MPH  Hematology/Oncology Fellow  Pager: [phone]

## 2023-01-25 NOTE — PROGRESS NOTES
"[facility] Medicine Cross Cover Note  2023 - 1:21 AM    Called X 2 for increased restlessness and trying to crawl out of bed.  Given haloperidol (Haldol ) and lorazepam (Ativan ) earlier this evening without adequate benefit.  RN requesting another round of medications.    BP (!) 142/78 (BP Location: Left arm)   Pulse 85   Temp (!) 96.7  F (35.9  C) (Axillary)   Resp 19   Ht 1.854 m (6' 1\")   Wt (!) 178.3 kg (393 lb)   SpO2 (!) 89%   BMI 51.85 kg/m    I came to see patient - examined and discussed with nursing staff.  Patient looks with blank stare at me.  Minimally able to answer my questions - answered his name and where he lives.  Moving all 4 extremities.  Large male, trying to climb out of bed continuously  Lungs clear bilaterally with good airflow (normal lung exam)  CV exam: RRR without murmur(s), rub, click.  No S3S4.  Peripheral pulses intact   Abdomen large, soft, nontender  Extremities - chronic venous stasis changes    CXR:  Poor quality secondary obesity.  IMPRESSION:   1. Cardiomegaly with diffuse mixed pulmonary opacities. Findings most  suggestive of pulmonary edema.  2. Small bilateral pleural effusions.    CXR tonight:  Right(R) > L infiltrates.  Appear worse to me than previous.    Assessment / Plan:    Altered behavior(al) status --- ? Etiology.  Does not seem to be CO2 narcosis.  ? Primary CNS process - will give one more dose round of medications - I have ordered.  Vital signs - increased restlessness.  PCO2 - minimally increased on VBG this morning.  QTc ok a couple of days ago.  Oxygen saturation(s) are marginal - probably a component of hypoxia.    Plan:  CXR now ---> ? Worsened R lung infiltrates.    Trial of Precedex - I have ordered.    CT head when / if more sedated    Is on cefepime (Maxipime )    Anthony Bianchi MD  [phone]   "

## 2023-01-26 NOTE — PLAN OF CARE
Pt transitioned to comfort cares this afternoon after family care conference. Pt is now DNR/DNI. Pt receiving prn medication to keep him comfortable. Pt continues on precedex gtt at this time. Multiple family members/friends at bedside.  at bedside along with palliative team. No other concerns.

## 2023-01-26 NOTE — PLAN OF CARE
Physical Therapy Discharge Summary    Reason for therapy discharge:    Transition to comfort cares    Progress towards therapy goal(s). See goals on Care Plan in [facility] electronic health record for goal details.  Goals not met.    Therapy recommendation(s):    No further therapy is recommended.

## 2023-01-26 NOTE — PROGRESS NOTES
SPIRITUAL HEALTH SERVICES  SPIRITUAL ASSESSMENT Progress Note  [facility] ([place] 6D   ON-CALL VISIT    REFERRAL SOURCE: Family request for end-of-life prayer.    I met with patient's sisters, nieces and spouses as they sat at patient's bedside. They shared wishes for patient's comfort and a good transition. We spent time sharing prayer together. They are aware they can request additional support as needed.     PLAN: Palliative care  will be notified of visit.  [facility] remains available for duration of hospitalization.     Lee Bingham    Pager [phone]    [facility] remains available 24/7 for emergent requests/referrals, either by having the switchboard page the on-call  or by entering an ASAP/STAT consult in Epic (this will also page the on-call ).

## 2023-01-26 NOTE — PROGRESS NOTES
[facility]  Palliative Care Daily Progress Note       Recommendations & Counseling     ADDENDUM - I was paged by the bedside RN and notified that the family wanted a hospice referral. They had previously declined a [facility] referral and it is unclear what changed. I called the on-call [facility] GIP RN and gave sign-out. He states that he can do the initial evaluation and introduce himself to Angel's family tonight but the full intake will be finished tomorrow as there are many evaluations. Called bedside RN back and closed the loop. Palliative will continue to follow. Would request that a reconsult be put in if Angel is discharged and readmitted to [facility] tomorrow.      Encounter for palliative care  Psychosocial support    Psychosocial support was provided to patient and/or family.    Prognosis: Poor to fair? > Pending cancer treatment response    Palliative performance scale (PPS): 10%, PTA 80%?    CODE status: DNR/DNI (change as of 1/25 per sisters Mulu and Courtney)    Goals of Care: Comfort measures only  ? Continue comfort measures  ? Surrogate: sister Courtney, Alternate: sister Mulu    Disposition: Inpatient per primary.    Comfort Care Measures    Continue comfort care orders    Keep monitors off, no vitals, no labs/studies/procedures    Stop all medications not directed at symptom control    Pain/Dyspnea    Continue Dilaudid 2 mg IV q1h prn dyspnea/agitation/distress    Agree with Dilaudid PCA 1 mg/hr    Lorazepam 1 mg IV q3h prn anxiety/agitation (ONLY use if opioid is ineffective)    Can increase to q1h prn or 2 mg 1 mg q3h prn is ineffective    Wean Precedex as tolerated    Haldol 5 mg IV q6h prn    Zyprexa 10 mg po ODT BID     Secretions    Glycopyrrolate 0.2 mg IV q1h prn    Can increase to 0.4 mg IV if 0.2 mg ineffective    Can start scopolamine patch if still ineffective     Palliative will continue to follow for comfort care.    Total time spent was 55  minutes regarding symptom control (pain and dyspnea and agitation) and support and end of life care and hospice evaluation. These recommendations were given to the primary team via this note.     Conrado Flores DO / Palliative Medicine / Text me via [facility].     Team Consult Pager [phone] (answered 8am-430pm M-F) - ok to text page via [facility] / After-Hours Answering Service [phone] / Palliative Clinic in the [facility] at the [facility] [place] - [phone] (scheduling); [phone] (triage).      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 He presents from clinic on 1/19/2023 for consideration of re-induction chemotherapy in the setting of fever and AHRF (multifactorial from COPD, edema, pulm HTN?). Due to poor clinical status and multiple co-morbidities, oncology started 10-day low-dose Cherie-C (C1D1=1/22/23). Palliative was consulted for pain control 1/24. Unfortunately, Angel decompensated and became agitated and dyspneic overnight. After speaking with his sisters Courtney and Mulu, decision was made to do comfort care without GIP which was started 1/25.     Today, the patient was seen for:  Pain related to neoplasm  End of life care  Terminal delirium  Goals of care  Support  Encounter for palliative care            Interval History:     Chart review/discussion with unit or clinical team members:   Received Ativan and Dilaudid essentially scheduled q3h and q1h respectively. Dilaudid 1 mg increased to 2 mg and then later today PCA 1 mg/hr was started.  Seemed to give better control as Dilaudid pushes were not given as frequently later in the day upon chart recheck.    Per patient or family/caregivers today:  Seen and  examined at bedside. Sisters Mulu and Courtney present as well as other family. Angel looks much more comfortable today. Offered intentional listening and support to family. Counseled them again on signs of discomfort and to speak with Angel as if he could hear them.     Key Palliative Symptoms:  # Pain in the last 12 hours: Low to moderate  # Dyspnea in the last 12 hours: Low  # Anxiety in the last 12 hours: Low to moderate    Patient is on opioids: assessed and bowels ok/no needed changes to plan of care today.           Review of Systems:     Besides above, >4 ROS was reviewed and is unremarkable          Medications:     I have reviewed this patient's medication profile and medications during this hospitalization.    Noted meds:    Zyprexa 10 mg ODT BID    Precedex gtt  Dilaudid PCA 1 mg/hr    Albuterol prn  Tylenol prn  Dilaudid 1 mg IV q1h prn > 2 mg q1h prn   Ativan 1 mg IV q3h prn  Haldol 5 mg IV q6h prn  Zofran prn  Compazine prn           Physical Exam:   Vitals were reviewed       Pulse: 63     SpO2: (!) 83 %   Oxygen Delivery: 2 LPM  General: Not in acute distress  Head: Atraumatic. Normocephalic.   Eyes: Eyes closed.  Ear, Nose, and Throat: Mouth pink and dry but without lesions. Neck without overt masses.  Pulmonary: Non-labored. Fan at bedside. RR ~mid 20s.  Cardiovascular: No overt jugular venous distension. Non-edematous.  Abdomen: Large abdomen   Skin: Warm. Dry. LE brawny edema  Musculoskeletal: Joints of hand normal. Muscle bulk and tone normal in UE and LE.  Neuro: Comatose  Psych: Comatose           Data Reviewed:     Reviewed recent pertinent imaging, comments:   No new imaging    Reviewed recent labs, comments:   ROUTINE ICU LABS (Last four results)  CMP  Recent Labs   Lab 01/25/23  1204 01/25/23  1023 01/25/23  0741 01/25/23  0017 01/24/23  0854 01/24/23  0546 01/23/23  1716 01/23/23  1659 01/23/23  1124 01/23/23  0557 01/22/23  2204 01/22/23  1805 01/22/23  0616 01/22/23  0550 01/20/23  1746  01/20/23  0528   NA  --  141  --   --   --  139  --  138  --  137  --  133*  133*  --  132*   < > 136   POTASSIUM  --  4.4  --   --   --  4.2  --  3.9  --  3.8  --  3.5  3.5   < > 3.4   < > 3.4   CHLORIDE  --  101  --   --   --  102  --  101  --  100  --  97*  97*  --  96*   < > 100   CO2  --  25  --   --   --  23  --  21*  --  23  --  22  22  --  21*   < > 20*   ANIONGAP  --  15  --   --   --  14  --  16*  --  14  --  14  14  --  15   < > 16*   * 170* 163* 182*   < > 174*   < > 130*   < > 97   < > 115*  115*   < > 99   < > 105*   BUN  --  23.1*  --   --   --  15.6  --  12.7  --  10.3  --  11.4  11.4  --  12.4   < > 17.6   CR  --  0.85  --   --   --  0.86  --  0.96  --  0.85  --  0.89  0.89  --  1.01   < > 1.11   GFRESTIMATED  --  >90  --   --   --  >90  --  90  --  >90  --  >90  >90  --  85   < > 76   ZEHRA  --  10.6*  --   --   --  10.4*  --  9.8  --  10.0  --  9.8  9.8  --  9.4   < > 9.2   MAG  --  2.3  --   --   --  1.9  --   --   --  1.6*  --   --   --  1.8  1.6*   < > 1.7   PHOS  --  4.2  --   --   --  4.4  --  4.2  --  3.4  --  3.3  --  4.1  4.0   < > 4.9*   PROTTOTAL  --  7.2  --   --   --  6.5  --   --   --   --   --  6.5  --   --   --  6.4   ALBUMIN  --  3.4*  --   --   --  2.8*  --   --   --   --   --  2.9*  --   --   --  2.9*   BILITOTAL  --  0.5  --   --   --  0.5  --   --   --   --   --  0.5  --   --   --  0.7   ALKPHOS  --  254*  --   --   --  185*  --   --   --   --   --  216*  --   --   --  169*   AST  --  105*  --   --   --  37  --   --   --   --   --  38  --   --   --  43   ALT  --  10  --   --   --  <5*  --   --   --   --   --  <5*  --   --   --  <5*    < > = values in this interval not displayed.     CBC  Recent Labs   Lab 01/25/23  1023 01/24/23  1859 01/24/23  0546 01/23/23  0557 01/22/23  1805   WBC 31.1*  --  17.4* 17.4* 13.6*   RBC 3.16*  --  2.77* 2.82* 2.85*   HGB 8.5*  --  7.5* 7.7* 7.8*   HCT 28.8*  --  25.3* 25.8* 26.1*   MCV 91  --  91 92 92   MCH 26.9  --  27.1 27.3  27.4   MCHC 29.5*  --  29.6* 29.8* 29.9*   RDW 20.7*  --  20.5* 20.2* 20.1*   PLT 58* 50* 36* 40* 41*     INR  Recent Labs   Lab 01/25/23  1023 01/24/23  0546 01/23/23  0557 01/22/23  0550   INR 1.31* 1.25* 1.23* 1.19*     Arterial Blood Gas  Recent Labs   Lab 01/25/23  1023 01/25/23  0021   O2PER 57 4

## 2023-01-26 NOTE — PROVIDER NOTIFICATION
01/25/23 1300   Call Information   Date of Call 01/25/23   Time of Call 1311   Name of person requesting the team Wil Naranjo   Title of person requesting team RN   RRT Arrival time 1315   Time RRT ended 1325   Reason for call   Type of RRT Adult   Primary reason for call Sepsis suspected   Sepsis Suspected Elevated Lactate level   Was patient transferred from the ED, ICU, or PACU within last 24 hours prior to RRT call? No   SBAR   Situation LA 2.6   Background Per H&P: DVTs and PE in 2022 (previously on Xarelto), DM2, COPD, HTN, gout and recently diagnosed AMML s/p chemo with recurrance of disease   Notable History/Conditions Cancer;COPD;Diabetes;Hypertension   Assessment Confused and agitated in bed; pulling at lines and yelling at staff. not redirectable   Interventions Other (describe)  (spft wrist restraints ordered; no orders for LA)   Patient Outcome   Patient Outcome Stabilized on unit   RRT Team   Attending/Primary/Covering Physician Heme Onc   Date Attending Physician notified 01/25/23   Time Attending Physician notified 1320   Physician(s) Tran MIRANDA   RT n/a   Post RRT Intervention Assessment   Post RRT Assessment Stable/Improved   Date Follow Up Done 01/25/23   Time Follow Up Done 1501   Comments Now comfort cares, DNR/DNI

## 2023-01-26 NOTE — PROGRESS NOTES
[facility]  Hematology / Oncology Progress Note  Date of Service (when I saw the patient): 01/26/2023     Assessment & Plan   Angel Cardenas is a 62 year old male with a history of multiple prior DVTs and PE in 2022 (previously on Xarelto), DM2, COPD, HTN, gout and recently diagnosed AMML s/p Cycle 1 of 10-days of Decitabine/Venetoclax (D1=12/19/22) with subsequent persistent disease on post-therapy BMBx 1/13/23 demonstrating ongoing ~70% blasts. He presents from clinic for consideration of re-induction chemotherapy in the setting of fever and AHRF. Due to poor clinical status and multiple co-morbidities, initiated 10-day low-dose Cherie-C (C1D1=1/22/23) with no improvement and continued progression of his leukemia. Clinical decompensation overnight 1/24 with progressive AHRF and AMS prompted GO meeting with sisters on 1/25 with decision for DNR/DNI and transition to comfort cares.      TODAY:   - Continue with comfort measures  - Appreciate palliative care involvement for assistance with symptom management   - Start Dilaudid PCA - 1 mg continuous and continue 2 mg IV pushes q1h prn. Wean Precedex gtt as able.   - Biotene spray, Vaseline available prn per family request      HEME  # Comfort cares  # AMML with SRSF2, TET2 mutations  # Leukocytosis   Patient of Dr. Velasquez. In summary, presented to PCP with fatigue, subjective fevers, lack of appetite, weight loss, epistaxis and rib pain. Found to be pancytopenic with 6% circulating peripheral blasts (WBC 8.5 Hgb 8.8 and plts 82). Diagnostic BMBx 12/9 was consistent with AMML with 80 blasts, NGS: SRSF3 + TET2 mutations (on peripheral blood) conferring adverse risk, Cyto: 50-52,XY,+4,+5,+8,+13,add(13)(p11.2)x2,+18,+20[cp17]/,idemx2,+16,+16[cp3]. Diagnostic LP negative. Due to comorbidities started on Decitabine 10 days + Venetoclax 28 (C1D1 12/19/22). Hospital course was complicated by TLS requiring rasburicase, possible  PNA, possible adrenal insufficiency/hypotension related to steroid taper (not requiring pressors) and back/rib pain flares. He discharged to home following completion of chemotherapy and post-therapy BMBx was completed on 1/13 which is notable for persistent pancytopenia showing hypoercellular marrow (90%) with marked decrease hematopoesis and 70% blasts. NGS/FISH/cyto is pending. He had a visit with Dr. Velasquez in clinic prior to admission on 1/19 to discuss next steps and consideration of re-induction.  - Per conversation with Dr. Velasquez PTA, considered intensive regimen with 7+3 vs G-CLAC. However, in the setting of significant hypoxia and multiple comorbidities will instead proceed with low dose cytarabine (C1D1=1/22/23), 20mg q12h and plan for a total 10-day course - discontinued on 1/25 (D4) following transition to comfort cares.   - PICC placement in the setting of multiple lab draws, IV products and chemotherapy  - Repeat pre-chemo work-up - normal EKG, echo with EF 60-65%  - CT chest PE/AP (1/19) - reveals splenomegaly, enlarged inguinal lymphadenopathy and hepatomegaly.   - Progressive leukemia as demonstrated by progressive leukocytosis to 30K, up-trending LDH, worsening lung infiltrates and AHRF, progressive pain flares, AMS.  - Dex taper discontinued 1/25 in light of comfort cares and alternative pain medications  - 1/25: GOC meeting held with sisters (Gala and Mulu) and Dr. Flores with palliative care, patient unable to participate d/t AMS. Discussed further clinic decompensation overnight and our concern for ongoing progressive leukemia despite chemotherapy thus far. Unfortunately given his co-morbidities and poor clinical status any further chemotherapy would likely cause more harm than benefit. Given his clinical picture would anticipate patient may have only hours-days left. Confirmed with sisters their decision for DNR/DNI and transition to comfort cares inpatient.   - Palliative care following as  below    # H/o PE (most recently Jan 2022)   # H/o multiple DVTs (1990s)  Per chart review he was seen for evaluation of PE by Dr. Amari Cortes (4/15/22). Patient has history of multiple DVTs in the 1990s without known cause, unprovoked PE at age 43, DVT with PE in context of hip replacement. He most recently was diagnosed with bilateral PE with right heart strain on 1/19/22 after presenting to the ED with SOB. Previously was on Xarelto though discontinued during hospitalization for induction chemotherapy. Transitioned to Lovenox and discharged on half-dosing d/t down-trending counts. Platelets appear to have recovered though are in the 100s on admission. Has continued half-dosing Lovenox without increase to full-dose in the outpatient setting. Presented this admission with fever and hypoxia (77% on RA) requiring 2-4L O2.   - CT PE protocol (1/19) is negative for PE and no R heart strain. Does reveal findings c/w chronic PE and possible pulmonary HTN. 6mm solid pulmonary nodules which should be monitored at least yearly.   - Lovenox now discontinued with transition to comfort cares 1/25      # Risk for TLS  # Risk for DIC  # H/o gout  Leukocytosis noted on admission lab work with WBC 15.3 in the setting of known persistent leukemia. Phos mildly elevated to 4.9. Cr near baseline (0.-1.1) at 1.17. Uric acid 8.6 and LDH 2,242. He is s/p Rasburicase x1 with improvement in uric acid to 6.6. Of note he does have h/o gout.   - PTA Allopurinol 300 mg BID (dose adjusted for weight); discontinue to align with goals  - Start Phoslo 1,334 TID; held 1/23.  - Labs discontinued to align with goals     # Anemia  # Thrombocytopenia   2/2 recent chemotherapy and underlying disease.   - Discontinue lab checks and blood product transfusions to align with goals     ID  # Fever  # AHRF  # Abdominal bloating/discomfort   # Lactic acidosis, improved  Febrile in clinic to 100.5 on 1/19, also with associated AHRF with sats to 77% on  admission. BP normotensive. Endorses progressive back pain as below along with SOB/DEAN worse over the past 1-2 days and abdominal bloating. He is having regular BMs. Denies other respiratory or infectious complaints. Lab work is notable for leukocytosis to 15 and mildly elevated LFTs (Alk 214 and AST 51). Lactic acid elevated to 2.9, later improved to 2.0 - presume Type B 2/2 progressive leukemia. Differential includes infection vs COPD exacerbation vs leukemia-related. CT chest PE and A/P (1/19) negative for infectious etiology. Infectious work-up has remained largely negative. ID is on board. Had repeat fever to 101.6 on 1/23 and continues IV antibiotics - repeat infectious work-up remains negative. CXR shows pulmonary edema. New rash to abdomen which is improving as of 1/24 and does not appear infectious per ID.   - ID consult 1/21 with no evidence of overt infection (see workup below) and recommendation to consider stopping IV antibiotics  - Continue to follow BCx; NGTD  - Discontinue IV antibiotics 1/25 to align with goals and transition to comfort cares      Antibiotics   - Cefepime (1/19 - 1/25)   - Vancomycin (1/19 - 1/21 AM) (1/23-1/25)      Infectious studies (1/19-1/21)  - RVP - neg  - MRSA nares - neg  - UCx - neg  - BCx (1/19, 1/23) - NGTD  - Cryptococcal serum ag - neg  - CRP (1/19) 242 ? (1/23) 151  - Procal (1/19) 1.09 ? (1/23) 0.67  - PJP PCR- needs to be collected  - Sputum culture- pt unable to produce sputum, but too unstable for induced sputum  - Legionella urine - negative  - Histo/Blasto - in process   - Fungitell, galactomannan - neg  - AFB culture - NGTD  - HHV6, serum - neg  - Quant gold- neg  - Treponema abs, serum - in process    # Prophylaxis  ANC is 1.8 on admission though c/f functional neutropenia in the setting of persistent leukemia and leukocytosis.   - Discontinue prophylaxis to align with goals     CV/PULM  # COPD  # Orthopnea  # CATHERINE  Ongoing mild hypoxia to high 80s appears to  be his baseline in the setting of his COPD PTA. Oxygen requirement to 4-5L on admission at rest (8-10L with activity or d/t positioning) with O2 sats in the 70s on RA. Reports SOB tends to flare with pain crises. Does desat at night.   - CT PE chest (negative) and antibiotics as above  - PTA Breo-Ellipta, on hold and replaced with Pulmicort 0.5mg BID, can continue BID prn with transition to comfort cares  - DuoNebs QID, can continue BID prn with transition to comfort cares  - Incentive spirometry, discontinued to align with goals  - PTA CPAP at night per pt preference  - Pulm consult - too unstable for bronchoscopy given high O2 requirements. Agree with infectious workup as above and continued diuresis. We will hold off on an induced sputum on x1/21 as he is now on HFNC. Recommended ID and Cardiology consult - ?consider d/w cards RHC this admission if unable to wean O2. Will now sign off given change in goals.   - Cardiology consulted, hold off on bubble study this hospitalization. Would consider RHC on a non-urgent basis in outpatient setting if in line with goals.   - PRN comfort meds available for symptom control -- Atropine, Robinul, IV Dilaudid, Ativan, Haldol, Precedex (weaning), Zyprexa, Biotene spray, Vaseline     # Chronic LE edema  # Pulmonary edema  # Small bilateral pleural effusions  # Chronic venous insufficiency   # Concern for pHTN  Patient endorses increased LE swelling on admission. Has home compression stockings in place. Of note, weight is up 20+ lb from recent discharge on 12/29. Repeat CXR on 1/23 demonstrates pulmonary edema and small bilateral pleural effusions.   - Scheduled diuresis discontinued given transition to comfort cares, can consider prn dosing for comfort  - Lymphedema consulted   - Repeat echo as above with EF 60-65%, IVC dilated and suggestive of high RA pressure  - Cardiology consult in setting of possible pulmonary HTN on echo and CT imaging  - recommended no intervention or  "workup while inpatient as he does not have obvious pHTN on echo nor does he have RV dysfunction, and overall would not change our mgmt at this time. Cardiology also does not think an echo w/ bubble is necessary. Consider referral to pulmonary HTN clinic outpatient for consideration of RHC and treatment pending goals.   - Consider thora if worsening effusions, no plan to pursue further invasive procedures given transition to comfort cares    # HTN  # HLD  Home Metoprolol and Lisinopril held during previous admission in the setting of hypotension and possible adrenal insufficiency. BP normotensive to mildly HTN on admission. Statin discontinued prior to admission given anticipation of chemotherapy.   - Continue to hold prior Metoprolol/Lisinopril for now; consider resuming as appropriate  - Continue to hold statin, consider resumption prior to discharge      MSK  # Acute on chronic back pain  Noted prior to hospitalization for induction chemotherapy. Presented with rib pain which radiates into his upper back and between his shoulder blades. MRI thoracic spine previously completed 12/17 and showed \"scattered subtle mottled enhancement of bones most pronounced on L 7th rib, could be 2/2 underlying disease process.\" It is possible that pt has some extramedullary infiltration of cortex causing pain. Endorses similar complaints this admission starting 1-2 days PTA in the setting of known persistent leukemia. No red flag sx. EKG on admission is normal.   - CT PE C/A/P as above; negative for source of acute pain   - Dex taper now discontinued in setting of comfort cares and alternative pain medications  - Palliative care consulted for assistance with pain control, appreciate comfort care reccs (see note from 1/25)  - PRN comfort meds available for symptom control -- Atropine, Robinul, IV Dilaudid, Ativan, Haldol, Precedex (weaning), Zyprexa, Biotene spray, Vaseline     NEURO  # AMS  # Agitation  Progressive AMS and agitation " "noted overnight 1/24. Per nursing patient attempting to get out of bed, remove BiPAP and pull out IV lines. Also with illogical and incomprehensible speech. Sedation with Haldol, Ativan and ultimately Precedex pursued. Vancomycin added to currently Cefepime and repeat BCx drawn.  Unable to obtain CT head due to inability to tolerate. Also with ongoing fluctuating oxygen needs with positioning/activity (5L-12L and intermittent BiPAP). Otherwise he has remained afebrile and with mild HTN. UA bland. CXR showed worsening pulmonary edema. Ammonia and BS wnl. Presume clinical decompensation to be largely related to progressive and refractory leukemia with possible contribution from opioid-induced delirium with increase in opioid medication on 1/24 causing worsened AMS. GOC held as above with transition to DNR/DNI and comfort cares.   - Defer further imaging to align with goals  - Black placed  - Sitter as appropriate  - PRN comfort meds available for symptom control -- Atropine, Robinul, IV Dilaudid, Ativan, Haldol, Precedex (weaning), Zyprexa, Biotene spray, Vaseline  - Start Dilaudid PCA - 1 mg continuous and continue 2 mg IV pushes q1h prn. Wean Precedex gtt as able.     GI  # Constipation   Noted during previous admission for induction chemotherapy. Patient endorses regular and soft BM on admission though feeling more constipated in setting of chemo and increased pain control.   - Senna and Miralax PRN  - Milk of magnesium daily PRN  - Prune juice was previously helpful for patient   - LBM 1/22      ENDO  # T2DM   Follows with [facility], last seen 11/15/22.   - PTA Ozempic held on admission  - Discontinue BS checks and sliding scale insulin to align with goals     MISC  # Dysphagia  Patient endorses difficulty swallowing on admission described as \"food getting stuck in throat\". Has had to modify diet at home and be careful with food selection/size. Occasionally finds himself coughing with water as " "well.   - SLP consulted - no s/sx of aspiration, ok to continue regular diet. Educated on how to take pills and safe eating habits.      FEN   - IVF bolus prn   - Discontinue lytes replacement to align with goals  - Regular diet as tolerated      Clinically Significant Risk Factors           # Hypercalcemia: Highest Ca = 10.6 mg/dL in last 2 days, will monitor as appropriate    # Hypoalbuminemia: Lowest albumin = 2.8 g/dL at 1/24/2023  5:46 AM, will monitor as appropriate   # Thrombocytopenia: Lowest platelets = 50 in last 2 days, will monitor for bleeding         # Severe Obesity: Estimated body mass index is 51.85 kg/m  as calculated from the following:    Height as of this encounter: 1.854 m (6' 1\").    Weight as of this encounter: 178.3 kg (393 lb).           Lines/Drains: PICC placed on admission, remain in place with multiple gtt  DVT ppx: discontinued to align with goals  GI ppx: PTA PPI  Consults: PT/OT, lymphedema, SLP, pulm, cardiology, IR, palliative care  CODE: FULL    DISPO: Transitioned to comfort cares on 1/25, anticipate imminent death within hours to days. Patient not able to pursue home hospice and will remain inpatient.      Follow-up/Referrals: Primary oncologist is Dr. Velasquez, provided message update on 1/25.      Social  - Lives at home alone near [place], [place]  - Has two sisters who are supportive and like to be involved in big conversations   - Appreciate SW involvement for assistance with [facility] [place] for sisters     Coordination of Cares   - Patient follows locally in [place], [place] at [facility]      Patient and plan of care was discussed with attending physician Dr. Dominguez.    >40 minutes spent on the date of the encounter. Over 50% of time was spent counseling the patient and/or coordinating care.     Shannon Irving PA-C  Hematology/Oncology  Ph: [phone], Pager: [phone]    Interval History   Formally transitioned to comfort cares yesterday. Overnight Angel received pretty " consistent IV Dilaudid around the clock for pain. Family able to tell when wears off, he will begin groaning more. Also remains on Precedex gtt and attempting to wean as able. This morning [place] appears much more comfortable than day prior. Multiple family members present and supportive at bedside. They are requesting additional mouth cares for dryness and consideration of continuous pain control as proposed by palliative care early this morning. Fan on at patient's bedside. All questions answered.     A comprehensive review of systems was obtained and is negative other than noted here or in the HPI.     Physical Exam        Pulse: 63   Resp: 24 SpO2: (!) 83 % O2 Device: Oxymizer cannula Oxygen Delivery: 2 LPM  Vitals:    01/22/23 1045 01/23/23 0940 01/24/23 1100   Weight: (!) 172.7 kg (380 lb 12.8 oz) (!) 178.4 kg (393 lb 6.4 oz) (!) 178.3 kg (393 lb)     Vital Signs with Ranges  Pulse:  [63-78] 63  Resp:  [21-25] 24  SpO2:  [83 %-96 %] 83 %  I/O last 3 completed shifts:  In: 261.77 [I.V.:261.77]  Out: 4075 [Urine:4075]    General: Chronically-ill appearing, more comfortable today, somnolent with eyes closed  Skin: Macular erythema and petechiae noted to lower abdomen, well-within prior markings. Chronic venous stasis changes to bilateral LE.  Scattered bruising to extremities.   Head: Normocephalic and atraumatic.  HEENT: Oral mucosis is pink and moist with no lesions, thrush, or exudates.   Lymph: Neck supple.   Cardiac: Tachy, regular rhythm   Respiratory: Slight increased work of breathing though significantly improved from day prior. Mouth breathing and tachypnea with NC in place for comfort.  Abd: Soft, symmetric, distended  Extremities: 2-3+ bilateral pitting edema. Distal pulses palpable.   Neuro: Lethargic, somnolent with eyes closed, eyes flicker and extremities react to gentle voice/touch.     Medications     dexmedetomidine 0.5 mcg/kg/hr (01/26/23 1052)     HYDROmorphone       sodium chloride 10 mL/hr at  01/26/23 1145       OLANZapine zydis  10 mg Oral BID

## 2023-01-26 NOTE — PLAN OF CARE
Neuro/Musculoskeletal:  Patient sedated on Precedex gtt and PRN Ativan and Dilaudid  Respiratory:  RR 20s. Patient weaned to 2 L for comfort.  GI/:  Adequate urine output.  Slight hematuria. Black in place.  Activity: Repositioned x 3 as needed overnight  Pain:  Patient received dilaudid almost q1hr. Patient would moan when indicating pain. Total 18 mg given   Skin: Patient extremely sweaty didnt change linen and have two fans on.    Plan:  Continuing comfort cares and family support. Maximize comfort. New bereavement cart ordered. Wean off Precedex drip as tolerated. Currently 0.6 mcg/kg/hr.     Blake Wooten RN on 1/26/2023 at 7:04 AM          Problem: Palliative Care  Goal: Enhanced Quality of Life  Outcome: Progressing  Intervention: Maximize Comfort  Flowsheets (Taken 1/26/2023 0647)  Pain Management Interventions:   medication (see MAR)   pillow support provided   repositioned   rest  Intervention: Optimize Function  Flowsheets (Taken 1/26/2023 0647)  Sleep/Rest Enhancement:   comfort measures   medication   room darkened   regular sleep/rest pattern promoted  Intervention: Promote Advance Care Planning  Flowsheets (Taken 1/26/2023 0647)  Life Transition/Adjustment:   end-of-life care initiated   palliative care discussed   palliative care initiated  Intervention: Optimize Psychosocial Wellbeing  Flowsheets (Taken 1/26/2023 0647)  Grieving Process Facilitation:   bereavement program initiated   family/significant other support facilitated  Family/Support System Care:   family care conference arranged   presence promoted   involvement promoted   support provided   Goal Outcome Evaluation:

## 2023-01-26 NOTE — PROGRESS NOTES
"SPIRITUAL HEALTH SERVICES  SPIRITUAL ASSESSMENT Progress Note (Palliative Focus)  [facility] ([place]) 6D    REFERRAL SOURCE: Palliative care follow up.    Visit with patient Angel Cardenas's sister Mulu at his bedside.    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 Mulu described Angel as the one who was in the center of life without having a big ego or a need to be recognized.     She and other family are grieving as they anticipate Angel's death. Family at bedside have been facilitating phone calls with other loved ones so that everyone can say goodbye. Let's Make a Deal is Angel's favorite show, and they are playing reruns quietly in his ear.    Mulu finds meaning in \"God getting me here\" in time to spend time with Angel while he was awake and functional; she had come home from out of state for another . Orthodox ish is a source of comfort for family.    Plan: I will follow for spiritual support while Palliative Care is consulted.    Lisette Pimentel M.Div., [facility]  Palliative Care   Pager [phone]  [facility] Inpatient Team Consult pager [phone] (M-F 8-4:30)  After-hours Answering Service [phone]  "

## 2023-01-26 NOTE — PROGRESS NOTES
"SPIRITUAL HEALTH SERVICES  SPIRITUAL ASSESSMENT Progress Note (Palliative Focus)  [facility] ([place]) 6D    REFERRAL SOURCE: Family request.    Visit with patient Angel Cardenas's sisters Mulu and Courtney at his bedside. They are keeping rodriguez with him and waiting for other family members to arrive.     Mulu and Courtney's primary concern is Angel's comfort. His change in condition has been pritchett, and they shared the conversations they were able to have with him yesterday. Their hope for him now is \"peace with God\". We discussed the signs and symptoms of the dying process.    Angel and family are [place], and they requested end of life prayers in which they joined.    Plan: I will follow for spiritual support while Palliative Care is consulted.    Lisette Pimentel M.Div., [facility]  Palliative Care   Pager [phone]  [facility] Inpatient Team Consult pager [phone] (M-F 8-4:30)  After-hours Answering Service [phone]  "

## 2023-01-26 NOTE — CARE PLAN
Lymphedema Therapy Discharge Summary    Reason for therapy discharge:    Change in GOC - no on comfort cares.    Progress towards therapy goal(s). See goals on Care Plan in Epic electronic health record for goal details.  Goals not met.  Barriers to achieving goals:   change in GOC.    Therapy recommendation(s):    Continue use of pt's velcro compression garments to manage BLE edema, if they provide comfort. If pt is unable to clearly communicate pain/discomfort, recommend compression garments removal to reduce risk of pressure injury.

## 2023-01-27 NOTE — PROGRESS NOTES
Pt continues to be on comfort cares. Pt appear comfortable in bed. Family at bedside. Mild secretions noted to breathing. Robinul given. Turned pt per family request. All needs being met to keep pt comfortable and family at ease.

## 2023-01-27 NOTE — CONSULTS
Referral Received - [facility] would like to thank you for the [facility] referral.    Referral received and initial insurance information sent to  Hospice intake for review.    We are determining your patient's eligibility with a medical director at this time.     Our plan is to visit your patient as soon as possible. We will connect with the primary care team shortly to collect more information on the patient's progression. Thank you for your patience.     Addendum Note: Meeting established for 1pm with family tomorrow to review [facility] hospice.     JERROD Evans, RN  Clinical Nurse Liaison [facility]  Work Cell: [phone]  Email: [email or web address]   [facility]  Contact information available via [facility] Paging/Directory     Listed as Hospice [facility] in [facility]

## 2023-01-27 NOTE — CONSULTS
[facility]  Palliative Care Consultation Note    This is to answer consult order. Palliative care already following. Please see my progress note from earlier today.       Thank you for the opportunity to participate in the care of this patient and family.     During regular M-F work hours -- if you are not sure who specifically to contact -- please contact us by sending a text page to our team consult pager at [phone].    After regular work hours and on weekends/holidays, you can call our answering service at [phone]. Also, who's on call for us is available in [facility] Smart Web.     Margret Moreno MD / Palliative Medicine / Pager [phone]

## 2023-01-27 NOTE — PROGRESS NOTES
Pt continues to be on comfort cares. Appears comfortable in bed after lorazepam given. Secretions decreased after robinul given. Family at bedside. Continuing to round and make sure all needs being met.

## 2023-01-27 NOTE — CONSULTS
"[facility]    Consult Note - [facility]    ______________________________________________________________________    [facility] 24[facility]7 Contact Number: [phone]    - Providers: Please contact [facility] with changes in orders or clinical plan of care   - Nursing: Please contact [facility] with significant changes in patient condition    _______        Hospice Diagnosis: AMML    Indication for Inpatient Hospice: Pain/dyspnea/Terminal agitation      Plan of Care Discussed with the Following:   - Nurse: Wil  - Charge Nurse: Adele  - Hospitalist/Rounding Provider: Shannon Irving and Dr. Flores    - Angel's Family/Preferred Contact: Sisters Mulu and Courtney  - Hospice Provider: Dr. Du White      NOTE:  Reviewed [facility] hospice benefit with patients sisters at bedside.   Hospice MSW, Margret present as well.  Electronic consents signed by pt's sisterGala.      Emotional/grief support provided.  Per pt's sisters, goal is comfort, \"we do not want to see our brother suffer with pain/anxiety.\"     Pt presents with furrowed brow, increased respiratory effort, loud groaning/moaning.  Please utilize PRN's for symptom management, as ordered by Palliative,  to promote end of life comfort in supporting pt/family comfort goals.    Collaborated with Dr. Flores and Shannon Irving PAMICHAEL.  Request for reconsult with Palliative Care with new GIP admission to provide patient with continuity of care.            Please reach out to cell listed below between 9a-9pm with any questions or concerns.    JERROD Greer, RN  Clinical Nurse Liaison I [facility]  Cell: [phone]  [facility]  Office: [phone]  Email [email or web address]    [email or web address]  "

## 2023-01-27 NOTE — PHARMACY-ADMISSION MEDICATION HISTORY
Prior to admission medication history completed during previous admission on 12/10/2022, see note from [facility] for details.

## 2023-01-27 NOTE — DISCHARGE SUMMARY
[facility]    Discharge Summary  Hematology / Oncology    Date of Admission:  1/19/2023  Date of Discharge:  1/27/2023  Discharging Provider: Shannon Irving PA-C, MARIKA  Date of Service (when I saw the patient): 01/27/23    Discharge Diagnoses   - Comfort cares  - AMML with SRSF2, TET2 mutations  - AHRF, SOB  - Acute on chronic back pain  - AMS  - Agitation    History of Present Illness   Angel Cardenas is a 62 year old male with a history of multiple prior DVTs and PE in 2022 (previously on Xarelto), DM2, COPD, HTN, gout and recently diagnosed AMML s/p Cycle 1 of 10-days of Decitabine/Venetoclax (D1=12/19/22) with subsequent persistent disease on post-therapy BMBx 1/13/23 demonstrating ongoing ~70% blasts. He presented from clinic for consideration of re-induction chemotherapy in the setting of fever and AHRF. Infectious and pulmonary work-up were negative (bronchoscopy not able to be done given oxygen requirements) his progressive symptoms have been felt most likely related to his leukemia. Due to poor clinical status and multiple co-morbidities, he initiated 10-day low-dose Cherie-C (C1D1=1/22/23) with no improvement and continued progression of his leukemia. Unfortunately any further attempt to control his leukemia are unlikely to result in meaningful benefit and no further chemotherapy was able to be offered. He had clinical decompensation overnight 1/24 with progressive AHRF and AMS prompting [facility] meeting with sisters on 1/25 with decision for DNR/DNI and transition to comfort cares. Palliative care is involved and helping to manage symptoms. [facility] was consulted per family request and Angel has been accepted. Now discharging to [facility] and will be followed by medicine team. Palliative will continue to following - consult replaced.     Today Angel is displaying signs of agonal breathing. He has been vocal with moaning/groaning throughout the day and this has  been distressing to family members present at bedside. Modifications made per palliative care team to provide additional comfort given concern for SOB and pain. [facility] team met with family this afternoon and he is a candidate for GIP. Have been speaking with medicine triage and medicine team will take over care this evening.     Recs for provider:    Palliative care will continue to follow. Please assure repeat palliative consult orders went through.     Medication Highlights:    Appreciate palliative care adjustments to pain control today: Increase Dilaudid PCA continuous dose (2 mg continuous), increase IV Dilaudid PRN dosing (1-3 mg IV q1h PRN). Continue to wean Precedex gtt as able.     Additional PRN meds available - Tylenol, Atropine, Robinul, IV Dilaudid, Ativan, Haldol, Precedex (weaning), Zyprexa, Biotene spray, Vaseline    Hospital Course   Angel Cardenas was admitted on 1/19/2023.  The following problems were addressed during his hospitalization:    HEME  # Comfort cares  # AMML with SRSF2, TET2 mutations  # Leukocytosis   Patient of Dr. Velasquez. In summary, presented to PCP with fatigue, subjective fevers, lack of appetite, weight loss, epistaxis and rib pain. Found to be pancytopenic with 6% circulating peripheral blasts (WBC 8.5 Hgb 8.8 and plts 82). Diagnostic BMBx 12/9 was consistent with AMML with 80 blasts, NGS: SRSF3 + TET2 mutations (on peripheral blood) conferring adverse risk, Cyto: 50-52,XY,+4,+5,+8,+13,add(13)(p11.2)x2,+18,+20[cp17]/,idemx2,+16,+16[cp3]. Diagnostic LP negative. Due to comorbidities started on Decitabine 10 days + Venetoclax 28 (C1D1 12/19/22). Hospital course was complicated by TLS requiring rasburicase, possible PNA, possible adrenal insufficiency/hypotension related to steroid taper (not requiring pressors) and back/rib pain flares. He discharged to home following completion of chemotherapy and post-therapy BMBx was completed on 1/13 which is notable  for persistent pancytopenia showing hypoercellular marrow (90%) with marked decrease hematopoesis and 70% blasts. NGS/FISH/cyto is pending. He had a visit with Dr. Velasquez in clinic prior to admission on 1/19 to discuss next steps and consideration of re-induction.  - Per conversation with Dr. Velasquez PTA, considered intensive regimen with 7+3 vs G-CLAC. However, in the setting of significant hypoxia and multiple comorbidities will instead proceed with low dose cytarabine (C1D1=1/22/23), 20mg q12h and plan for a total 10-day course - discontinued on 1/25 (D4) following transition to comfort cares.   - PICC placement in the setting of multiple lab draws, IV products and chemotherapy  - Repeat pre-chemo work-up - normal EKG, echo with EF 60-65%  - CT chest PE/AP (1/19) - reveals splenomegaly, enlarged inguinal lymphadenopathy and hepatomegaly.   - Progressive leukemia as demonstrated by progressive leukocytosis to 30K, up-trending LDH, worsening lung infiltrates and AHRF, progressive pain flares, AMS.  - Dex taper discontinued 1/25 in light of comfort cares and alternative pain medications  - 1/25: GOC meeting held with sisters (Gala and Mulu) and Dr. Flores with palliative care, patient unable to participate d/t AMS. Discussed further clinic decompensation overnight and our concern for ongoing progressive leukemia despite chemotherapy thus far. Unfortunately given his co-morbidities and poor clinical status any further chemotherapy would likely cause more harm than benefit. Given his clinical picture would anticipate patient may have only hours-days left. Confirmed with sisters their decision for DNR/DNI and transition to comfort cares inpatient.   - Palliative care following as below  - [facility] consult placed 1/26 per family requested, accepted after meeting with family on 1/27. Plan for transition to [facility] service, medicine team will continue to follow.      # H/o PE (most recently Jan 2022)   # H/o multiple  DVTs (1990s)  Per chart review he was seen for evaluation of PE by Dr. Amari Cortes (4/15/22). Patient has history of multiple DVTs in the 1990s without known cause, unprovoked PE at age 43, DVT with PE in context of hip replacement. He most recently was diagnosed with bilateral PE with right heart strain on 1/19/22 after presenting to the ED with SOB. Previously was on Xarelto though discontinued during hospitalization for induction chemotherapy. Transitioned to Lovenox and discharged on half-dosing d/t down-trending counts. Platelets appear to have recovered though are in the 100s on admission. Has continued half-dosing Lovenox without increase to full-dose in the outpatient setting. Presented this admission with fever and hypoxia (77% on RA) requiring 2-4L O2.   - CT PE protocol (1/19) is negative for PE and no R heart strain. Does reveal findings c/w chronic PE and possible pulmonary HTN. 6mm solid pulmonary nodules which should be monitored at least yearly.   - Lovenox now discontinued with transition to comfort cares 1/25      # Risk for TLS  # Risk for DIC  # H/o gout  Leukocytosis noted on admission lab work with WBC 15.3 in the setting of known persistent leukemia. Phos mildly elevated to 4.9. Cr near baseline (0.-1.1) at 1.17. Uric acid 8.6 and LDH 2,242. He is s/p Rasburicase x1 with improvement in uric acid to 6.6. Of note he does have h/o gout.   - PTA Allopurinol 300 mg BID (dose adjusted for weight); discontinue to align with goals  - Start Phoslo 1,334 TID; held 1/23.  - Labs discontinued to align with goals     # Anemia  # Thrombocytopenia   2/2 recent chemotherapy and underlying disease.   - Discontinue lab checks and blood product transfusions to align with goals     ID  # Fever  # AHRF  # Abdominal bloating/discomfort   # Lactic acidosis, improved  Febrile in clinic to 100.5 on 1/19, also with associated AHRF with sats to 77% on admission. BP normotensive. Endorses progressive back pain as  below along with SOB/DEAN worse over the past 1-2 days and abdominal bloating. He is having regular BMs. Denies other respiratory or infectious complaints. Lab work is notable for leukocytosis to 15 and mildly elevated LFTs (Alk 214 and AST 51). Lactic acid elevated to 2.9, later improved to 2.0 - presume Type B 2/2 progressive leukemia. Differential includes infection vs COPD exacerbation vs leukemia-related. CT chest PE and A/P (1/19) negative for infectious etiology. Infectious work-up has remained largely negative. ID is on board. Had repeat fever to 101.6 on 1/23 and continues IV antibiotics - repeat infectious work-up remains negative. CXR shows pulmonary edema. New rash to abdomen which is improving as of 1/24 and does not appear infectious per ID.   - ID consult 1/21 with no evidence of overt infection (see workup below) and recommendation to consider stopping IV antibiotics  - Continue to follow BCx; NGTD  - Discontinue IV antibiotics 1/25 to align with goals and transition to comfort cares             Antibiotics         - Cefepime (1/19 - 1/25)         - Vancomycin (1/19 - 1/21 AM) (1/23-1/25)                  Infectious studies (1/19-1/21)  - RVP - neg  - MRSA nares - neg  - UCx - neg  - BCx (1/19, 1/23) - NGTD  - Cryptococcal serum ag - neg  - CRP (1/19) 242 ? (1/23) 151  - Procal (1/19) 1.09 ? (1/23) 0.67  - PJP PCR- needs to be collected  - Sputum culture- pt unable to produce sputum, but too unstable for induced sputum  - Legionella urine - negative  - Histo/Blasto - in process               - Fungitell, galactomannan - neg  - AFB culture - NGTD  - HHV6, serum - neg  - Quant gold- neg  - Treponema abs, serum - in process     # Prophylaxis  ANC is 1.8 on admission though c/f functional neutropenia in the setting of persistent leukemia and leukocytosis.   - Discontinue prophylaxis to align with goals     CV/PULM  # COPD  # Orthopnea  # CATHERINE  Ongoing mild hypoxia to high 80s appears to be his baseline in  the setting of his COPD PTA. Oxygen requirement to 4-5L on admission at rest (8-10L with activity or d/t positioning) with O2 sats in the 70s on RA. Reports SOB tends to flare with pain crises. Does desat at night.   - CT PE chest (negative) and antibiotics as above  - PTA Breo-Ellipta, on hold and replaced with Pulmicort 0.5mg BID, can continue BID prn with transition to comfort cares  - DuoNebs QID, can continue BID prn with transition to comfort cares  - Incentive spirometry, discontinued to align with goals  - PTA CPAP at night per pt preference  - Pulm consult - too unstable for bronchoscopy given high O2 requirements. Agree with infectious workup as above and continued diuresis. We will hold off on an induced sputum on x1/21 as he is now on HFNC. Recommended ID and Cardiology consult - ?consider d/w cards RHC this admission if unable to wean O2. Will now sign off given change in goals.   - Cardiology consulted, hold off on bubble study this hospitalization. Would consider RHC on a non-urgent basis in outpatient setting if in line with goals.   - PRN comfort meds available for symptom control -- Atropine, Robinul, IV Dilaudid, Ativan, Haldol, Precedex (weaning), Zyprexa, Biotene spray, Vaseline     # Chronic LE edema  # Pulmonary edema  # Small bilateral pleural effusions  # Chronic venous insufficiency   # Concern for pHTN  Patient endorses increased LE swelling on admission. Has home compression stockings in place. Of note, weight is up 20+ lb from recent discharge on 12/29. Repeat CXR on 1/23 demonstrates pulmonary edema and small bilateral pleural effusions.   - Scheduled diuresis discontinued given transition to comfort cares, can consider prn dosing for comfort  - Lymphedema consulted   - Repeat echo as above with EF 60-65%, IVC dilated and suggestive of high RA pressure  - Cardiology consult in setting of possible pulmonary HTN on echo and CT imaging  - recommended no intervention or workup while  "inpatient as he does not have obvious pHTN on echo nor does he have RV dysfunction, and overall would not change our mgmt at this time. Cardiology also does not think an echo w/ bubble is necessary. Consider referral to pulmonary HTN clinic outpatient for consideration of RHC and treatment pending goals.   - Consider thora if worsening effusions, no plan to pursue further invasive procedures given transition to comfort cares     # HTN  # HLD  Home Metoprolol and Lisinopril held during previous admission in the setting of hypotension and possible adrenal insufficiency. BP normotensive to mildly HTN on admission. Statin discontinued prior to admission given anticipation of chemotherapy.   - Continue to hold prior Metoprolol/Lisinopril for now; consider resuming as appropriate  - Continue to hold statin, consider resumption prior to discharge      MSK  # Acute on chronic back pain  Noted prior to hospitalization for induction chemotherapy. Presented with rib pain which radiates into his upper back and between his shoulder blades. MRI thoracic spine previously completed 12/17 and showed \"scattered subtle mottled enhancement of bones most pronounced on L 7th rib, could be 2/2 underlying disease process.\" It is possible that pt has some extramedullary infiltration of cortex causing pain. Endorses similar complaints this admission starting 1-2 days PTA in the setting of known persistent leukemia. No red flag sx. EKG on admission is normal.   - CT PE C/A/P as above; negative for source of acute pain   - Dex taper now discontinued in setting of comfort cares and alternative pain medications  - Palliative care consulted for assistance with pain control, appreciate comfort care reccs (see note from 1/27)  - PRN comfort meds available for symptom control -- Atropine, Robinul, IV Dilaudid, Ativan, Haldol, Precedex (weaning), Zyprexa, Biotene spray, Vaseline  - Started Dilaudid PCA x1/26; Increase Dilaudid PCA continuous dose (2 " "mg continuous), increase IV Dilaudid PRN dosing (1-3 mg IV q1h PRN). Continue to wean Precedex gtt as able as above.      NEURO  # AMS  # Agitation  Progressive AMS and agitation noted overnight 1/24. Per nursing patient attempting to get out of bed, remove BiPAP and pull out IV lines. Also with illogical and incomprehensible speech. Sedation with Haldol, Ativan and ultimately Precedex pursued. Vancomycin added to currently Cefepime and repeat BCx drawn.  Unable to obtain CT head due to inability to tolerate. Also with ongoing fluctuating oxygen needs with positioning/activity (5L-12L and intermittent BiPAP). Otherwise he has remained afebrile and with mild HTN. UA bland. CXR showed worsening pulmonary edema. Ammonia and BS wnl. Presume clinical decompensation to be largely related to progressive and refractory leukemia with possible contribution from opioid-induced delirium with increase in opioid medication on 1/24 causing worsened AMS. GOC held as above with transition to DNR/DNI and comfort cares.   - Defer further imaging to align with goals  - Black placed  - Sitter as appropriate  - PRN comfort meds available for symptom control -- Atropine, Robinul, IV Dilaudid, Ativan, Haldol, Precedex (weaning), Zyprexa, Biotene spray, Vaseline       GI  # Constipation   Noted during previous admission for induction chemotherapy. Patient endorses regular and soft BM on admission though feeling more constipated in setting of chemo and increased pain control.   - Senna and Miralax PRN  - Milk of magnesium daily PRN  - Prune juice was previously helpful for patient   - LBM 1/22      ENDO  # T2DM   Follows with [facility], last seen 11/15/22.   - PTA Ozempic held on admission  - Discontinue BS checks and sliding scale insulin to align with goals     MISC  # Dysphagia  Patient endorses difficulty swallowing on admission described as \"food getting stuck in throat\". Has had to modify diet at home and be careful " "with food selection/size. Occasionally finds himself coughing with water as well.   - SLP consulted - no s/sx of aspiration, ok to continue regular diet. Educated on how to take pills and safe eating habits.      FEN   - IVF bolus prn   - Discontinue lytes replacement to align with goals  - Regular diet as tolerated            Clinically Significant Risk Factors              # Hypercalcemia: Highest Ca = 10.6 mg/dL in last 2 days, will monitor as appropriate    # Hypoalbuminemia: Lowest albumin = 2.8 g/dL at 1/24/2023  5:46 AM, will monitor as appropriate   # Thrombocytopenia: Lowest platelets = 50 in last 2 days, will monitor for bleeding         # Severe Obesity: Estimated body mass index is 51.85 kg/m  as calculated from the following:    Height as of this encounter: 1.854 m (6' 1\").    Weight as of this encounter: 178.3 kg (393 lb).              Lines/Drains: PICC placed on admission, remain in place with multiple gtt  DVT ppx: discontinued to align with goals  GI ppx: PTA PPI  Consults: PT/OT, lymphedema, SLP, pulm, cardiology, IR, palliative care  CODE: DNR/DNI     DISPO: Transitioned to comfort cares on 1/25, discharge to [facility] on 1/27. Accepted by [facility]. Medicine service to follow.      Follow-up/Referrals: Primary oncologist is Dr. Velasquez, provided message update on 1/25.      Social  - Lives at home alone near [place], [place]  - Has two sisters who are supportive and like to be involved in big conversations   - Appreciate  involvement for assistance with [facility] for sisters     Coordination of Cares   - Patient follows locally in [place], [place] at [facility]        Patient and plan of care was discussed with attending physician Dr. Dominguez.     >60 minutes spent on the date of the encounter. Over 50% of time was spent counseling the patient and/or coordinating care.     Shannon Irving PA-C  Hematology/Oncology  Pager: [phone]    Pending Results   Unresulted Labs Ordered in the Past 30 " Days of this Admission     Date and Time Order Name Status Description    1/25/2023  7:47 AM Blood Culture Hand, Right Preliminary     1/25/2023  7:47 AM Blood Culture Line, venous Preliminary     1/23/2023 12:12 PM Blood Culture White Lumen Preliminary     1/23/2023 12:12 PM Blood Culture Red Lumen Preliminary     1/21/2023  7:42 AM Acid-fast Bacilli (AFB) Blood Culture Preliminary     1/18/2023  7:46 AM PREPARE PHERESED PLATELETS (UNIT) Preliminary     1/18/2023  7:46 AM PREPARE PHERESED PLATELETS (UNIT) Preliminary     1/13/2023 10:04 AM CHROMOSOME ANALYSIS, BONE MARROW, DIAGNOSIS/RELAPSE In process         Code Status   DNR / DNI    Primary Care Physician   Gagan Wills    Physical Exam              SpO2: (!) 85 %   Oxygen Delivery: 2 LPM  Vitals:    01/22/23 1045 01/23/23 0940 01/24/23 1100   Weight: (!) 172.7 kg (380 lb 12.8 oz) (!) 178.4 kg (393 lb 6.4 oz) (!) 178.3 kg (393 lb)     Vital Signs with Ranges  SpO2:  [85 %] 85 %  I/O last 3 completed shifts:  In: 1099.01 [I.V.:1099.01]  Out: 3275 [Urine:3275]    General: Chronically-ill appearing, lying in bed. Appears comfortable and somnolent with eyes closed. Occasional groaning/moaning.   Skin: Macular erythema and petechiae noted to lower abdomen,   Head: Normocephalic and atraumatic.  HEENT: Oral mucosis is pink, mildly dry. Vaseline present to lips.    Respiratory: No increased work of breathing. Mouth breathing and tachypnea with NC in place for comfort.  Abd: Distended  Extremities: 2-3+ bilateral pitting edema.   Neuro: Lethargic, somnolent with eyes closed. Not responsive to verbal stimuli or touch.    Time Spent on this Encounter   Shannon OCAMPO PA-C, personally saw the patient today and spent greater than 30 minutes discharging this patient.    Discharge Disposition   Discharged to home  Condition at discharge: Stable    Consultations This Hospital Stay   VASCULAR ACCESS FOR PICC PLACEMENT ADULT IP CONSULT  PHYSICAL THERAPY ADULT IP  CONSULT  OCCUPATIONAL THERAPY ADULT IP CONSULT  LYMPHEDEMA THERAPY IP CONSULT  NURSING TO CONSULT FOR VASCULAR ACCESS CARE IP CONSULT  SPEECH LANGUAGE PATH ADULT IP CONSULT  PHARMACY TO DOSE VANCO  PULMONARY GENERAL ADULT IP CONSULT  CARDIOLOGY GENERAL ADULT IP CONSULT  INFECTIOUS DISEASE TRANSPLANT HSCT/ HEME MALIG ADULT IP CONSULT  INTERVENTIONAL RADIOLOGY ADULT/PEDS IP CONSULT  PHARMACY TO DOSE VANCO  PALLIATIVE CARE ADULT IP CONSULT  CARE MANAGEMENT / SOCIAL WORK IP CONSULT  PHARMACY TO DOSE [facility]  NURSING TO CONSULT FOR VASCULAR ACCESS CARE IP CONSULT  SPIRITUAL HEALTH SERVICES IP CONSULT  [facility] INPATIENT HOSPICE ADULT CONSULT  INTERNAL MEDICINE ADULT IP CONSULT FOR [facility] ADULT CONSULT  PALLIATIVE CARE ADULT IP CONSULT    Discharge Orders   No discharge procedures on file.  Discharge Medications   Current Discharge Medication List      CONTINUE these medications which have NOT CHANGED    Details   acetaminophen (TYLENOL) 500 MG tablet Take 500-1,000 mg by mouth every 6 hours as needed for mild pain      acyclovir (ZOVIRAX) 400 MG tablet Take 1 tablet (400 mg) by mouth 2 times daily  Qty: 60 tablet, Refills: 0    Associated Diagnoses: Acute myeloid leukemia not having achieved remission (H)      allopurinol (ZYLOPRIM) 300 MG tablet Take 1 tablet (300 mg) by mouth 2 times daily  Qty: 60 tablet, Refills: 0    Associated Diagnoses: Acute myeloid leukemia not having achieved remission (H)      atorvastatin (LIPITOR) 20 MG tablet Take 20 mg by mouth daily      budesonide-formoterol (SYMBICORT) 160-4.5 MCG/ACT Inhaler Inhale 2 puffs into the lungs 2 times daily      bumetanide (BUMEX) 1 MG tablet Take 1 mg by mouth daily      diclofenac (VOLTAREN) 1 % topical gel Apply 2 g topically 4 times daily as needed for moderate pain (4-6)  Qty: 50 g, Refills: 0    Associated Diagnoses: Acute myeloid leukemia not having achieved remission (H)      levofloxacin (LEVAQUIN) 250 MG tablet Take 1 tablet (250  mg) by mouth daily  Qty: 30 tablet, Refills: 0    Associated Diagnoses: Acute myeloid leukemia not having achieved remission (H)      methocarbamol (ROBAXIN) 500 MG tablet Take 1 tablet (500 mg) by mouth 4 times daily as needed for muscle spasms  Qty: 60 tablet, Refills: 0    Associated Diagnoses: Acute myeloid leukemia not having achieved remission (H)      oxyCODONE (ROXICODONE) 5 MG tablet Take 1 tablet (5 mg) by mouth every 4 hours as needed for moderate pain (4-6)  Qty: 20 tablet, Refills: 0    Associated Diagnoses: Acute myeloid leukemia not having achieved remission (H)      polyethylene glycol (MIRALAX) 17 GM/Dose powder Take 17 g by mouth daily as needed for constipation  Qty: 225 g, Refills: 0    Associated Diagnoses: Acute myeloid leukemia not having achieved remission (H)      posaconazole (NOXAFIL) 100 MG EC tablet Take 3 tablets (300 mg) by mouth every morning  Qty: 90 tablet, Refills: 0    Associated Diagnoses: Acute myeloid leukemia not having achieved remission (H)      semaglutide (OZEMPIC, 1 MG/DOSE,) 2 MG/1.5ML pen Inject 2 mg Subcutaneous every 7 days Every Wednesdays      senna-docusate (SENOKOT-S/PERICOLACE) 8.6-50 MG tablet Take 1 tablet by mouth 2 times daily as needed for constipation  Qty: 30 tablet, Refills: 0    Associated Diagnoses: Acute myeloid leukemia not having achieved remission (H)      venetoclax (VENCLEXTA) 100 MG tablet Take 1 tablet (100 mg) by mouth daily  Qty: 30 tablet, Refills: 0    Associated Diagnoses: Acute myeloid leukemia not having achieved remission (H); Leukemia not having achieved remission, unspecified leukemia type (H)      ammonium lactate (AMLACTIN) 12 % external cream Apply topically daily  Qty: 385 g, Refills: 0    Associated Diagnoses: Acute myeloid leukemia not having achieved remission (H)      calcium acetate (PHOSLO) 667 MG CAPS capsule Take 3 capsules (2,001 mg) by mouth 3 times daily (with meals)  Qty: 30 capsule, Refills: 0    Associated Diagnoses:  Acute myeloid leukemia not having achieved remission (H)      enoxaparin ANTICOAGULANT (LOVENOX) 100 MG/ML syringe Inject 0.9 mLs (90 mg) Subcutaneous every 12 hours HOLD if platelets are less than 50K  Qty: 40 mL, Refills: 0    Associated Diagnoses: Acute myeloid leukemia not having achieved remission (H)      pantoprazole (PROTONIX) 40 MG EC tablet Take 1 tablet (40 mg) by mouth every morning (before breakfast)  Qty: 30 tablet, Refills: 0    Associated Diagnoses: Acute myeloid leukemia not having achieved remission (H)           Allergies   Allergies   Allergen Reactions     Metformin Unknown     Data   Most Recent 3 CBC's:Recent Labs   Lab Test 01/25/23  1023 01/24/23  1859 01/24/23  0546 01/23/23  0557   WBC 31.1*  --  17.4* 17.4*   HGB 8.5*  --  7.5* 7.7*   MCV 91  --  91 92   PLT 58* 50* 36* 40*      Most Recent 3 BMP's:  Recent Labs   Lab Test 01/25/23  1204 01/25/23  1023 01/25/23  0741 01/24/23  0854 01/24/23  0546 01/23/23  1716 01/23/23  1659   NA  --  141  --   --  139  --  138   POTASSIUM  --  4.4  --   --  4.2  --  3.9   CHLORIDE  --  101  --   --  102  --  101   CO2  --  25  --   --  23  --  21*   BUN  --  23.1*  --   --  15.6  --  12.7   CR  --  0.85  --   --  0.86  --  0.96   ANIONGAP  --  15  --   --  14  --  16*   ZEHRA  --  10.6*  --   --  10.4*  --  9.8   * 170* 163*   < > 174*   < > 130*    < > = values in this interval not displayed.     Most Recent 2 LFT's:  Recent Labs   Lab Test 01/25/23  1023 01/24/23  0546   * 37   ALT 10 <5*   ALKPHOS 254* 185*   BILITOTAL 0.5 0.5     Most Recent INR's and Anticoagulation Dosing History:  Anticoagulation Dose History     Recent Dosing and Labs Latest Ref Rng & Units 1/19/2023 1/20/2023 1/21/2023 1/22/2023 1/23/2023 1/24/2023 1/25/2023    INR 0.85 - 1.15 1.28(H) 1.29(H) 1.23(H) 1.19(H) 1.23(H) 1.25(H) 1.31(H)        Most Recent 3 Troponin's:No lab results found.  Most Recent Cholesterol Panel:No lab results found.  Most Recent 6 Bacteria  Isolates From Any Culture (See EPIC Reports for Culture Details):No lab results found.  Most Recent TSH, T4 and A1c Labs:  Recent Labs   Lab Test 01/24/23  0546   TSH 0.87     Results for orders placed or performed during the hospital encounter of 01/19/23   CT Chest Pulmonary Embolism w Contrast    Narrative    EXAM: CTA pulmonary angiogram, 1/19/2023 3:24 PM    HISTORY: Male sex; No imaging to r/o PE in the last 24 hours;  Pulmonary Embolism Rule-Out Criteria (PERC) score > 0; Revised Parker  Score (RGS) not >= 11; No D-dimer result available; D-dimer not  ordered    COMPARISON: 12/16/2022 chest CT comment post 7/20/2022 CT CAP.    TECHNIQUE: Volumetric CT images obtained through the chest with  contrast. Coronal and axial MIP reformatted images obtained.  Three-dimensional (3D) post-processed angiographic images were  reconstructed, archived to PACS and used in interpretation of this  study.     CONTRAST: iopamidol (ISOVUE-370) solution 135 mL ml isovue 370 IV.    FINDINGS:     Right chest wall Port-A-Cath tip terminates in the low thoracic SVC.  Lung volumes are low. Mosaic attenuation most prominently in the right  and left lower lobes.    Vascular: There is good contrast opacification of the pulmonary  arterial vasculature. No acute pulmonary embolus but apparent  pulmonary artery web is seen in the proximal aspect of the right upper  lobe pulmonary artery.  Right size is normal. Pulmonary artery is  enlarged and measures 3.7 cm. No right heart strain     Remaining Chest: Central tracheobronchial tree is patent. No  consolidation, mass, or suspicious pulmonary nodules. Stable size of 6  mm (series 7, image 222) subpleural pulmonary nodule in the left lung  base. No pleural effusion or pneumothorax. No thoracic  lymphadenopathy.    Upper Abdomen: Limited evaluation demonstrates no acute findings.  Incompletely visualized cystic lesion in the superior pole the right  kidney. The spleen is enlarged and measures  17.8 cm in maximum long  axis.    Bones/Soft Tissues: No acute abnormalities or suspicious lesions.      Impression    IMPRESSION:     1. Exam is negative for acute pulmonary embolism. No right heart  strain . 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    2. Pulmonary arteries enlarged and measures up to 3.7 cm comment this  is nonspecific but most often seen with pulmonary arterial  hypertension, possibly CTEPH    3. Mosaic attenuation in the lung bases is most often seen with air  trapping or perfusional abnormalities.    4. 6 mm solid pulmonary nodule in the left lung base cannot be  reimaged in one year's time with a noncontrast chest CT.    5. Splenomegaly.      In the event of a positive result for acute pulmonary embolism  resulting in right heart strain, consider calling the   [facility] hospital  for PERT (Pulmonary Embolism Response Team)  Activation?    PERT -- Pulmonary Embolism Response Team (Multidisciplinary team  including cardiology, interventional radiology, critical care,  hematology)    I have personally reviewed the examination and initial interpretation  and I agree with the findings.    DANIELA ELIZABETH MD         SYSTEM ID:  [id number]   CT Abdomen Pelvis w Contrast    Narrative    EXAMINATION: CT ABDOMEN PELVIS W CONTRAST, 1/19/2023 3:23 PM    INDICATION: Pt with persistent leukemia and borderline neutropenic  fever with new hypoxia and abdominal pain/bloating and back pain. Eval  infectious source and lymphadenopathy/extramedullary leukemia  involvement.    COMPARISON STUDY: Outside CT chest and pelvis 12/7/2022    TECHNIQUE: CT scan of the abdomen and pelvis was performed on  multidetector CT scanner using volumetric acquisition technique and  images were reconstructed in multiple planes with variable thickness  and reviewed on dedicated workstations.     CONTRAST: iopamidol (ISOVUE-370) solution 135 mL injected IV  without  oral contrast    CT scan radiation dose is optimized to minimum requisite dose using  automated dose modulation techniques.    FINDINGS:    Support devices: None.    Mild motion related artifact.    Lower thorax: Mosaic attenuation at the lung bases. Please see same  day chest CT for detailed findings.    Liver: No focal mass lesions. No intrahepatic biliary ductal  dilatation. Hepatomegaly.    Gallbladder and bile ducts: No stones, gallbladder wall thickening, or  pericholecystic fluid. No extrahepatic biliary ductal dilation.    Spleen: Splenomegaly.    Pancreas: Atrophy of the pancreatic parenchyma. No focal pancreatic  lesion    Adrenals: No nodules.     Kidneys and ureters: No obstructive renal calculus. No hydronephrosis.  Few bilateral renal cortical hypodensities, compatible with renal  cysts and additional small to characterize renal hypodensities    Bladder: Unremarkable.    Reproductive: Unremarkable.    Bowel: Unremarkable.    Appendix: Normal.    Mesentery/Peritoneum: Unremarkable.    Lymph nodes: Scattered small abdominal nodes, not significantly  enlarged. Enlarged inguinal lymphadenopathy, measuring up to 2.2 cm in  short axis on the right (series 2 image 244). Inguinal lymph nodes  appear similar to prior CT from 12/7/2022    Vasculature: Mild atherosclerotic calcifications. No aneurysmal  dilatation.    Bone windows: No aggressive osseous abnormalities. Degenerative  changes. Postoperative changes of bilateral total hip arthroplasties.  Streak artifacts from bilateral arthroplasty limits evaluation of the  pelvis.    Soft tissues: Fat-containing umbilical hernia. Soft tissue nodularity  in the subcutaneous right anterior abdominal wall, slightly more  conspicuous compared to prior (series 2, image 167)      Impression    IMPRESSION:       1. Hepatosplenomegaly.  2. No intra-abdominal collection. No significant enlarged  retroperitoneal, mesenteric lymph nodes  3. Enlarged inguinal lymph  nodes, possibly reactive.  4.  Bilateral renal cortical hypodensities, compatible with renal cyst  and additional too  small to characterize renal hypodensities, may  consider attention on follow-up.    I have personally reviewed the examination and initial interpretation  and I agree with the findings.    LING HERNANDEZ MD         SYSTEM ID:  [id number]   XR Chest Port 1 View    Narrative    EXAM: XR CHEST PORT 1 VIEW  1/23/2023 1:49 PM      HISTORY: worsened hypoxia    COMPARISON: Chest CT 1/19/2023.    FINDINGS: Single view of the chest. Partially visualized right  shoulder arthroplasty changes. Right upper extremity PICC with tip  projecting over the right atrium. Trachea is midline. Enlarged  cardiomediastinal silhouette. Small pleural effusions. No  pneumothorax. Diffuse interstitial and airspace opacities. No acute  osseous abnormalities.      Impression    IMPRESSION:   1. Cardiomegaly with diffuse mixed pulmonary opacities. Findings most  suggestive of pulmonary edema.  2. Small bilateral pleural effusions.    I have personally reviewed the examination and initial interpretation  and I agree with the findings.    EVAN ADDISON MD         SYSTEM ID:  [id number]   XR Chest Port 1 View    Narrative    Exam: XR CHEST PORT 1 VIEW, 1/25/2023 2:14 AM    Indication: Shortness of breath    Comparison: 1/23/2023 chest x-ray and 1/19/2023 chest CT    Findings:   Semiupright, AP view of the chest. Right PICC tip projects over the  high right atrium. The cardiac silhouette is enlarged. More prominent  pulmonary venous congestion. Small bilateral pleural effusions. No  pneumothorax.      Impression    Impression: Stable cardiomegaly with worsening pulmonary edema.    I have personally reviewed the examination and initial interpretation  and I agree with the findings.    MARCI RANGEL MD         SYSTEM ID:  [id number]   Echo Limited     Value    LVEF  60-65%    Narrative     [id number]  [id number]  [id number]  [id number]^JULIANO^CARMELO     [facility],[place]  Echocardiography Laboratory  [address] [place] [id number]     Name: RAD TAYLOR  MRN: [id number]  : 1960  Study Date: 2023 09:05 AM  Age: 62 yrs  Gender: Male  Patient Location: [facility] [place]  Reason For Study: Chemo  Ordering Physician: CARMELO HENAO  Referring Physician: JODEE MEHTA  Performed By: Devon Cruz RDCS     BSA: 2.9 m2  Height: 73 in  Weight: 393 lb  HR: 82  BP: 123/57 mmHg  ______________________________________________________________________________  Procedure  Limited Portable Echo Adult. Contrast Optison. Patient was given 6 ml mixture  of 3 ml Optison and 6 ml saline. 3 ml wasted.  ______________________________________________________________________________  Interpretation Summary  Left ventricular size, wall motion and function are normal. The ejection  fraction is 60-65%.  Right ventricular function, chamber size, wall motion, and thickness are  normal.  No significant valvular abnormalities present.  Dilation of the inferior vena cava is present with abnormal respiratory  variation in diameter suggests a high RA pressure estimated at 15 mmHg or  greater.     This study was compared with the study from 12/10/2022. IVC is dilated today.  No other change.  ______________________________________________________________________________  Left Ventricle  Left ventricular size, wall motion and function are normal. The ejection  fraction is 60-65%.     Right Ventricle  Right ventricular function, chamber size, wall motion, and thickness are  normal.     Atria  The atria cannot be assessed.     Mitral Valve  The mitral valve is normal.     Aortic Valve  On Doppler interrogation, there is no significant stenosis or regurgitation.  The valve leaflets are not well visualized.     Tricuspid Valve  The tricuspid valve is normal.     Pulmonic Valve  The pulmonic valve is  normal.     Vessels  Dilation of the inferior vena cava is present with abnormal respiratory  variation in diameter. IVC diameter >2.1 cm collapsing <50% with sniff  suggests a high RA pressure estimated at 15 mmHg or greater.     Pericardium  No pericardial effusion is present.     Miscellaneous  No significant valvular abnormalities present.     Compared to Previous Study  This study was compared with the study from 12/10/2022 . IVC is dilated today.  No other change.     Attestation  I have personally viewed the imaging and agree with the interpretation and  report as documented by the fellow, Payam Vela, and/or edited by me.  ______________________________________________________________________________  MMode/2D Measurements & Calculations  IVSd: 1.2 cm  LVIDd: 5.2 cm  LVIDs: 3.4 cm  LVPWd: 1.3 cm  FS: 34.5 %  LV mass(C)d: 255.3 grams  LV mass(C)dI: 89.0 grams/m2  RWT: 0.49     ______________________________________________________________________________  Report approved by: Marilee JONES 01/20/2023 11:39 AM

## 2023-01-27 NOTE — H&P
[facility]    History and Physical - Hospitalist Service, GOLD TEAM        Date of Admission:  1/19/2023    Assessment & Plan      Angel Cardenas is a 62 year old male with a history of multiple prior DVTs and PE in 2022 (previously on Xarelto), DM2, COPD, HTN, gout and recently diagnosed AMML s/p Cycle 1 of 10-days of Decitabine/Venetoclax (D1=12/19/22) with subsequent persistent disease on post-therapy BMBx 1/13/23 demonstrating ongoing ~70% blasts. He presented from clinic for consideration of re-induction chemotherapy in the setting of fever and AHRF. Infectious and pulmonary work-up were negative (bronchoscopy not able to be done given oxygen requirements) his progressive symptoms have been felt most likely related to his leukemia. Due to poor clinical status and multiple co-morbidities, he initiated 10-day low-dose Cherie-C (C1D1=1/22/23) with no improvement and continued progression of his leukemia. Unfortunately any further attempt to control his leukemia are unlikely to result in meaningful benefit and no further chemotherapy was able to be offered. He had clinical decompensation overnight 1/24 with progressive AHRF and AMS prompting [facility] meeting with sisters on 1/25 with decision for DNR/DNI and transition to comfort cares. Palliative care is involved and helping to manage symptoms. [facility] was consulted per family request and Angel has been accepted.     #Comfort cares  Admitted to [facility] hospice, transferred from [facility] to medicine primary. Given rapport built between family and palliative care team and complicated comfort care regimen, palliative care will continue to manage patient's symptoms.   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 All vital signs checks, labs, procedures and other studies will not be obtained.        Diet: Regular Diet Adult    DVT Prophylaxis: None  Black Catheter: PRESENT, indication: End of Life  Lines: PRESENT      PICC 01/19/23 Triple Lumen Right Basilic Chemotherapy. PICC okay to use.-Site Assessment: WDL      Cardiac Monitoring: None  Code Status: No CPR- Do NOT Intubate      Clinically Significant Risk Factors              # Hypoalbuminemia: Lowest albumin = 2.8 g/dL at 1/24/2023  5:46 AM, will monitor as appropriate           # Severe Obesity: Estimated body mass index is 51.85 kg/m  as calculated from the following:    Height as of this encounter: 1.854 m (6' 1\").    Weight as of this encounter: 178.3 kg (393 lb).          Disposition Plan      Expected Discharge Date: 01/27/2023,  3:00 PM      Discharge Comments: .          Tonny Ellison MD  Hospitalist Service, [facility]  Securely message with [email or web address] (more info)  Text page via [facility] Paging/Directory   See signed in provider for up to date coverage information    ______________________________________________________________________    Chief Complaint   Hospice admission    Unable to obtain a history from the patient due to mental status, supporting history from chart review, family, nursing and palliative care physician.     History of Present Illness   Angel Cardenas is a 62 year old male who was admitted for AMML, now transitioning " to comfort cares. Palliative care has already been involved with symptom management and patient symptoms are currently under control. He has had agonal breathing and moaning throughout the day.       Past Medical History    Past Medical History:   Diagnosis Date     COPD (chronic obstructive pulmonary disease) (H)      Diabetes mellitus, type 2 (H)      Gout      History of pulmonary embolism      HLD (hyperlipidemia)      HTN (hypertension)        Past Surgical History   Past Surgical History:   Procedure Laterality Date     PICC DOUBLE LUMEN PLACEMENT Right 12/16/2022    Right brachial-medial vein 48cm total 1cm external.Placement verified by [facility] 3CG.PICC okay to use.     PICC TRIPLE LUMEN PLACEMENT Right 01/19/2023    5FR TL PICC basilic vein. L-50cm 2cm out     GA REVISE TOTAL HIP REPLACEMENT Bilateral      TOTAL SHOULDER REPLACEMENT Right        Prior to Admission Medications   Prior to Admission Medications   Prescriptions Last Dose Informant Patient Reported? Taking?   acetaminophen (TYLENOL) 500 MG tablet 1/19/2023 at AM; 2000 mg Self Yes Yes   Sig: Take 500-1,000 mg by mouth every 6 hours as needed for mild pain   acyclovir (ZOVIRAX) 400 MG tablet 1/19/2023 at AM; 1 dose/2 Self No Yes   Sig: Take 1 tablet (400 mg) by mouth 2 times daily   allopurinol (ZYLOPRIM) 300 MG tablet 1/19/2023 at AM; 1 dose/2 Self No Yes   Sig: Take 1 tablet (300 mg) by mouth 2 times daily   ammonium lactate (AMLACTIN) 12 % external cream Not Taking Self No No   Sig: Apply topically daily   Patient not taking: Reported on 1/19/2023   atorvastatin (LIPITOR) 20 MG tablet Unknown  Yes Yes   Sig: Take 20 mg by mouth daily   budesonide-formoterol (SYMBICORT) 160-4.5 MCG/ACT Inhaler 1/19/2023 at AM; 1 dose/2 Self Yes Yes   Sig: Inhale 2 puffs into the lungs 2 times daily   bumetanide (BUMEX) 1 MG tablet 1/19/2023 at AM Self Yes Yes   Sig: Take 1 mg by mouth daily   calcium acetate (PHOSLO) 667 MG CAPS capsule  Self No No   Sig: Take  3 capsules (2,001 mg) by mouth 3 times daily (with meals)   diclofenac (VOLTAREN) 1 % topical gel 1/18/2023 at PM Self No Yes   Sig: Apply 2 g topically 4 times daily as needed for moderate pain (4-6)   enoxaparin ANTICOAGULANT (LOVENOX) 100 MG/ML syringe Not Taking Self No No   Sig: Inject 0.9 mLs (90 mg) Subcutaneous every 12 hours HOLD if platelets are less than 50K   Patient not taking: Reported on 1/19/2023   levofloxacin (LEVAQUIN) 250 MG tablet 1/19/2023 at AM Self No Yes   Sig: Take 1 tablet (250 mg) by mouth daily   methocarbamol (ROBAXIN) 500 MG tablet 1/18/2023  No Yes   Sig: Take 1 tablet (500 mg) by mouth 4 times daily as needed for muscle spasms   oxyCODONE (ROXICODONE) 5 MG tablet 1/19/2023 at AM; 5 mg  No Yes   Sig: Take 1 tablet (5 mg) by mouth every 4 hours as needed for moderate pain (4-6)   pantoprazole (PROTONIX) 40 MG EC tablet Not Taking Self No No   Sig: Take 1 tablet (40 mg) by mouth every morning (before breakfast)   Patient not taking: Reported on 1/19/2023   polyethylene glycol (MIRALAX) 17 GM/Dose powder 1/18/2023 at AM Self No Yes   Sig: Take 17 g by mouth daily as needed for constipation   posaconazole (NOXAFIL) 100 MG EC tablet 1/19/2023 Self No Yes   Sig: Take 3 tablets (300 mg) by mouth every morning   semaglutide (OZEMPIC, 1 MG/DOSE,) 2 MG/1.5ML pen 1/11/2023 at Unknown Self Yes Yes   Sig: Inject 2 mg Subcutaneous every 7 days Every Wednesdays   senna-docusate (SENOKOT-S/PERICOLACE) 8.6-50 MG tablet Past Week at Unknown Self No Yes   Sig: Take 1 tablet by mouth 2 times daily as needed for constipation   venetoclax (VENCLEXTA) 100 MG tablet 1/18/2023 at PM Self No Yes   Sig: Take 1 tablet (100 mg) by mouth daily      Facility-Administered Medications: None        Physical Exam   Vital Signs:            SpO2: (!) 85 %   Oxygen Delivery: 2 LPM  Weight: 393 lbs 0 oz    Constitutional: laying in bed, not interacting, agonal breathing  HEENT: normocephalic   Further examination deferred  given comfort goals      Medical Decision Making       >75 MINUTES SPENT BY ME on the date of service doing chart review, history, exam, documentation & further activities per the note.

## 2023-01-27 NOTE — PROGRESS NOTES
"SPIRITUAL HEALTH SERVICES Progress Note  [facility] ([place]) 6D    I met with Angel, two of his sisters, his niece and her  per staff request for emotional support. Family described the past few days since \"expecting Angel to die Wednesday night.\" They named emotional fatigue waiting with him in the time since. Family shared memories of Angel and the figure that he was in their lives. We prayed together per their request that Angel be at peace.    Robert Munguia  Chaplain Resident  Pager [phone]    * [facility] remains available 24/7 for emergent requests/referrals, either by having the switchboard page the on-call  or by entering an ASAP/STAT consult in Epic (this will also page the on-call ). Routine Epic consults receive an initial response within 24 hours.*    "

## 2023-01-27 NOTE — PROGRESS NOTES
[facility]  Palliative Care Daily Progress Note       Recommendations & Counseling       Comfort cares: continue comfort cares.     DNR/DNI  Pain / Dyspnea: pt with episodes of appearing short of breath and uncomfortable according to family at bedside.     Increase hydromorphone 1-3mg IV q1hr PRN dyspnea / pain     Increase hydromorphone PCA 2mg continuous (patient required 22mg of hydromorphone PRN doses x 24hrs so will double hydromorphone PCA)  Restlessness / agitation:     Continue lorazepam 1mg IV q3hrs PRN and can increase to 2mg q3hrs if 1mg not effective    Continue haldol 5mg IV q6hrs PRN     Continue scheduled zyprexa 10mg ODT BID    Continue to wean precedex as tolerates    Secretions:     Continue glycopyrrolate and atropine PRN     Palliative care will continue to follow for comfort care. If pt transitions to [facility], would request that a reconsult for palliative care be put in if Angel is discharged and readmitted to [facility].       Thank you for the opportunity to continue to participate in the care of this patient and family.  Please feel free to contact on-call palliative provider with any emergent needs.  We can be reached via team pager [phone] (answered 8-4:30 Monday-Friday); after-hours answering service ([phone]);     Margret Moreno MD / Palliative Medicine / Pager [phone]     TTS: I have personally spent a total of 45 minutes today reviewing patient's Epic record, performing and history and exam, speaking with family at bedside, formulating a plan and documenting this note.     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 He presents from  clinic on 1/19/2023 for consideration of re-induction chemotherapy in the setting of fever and AHRF (multifactorial from COPD, edema, pulm HTN?). Due to poor clinical status and multiple co-morbidities, oncology started 10-day low-dose Cherie-C (C1D1=1/22/23). Palliative was consulted for pain control 1/24. Unfortunately, Angel decompensated and became agitated and dyspneic overnight. After speaking with his sisters Courtney and Mulu, decision was made to do comfort care without GIP which was started 1/25.     Today, the patient was seen for:  Pain related to neoplasm  End of life care  Terminal delirium  Goals of care  Support    Prognosis, Goals, or Advance Care Planning was addressed today with: Yes. Spoke with family at bedside. Prognosis of likely days given to family    Mood, coping, and/or meaning in the context of serious illness were addressed today: Yes.  Family has greatly appreciated  support and when brother arrives tomorrow, they will ask bedside nurse to page on call              Interval History:     Chart review/discussion with unit or clinical team members:   No acute events overnight               Review of Systems:     Not responsive to voice           Medications:     I have reviewed this patient's medication profile and medications during this hospitalization.  Hydromorphone 1mg continuous  Hydromorphone 1-2mg IV q1hr PRN - 22mg x 24hrs            Physical Exam:     Gen: lying in bed, not responsive to verbal stimuli, appears comfortable, in NAD  Resp: no increased work of breathing,   Mental status/Psych: not responsive to verbal stimuli; sensorium not intact             Data Reviewed:     Reviewed recent labs and pertinent imaging

## 2023-01-28 NOTE — PROGRESS NOTES
Overnight Events:     No acute events. Continue with PRN dilaudid and Robinul q1hr for comfort. Notify team with deterioration or any changes.     Margret Hubbard RN

## 2023-01-28 NOTE — PROGRESS NOTES
[facility]  Palliative Care Daily Progress Note       Recommendations & Counseling     Goals of care:    Comfort measures    DNR/DNI    Per discussion with [facility] GIP RN today, palliative will manage symptom medications.  Pain / Dyspnea: Sometimes agitated especially with cares such as bathing and turning. Family concerned about medications. They were told that he would be continued on his medications prior to [facility] admission but Ativan seems to have fallen off. Opening eyes when see just after RN care but reevaluated shortly after Precedex increase and Dilaudid administration and he was more comfortably. Family also noting secretions and states that at one point, Angel needed a procedure done and the anesthesia caused him to feel breathless which caused him a lot of distress.     Continue hydromorphone 1-3mg IV q1hr PRN dyspnea / pain (using range of 1-3 mg per RN discretion)    Continue hydromorphone PCA 2mg continuous   Restlessness / agitation:     START lorazepam 1mg IV q1h PRN (increase to 2mg if 1mg not effective) (done for you)    Continue haldol 5mg PRN     Continue to wean precedex as tolerates    Zyprexa 10mg ODT BID fell off, continue to monitor and if needed, can start again  Secretions:     INCREASE glycopyrrolate from 0.2 mg q1h prn to 0.4 mg q1h prn (done for you)    START Scopolamine q72h patch (done for you)    Continue atropine PRN     Palliative will continue to follow. Please page if needed.    Total time spent was 50 minutes regarding symptom control and end of life care and support. These recommendations were given to the primary team via this note and to hospice via phone.    Conrado Flores DO / Palliative Medicine / Text me via [employer].     Team Consult Pager [phone] (answered 8am-430pm M-F) - ok to text page via [employer] / After-Hours Answering Service [phone] / Palliative Clinic in the [facility] at the [facility] - [phone]  "(scheduling); [phone] (triage).        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 He presents from clinic on 1/19/2023 for consideration of re-induction chemotherapy in the setting of fever and AHRF (multifactorial from COPD, edema, pulm HTN?). Due to poor clinical status and multiple co-morbidities, oncology started 10-day low-dose Cherie-C (C1D1=1/22/23). Palliative was consulted for pain control 1/24. Unfortunately, Angel decompensated and became agitated and dyspneic overnight. After speaking with his sisters Courtney and Mulu, decision was made to do comfort care without GIP which was started 1/25.     Today, the patient was seen for:  Pain related to neoplasm  End of life care  Terminal delirium  Goals of care  Support    Prognosis, Goals, or Advance Care Planning was addressed today with: Yes. Spoke with family at bedside. Prognosis of likely days given to family    Mood, coping, and/or meaning in the context of serious illness were addressed today: Yes.  Family has greatly appreciated  support and when brother arrives tomorrow, they will ask bedside nurse to page on call              Interval History:     Chart review/discussion with unit or clinical team members:   Family notes that Angel \"did okay\" overnight but has copious secretions needing a lot of medication. He had just been bathed and turned and was agitated, opening his eyes and moving his extremities spontaneously. Provided support for sisters Mulu and Gala. Medication adjustments made above.    Symptoms being managed: Pain, dyspnea, and anxiety - moderate           Review of Systems:     Unable to obtain ROS due to acuity of condition " (end of life, comatose)          Medications:     I have reviewed this patient's medication profile and medications during this hospitalization.    Scopolamine patch     Hydromorphone 2 mg/hr PCA continuous  Precedex drip  NS at 10 ml/hr (KVO)    APAP 650 mg suppository prn  Atropine prn  Biotene prn  Haldol 5 mg IV q6h prn  Haldol 1 mg SL q3h prn  Hydromorphone 1-3mg IV q1hr PRN  Ativan 1 mg q1h prn  Glycopyrrolate 0.4 mg q1h prn           Physical Exam:   Gen: lying in bed, not responsive to verbal stimuli, appears comfortable, in NAD  Resp: no increased work of breathing,   Mental status/Psych: not responsive to verbal stimuli; sensorium not intact           Data Reviewed:     Reviewed recent labs and pertinent imaging  No recent labs/imaging

## 2023-01-28 NOTE — CONSULTS
[facility]    Progress Note - [facility]    ______________________________________________________________________    [facility] 24/7 Contact Number: [phone]    - Providers: Please contact [facility] with changes in orders or clinical plan of care   - Nursing: Please contact [facility] with significant changes in patient condition  ______________________________________________________________________        Plan of Care Discussed with the Following:   - Nurse: Viry  - Hospitalist/Rounding Provider: Palliative Dr. Flores and Attending Dr. Paige    - Patient's Family/Preferred Contact: sisters Mulu and Gala  - Hospice Provider: Dr. uD BALLARD Eligibility: pain, terminal agitation, terminal congestion    Symptom management goals:  Per GIA Hardwick, goal is to ensure her brother has 0 pain/anxiety during active dying process.  Both sisters are strong advocates for symptom management.    Pertinent assessment information:  Overall presentation improved since yesterday's visit.  Body is relaxed, furrowed brow absent, loud moaning absent during time of assessment.     RR = 14  With periods of apnea noted lasting 10 seconds.  Terminal congestion present, audible from foot of the bed.  Discussion with Dr. Flores who added Scopolamine patch and increased Robinul to 0.4mg IVP Q1H PRN.      Education on end of life signs, changes in breathing patterns, apnea and agonal breathing, terminal congestion and medications to aid in symptom management.    Mulu and Gala both present for visit.  Provided empathic listening, emotional and grief support.  Compound grief present due to recent cumulative losses. Sisters live close to each other and indicate they have strong support from their family and local [employer] offers grief support.        Thank you for the compassionate care being provided by the hospital staff.      Please continue to utilize PRN's as  ordered by Dr. Flores for symptom management to provide end of life comfort.    Lita Hardin RN  [facility]  Contact information available via [facility] Paging/Directory     Listed as Hospice [facility] in [facility]

## 2023-01-28 NOTE — PLAN OF CARE
Major Shift Events: Comfort cares. 2 L nasal cannula in place for comfort. Precedex gtt increased to 0.6 mcg/kg/hr due to agitation after bed bath and turning this AM. Bed bath requested per family at the bedside. IV Ativan reordered and x1 dose given. Gave prn IV Dilaudid q 1 hr for comfort and dyspnea. Cont PCA Dilaudid @ 2 mg/hr. Increased respiratory secretions, prn IV Robinul given q 1 hr, dose was increased this AM. Scop patch started, behind L ear.  Plan: Cont to monitor comfort, dyspnea, and respiratory secretions. Cont to provide interventions to alleviate symptoms and promote comfort. Cont w/ POC.  For vital signs and complete assessments, please see documentation flowsheets.    Goal Outcome Evaluation:  Problem: End-of-Life Care  Goal: Comfort, Peace and Preserved Dignity  Outcome: Progressing

## 2023-01-28 NOTE — PROGRESS NOTES
[facility]    Medicine Progress Note - Hospitalist Service, GOLD TEAM 5    Date of Admission:  1/27/2023    Assessment & Plan   Angel Cardenas is a 62 year old male with a history of multiple prior DVTs and PE in 2022 (previously on Xarelto), DM2, COPD, HTN, gout and recently diagnosed AMML s/p Cycle 1 of 10-days of Decitabine/Venetoclax (D1=12/19/22) with subsequent persistent disease on post-therapy BMBx 1/13/23 demonstrating ongoing ~70% blasts. He presented from clinic for consideration of re-induction chemotherapy in the setting of fever and AHRF. Infectious and pulmonary work-up were negative (bronchoscopy not able to be done given oxygen requirements) and his progressive symptoms have been felt most likely related to his leukemia. Due to poor clinical status and multiple co-morbidities, he initiated 10-day low-dose Cherie-C (C1D1=1/22/23) with no improvement and continued progression of his leukemia. Unfortunately any further attempt to control his leukemia are unlikely to result in meaningful benefit and no further chemotherapy was able to be offered. He had clinical decompensation overnight 1/24 with progressive AHRF and AMS prompting [facility] meeting with sisters on 1/25 with decision for DNR/DNI and transition to comfort cares. Palliative care is involved and helping to manage symptoms. [facility] was consulted per family request and Angel has been accepted.      Comfort cares  Admitted to [facility] hospice, transferred from [facility] to medicine primary. Given rapport built between family and palliative care team and complicated comfort care regimen, palliative care will continue to manage patient's symptoms.   - Palliative care following,appreciate their assistance in adjusting comfort orders (see note by Dr. Flores)  - Appreciate support provided by [facility] hospice services  - Keep off monitors, no vitals, no labs       AMML with SRSF2, TET2 mutations         "  H/o PE (most recently Jan 2022)   H/o multiple DVTs (1990s)  Anemia  Thrombocytopenia   AHRF  Lactic acidosis  COPD  CATHERINE  HTN  HLD  T2DM   All medications not directed toward patient comfort have been stopped. All vital signs checks, labs, procedures and other studies will not be obtained.        Diet: Regular Diet Adult    DVT Prophylaxis: Comfort cares  Black Catheter: Not present  Lines: PRESENT      PICC 01/19/23 Triple Lumen Right Basilic Chemotherapy. PICC okay to use.-Site Assessment: WDL      Cardiac Monitoring: None  Code Status: No CPR- Do NOT Intubate      Clinically Significant Risk Factors Present on Admission               # Drug Induced Coagulation Defect: home medication list includes an anticoagulant medication         # Severe Obesity: Estimated body mass index is 51.85 kg/m  as calculated from the following:    Height as of 1/19/23: 1.854 m (6' 1\").    Weight as of 1/24/23: 178.3 kg (393 lb).           Disposition Plan      Expected Discharge Date: 01/30/2023                The patient's care was discussed with the Attending Physician, Dr. Paige, Bedside Nurse and Patient's Family.    Lexis Rodas PA-C  Hospitalist Service, GOLD TEAM [address] [place] [address]  Securely message with [employer] (more info)  Text page via [facility] Paging/Directory   See signed in provider for up to date coverage information  ______________________________________________________________________    Interval History   Patient admitted to inpatient hospice yesterday. His sisters, Mulu and Gala, are at bedside. He looked like his breathing was more uncomfortable last night and ativan was added per palliative. They believe he appears comfortable at this time. Has some short term disability paperwork to fill out but otherwise denied further needs from me. They have been having people call to talk to him over the phone and playing TV shows that he likes.     Physical Exam   Vital Signs: " Temp: (!) 102.6  F (39.2  C) Temp src: Axillary                Weight: 0 lbs 0 oz    General Appearance: Appears comfortable, calm. Resting.   Respiratory: Increased respiratory secretions. Breathing appears comfortable.   Skin: No rashes on exposed skin.      Medical Decision Making       35 MINUTES SPENT BY ME on the date of service doing chart review, history, exam, documentation & further activities per the note.      Data         Imaging results reviewed over the past 24 hrs:   No results found for this or any previous visit (from the past 24 hour(s)).

## 2023-01-29 NOTE — DISCHARGE SUMMARY
[facility]  Hospitalist Discharge Summary      Date of Admission:  2023  Date of Discharge:  2023 11:45 AM  Discharging Provider: Lexis Rodas PA-C/Dr. Beto Paige  Discharge Service: Hospitalist Service, GOLD TEAM 5    Discharge Diagnoses   AMML with SRSF2, TET2 mutations  Hx of PE, multiple prior DVTs  Anemia  Thrombocytopenia  AHRF  Lactic acidosis  COPD  CATHERINE  HTN  HLD  DM2    Follow-ups Needed After Discharge   Not applicable.     Unresulted Labs Ordered in the Past 30 Days of this Admission     Date and Time Order Name Status Description    2023  7:47 AM Blood Culture Hand, Right Preliminary     2023  7:47 AM Blood Culture Line, venous Preliminary     2023  7:42 AM Acid-fast Bacilli (AFB) Blood Culture Preliminary     2023  7:46 AM PREPARE PHERESED PLATELETS (UNIT) Preliminary     2023  7:46 AM PREPARE PHERESED PLATELETS (UNIT) Preliminary     2023 10:04 AM CHROMOSOME ANALYSIS, BONE MARROW, DIAGNOSIS/RELAPSE In process         Discharge Disposition   Patient  during this admission  Condition at discharge:     Hospital Course   Angel Cardenas is a 62 year old male with a history of multiple prior DVTs and PE in  (previously on Xarelto), DM2, COPD, HTN, gout and recently diagnosed AMML s/p Cycle 1 of 10-days of Decitabine/Venetoclax (D1=22) with subsequent persistent disease on post-therapy BMBx 23 demonstrating ongoing ~70% blasts. He presented from clinic for consideration of re-induction chemotherapy in the setting of fever and AHRF. Infectious and pulmonary work-up were negative (bronchoscopy not able to be done given oxygen requirements) and his progressive symptoms have been felt most likely related to his leukemia. Due to poor clinical status and multiple co-morbidities, he initiated 10-day low-dose Cherie-C (C1D1=23) with no improvement and continued progression of his leukemia.  Unfortunately any further attempt to control his leukemia felt unlikely to result in meaningful benefit and no further chemotherapy was able to be offered. He had clinical decompensation overnight  with progressive AHRF and AMS prompting [facility] meeting with sisters on  with decision for DNR/DNI and transition to comfort cares. Palliative care was involved and helping to manage symptoms. [facility] was consulted per family request and [facility] was accept. Patient  on morning of 2023.      AMML with SRSF2, TET2 mutations          H/o PE (most recently 2022)   H/o multiple DVTs ()  Anemia  Thrombocytopenia   AHRF  Lactic acidosis  COPD  CATHERINE  HTN  HLD  T2DM   All medications not directed toward patient comfort were stopped. All vital signs checks, labs, procedures and other studies stopped.     Consultations This Hospital Stay   [facility] INPATIENT [facility]OSPICE ADULT CONSULT  PALLIATIVE CARE ADULT IP CONSULT  SPIRITUAL HEALTH SERVICES IP CONSULT    Code Status   No CPR- Do NOT Intubate    Time Spent on this Encounter   I, Lexis Rodas PA-C, personally saw the patient today and spent less than or equal to 30 minutes discharging this patient.       Lexis Rodas PA-C  [facility] 6D INTERMEDIATE CARE  [address] [address]  Phone: [phone]  Fax: [phone]  ______________________________________________________________________    Physical Exam   Vital Signs:              O2 Device: Nasal cannula Oxygen Delivery: 2 LPM  Weight: 0 lbs 0 oz  Patient  and death exam complete. Further exam not appropriate.        Primary Care Physician   Gagan Wills    Discharge Orders   No discharge procedures on file.    Significant Results and Procedures   Most Recent 3 CBC's:Recent Labs   Lab Test 23  1023 23  1859 23  0546 23  0557   WBC 31.1*  --  17.4* 17.4*   HGB 8.5*  --  7.5* 7.7*   MCV 91  --  91 92   PLT 58* 50* 36* 40*      Most Recent 3 BMP's:Recent Labs   Lab Test 01/25/23  1204 01/25/23  1023 01/25/23  0741 01/24/23  0854 01/24/23  0546 01/23/23  1716 01/23/23  1659   NA  --  141  --   --  139  --  138   POTASSIUM  --  4.4  --   --  4.2  --  3.9   CHLORIDE  --  101  --   --  102  --  101   CO2  --  25  --   --  23  --  21*   BUN  --  23.1*  --   --  15.6  --  12.7   CR  --  0.85  --   --  0.86  --  0.96   ANIONGAP  --  15  --   --  14  --  16*   ZEHRA  --  10.6*  --   --  10.4*  --  9.8   * 170* 163*   < > 174*   < > 130*    < > = values in this interval not displayed.     Most Recent 3 INR's:Recent Labs   Lab Test 01/25/23  1023 01/24/23  0546 01/23/23  0557   INR 1.31* 1.25* 1.23*   ,   Results for orders placed or performed during the hospital encounter of 01/19/23   CT Chest Pulmonary Embolism w Contrast    Narrative    EXAM: CTA pulmonary angiogram, 1/19/2023 3:24 PM    HISTORY: Male sex; No imaging to r/o PE in the last 24 hours;  Pulmonary Embolism Rule-Out Criteria (PERC) score > 0; Revised Leverett  Score (RGS) not >= 11; No D-dimer result available; D-dimer not  ordered    COMPARISON: 12/16/2022 chest CT comment post 7/20/2022 CT CAP.    TECHNIQUE: Volumetric CT images obtained through the chest with  contrast. Coronal and axial MIP reformatted images obtained.  Three-dimensional (3D) post-processed angiographic images were  reconstructed, archived to PACS and used in interpretation of this  study.     CONTRAST: iopamidol (ISOVUE-370) solution 135 mL ml isovue 370 IV.    FINDINGS:     Right chest wall Port-A-Cath tip terminates in the low thoracic SVC.  Lung volumes are low. Mosaic attenuation most prominently in the right  and left lower lobes.    Vascular: There is good contrast opacification of the pulmonary  arterial vasculature. No acute pulmonary embolus but apparent  pulmonary artery web is seen in the proximal aspect of the right upper  lobe pulmonary artery.  Right size is normal. Pulmonary artery  is  enlarged and measures 3.7 cm. No right heart strain     Remaining Chest: Central tracheobronchial tree is patent. No  consolidation, mass, or suspicious pulmonary nodules. Stable size of 6  mm (series 7, image 222) subpleural pulmonary nodule in the left lung  base. No pleural effusion or pneumothorax. No thoracic  lymphadenopathy.    Upper Abdomen: Limited evaluation demonstrates no acute findings.  Incompletely visualized cystic lesion in the superior pole the right  kidney. The spleen is enlarged and measures 17.8 cm in maximum long  axis.    Bones/Soft Tissues: No acute abnormalities or suspicious lesions.      Impression    IMPRESSION:     1. Exam is negative for acute pulmonary embolism. No right heart  strain . 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    2. Pulmonary arteries enlarged and measures up to 3.7 cm comment this  is nonspecific but most often seen with pulmonary arterial  hypertension, possibly CTEPH    3. Mosaic attenuation in the lung bases is most often seen with air  trapping or perfusional abnormalities.    4. 6 mm solid pulmonary nodule in the left lung base cannot be  reimaged in one year's time with a noncontrast chest CT.    5. Splenomegaly.    In the event of a positive result for acute pulmonary embolism  resulting in right heart strain, consider calling the   [facility] hospital  for PERT (Pulmonary Embolism Response Team)  Activation?    PERT -- Pulmonary Embolism Response Team (Multidisciplinary team  including cardiology, interventional radiology, critical care,  hematology)    I have personally reviewed the examination and initial interpretation  and I agree with the findings.    DANIELA ELIZABETH MD         SYSTEM ID:  [id number]   CT Abdomen Pelvis w Contrast    Narrative    EXAMINATION: CT ABDOMEN PELVIS W CONTRAST, 1/19/2023 3:23 PM    INDICATION: Pt with persistent leukemia and borderline  neutropenic  fever with new hypoxia and abdominal pain/bloating and back pain. Eval  infectious source and lymphadenopathy/extramedullary leukemia  involvement.    COMPARISON STUDY: Outside CT chest and pelvis 12/7/2022    TECHNIQUE: CT scan of the abdomen and pelvis was performed on  multidetector CT scanner using volumetric acquisition technique and  images were reconstructed in multiple planes with variable thickness  and reviewed on dedicated workstations.     CONTRAST: iopamidol (ISOVUE-370) solution 135 mL injected IV without  oral contrast    CT scan radiation dose is optimized to minimum requisite dose using  automated dose modulation techniques.    FINDINGS:    Support devices: None.    Mild motion related artifact.    Lower thorax: Mosaic attenuation at the lung bases. Please see same  day chest CT for detailed findings.    Liver: No focal mass lesions. No intrahepatic biliary ductal  dilatation. Hepatomegaly.    Gallbladder and bile ducts: No stones, gallbladder wall thickening, or  pericholecystic fluid. No extrahepatic biliary ductal dilation.    Spleen: Splenomegaly.    Pancreas: Atrophy of the pancreatic parenchyma. No focal pancreatic  lesion    Adrenals: No nodules.     Kidneys and ureters: No obstructive renal calculus. No hydronephrosis.  Few bilateral renal cortical hypodensities, compatible with renal  cysts and additional small to characterize renal hypodensities    Bladder: Unremarkable.    Reproductive: Unremarkable.    Bowel: Unremarkable.    Appendix: Normal.    Mesentery/Peritoneum: Unremarkable.    Lymph nodes: Scattered small abdominal nodes, not significantly  enlarged. Enlarged inguinal lymphadenopathy, measuring up to 2.2 cm in  short axis on the right (series 2 image 244). Inguinal lymph nodes  appear similar to prior CT from 12/7/2022    Vasculature: Mild atherosclerotic calcifications. No aneurysmal  dilatation.    Bone windows: No aggressive osseous abnormalities.  Degenerative  changes. Postoperative changes of bilateral total hip arthroplasties.  Streak artifacts from bilateral arthroplasty limits evaluation of the  pelvis.    Soft tissues: Fat-containing umbilical hernia. Soft tissue nodularity  in the subcutaneous right anterior abdominal wall, slightly more  conspicuous compared to prior (series 2, image 167)      Impression    IMPRESSION:       1. Hepatosplenomegaly.  2. No intra-abdominal collection. No significant enlarged  retroperitoneal, mesenteric lymph nodes  3. Enlarged inguinal lymph nodes, possibly reactive.  4.  Bilateral renal cortical hypodensities, compatible with renal cyst  and additional too  small to characterize renal hypodensities, may  consider attention on follow-up.    I have personally reviewed the examination and initial interpretation  and I agree with the findings.    LING HERNANDEZ MD         SYSTEM ID:  [id number]   XR Chest Port 1 View    Narrative    EXAM: XR CHEST PORT 1 VIEW  1/23/2023 1:49 PM      HISTORY: worsened hypoxia    COMPARISON: Chest CT 1/19/2023.    FINDINGS: Single view of the chest. Partially visualized right  shoulder arthroplasty changes. Right upper extremity PICC with tip  projecting over the right atrium. Trachea is midline. Enlarged  cardiomediastinal silhouette. Small pleural effusions. No  pneumothorax. Diffuse interstitial and airspace opacities. No acute  osseous abnormalities.      Impression    IMPRESSION:   1. Cardiomegaly with diffuse mixed pulmonary opacities. Findings most  suggestive of pulmonary edema.  2. Small bilateral pleural effusions.    I have personally reviewed the examination and initial interpretation  and I agree with the findings.    EVAN ADDISON MD         SYSTEM ID:  [id number]   XR Chest Port 1 View    Narrative    Exam: XR CHEST PORT 1 VIEW, 1/25/2023 2:14 AM    Indication: Shortness of breath    Comparison: 1/23/2023 chest x-ray and 1/19/2023 chest CT    Findings:   Semiupright, AP  view of the chest. Right PICC tip projects over the  high right atrium. The cardiac silhouette is enlarged. More prominent  pulmonary venous congestion. Small bilateral pleural effusions. No  pneumothorax.      Impression    Impression: Stable cardiomegaly with worsening pulmonary edema.    I have personally reviewed the examination and initial interpretation  and I agree with the findings.    MARCI RANGEL MD         SYSTEM ID:  [id number]   Echo Limited     Value    LVEF  60-65%    Narrative    [id number]  [id number]  [id number]  [id number]^JULIANO^CARMELO     [facility]  Echocardiography Laboratory  [address] [place] [address]     Name: RAD TAYLOR  MRN: [id number]  : 1960  Study Date: 2023 09:05 AM  Age: 62 yrs  Gender: Male  Patient Location: [facility]  Reason For Study: Chemo  Ordering Physician: CARMELO HENAO  Referring Physician: JODEE MEHTA  Performed By: Devon Cruz RDCS     BSA: 2.9 m2  Height: 73 in  Weight: 393 lb  HR: 82  BP: 123/57 mmHg  ______________________________________________________________________________  Procedure  Limited Portable Echo Adult. Contrast Optison. Patient was given 6 ml mixture  of 3 ml Optison and 6 ml saline. 3 ml wasted.  ______________________________________________________________________________  Interpretation Summary  Left ventricular size, wall motion and function are normal. The ejection  fraction is 60-65%.  Right ventricular function, chamber size, wall motion, and thickness are  normal.  No significant valvular abnormalities present.  Dilation of the inferior vena cava is present with abnormal respiratory  variation in diameter suggests a high RA pressure estimated at 15 mmHg or  greater.     This study was compared with the study from 12/10/2022. IVC is dilated today.  No other change.  ______________________________________________________________________________  Left Ventricle  Left ventricular size,  wall motion and function are normal. The ejection  fraction is 60-65%.     Right Ventricle  Right ventricular function, chamber size, wall motion, and thickness are  normal.     Atria  The atria cannot be assessed.     Mitral Valve  The mitral valve is normal.     Aortic Valve  On Doppler interrogation, there is no significant stenosis or regurgitation.  The valve leaflets are not well visualized.     Tricuspid Valve  The tricuspid valve is normal.     Pulmonic Valve  The pulmonic valve is normal.     Vessels  Dilation of the inferior vena cava is present with abnormal respiratory  variation in diameter. IVC diameter >2.1 cm collapsing <50% with sniff  suggests a high RA pressure estimated at 15 mmHg or greater.     Pericardium  No pericardial effusion is present.     Miscellaneous  No significant valvular abnormalities present.     Compared to Previous Study  This study was compared with the study from 12/10/2022 . IVC is dilated today.  No other change.     Attestation  I have personally viewed the imaging and agree with the interpretation and  report as documented by the fellow, Payam Vela, and/or edited by me.  ______________________________________________________________________________  MMode/2D Measurements & Calculations  IVSd: 1.2 cm  LVIDd: 5.2 cm  LVIDs: 3.4 cm  LVPWd: 1.3 cm  FS: 34.5 %  LV mass(C)d: 255.3 grams  LV mass(C)dI: 89.0 grams/m2  RWT: 0.49     ______________________________________________________________________________  Report approved by: Marilee JONES 01/20/2023 11:39 AM               Discharge Medications   Current Discharge Medication List      CONTINUE these medications which have NOT CHANGED    Details   acetaminophen (TYLENOL) 500 MG tablet Take 500-1,000 mg by mouth every 6 hours as needed for mild pain      acyclovir (ZOVIRAX) 400 MG tablet Take 1 tablet (400 mg) by mouth 2 times daily  Qty: 60 tablet, Refills: 0    Associated Diagnoses: Acute myeloid leukemia not having  achieved remission (H)      allopurinol (ZYLOPRIM) 300 MG tablet Take 1 tablet (300 mg) by mouth 2 times daily  Qty: 60 tablet, Refills: 0    Associated Diagnoses: Acute myeloid leukemia not having achieved remission (H)      ammonium lactate (AMLACTIN) 12 % external cream Apply topically daily  Qty: 385 g, Refills: 0    Associated Diagnoses: Acute myeloid leukemia not having achieved remission (H)      atorvastatin (LIPITOR) 20 MG tablet Take 20 mg by mouth daily      budesonide-formoterol (SYMBICORT) 160-4.5 MCG/ACT Inhaler Inhale 2 puffs into the lungs 2 times daily      bumetanide (BUMEX) 1 MG tablet Take 1 mg by mouth daily      calcium acetate (PHOSLO) 667 MG CAPS capsule Take 3 capsules (2,001 mg) by mouth 3 times daily (with meals)  Qty: 30 capsule, Refills: 0    Associated Diagnoses: Acute myeloid leukemia not having achieved remission (H)      diclofenac (VOLTAREN) 1 % topical gel Apply 2 g topically 4 times daily as needed for moderate pain (4-6)  Qty: 50 g, Refills: 0    Associated Diagnoses: Acute myeloid leukemia not having achieved remission (H)      enoxaparin ANTICOAGULANT (LOVENOX) 100 MG/ML syringe Inject 0.9 mLs (90 mg) Subcutaneous every 12 hours HOLD if platelets are less than 50K  Qty: 40 mL, Refills: 0    Associated Diagnoses: Acute myeloid leukemia not having achieved remission (H)      levofloxacin (LEVAQUIN) 250 MG tablet Take 1 tablet (250 mg) by mouth daily  Qty: 30 tablet, Refills: 0    Associated Diagnoses: Acute myeloid leukemia not having achieved remission (H)      methocarbamol (ROBAXIN) 500 MG tablet Take 1 tablet (500 mg) by mouth 4 times daily as needed for muscle spasms  Qty: 60 tablet, Refills: 0    Associated Diagnoses: Acute myeloid leukemia not having achieved remission (H)      oxyCODONE (ROXICODONE) 5 MG tablet Take 1 tablet (5 mg) by mouth every 4 hours as needed for moderate pain (4-6)  Qty: 20 tablet, Refills: 0    Associated Diagnoses: Acute myeloid leukemia not  having achieved remission (H)      pantoprazole (PROTONIX) 40 MG EC tablet Take 1 tablet (40 mg) by mouth every morning (before breakfast)  Qty: 30 tablet, Refills: 0    Associated Diagnoses: Acute myeloid leukemia not having achieved remission (H)      polyethylene glycol (MIRALAX) 17 GM/Dose powder Take 17 g by mouth daily as needed for constipation  Qty: 225 g, Refills: 0    Associated Diagnoses: Acute myeloid leukemia not having achieved remission (H)      posaconazole (NOXAFIL) 100 MG EC tablet Take 3 tablets (300 mg) by mouth every morning  Qty: 90 tablet, Refills: 0    Associated Diagnoses: Acute myeloid leukemia not having achieved remission (H)      semaglutide (OZEMPIC, 1 MG/DOSE,) 2 MG/1.5ML pen Inject 2 mg Subcutaneous every 7 days Every Wednesdays      senna-docusate (SENOKOT-S/PERICOLACE) 8.6-50 MG tablet Take 1 tablet by mouth 2 times daily as needed for constipation  Qty: 30 tablet, Refills: 0    Associated Diagnoses: Acute myeloid leukemia not having achieved remission (H)      venetoclax (VENCLEXTA) 100 MG tablet Take 1 tablet (100 mg) by mouth daily  Qty: 30 tablet, Refills: 0    Associated Diagnoses: Acute myeloid leukemia not having achieved remission (H); Leukemia not having achieved remission, unspecified leukemia type (H)           Allergies   Allergies   Allergen Reactions     Metformin Unknown

## 2023-01-29 NOTE — PROGRESS NOTES
Patient pronounced at 0730. Appreciate  praying with and helping family cope. Stopped by room to check-in with family who notes great appreciation for all staff involved in Angel's care and state that they are grateful he passed peacefully. No needs noted.    This is a non-billable note.    Conrado Flores DO / Palliative Medicine / Text me via [employer]ila[employer].     Team Consult Pager [phone] (answered 8am-430pm M-F) - ok to text page via [employer] / After-Hours Answering Service [phone] / Palliative Clinic in the [facility] - [phone] (scheduling); [phone] (triage).

## 2023-01-29 NOTE — PROGRESS NOTES
SPIRITUAL HEALTH SERVICES  SPIRITUAL ASSESSMENT Progress Note  [facility] 6D     REFERRAL SOURCE: Consult for support to family after death    Met with pt's sisters Gala and Mulu, brother Franko, niece and her  at pt's bedside following death. Provided prayer and emotional support. Family name extensive support from all who knew and loved Angel as well as those in their own communities.    PLAN: No follow-up anticipated.    Danelle Varela  Associate    Pager [phone]    [facility] remains available 24/7 for emergent requests/referrals, either by having the switchboard page the on-call  or by entering an ASAP/STAT consult in Epic (this will also page the on-call ).

## 2023-01-29 NOTE — PROGRESS NOTES
Overnight Events:     No acute events. Comfort cares continued. 2 L NC. PRN dilaudid and robinul given q1hr for comfort and secretions. PRN Ativan given before suctioning. PT. has copious thick secretions. PCA dilaudid @ 2mg/hr and Precedex gtt @ 0.6 mcg/kg/hr.     Plan: continue to manage comfort measures and suction as needed. Notify team with any changes.     Margret Hubbard RN

## 2023-01-29 NOTE — CONSULTS
[facility]    Death Visit - [facility]    The care team has confirmed the death of Orion Cardenas on 1/29/23 at 0730 per Dr. Cifuentes's exam...      RN-CNL has notified [facility] intake of patient's death.    Interventions:    Writer made visit to patient room. Provided emotional/grief support for family and visitors who were present at bedside.  Reassured them that our support does not end with the patient's passing. Collaborated with bedside nurse, Dwight, on how they can provide support as well.      Bereavement Assessment: Compound grief due to recent cumulative losses.  Bereavement services will continue to be provided for the family.    Thank you for collaborating with our team for the care of this patient. Please reach out to phone number listed below between 9am-9pm for any questions or concerns.    Lita Hardin RN  [facility]  Contact information available via [facility] Paging/Directory     [facility]

## 2023-01-29 NOTE — PLAN OF CARE
Pt  during RN-handoff comfortably at bedside. MD paged and at bedside, pronounced TOD at 0730. All family questions and concerns addressed and pt's wallet/CPAP/belongings sent home with sister Courtney.

## 2023-01-29 NOTE — PROGRESS NOTES
MD DEATH PRONOUNCEMENT     Physical Exam: Unresponsive to noxious stimuli, no spontaneous respirations. Breath and heart sounds absent, no pupillary light reflex or corneal reflex. No pulses present.    Death pronounced at: 7:30am on 23          Active Problems:   Past Medical History:   Diagnosis Date     COPD (chronic obstructive pulmonary disease) (H)      Diabetes mellitus, type 2 (H)      Gout      History of pulmonary embolism      HLD (hyperlipidemia)      HTN (hypertension)        Infectious disease present?: No     Communicable disease present? (examples: HIV, chicken pox, TB, Ebola, CJD): No     Multi-drug resistant organism present? (example: MRSA): No    Please consider an autopsy if any of the following exist:   NO  Unexpected or unexplained death during or following any dental, medical, or surgical diagnostic treatment procedures.    NO  Death of mother at or up to seven days after delivery.    NO  All  and pediatric deaths.    NO  Death where the cause is sufficiently obscure to delay completion of the death certificate.    NO  Deaths in which autopsy would confirm a suspected illness/condition that would affect surviving family members or recipients of transplanted organs.      The following deaths must be reported to the 's Office:   NO  A death that may be due entirely or in part to any factors other than natural disease (recent surgery, recent trauma, suspected abuse/neglect).    NO  A death that may be an accident, suicide, or homicide.    NO  Any sudden, unexpected death in which there is no prior history of significant heart disease or any other condition associated with sudden death.    NO  Any death which is apparently due to natural causes but in which the  does not have a personal physician familiar with the patient s medical history, social, or environmental situation or the circumstances of the terminal event.    NO  Any death apparently due to Sudden Infant  Death Syndrome.    NO  Deaths that occur during, in association with, or as consequences of a diagnostic, therapeutic, or anesthetic procedure.    NO  Any death in which a fracture of a major bone has occurred within the past (6) six months.    NO  Any death in which the  was an inmate of a public institution or was in the custody of Law Enforcement personnel.      Disposition: Autopsy was discussed with family member/POA, autopsy was declined.      Betzy Cifuentes M.D.  Attending Physician  [facility]  [phone]  Date of Service: 2023

## 2023-01-30 LAB
BACTERIA BLD CULT: NO GROWTH
BACTERIA BLD CULT: NO GROWTH

## 2023-02-02 NOTE — PROGRESS NOTES
[facility]: Cancer Care                                                                                            Patient will be getting admitted to 7D . RNCC will complete initial with him after his admission. RNCC called and gave report to 7D. No other follow up needed at this time.     Micaela Hamlin, RN, BSN  RN Care Coordinator   [phone]

## 2023-03-04 LAB — BACTERIA BLD CULT: NO GROWTH

## 2024-03-20 NOTE — PROVIDER NOTIFICATION
"   01/23/23 1200   Call Information   Date of Call 01/23/23   Time of Call 1233   Name of person requesting the team Yolande Davis   Title of person requesting team RN   RRT Arrival time 1238   Time RRT ended 1250   Reason for call   Type of RRT Adult   Primary reason for call Sepsis suspected   Sepsis Suspected Elevated Lactate level;WBC <4 or >12;Temperature <95 or >101   Was patient transferred from the ED, ICU, or PACU within last 24 hours prior to RRT call? No   SBAR   Situation Lactic Acid 2.3   Background Per Heme Onc note: \"Angel Cardenas is a 62 year old male with a history of multiple prior DVTs and PE in 2022 (previously on Xarelto), DM2, COPD, HTN, gout and recently diagnosed AMML s/p Cycle 1 of 10-days of Decitabine/Venetoclax (D1=12/19/22) with subsequent persistent disease on post-therapy BMBx 1/13/23 demonstrating ongoing ~70% blasts. He presents from clinic for consideration of re-induction chemotherapy in the setting of fever to 100.5 and AHRF.\"   Notable History/Conditions Cancer;Diabetes;COPD;Hypertension   Assessment Drowsy and oriented x3. Dyspneic and tachypneic on HFNC 60% 40LPM since 10:00 this morning after activity, was previously on 6L NC. Febrile. BPs normotensive. New red rash on belly RLQ, outlined.   Interventions Neb treatment;CXR;Meds  (Restart vano, scheduled nebs, CXR, bumex)   Patient Outcome   Patient Outcome Stabilized on unit   RRT Team   Attending/Primary/Covering Physician Heme Onc   Date Attending Physician notified 01/23/23   Time Attending Physician notified 1235  (At bedside)   Physician(s) Adriana Diaz PA-C   Lead RN Wilmer Garcia, JAVON   RN Yolande Davis, RN   RT None   Other staff N/A   Post RRT Intervention Assessment   Post RRT Assessment Stable/Improved   Date Follow Up Done 01/23/23   Time Follow Up Done 1453   Comments Vanco infusing, making urine after bumex. Still on HFNC 60% 40L saturating >92%. Attempted to turn HFNC to 50% but patient desated to 87%.       " 20-Mar-2024 18:39
